# Patient Record
Sex: MALE | Race: OTHER | NOT HISPANIC OR LATINO | URBAN - METROPOLITAN AREA
[De-identification: names, ages, dates, MRNs, and addresses within clinical notes are randomized per-mention and may not be internally consistent; named-entity substitution may affect disease eponyms.]

---

## 2021-01-01 ENCOUNTER — INPATIENT (INPATIENT)
Facility: HOSPITAL | Age: 81
LOS: 35 days | DRG: 870 | End: 2021-11-04
Attending: STUDENT IN AN ORGANIZED HEALTH CARE EDUCATION/TRAINING PROGRAM | Admitting: STUDENT IN AN ORGANIZED HEALTH CARE EDUCATION/TRAINING PROGRAM
Payer: MEDICARE

## 2021-01-01 ENCOUNTER — TRANSCRIPTION ENCOUNTER (OUTPATIENT)
Age: 81
End: 2021-01-01

## 2021-01-01 ENCOUNTER — INPATIENT (INPATIENT)
Facility: HOSPITAL | Age: 81
LOS: 2 days | Discharge: HOME | DRG: 871 | End: 2021-01-24
Attending: HOSPITALIST | Admitting: STUDENT IN AN ORGANIZED HEALTH CARE EDUCATION/TRAINING PROGRAM
Payer: MEDICARE

## 2021-01-01 ENCOUNTER — APPOINTMENT (OUTPATIENT)
Dept: DISASTER EMERGENCY | Facility: CLINIC | Age: 81
End: 2021-01-01

## 2021-01-01 ENCOUNTER — APPOINTMENT (OUTPATIENT)
Dept: GASTROENTEROLOGY | Facility: HOSPITAL | Age: 81
End: 2021-01-01

## 2021-01-01 ENCOUNTER — APPOINTMENT (OUTPATIENT)
Dept: NEUROSURGERY | Facility: HOSPITAL | Age: 81
End: 2021-01-01

## 2021-01-01 ENCOUNTER — APPOINTMENT (OUTPATIENT)
Dept: GASTROENTEROLOGY | Facility: CLINIC | Age: 81
End: 2021-01-01
Payer: MEDICARE

## 2021-01-01 VITALS
RESPIRATION RATE: 18 BRPM | SYSTOLIC BLOOD PRESSURE: 144 MMHG | DIASTOLIC BLOOD PRESSURE: 82 MMHG | HEART RATE: 71 BPM | OXYGEN SATURATION: 97 % | TEMPERATURE: 98 F

## 2021-01-01 VITALS
SYSTOLIC BLOOD PRESSURE: 117 MMHG | HEIGHT: 67 IN | DIASTOLIC BLOOD PRESSURE: 70 MMHG | OXYGEN SATURATION: 95 % | HEART RATE: 109 BPM | WEIGHT: 123.02 LBS | TEMPERATURE: 99 F | RESPIRATION RATE: 22 BRPM

## 2021-01-01 VITALS
RESPIRATION RATE: 22 BRPM | DIASTOLIC BLOOD PRESSURE: 59 MMHG | OXYGEN SATURATION: 95 % | HEART RATE: 101 BPM | TEMPERATURE: 97 F | SYSTOLIC BLOOD PRESSURE: 102 MMHG

## 2021-01-01 VITALS
HEIGHT: 67 IN | WEIGHT: 139.99 LBS | HEART RATE: 87 BPM | SYSTOLIC BLOOD PRESSURE: 145 MMHG | DIASTOLIC BLOOD PRESSURE: 75 MMHG | TEMPERATURE: 98 F | OXYGEN SATURATION: 97 % | RESPIRATION RATE: 14 BRPM

## 2021-01-01 DIAGNOSIS — E11.9 TYPE 2 DIABETES MELLITUS WITHOUT COMPLICATIONS: ICD-10-CM

## 2021-01-01 DIAGNOSIS — Z29.9 ENCOUNTER FOR PROPHYLACTIC MEASURES, UNSPECIFIED: ICD-10-CM

## 2021-01-01 DIAGNOSIS — E87.1 HYPO-OSMOLALITY AND HYPONATREMIA: ICD-10-CM

## 2021-01-01 DIAGNOSIS — G93.89 OTHER SPECIFIED DISORDERS OF BRAIN: ICD-10-CM

## 2021-01-01 DIAGNOSIS — E78.5 HYPERLIPIDEMIA, UNSPECIFIED: ICD-10-CM

## 2021-01-01 DIAGNOSIS — K94.23 GASTROSTOMY MALFUNCTION: ICD-10-CM

## 2021-01-01 DIAGNOSIS — R74.01 ELEVATION OF LEVELS OF LIVER TRANSAMINASE LEVELS: ICD-10-CM

## 2021-01-01 DIAGNOSIS — D64.9 ANEMIA, UNSPECIFIED: ICD-10-CM

## 2021-01-01 DIAGNOSIS — D50.9 IRON DEFICIENCY ANEMIA, UNSPECIFIED: ICD-10-CM

## 2021-01-01 DIAGNOSIS — Z98.49 CATARACT EXTRACTION STATUS, UNSPECIFIED EYE: Chronic | ICD-10-CM

## 2021-01-01 DIAGNOSIS — R13.10 DYSPHAGIA, UNSPECIFIED: ICD-10-CM

## 2021-01-01 DIAGNOSIS — J69.0 PNEUMONITIS DUE TO INHALATION OF FOOD AND VOMIT: ICD-10-CM

## 2021-01-01 DIAGNOSIS — E83.51 HYPOCALCEMIA: ICD-10-CM

## 2021-01-01 DIAGNOSIS — K80.50 CALCULUS OF BILE DUCT W/OUT CHOLANGITIS OR CHOLECYSTITIS W/OUT OBSTRUCTION: ICD-10-CM

## 2021-01-01 DIAGNOSIS — R78.81 BACTEREMIA: ICD-10-CM

## 2021-01-01 DIAGNOSIS — N40.0 BENIGN PROSTATIC HYPERPLASIA WITHOUT LOWER URINARY TRACT SYMPTOMS: ICD-10-CM

## 2021-01-01 DIAGNOSIS — G20 PARKINSON'S DISEASE: ICD-10-CM

## 2021-01-01 DIAGNOSIS — U07.1 COVID-19: ICD-10-CM

## 2021-01-01 DIAGNOSIS — R41.82 ALTERED MENTAL STATUS, UNSPECIFIED: ICD-10-CM

## 2021-01-01 DIAGNOSIS — Z71.89 OTHER SPECIFIED COUNSELING: ICD-10-CM

## 2021-01-01 DIAGNOSIS — I10 ESSENTIAL (PRIMARY) HYPERTENSION: ICD-10-CM

## 2021-01-01 DIAGNOSIS — K83.09 OTHER CHOLANGITIS: ICD-10-CM

## 2021-01-01 DIAGNOSIS — Z46.89 ENCOUNTER FOR FITTING AND ADJUSTMENT OF OTHER SPECIFIED DEVICES: ICD-10-CM

## 2021-01-01 DIAGNOSIS — Z86.73 PERSONAL HISTORY OF TRANSIENT ISCHEMIC ATTACK (TIA), AND CEREBRAL INFARCTION W/OUT RESIDUAL DEFICITS: ICD-10-CM

## 2021-01-01 DIAGNOSIS — R32 UNSPECIFIED URINARY INCONTINENCE: ICD-10-CM

## 2021-01-01 DIAGNOSIS — E11.9 TYPE 2 DIABETES MELLITUS W/OUT COMPLICATIONS: ICD-10-CM

## 2021-01-01 DIAGNOSIS — I62.9 NONTRAUMATIC INTRACRANIAL HEMORRHAGE, UNSPECIFIED: ICD-10-CM

## 2021-01-01 DIAGNOSIS — Z01.818 ENCOUNTER FOR OTHER PREPROCEDURAL EXAMINATION: ICD-10-CM

## 2021-01-01 DIAGNOSIS — D69.6 THROMBOCYTOPENIA, UNSPECIFIED: ICD-10-CM

## 2021-01-01 DIAGNOSIS — Z51.5 ENCOUNTER FOR PALLIATIVE CARE: ICD-10-CM

## 2021-01-01 DIAGNOSIS — E87.0 HYPEROSMOLALITY AND HYPERNATREMIA: ICD-10-CM

## 2021-01-01 DIAGNOSIS — B37.7 CANDIDAL SEPSIS: ICD-10-CM

## 2021-01-01 DIAGNOSIS — R53.2 FUNCTIONAL QUADRIPLEGIA: ICD-10-CM

## 2021-01-01 LAB
-  AMIKACIN: SIGNIFICANT CHANGE UP
-  AMIKACIN: SIGNIFICANT CHANGE UP
-  AMPHOTERICIN B: SIGNIFICANT CHANGE UP
-  AMPICILLIN/SULBACTAM: SIGNIFICANT CHANGE UP
-  AMPICILLIN/SULBACTAM: SIGNIFICANT CHANGE UP
-  AMPICILLIN: SIGNIFICANT CHANGE UP
-  AMPICILLIN: SIGNIFICANT CHANGE UP
-  ANIDULAFUNGIN: SIGNIFICANT CHANGE UP
-  AZTREONAM: SIGNIFICANT CHANGE UP
-  AZTREONAM: SIGNIFICANT CHANGE UP
-  CASPOFUNGIN: SIGNIFICANT CHANGE UP
-  CEFAZOLIN: SIGNIFICANT CHANGE UP
-  CEFAZOLIN: SIGNIFICANT CHANGE UP
-  CEFEPIME: SIGNIFICANT CHANGE UP
-  CEFEPIME: SIGNIFICANT CHANGE UP
-  CEFOXITIN: SIGNIFICANT CHANGE UP
-  CEFOXITIN: SIGNIFICANT CHANGE UP
-  CEFTAZIDIME/AVIBACTAM: SIGNIFICANT CHANGE UP
-  CEFTAZIDIME/AVIBACTAM: SIGNIFICANT CHANGE UP
-  CEFTOLOZANE/TAZOBACTAM: SIGNIFICANT CHANGE UP
-  CEFTOLOZANE/TAZOBACTAM: SIGNIFICANT CHANGE UP
-  CEFTRIAXONE: SIGNIFICANT CHANGE UP
-  CEFTRIAXONE: SIGNIFICANT CHANGE UP
-  CIPROFLOXACIN: SIGNIFICANT CHANGE UP
-  CIPROFLOXACIN: SIGNIFICANT CHANGE UP
-  ERTAPENEM: SIGNIFICANT CHANGE UP
-  ERTAPENEM: SIGNIFICANT CHANGE UP
-  FLUCONAZOLE: SIGNIFICANT CHANGE UP
-  GENTAMICIN: SIGNIFICANT CHANGE UP
-  GENTAMICIN: SIGNIFICANT CHANGE UP
-  IMIPENEM: SIGNIFICANT CHANGE UP
-  IMIPENEM: SIGNIFICANT CHANGE UP
-  INTERPRETATION: SIGNIFICANT CHANGE UP
-  K. PNEUMONIAE GROUP: SIGNIFICANT CHANGE UP
-  LEVOFLOXACIN: SIGNIFICANT CHANGE UP
-  LEVOFLOXACIN: SIGNIFICANT CHANGE UP
-  MEROPENEM: SIGNIFICANT CHANGE UP
-  MEROPENEM: SIGNIFICANT CHANGE UP
-  MICAFUNGIN: SIGNIFICANT CHANGE UP
-  PIPERACILLIN/TAZOBACTAM: SIGNIFICANT CHANGE UP
-  PIPERACILLIN/TAZOBACTAM: SIGNIFICANT CHANGE UP
-  POSACONAZOLE: SIGNIFICANT CHANGE UP
-  TIGECYCLINE: SIGNIFICANT CHANGE UP
-  TIGECYCLINE: SIGNIFICANT CHANGE UP
-  TOBRAMYCIN: SIGNIFICANT CHANGE UP
-  TOBRAMYCIN: SIGNIFICANT CHANGE UP
-  TRIMETHOPRIM/SULFAMETHOXAZOLE: SIGNIFICANT CHANGE UP
-  TRIMETHOPRIM/SULFAMETHOXAZOLE: SIGNIFICANT CHANGE UP
-  VORICONAZOLE: SIGNIFICANT CHANGE UP
A1C WITH ESTIMATED AVERAGE GLUCOSE RESULT: 8 % — HIGH (ref 4–5.6)
ALBUMIN SERPL ELPH-MCNC: 1.4 G/DL — LOW (ref 3.3–5)
ALBUMIN SERPL ELPH-MCNC: 2 G/DL — LOW (ref 3.3–5)
ALBUMIN SERPL ELPH-MCNC: 2.1 G/DL — LOW (ref 3.3–5)
ALBUMIN SERPL ELPH-MCNC: 2.2 G/DL — LOW (ref 3.3–5)
ALBUMIN SERPL ELPH-MCNC: 2.3 G/DL — LOW (ref 3.3–5)
ALBUMIN SERPL ELPH-MCNC: 2.4 G/DL — LOW (ref 3.3–5)
ALBUMIN SERPL ELPH-MCNC: 2.5 G/DL — LOW (ref 3.3–5)
ALBUMIN SERPL ELPH-MCNC: 2.6 G/DL — LOW (ref 3.3–5)
ALBUMIN SERPL ELPH-MCNC: 2.6 G/DL — LOW (ref 3.3–5)
ALBUMIN SERPL ELPH-MCNC: 2.7 G/DL — LOW (ref 3.3–5)
ALBUMIN SERPL ELPH-MCNC: 2.8 G/DL — LOW (ref 3.3–5)
ALBUMIN SERPL ELPH-MCNC: 2.9 G/DL — LOW (ref 3.3–5)
ALBUMIN SERPL ELPH-MCNC: 3 G/DL — LOW (ref 3.3–5)
ALBUMIN SERPL ELPH-MCNC: 3.1 G/DL — LOW (ref 3.3–5)
ALBUMIN SERPL ELPH-MCNC: 3.2 G/DL — LOW (ref 3.3–5)
ALBUMIN SERPL ELPH-MCNC: 3.4 G/DL — SIGNIFICANT CHANGE UP (ref 3.3–5)
ALBUMIN SERPL ELPH-MCNC: 3.4 G/DL — SIGNIFICANT CHANGE UP (ref 3.3–5)
ALBUMIN SERPL ELPH-MCNC: 3.5 G/DL — SIGNIFICANT CHANGE UP (ref 3.3–5)
ALBUMIN SERPL ELPH-MCNC: 3.5 G/DL — SIGNIFICANT CHANGE UP (ref 3.3–5)
ALP SERPL-CCNC: 100 U/L — SIGNIFICANT CHANGE UP (ref 40–120)
ALP SERPL-CCNC: 106 U/L — SIGNIFICANT CHANGE UP (ref 40–120)
ALP SERPL-CCNC: 110 U/L — SIGNIFICANT CHANGE UP (ref 40–120)
ALP SERPL-CCNC: 140 U/L — HIGH (ref 40–120)
ALP SERPL-CCNC: 145 U/L — HIGH (ref 40–120)
ALP SERPL-CCNC: 148 U/L — HIGH (ref 40–120)
ALP SERPL-CCNC: 149 U/L — HIGH (ref 40–120)
ALP SERPL-CCNC: 158 U/L — HIGH (ref 40–120)
ALP SERPL-CCNC: 167 U/L — HIGH (ref 40–120)
ALP SERPL-CCNC: 182 U/L — HIGH (ref 40–120)
ALP SERPL-CCNC: 197 U/L — HIGH (ref 40–120)
ALP SERPL-CCNC: 202 U/L — HIGH (ref 40–120)
ALP SERPL-CCNC: 206 U/L — HIGH (ref 40–120)
ALP SERPL-CCNC: 215 U/L — HIGH (ref 40–120)
ALP SERPL-CCNC: 229 U/L — HIGH (ref 40–120)
ALP SERPL-CCNC: 253 U/L — HIGH (ref 40–120)
ALP SERPL-CCNC: 260 U/L — HIGH (ref 40–120)
ALP SERPL-CCNC: 282 U/L — HIGH (ref 40–120)
ALP SERPL-CCNC: 302 U/L — HIGH (ref 40–120)
ALP SERPL-CCNC: 321 U/L — HIGH (ref 40–120)
ALP SERPL-CCNC: 77 U/L — SIGNIFICANT CHANGE UP (ref 40–120)
ALP SERPL-CCNC: 82 U/L — SIGNIFICANT CHANGE UP (ref 40–120)
ALP SERPL-CCNC: 88 U/L — SIGNIFICANT CHANGE UP (ref 40–120)
ALP SERPL-CCNC: 97 U/L — SIGNIFICANT CHANGE UP (ref 40–120)
ALP SERPL-CCNC: 97 U/L — SIGNIFICANT CHANGE UP (ref 40–120)
ALT FLD-CCNC: 10 U/L — SIGNIFICANT CHANGE UP (ref 10–45)
ALT FLD-CCNC: 10 U/L — SIGNIFICANT CHANGE UP (ref 10–45)
ALT FLD-CCNC: 11 U/L — SIGNIFICANT CHANGE UP (ref 10–45)
ALT FLD-CCNC: 118 U/L — HIGH (ref 10–45)
ALT FLD-CCNC: 12 U/L — SIGNIFICANT CHANGE UP (ref 10–45)
ALT FLD-CCNC: 128 U/L — HIGH (ref 10–45)
ALT FLD-CCNC: 13 U/L — SIGNIFICANT CHANGE UP (ref 10–45)
ALT FLD-CCNC: 133 U/L — HIGH (ref 10–45)
ALT FLD-CCNC: 14 U/L — SIGNIFICANT CHANGE UP (ref 10–45)
ALT FLD-CCNC: 149 U/L — HIGH (ref 10–45)
ALT FLD-CCNC: 166 U/L — HIGH (ref 10–45)
ALT FLD-CCNC: 214 U/L — HIGH (ref 10–45)
ALT FLD-CCNC: 222 U/L — HIGH (ref 10–45)
ALT FLD-CCNC: 25 U/L — SIGNIFICANT CHANGE UP (ref 10–45)
ALT FLD-CCNC: 49 U/L — HIGH (ref 10–45)
ALT FLD-CCNC: 56 U/L — HIGH (ref 10–45)
ALT FLD-CCNC: 68 U/L — HIGH (ref 10–45)
ALT FLD-CCNC: 70 U/L — HIGH (ref 10–45)
ALT FLD-CCNC: 77 U/L — HIGH (ref 10–45)
ALT FLD-CCNC: 80 U/L — HIGH (ref 10–45)
ALT FLD-CCNC: 86 U/L — HIGH (ref 10–45)
ALT FLD-CCNC: 95 U/L — HIGH (ref 10–45)
ALT FLD-CCNC: 98 U/L — HIGH (ref 10–45)
ANION GAP SERPL CALC-SCNC: 10 MMOL/L — SIGNIFICANT CHANGE UP (ref 5–17)
ANION GAP SERPL CALC-SCNC: 11 MMOL/L — SIGNIFICANT CHANGE UP (ref 5–17)
ANION GAP SERPL CALC-SCNC: 12 MMOL/L — SIGNIFICANT CHANGE UP (ref 5–17)
ANION GAP SERPL CALC-SCNC: 13 MMOL/L — SIGNIFICANT CHANGE UP (ref 5–17)
ANION GAP SERPL CALC-SCNC: 14 MMOL/L — SIGNIFICANT CHANGE UP (ref 5–17)
ANION GAP SERPL CALC-SCNC: 15 MMOL/L — SIGNIFICANT CHANGE UP (ref 5–17)
ANION GAP SERPL CALC-SCNC: 16 MMOL/L — SIGNIFICANT CHANGE UP (ref 5–17)
ANION GAP SERPL CALC-SCNC: 16 MMOL/L — SIGNIFICANT CHANGE UP (ref 5–17)
ANION GAP SERPL CALC-SCNC: 17 MMOL/L — SIGNIFICANT CHANGE UP (ref 5–17)
ANION GAP SERPL CALC-SCNC: 18 MMOL/L — HIGH (ref 5–17)
ANION GAP SERPL CALC-SCNC: 5 MMOL/L — SIGNIFICANT CHANGE UP (ref 5–17)
ANISOCYTOSIS BLD QL: SLIGHT — SIGNIFICANT CHANGE UP
ANISOCYTOSIS BLD QL: SLIGHT — SIGNIFICANT CHANGE UP
APPEARANCE UR: ABNORMAL
APPEARANCE UR: ABNORMAL
APPEARANCE UR: CLEAR — SIGNIFICANT CHANGE UP
APTT BLD: 18 SEC — LOW (ref 27.5–35.5)
APTT BLD: 24.2 SEC — LOW (ref 27.5–35.5)
APTT BLD: 24.8 SEC — LOW (ref 27.5–35.5)
APTT BLD: 26 SEC — LOW (ref 27.5–35.5)
APTT BLD: 27.7 SEC — SIGNIFICANT CHANGE UP (ref 27.5–35.5)
APTT BLD: 28 SEC — SIGNIFICANT CHANGE UP (ref 27.5–35.5)
APTT BLD: 29.1 SEC — SIGNIFICANT CHANGE UP (ref 27.5–35.5)
AST SERPL-CCNC: 12 U/L — SIGNIFICANT CHANGE UP (ref 10–40)
AST SERPL-CCNC: 15 U/L — SIGNIFICANT CHANGE UP (ref 10–40)
AST SERPL-CCNC: 15 U/L — SIGNIFICANT CHANGE UP (ref 10–40)
AST SERPL-CCNC: 17 U/L — SIGNIFICANT CHANGE UP (ref 10–40)
AST SERPL-CCNC: 19 U/L — SIGNIFICANT CHANGE UP (ref 10–40)
AST SERPL-CCNC: 20 U/L — SIGNIFICANT CHANGE UP (ref 10–40)
AST SERPL-CCNC: 23 U/L — SIGNIFICANT CHANGE UP (ref 10–40)
AST SERPL-CCNC: 231 U/L — HIGH (ref 10–40)
AST SERPL-CCNC: 233 U/L — HIGH (ref 10–40)
AST SERPL-CCNC: 25 U/L — SIGNIFICANT CHANGE UP (ref 10–40)
AST SERPL-CCNC: 270 U/L — HIGH (ref 10–40)
AST SERPL-CCNC: 29 U/L — SIGNIFICANT CHANGE UP (ref 10–40)
AST SERPL-CCNC: 36 U/L — SIGNIFICANT CHANGE UP (ref 10–40)
AST SERPL-CCNC: 40 U/L — SIGNIFICANT CHANGE UP (ref 10–40)
AST SERPL-CCNC: 43 U/L — HIGH (ref 10–40)
AST SERPL-CCNC: 44 U/L — HIGH (ref 10–40)
AST SERPL-CCNC: 48 U/L — HIGH (ref 10–40)
AST SERPL-CCNC: 53 U/L — HIGH (ref 10–40)
AST SERPL-CCNC: 57 U/L — HIGH (ref 10–40)
AST SERPL-CCNC: 57 U/L — HIGH (ref 10–40)
AST SERPL-CCNC: 64 U/L — HIGH (ref 10–40)
AST SERPL-CCNC: 67 U/L — HIGH (ref 10–40)
AST SERPL-CCNC: 69 U/L — HIGH (ref 10–40)
AST SERPL-CCNC: 86 U/L — HIGH (ref 10–40)
AST SERPL-CCNC: 88 U/L — HIGH (ref 10–40)
BACTERIA # UR AUTO: ABNORMAL
BACTERIA # UR AUTO: NEGATIVE — SIGNIFICANT CHANGE UP
BASE EXCESS BLDA CALC-SCNC: 5.2 MMOL/L — HIGH (ref -2–3)
BASE EXCESS BLDV CALC-SCNC: -2.8 MMOL/L — LOW (ref -2–2)
BASE EXCESS BLDV CALC-SCNC: -4.4 MMOL/L — LOW (ref -2–2)
BASE EXCESS BLDV CALC-SCNC: -6.9 MMOL/L — LOW (ref -2–2)
BASE EXCESS BLDV CALC-SCNC: -7.2 MMOL/L — LOW (ref -2–2)
BASE EXCESS BLDV CALC-SCNC: -8.2 MMOL/L — LOW (ref -2–2)
BASOPHILS # BLD AUTO: 0 K/UL — SIGNIFICANT CHANGE UP (ref 0–0.2)
BASOPHILS # BLD AUTO: 0.01 K/UL — SIGNIFICANT CHANGE UP (ref 0–0.2)
BASOPHILS # BLD AUTO: 0.05 K/UL — SIGNIFICANT CHANGE UP (ref 0–0.2)
BASOPHILS NFR BLD AUTO: 0 % — SIGNIFICANT CHANGE UP (ref 0–2)
BASOPHILS NFR BLD AUTO: 0.1 % — SIGNIFICANT CHANGE UP (ref 0–2)
BASOPHILS NFR BLD AUTO: 0.3 % — SIGNIFICANT CHANGE UP (ref 0–2)
BILIRUB SERPL-MCNC: 0.2 MG/DL — SIGNIFICANT CHANGE UP (ref 0.2–1.2)
BILIRUB SERPL-MCNC: 0.3 MG/DL — SIGNIFICANT CHANGE UP (ref 0.2–1.2)
BILIRUB SERPL-MCNC: 0.4 MG/DL — SIGNIFICANT CHANGE UP (ref 0.2–1.2)
BILIRUB SERPL-MCNC: 0.5 MG/DL — SIGNIFICANT CHANGE UP (ref 0.2–1.2)
BILIRUB SERPL-MCNC: 0.5 MG/DL — SIGNIFICANT CHANGE UP (ref 0.2–1.2)
BILIRUB SERPL-MCNC: 0.6 MG/DL — SIGNIFICANT CHANGE UP (ref 0.2–1.2)
BILIRUB SERPL-MCNC: 0.6 MG/DL — SIGNIFICANT CHANGE UP (ref 0.2–1.2)
BILIRUB SERPL-MCNC: 0.7 MG/DL — SIGNIFICANT CHANGE UP (ref 0.2–1.2)
BILIRUB SERPL-MCNC: 0.8 MG/DL — SIGNIFICANT CHANGE UP (ref 0.2–1.2)
BILIRUB SERPL-MCNC: 0.9 MG/DL — SIGNIFICANT CHANGE UP (ref 0.2–1.2)
BILIRUB SERPL-MCNC: 1.2 MG/DL — SIGNIFICANT CHANGE UP (ref 0.2–1.2)
BILIRUB SERPL-MCNC: 1.2 MG/DL — SIGNIFICANT CHANGE UP (ref 0.2–1.2)
BILIRUB SERPL-MCNC: 1.6 MG/DL — HIGH (ref 0.2–1.2)
BILIRUB SERPL-MCNC: 2.7 MG/DL — HIGH (ref 0.2–1.2)
BILIRUB SERPL-MCNC: 2.7 MG/DL — HIGH (ref 0.2–1.2)
BILIRUB SERPL-MCNC: 3.5 MG/DL — HIGH (ref 0.2–1.2)
BILIRUB UR-MCNC: ABNORMAL
BILIRUB UR-MCNC: NEGATIVE — SIGNIFICANT CHANGE UP
BLD GP AB SCN SERPL QL: NEGATIVE — SIGNIFICANT CHANGE UP
BUN SERPL-MCNC: 15 MG/DL — SIGNIFICANT CHANGE UP (ref 7–23)
BUN SERPL-MCNC: 16 MG/DL — SIGNIFICANT CHANGE UP (ref 7–23)
BUN SERPL-MCNC: 17 MG/DL — SIGNIFICANT CHANGE UP (ref 7–23)
BUN SERPL-MCNC: 18 MG/DL — SIGNIFICANT CHANGE UP (ref 7–23)
BUN SERPL-MCNC: 18 MG/DL — SIGNIFICANT CHANGE UP (ref 7–23)
BUN SERPL-MCNC: 19 MG/DL — SIGNIFICANT CHANGE UP (ref 7–23)
BUN SERPL-MCNC: 19 MG/DL — SIGNIFICANT CHANGE UP (ref 7–23)
BUN SERPL-MCNC: 20 MG/DL — SIGNIFICANT CHANGE UP (ref 7–23)
BUN SERPL-MCNC: 21 MG/DL — SIGNIFICANT CHANGE UP (ref 7–23)
BUN SERPL-MCNC: 22 MG/DL — SIGNIFICANT CHANGE UP (ref 7–23)
BUN SERPL-MCNC: 23 MG/DL — SIGNIFICANT CHANGE UP (ref 7–23)
BUN SERPL-MCNC: 24 MG/DL — HIGH (ref 7–23)
BUN SERPL-MCNC: 25 MG/DL — HIGH (ref 7–23)
BUN SERPL-MCNC: 26 MG/DL — HIGH (ref 7–23)
BUN SERPL-MCNC: 28 MG/DL — HIGH (ref 7–23)
BUN SERPL-MCNC: 29 MG/DL — HIGH (ref 7–23)
BUN SERPL-MCNC: 30 MG/DL — HIGH (ref 7–23)
BUN SERPL-MCNC: 31 MG/DL — HIGH (ref 7–23)
BUN SERPL-MCNC: 33 MG/DL — HIGH (ref 7–23)
BUN SERPL-MCNC: 34 MG/DL — HIGH (ref 7–23)
BUN SERPL-MCNC: 34 MG/DL — HIGH (ref 7–23)
BUN SERPL-MCNC: 37 MG/DL — HIGH (ref 7–23)
BUN SERPL-MCNC: 38 MG/DL — HIGH (ref 7–23)
BUN SERPL-MCNC: 39 MG/DL — HIGH (ref 7–23)
BUN SERPL-MCNC: 39 MG/DL — HIGH (ref 7–23)
BUN SERPL-MCNC: 41 MG/DL — HIGH (ref 7–23)
BUN SERPL-MCNC: 43 MG/DL — HIGH (ref 7–23)
BUN SERPL-MCNC: 44 MG/DL — HIGH (ref 7–23)
BUN SERPL-MCNC: 45 MG/DL — HIGH (ref 7–23)
BUN SERPL-MCNC: 46 MG/DL — HIGH (ref 7–23)
BUN SERPL-MCNC: 46 MG/DL — HIGH (ref 7–23)
BUN SERPL-MCNC: 50 MG/DL — HIGH (ref 7–23)
BURR CELLS BLD QL SMEAR: PRESENT — SIGNIFICANT CHANGE UP
C AURIS DNA BLD POS QL NAA+PROBE: SIGNIFICANT CHANGE UP
CA-I BLD-SCNC: 1.07 MMOL/L — LOW (ref 1.15–1.33)
CA-I SERPL-SCNC: 1.17 MMOL/L — SIGNIFICANT CHANGE UP (ref 1.15–1.33)
CA-I SERPL-SCNC: 1.18 MMOL/L — SIGNIFICANT CHANGE UP (ref 1.15–1.33)
CA-I SERPL-SCNC: 1.19 MMOL/L — SIGNIFICANT CHANGE UP (ref 1.15–1.33)
CA-I SERPL-SCNC: 1.21 MMOL/L — SIGNIFICANT CHANGE UP (ref 1.15–1.33)
CA-I SERPL-SCNC: 1.23 MMOL/L — SIGNIFICANT CHANGE UP (ref 1.15–1.33)
CALCIUM SERPL-MCNC: 5.1 MG/DL — CRITICAL LOW (ref 8.4–10.5)
CALCIUM SERPL-MCNC: 7 MG/DL — LOW (ref 8.4–10.5)
CALCIUM SERPL-MCNC: 7.3 MG/DL — LOW (ref 8.4–10.5)
CALCIUM SERPL-MCNC: 7.6 MG/DL — LOW (ref 8.4–10.5)
CALCIUM SERPL-MCNC: 7.6 MG/DL — LOW (ref 8.4–10.5)
CALCIUM SERPL-MCNC: 7.7 MG/DL — LOW (ref 8.4–10.5)
CALCIUM SERPL-MCNC: 7.8 MG/DL — LOW (ref 8.4–10.5)
CALCIUM SERPL-MCNC: 7.9 MG/DL — LOW (ref 8.4–10.5)
CALCIUM SERPL-MCNC: 8 MG/DL — LOW (ref 8.4–10.5)
CALCIUM SERPL-MCNC: 8.1 MG/DL — LOW (ref 8.4–10.5)
CALCIUM SERPL-MCNC: 8.2 MG/DL — LOW (ref 8.4–10.5)
CALCIUM SERPL-MCNC: 8.3 MG/DL — LOW (ref 8.4–10.5)
CALCIUM SERPL-MCNC: 8.4 MG/DL — SIGNIFICANT CHANGE UP (ref 8.4–10.5)
CALCIUM SERPL-MCNC: 8.5 MG/DL — SIGNIFICANT CHANGE UP (ref 8.4–10.5)
CALCIUM SERPL-MCNC: 8.7 MG/DL — SIGNIFICANT CHANGE UP (ref 8.4–10.5)
CALCIUM SERPL-MCNC: 8.8 MG/DL — SIGNIFICANT CHANGE UP (ref 8.4–10.5)
CALCIUM SERPL-MCNC: 8.9 MG/DL — SIGNIFICANT CHANGE UP (ref 8.4–10.5)
CALCIUM SERPL-MCNC: 8.9 MG/DL — SIGNIFICANT CHANGE UP (ref 8.4–10.5)
CHLORIDE BLDV-SCNC: 106 MMOL/L — SIGNIFICANT CHANGE UP (ref 96–108)
CHLORIDE BLDV-SCNC: 107 MMOL/L — SIGNIFICANT CHANGE UP (ref 96–108)
CHLORIDE BLDV-SCNC: 115 MMOL/L — HIGH (ref 96–108)
CHLORIDE BLDV-SCNC: 80 MMOL/L — LOW (ref 96–108)
CHLORIDE BLDV-SCNC: 90 MMOL/L — LOW (ref 96–108)
CHLORIDE SERPL-SCNC: 101 MMOL/L — SIGNIFICANT CHANGE UP (ref 96–108)
CHLORIDE SERPL-SCNC: 102 MMOL/L — SIGNIFICANT CHANGE UP (ref 96–108)
CHLORIDE SERPL-SCNC: 103 MMOL/L — SIGNIFICANT CHANGE UP (ref 96–108)
CHLORIDE SERPL-SCNC: 104 MMOL/L — SIGNIFICANT CHANGE UP (ref 96–108)
CHLORIDE SERPL-SCNC: 104 MMOL/L — SIGNIFICANT CHANGE UP (ref 96–108)
CHLORIDE SERPL-SCNC: 105 MMOL/L — SIGNIFICANT CHANGE UP (ref 96–108)
CHLORIDE SERPL-SCNC: 106 MMOL/L — SIGNIFICANT CHANGE UP (ref 96–108)
CHLORIDE SERPL-SCNC: 106 MMOL/L — SIGNIFICANT CHANGE UP (ref 96–108)
CHLORIDE SERPL-SCNC: 108 MMOL/L — SIGNIFICANT CHANGE UP (ref 96–108)
CHLORIDE SERPL-SCNC: 109 MMOL/L — HIGH (ref 96–108)
CHLORIDE SERPL-SCNC: 110 MMOL/L — HIGH (ref 96–108)
CHLORIDE SERPL-SCNC: 111 MMOL/L — HIGH (ref 96–108)
CHLORIDE SERPL-SCNC: 112 MMOL/L — HIGH (ref 96–108)
CHLORIDE SERPL-SCNC: 113 MMOL/L — HIGH (ref 96–108)
CHLORIDE SERPL-SCNC: 113 MMOL/L — HIGH (ref 96–108)
CHLORIDE SERPL-SCNC: 114 MMOL/L — HIGH (ref 96–108)
CHLORIDE SERPL-SCNC: 114 MMOL/L — HIGH (ref 96–108)
CHLORIDE SERPL-SCNC: 115 MMOL/L — HIGH (ref 96–108)
CHLORIDE SERPL-SCNC: 116 MMOL/L — HIGH (ref 96–108)
CHLORIDE SERPL-SCNC: 117 MMOL/L — HIGH (ref 96–108)
CHLORIDE SERPL-SCNC: 119 MMOL/L — HIGH (ref 96–108)
CHLORIDE SERPL-SCNC: 79 MMOL/L — LOW (ref 96–108)
CHLORIDE SERPL-SCNC: 84 MMOL/L — LOW (ref 96–108)
CHLORIDE SERPL-SCNC: 86 MMOL/L — LOW (ref 96–108)
CHLORIDE SERPL-SCNC: 87 MMOL/L — LOW (ref 96–108)
CHLORIDE SERPL-SCNC: 90 MMOL/L — LOW (ref 96–108)
CHLORIDE SERPL-SCNC: 90 MMOL/L — LOW (ref 96–108)
CHLORIDE SERPL-SCNC: 91 MMOL/L — LOW (ref 96–108)
CHLORIDE SERPL-SCNC: 91 MMOL/L — LOW (ref 96–108)
CHLORIDE SERPL-SCNC: 94 MMOL/L — LOW (ref 96–108)
CHLORIDE SERPL-SCNC: 97 MMOL/L — SIGNIFICANT CHANGE UP (ref 96–108)
CHLORIDE SERPL-SCNC: 98 MMOL/L — SIGNIFICANT CHANGE UP (ref 96–108)
CHLORIDE SERPL-SCNC: 99 MMOL/L — SIGNIFICANT CHANGE UP (ref 96–108)
CHLORIDE UR-SCNC: 27 MMOL/L — SIGNIFICANT CHANGE UP
CO2 BLDA-SCNC: 28 MMOL/L — HIGH (ref 19–24)
CO2 BLDV-SCNC: 18 MMOL/L — LOW (ref 22–26)
CO2 BLDV-SCNC: 21 MMOL/L — LOW (ref 22–26)
CO2 BLDV-SCNC: 23 MMOL/L — SIGNIFICANT CHANGE UP (ref 22–26)
CO2 SERPL-SCNC: 12 MMOL/L — LOW (ref 22–31)
CO2 SERPL-SCNC: 13 MMOL/L — LOW (ref 22–31)
CO2 SERPL-SCNC: 14 MMOL/L — LOW (ref 22–31)
CO2 SERPL-SCNC: 15 MMOL/L — LOW (ref 22–31)
CO2 SERPL-SCNC: 16 MMOL/L — LOW (ref 22–31)
CO2 SERPL-SCNC: 17 MMOL/L — LOW (ref 22–31)
CO2 SERPL-SCNC: 18 MMOL/L — LOW (ref 22–31)
CO2 SERPL-SCNC: 20 MMOL/L — LOW (ref 22–31)
CO2 SERPL-SCNC: 21 MMOL/L — LOW (ref 22–31)
CO2 SERPL-SCNC: 22 MMOL/L — SIGNIFICANT CHANGE UP (ref 22–31)
CO2 SERPL-SCNC: 23 MMOL/L — SIGNIFICANT CHANGE UP (ref 22–31)
CO2 SERPL-SCNC: 24 MMOL/L — SIGNIFICANT CHANGE UP (ref 22–31)
CO2 SERPL-SCNC: 25 MMOL/L — SIGNIFICANT CHANGE UP (ref 22–31)
CO2 SERPL-SCNC: 26 MMOL/L — SIGNIFICANT CHANGE UP (ref 22–31)
COLOR SPEC: YELLOW — SIGNIFICANT CHANGE UP
COMMENT - URINE: SIGNIFICANT CHANGE UP
CORTICOSTEROID BINDING GLOBULIN RESULT: 1.6 MG/DL — LOW
CORTIS F/TOTAL MFR SERPL: 24 % — SIGNIFICANT CHANGE UP
CORTIS SERPL-MCNC: 13 UG/DL — SIGNIFICANT CHANGE UP
CORTISOL, FREE RESULT: 3.1 UG/DL — HIGH
COVID-19 SPIKE DOMAIN AB INTERP: POSITIVE
COVID-19 SPIKE DOMAIN ANTIBODY RESULT: 73.5 U/ML — HIGH
CREAT ?TM UR-MCNC: 21 MG/DL — SIGNIFICANT CHANGE UP
CREAT ?TM UR-MCNC: 32 MG/DL — SIGNIFICANT CHANGE UP
CREAT SERPL-MCNC: 0.3 MG/DL — LOW (ref 0.5–1.3)
CREAT SERPL-MCNC: 0.31 MG/DL — LOW (ref 0.5–1.3)
CREAT SERPL-MCNC: 0.32 MG/DL — LOW (ref 0.5–1.3)
CREAT SERPL-MCNC: 0.33 MG/DL — LOW (ref 0.5–1.3)
CREAT SERPL-MCNC: 0.34 MG/DL — LOW (ref 0.5–1.3)
CREAT SERPL-MCNC: 0.35 MG/DL — LOW (ref 0.5–1.3)
CREAT SERPL-MCNC: 0.36 MG/DL — LOW (ref 0.5–1.3)
CREAT SERPL-MCNC: 0.36 MG/DL — LOW (ref 0.5–1.3)
CREAT SERPL-MCNC: 0.37 MG/DL — LOW (ref 0.5–1.3)
CREAT SERPL-MCNC: 0.38 MG/DL — LOW (ref 0.5–1.3)
CREAT SERPL-MCNC: 0.39 MG/DL — LOW (ref 0.5–1.3)
CREAT SERPL-MCNC: 0.4 MG/DL — LOW (ref 0.5–1.3)
CREAT SERPL-MCNC: 0.41 MG/DL — LOW (ref 0.5–1.3)
CREAT SERPL-MCNC: 0.42 MG/DL — LOW (ref 0.5–1.3)
CREAT SERPL-MCNC: 0.43 MG/DL — LOW (ref 0.5–1.3)
CREAT SERPL-MCNC: 0.44 MG/DL — LOW (ref 0.5–1.3)
CREAT SERPL-MCNC: 0.45 MG/DL — LOW (ref 0.5–1.3)
CREAT SERPL-MCNC: 0.46 MG/DL — LOW (ref 0.5–1.3)
CREAT SERPL-MCNC: 0.46 MG/DL — LOW (ref 0.5–1.3)
CREAT SERPL-MCNC: 0.47 MG/DL — LOW (ref 0.5–1.3)
CREAT SERPL-MCNC: 0.47 MG/DL — LOW (ref 0.5–1.3)
CREAT SERPL-MCNC: 0.48 MG/DL — LOW (ref 0.5–1.3)
CREAT SERPL-MCNC: 0.49 MG/DL — LOW (ref 0.5–1.3)
CREAT SERPL-MCNC: 0.89 MG/DL — SIGNIFICANT CHANGE UP (ref 0.5–1.3)
CREAT SERPL-MCNC: 1.01 MG/DL — SIGNIFICANT CHANGE UP (ref 0.5–1.3)
CREAT SERPL-MCNC: 1.02 MG/DL — SIGNIFICANT CHANGE UP (ref 0.5–1.3)
CREAT SERPL-MCNC: 1.05 MG/DL — SIGNIFICANT CHANGE UP (ref 0.5–1.3)
CREAT SERPL-MCNC: 1.08 MG/DL — SIGNIFICANT CHANGE UP (ref 0.5–1.3)
CREAT SERPL-MCNC: <0.3 MG/DL — LOW (ref 0.5–1.3)
CULTURE RESULTS: SIGNIFICANT CHANGE UP
D DIMER BLD IA.RAPID-MCNC: 981 NG/ML DDU — HIGH
DACRYOCYTES BLD QL SMEAR: SLIGHT — SIGNIFICANT CHANGE UP
DIFF PNL FLD: ABNORMAL
DIFF PNL FLD: NEGATIVE — SIGNIFICANT CHANGE UP
DIFF PNL FLD: NEGATIVE — SIGNIFICANT CHANGE UP
ELLIPTOCYTES BLD QL SMEAR: SLIGHT — SIGNIFICANT CHANGE UP
ELLIPTOCYTES BLD QL SMEAR: SLIGHT — SIGNIFICANT CHANGE UP
EOSINOPHIL # BLD AUTO: 0 K/UL — SIGNIFICANT CHANGE UP (ref 0–0.5)
EOSINOPHIL # BLD AUTO: 0.11 K/UL — SIGNIFICANT CHANGE UP (ref 0–0.5)
EOSINOPHIL NFR BLD AUTO: 0 % — SIGNIFICANT CHANGE UP (ref 0–6)
EOSINOPHIL NFR BLD AUTO: 0.8 % — SIGNIFICANT CHANGE UP (ref 0–6)
EPI CELLS # UR: 1 — SIGNIFICANT CHANGE UP
EPI CELLS # UR: 3 /HPF — SIGNIFICANT CHANGE UP
EPI CELLS # UR: 6 — HIGH
EPI CELLS # UR: 7 /HPF — HIGH
ESTIMATED AVERAGE GLUCOSE: 183 MG/DL — HIGH (ref 68–114)
FACT X ACT/NOR PPP: 64 % — LOW (ref 70–170)
FERRITIN SERPL-MCNC: 292 NG/ML — SIGNIFICANT CHANGE UP (ref 30–400)
FERRITIN SERPL-MCNC: 666 NG/ML — HIGH (ref 30–400)
FIBRINOGEN PPP-MCNC: 366 MG/DL — SIGNIFICANT CHANGE UP (ref 290–520)
FIBRINOGEN PPP-MCNC: 572 MG/DL — HIGH (ref 290–520)
FOLATE SERPL-MCNC: 4.3 NG/ML — LOW
FOLATE SERPL-MCNC: 8.5 NG/ML — SIGNIFICANT CHANGE UP
GAS PNL BLDA: SIGNIFICANT CHANGE UP
GAS PNL BLDV: 109 MMOL/L — CRITICAL LOW (ref 136–145)
GAS PNL BLDV: 116 MMOL/L — CRITICAL LOW (ref 136–145)
GAS PNL BLDV: 132 MMOL/L — LOW (ref 136–145)
GAS PNL BLDV: 137 MMOL/L — SIGNIFICANT CHANGE UP (ref 136–145)
GAS PNL BLDV: 144 MMOL/L — SIGNIFICANT CHANGE UP (ref 136–145)
GAS PNL BLDV: SIGNIFICANT CHANGE UP
GIANT PLATELETS BLD QL SMEAR: PRESENT — SIGNIFICANT CHANGE UP
GLUCOSE BLDC GLUCOMTR-MCNC: 100 MG/DL — HIGH (ref 70–99)
GLUCOSE BLDC GLUCOMTR-MCNC: 101 MG/DL — HIGH (ref 70–99)
GLUCOSE BLDC GLUCOMTR-MCNC: 102 MG/DL — HIGH (ref 70–99)
GLUCOSE BLDC GLUCOMTR-MCNC: 103 MG/DL — HIGH (ref 70–99)
GLUCOSE BLDC GLUCOMTR-MCNC: 103 MG/DL — HIGH (ref 70–99)
GLUCOSE BLDC GLUCOMTR-MCNC: 104 MG/DL — HIGH (ref 70–99)
GLUCOSE BLDC GLUCOMTR-MCNC: 104 MG/DL — HIGH (ref 70–99)
GLUCOSE BLDC GLUCOMTR-MCNC: 106 MG/DL — HIGH (ref 70–99)
GLUCOSE BLDC GLUCOMTR-MCNC: 109 MG/DL — HIGH (ref 70–99)
GLUCOSE BLDC GLUCOMTR-MCNC: 109 MG/DL — HIGH (ref 70–99)
GLUCOSE BLDC GLUCOMTR-MCNC: 110 MG/DL — HIGH (ref 70–99)
GLUCOSE BLDC GLUCOMTR-MCNC: 111 MG/DL — HIGH (ref 70–99)
GLUCOSE BLDC GLUCOMTR-MCNC: 112 MG/DL — HIGH (ref 70–99)
GLUCOSE BLDC GLUCOMTR-MCNC: 114 MG/DL — HIGH (ref 70–99)
GLUCOSE BLDC GLUCOMTR-MCNC: 114 MG/DL — HIGH (ref 70–99)
GLUCOSE BLDC GLUCOMTR-MCNC: 115 MG/DL — HIGH (ref 70–99)
GLUCOSE BLDC GLUCOMTR-MCNC: 117 MG/DL — HIGH (ref 70–99)
GLUCOSE BLDC GLUCOMTR-MCNC: 117 MG/DL — HIGH (ref 70–99)
GLUCOSE BLDC GLUCOMTR-MCNC: 118 MG/DL — HIGH (ref 70–99)
GLUCOSE BLDC GLUCOMTR-MCNC: 119 MG/DL — HIGH (ref 70–99)
GLUCOSE BLDC GLUCOMTR-MCNC: 120 MG/DL — HIGH (ref 70–99)
GLUCOSE BLDC GLUCOMTR-MCNC: 120 MG/DL — HIGH (ref 70–99)
GLUCOSE BLDC GLUCOMTR-MCNC: 122 MG/DL — HIGH (ref 70–99)
GLUCOSE BLDC GLUCOMTR-MCNC: 122 MG/DL — HIGH (ref 70–99)
GLUCOSE BLDC GLUCOMTR-MCNC: 124 MG/DL — HIGH (ref 70–99)
GLUCOSE BLDC GLUCOMTR-MCNC: 125 MG/DL — HIGH (ref 70–99)
GLUCOSE BLDC GLUCOMTR-MCNC: 126 MG/DL — HIGH (ref 70–99)
GLUCOSE BLDC GLUCOMTR-MCNC: 128 MG/DL — HIGH (ref 70–99)
GLUCOSE BLDC GLUCOMTR-MCNC: 130 MG/DL — HIGH (ref 70–99)
GLUCOSE BLDC GLUCOMTR-MCNC: 132 MG/DL — HIGH (ref 70–99)
GLUCOSE BLDC GLUCOMTR-MCNC: 133 MG/DL — HIGH (ref 70–99)
GLUCOSE BLDC GLUCOMTR-MCNC: 133 MG/DL — HIGH (ref 70–99)
GLUCOSE BLDC GLUCOMTR-MCNC: 135 MG/DL — HIGH (ref 70–99)
GLUCOSE BLDC GLUCOMTR-MCNC: 136 MG/DL — HIGH (ref 70–99)
GLUCOSE BLDC GLUCOMTR-MCNC: 137 MG/DL — HIGH (ref 70–99)
GLUCOSE BLDC GLUCOMTR-MCNC: 138 MG/DL — HIGH (ref 70–99)
GLUCOSE BLDC GLUCOMTR-MCNC: 138 MG/DL — HIGH (ref 70–99)
GLUCOSE BLDC GLUCOMTR-MCNC: 139 MG/DL — HIGH (ref 70–99)
GLUCOSE BLDC GLUCOMTR-MCNC: 140 MG/DL — HIGH (ref 70–99)
GLUCOSE BLDC GLUCOMTR-MCNC: 143 MG/DL — HIGH (ref 70–99)
GLUCOSE BLDC GLUCOMTR-MCNC: 144 MG/DL — HIGH (ref 70–99)
GLUCOSE BLDC GLUCOMTR-MCNC: 146 MG/DL — HIGH (ref 70–99)
GLUCOSE BLDC GLUCOMTR-MCNC: 147 MG/DL — HIGH (ref 70–99)
GLUCOSE BLDC GLUCOMTR-MCNC: 149 MG/DL — HIGH (ref 70–99)
GLUCOSE BLDC GLUCOMTR-MCNC: 152 MG/DL — HIGH (ref 70–99)
GLUCOSE BLDC GLUCOMTR-MCNC: 153 MG/DL — HIGH (ref 70–99)
GLUCOSE BLDC GLUCOMTR-MCNC: 155 MG/DL — HIGH (ref 70–99)
GLUCOSE BLDC GLUCOMTR-MCNC: 157 MG/DL — HIGH (ref 70–99)
GLUCOSE BLDC GLUCOMTR-MCNC: 158 MG/DL — HIGH (ref 70–99)
GLUCOSE BLDC GLUCOMTR-MCNC: 159 MG/DL — HIGH (ref 70–99)
GLUCOSE BLDC GLUCOMTR-MCNC: 161 MG/DL — HIGH (ref 70–99)
GLUCOSE BLDC GLUCOMTR-MCNC: 168 MG/DL — HIGH (ref 70–99)
GLUCOSE BLDC GLUCOMTR-MCNC: 168 MG/DL — HIGH (ref 70–99)
GLUCOSE BLDC GLUCOMTR-MCNC: 170 MG/DL — HIGH (ref 70–99)
GLUCOSE BLDC GLUCOMTR-MCNC: 171 MG/DL — HIGH (ref 70–99)
GLUCOSE BLDC GLUCOMTR-MCNC: 171 MG/DL — HIGH (ref 70–99)
GLUCOSE BLDC GLUCOMTR-MCNC: 174 MG/DL — HIGH (ref 70–99)
GLUCOSE BLDC GLUCOMTR-MCNC: 174 MG/DL — HIGH (ref 70–99)
GLUCOSE BLDC GLUCOMTR-MCNC: 175 MG/DL — HIGH (ref 70–99)
GLUCOSE BLDC GLUCOMTR-MCNC: 179 MG/DL — HIGH (ref 70–99)
GLUCOSE BLDC GLUCOMTR-MCNC: 179 MG/DL — HIGH (ref 70–99)
GLUCOSE BLDC GLUCOMTR-MCNC: 181 MG/DL — HIGH (ref 70–99)
GLUCOSE BLDC GLUCOMTR-MCNC: 181 MG/DL — HIGH (ref 70–99)
GLUCOSE BLDC GLUCOMTR-MCNC: 184 MG/DL — HIGH (ref 70–99)
GLUCOSE BLDC GLUCOMTR-MCNC: 186 MG/DL — HIGH (ref 70–99)
GLUCOSE BLDC GLUCOMTR-MCNC: 189 MG/DL — HIGH (ref 70–99)
GLUCOSE BLDC GLUCOMTR-MCNC: 192 MG/DL — HIGH (ref 70–99)
GLUCOSE BLDC GLUCOMTR-MCNC: 193 MG/DL — HIGH (ref 70–99)
GLUCOSE BLDC GLUCOMTR-MCNC: 194 MG/DL — HIGH (ref 70–99)
GLUCOSE BLDC GLUCOMTR-MCNC: 194 MG/DL — HIGH (ref 70–99)
GLUCOSE BLDC GLUCOMTR-MCNC: 195 MG/DL — HIGH (ref 70–99)
GLUCOSE BLDC GLUCOMTR-MCNC: 198 MG/DL — HIGH (ref 70–99)
GLUCOSE BLDC GLUCOMTR-MCNC: 201 MG/DL — HIGH (ref 70–99)
GLUCOSE BLDC GLUCOMTR-MCNC: 203 MG/DL — HIGH (ref 70–99)
GLUCOSE BLDC GLUCOMTR-MCNC: 203 MG/DL — HIGH (ref 70–99)
GLUCOSE BLDC GLUCOMTR-MCNC: 205 MG/DL — HIGH (ref 70–99)
GLUCOSE BLDC GLUCOMTR-MCNC: 206 MG/DL — HIGH (ref 70–99)
GLUCOSE BLDC GLUCOMTR-MCNC: 208 MG/DL — HIGH (ref 70–99)
GLUCOSE BLDC GLUCOMTR-MCNC: 209 MG/DL — HIGH (ref 70–99)
GLUCOSE BLDC GLUCOMTR-MCNC: 209 MG/DL — HIGH (ref 70–99)
GLUCOSE BLDC GLUCOMTR-MCNC: 212 MG/DL — HIGH (ref 70–99)
GLUCOSE BLDC GLUCOMTR-MCNC: 214 MG/DL — HIGH (ref 70–99)
GLUCOSE BLDC GLUCOMTR-MCNC: 215 MG/DL — HIGH (ref 70–99)
GLUCOSE BLDC GLUCOMTR-MCNC: 217 MG/DL — HIGH (ref 70–99)
GLUCOSE BLDC GLUCOMTR-MCNC: 218 MG/DL — HIGH (ref 70–99)
GLUCOSE BLDC GLUCOMTR-MCNC: 218 MG/DL — HIGH (ref 70–99)
GLUCOSE BLDC GLUCOMTR-MCNC: 220 MG/DL — HIGH (ref 70–99)
GLUCOSE BLDC GLUCOMTR-MCNC: 221 MG/DL — HIGH (ref 70–99)
GLUCOSE BLDC GLUCOMTR-MCNC: 222 MG/DL — HIGH (ref 70–99)
GLUCOSE BLDC GLUCOMTR-MCNC: 223 MG/DL — HIGH (ref 70–99)
GLUCOSE BLDC GLUCOMTR-MCNC: 224 MG/DL — HIGH (ref 70–99)
GLUCOSE BLDC GLUCOMTR-MCNC: 226 MG/DL — HIGH (ref 70–99)
GLUCOSE BLDC GLUCOMTR-MCNC: 227 MG/DL — HIGH (ref 70–99)
GLUCOSE BLDC GLUCOMTR-MCNC: 228 MG/DL — HIGH (ref 70–99)
GLUCOSE BLDC GLUCOMTR-MCNC: 230 MG/DL — HIGH (ref 70–99)
GLUCOSE BLDC GLUCOMTR-MCNC: 231 MG/DL — HIGH (ref 70–99)
GLUCOSE BLDC GLUCOMTR-MCNC: 231 MG/DL — HIGH (ref 70–99)
GLUCOSE BLDC GLUCOMTR-MCNC: 232 MG/DL — HIGH (ref 70–99)
GLUCOSE BLDC GLUCOMTR-MCNC: 236 MG/DL — HIGH (ref 70–99)
GLUCOSE BLDC GLUCOMTR-MCNC: 237 MG/DL — HIGH (ref 70–99)
GLUCOSE BLDC GLUCOMTR-MCNC: 237 MG/DL — HIGH (ref 70–99)
GLUCOSE BLDC GLUCOMTR-MCNC: 241 MG/DL — HIGH (ref 70–99)
GLUCOSE BLDC GLUCOMTR-MCNC: 241 MG/DL — HIGH (ref 70–99)
GLUCOSE BLDC GLUCOMTR-MCNC: 242 MG/DL — HIGH (ref 70–99)
GLUCOSE BLDC GLUCOMTR-MCNC: 243 MG/DL — HIGH (ref 70–99)
GLUCOSE BLDC GLUCOMTR-MCNC: 245 MG/DL — HIGH (ref 70–99)
GLUCOSE BLDC GLUCOMTR-MCNC: 247 MG/DL — HIGH (ref 70–99)
GLUCOSE BLDC GLUCOMTR-MCNC: 248 MG/DL — HIGH (ref 70–99)
GLUCOSE BLDC GLUCOMTR-MCNC: 248 MG/DL — HIGH (ref 70–99)
GLUCOSE BLDC GLUCOMTR-MCNC: 250 MG/DL — HIGH (ref 70–99)
GLUCOSE BLDC GLUCOMTR-MCNC: 251 MG/DL — HIGH (ref 70–99)
GLUCOSE BLDC GLUCOMTR-MCNC: 259 MG/DL — HIGH (ref 70–99)
GLUCOSE BLDC GLUCOMTR-MCNC: 263 MG/DL — HIGH (ref 70–99)
GLUCOSE BLDC GLUCOMTR-MCNC: 267 MG/DL — HIGH (ref 70–99)
GLUCOSE BLDC GLUCOMTR-MCNC: 268 MG/DL — HIGH (ref 70–99)
GLUCOSE BLDC GLUCOMTR-MCNC: 273 MG/DL — HIGH (ref 70–99)
GLUCOSE BLDC GLUCOMTR-MCNC: 284 MG/DL — HIGH (ref 70–99)
GLUCOSE BLDC GLUCOMTR-MCNC: 351 MG/DL — HIGH (ref 70–99)
GLUCOSE BLDC GLUCOMTR-MCNC: 48 MG/DL — CRITICAL LOW (ref 70–99)
GLUCOSE BLDC GLUCOMTR-MCNC: 50 MG/DL — CRITICAL LOW (ref 70–99)
GLUCOSE BLDC GLUCOMTR-MCNC: 51 MG/DL — CRITICAL LOW (ref 70–99)
GLUCOSE BLDC GLUCOMTR-MCNC: 54 MG/DL — CRITICAL LOW (ref 70–99)
GLUCOSE BLDC GLUCOMTR-MCNC: 54 MG/DL — CRITICAL LOW (ref 70–99)
GLUCOSE BLDC GLUCOMTR-MCNC: 60 MG/DL — LOW (ref 70–99)
GLUCOSE BLDC GLUCOMTR-MCNC: 61 MG/DL — LOW (ref 70–99)
GLUCOSE BLDC GLUCOMTR-MCNC: 63 MG/DL — LOW (ref 70–99)
GLUCOSE BLDC GLUCOMTR-MCNC: 68 MG/DL — LOW (ref 70–99)
GLUCOSE BLDC GLUCOMTR-MCNC: 72 MG/DL — SIGNIFICANT CHANGE UP (ref 70–99)
GLUCOSE BLDC GLUCOMTR-MCNC: 73 MG/DL — SIGNIFICANT CHANGE UP (ref 70–99)
GLUCOSE BLDC GLUCOMTR-MCNC: 76 MG/DL — SIGNIFICANT CHANGE UP (ref 70–99)
GLUCOSE BLDC GLUCOMTR-MCNC: 77 MG/DL — SIGNIFICANT CHANGE UP (ref 70–99)
GLUCOSE BLDC GLUCOMTR-MCNC: 78 MG/DL — SIGNIFICANT CHANGE UP (ref 70–99)
GLUCOSE BLDC GLUCOMTR-MCNC: 78 MG/DL — SIGNIFICANT CHANGE UP (ref 70–99)
GLUCOSE BLDC GLUCOMTR-MCNC: 79 MG/DL — SIGNIFICANT CHANGE UP (ref 70–99)
GLUCOSE BLDC GLUCOMTR-MCNC: 83 MG/DL — SIGNIFICANT CHANGE UP (ref 70–99)
GLUCOSE BLDC GLUCOMTR-MCNC: 86 MG/DL — SIGNIFICANT CHANGE UP (ref 70–99)
GLUCOSE BLDC GLUCOMTR-MCNC: 88 MG/DL — SIGNIFICANT CHANGE UP (ref 70–99)
GLUCOSE BLDC GLUCOMTR-MCNC: 89 MG/DL — SIGNIFICANT CHANGE UP (ref 70–99)
GLUCOSE BLDC GLUCOMTR-MCNC: 90 MG/DL — SIGNIFICANT CHANGE UP (ref 70–99)
GLUCOSE BLDC GLUCOMTR-MCNC: 91 MG/DL — SIGNIFICANT CHANGE UP (ref 70–99)
GLUCOSE BLDC GLUCOMTR-MCNC: 91 MG/DL — SIGNIFICANT CHANGE UP (ref 70–99)
GLUCOSE BLDC GLUCOMTR-MCNC: 92 MG/DL — SIGNIFICANT CHANGE UP (ref 70–99)
GLUCOSE BLDC GLUCOMTR-MCNC: 93 MG/DL — SIGNIFICANT CHANGE UP (ref 70–99)
GLUCOSE BLDC GLUCOMTR-MCNC: 96 MG/DL — SIGNIFICANT CHANGE UP (ref 70–99)
GLUCOSE BLDC GLUCOMTR-MCNC: 97 MG/DL — SIGNIFICANT CHANGE UP (ref 70–99)
GLUCOSE BLDC GLUCOMTR-MCNC: 99 MG/DL — SIGNIFICANT CHANGE UP (ref 70–99)
GLUCOSE BLDV-MCNC: 156 MG/DL — HIGH (ref 70–99)
GLUCOSE BLDV-MCNC: 159 MG/DL — HIGH (ref 70–99)
GLUCOSE BLDV-MCNC: 176 MG/DL — HIGH (ref 70–99)
GLUCOSE BLDV-MCNC: 242 MG/DL — HIGH (ref 70–99)
GLUCOSE BLDV-MCNC: 250 MG/DL — HIGH (ref 70–99)
GLUCOSE SERPL-MCNC: 104 MG/DL — HIGH (ref 70–99)
GLUCOSE SERPL-MCNC: 113 MG/DL — HIGH (ref 70–99)
GLUCOSE SERPL-MCNC: 115 MG/DL — HIGH (ref 70–99)
GLUCOSE SERPL-MCNC: 117 MG/DL — HIGH (ref 70–99)
GLUCOSE SERPL-MCNC: 121 MG/DL — HIGH (ref 70–99)
GLUCOSE SERPL-MCNC: 126 MG/DL — HIGH (ref 70–99)
GLUCOSE SERPL-MCNC: 127 MG/DL — HIGH (ref 70–99)
GLUCOSE SERPL-MCNC: 127 MG/DL — HIGH (ref 70–99)
GLUCOSE SERPL-MCNC: 131 MG/DL — HIGH (ref 70–99)
GLUCOSE SERPL-MCNC: 136 MG/DL — HIGH (ref 70–99)
GLUCOSE SERPL-MCNC: 140 MG/DL — HIGH (ref 70–99)
GLUCOSE SERPL-MCNC: 142 MG/DL — HIGH (ref 70–99)
GLUCOSE SERPL-MCNC: 144 MG/DL — HIGH (ref 70–99)
GLUCOSE SERPL-MCNC: 145 MG/DL — HIGH (ref 70–99)
GLUCOSE SERPL-MCNC: 146 MG/DL — HIGH (ref 70–99)
GLUCOSE SERPL-MCNC: 147 MG/DL — HIGH (ref 70–99)
GLUCOSE SERPL-MCNC: 152 MG/DL — HIGH (ref 70–99)
GLUCOSE SERPL-MCNC: 153 MG/DL — HIGH (ref 70–99)
GLUCOSE SERPL-MCNC: 153 MG/DL — HIGH (ref 70–99)
GLUCOSE SERPL-MCNC: 155 MG/DL — HIGH (ref 70–99)
GLUCOSE SERPL-MCNC: 156 MG/DL — HIGH (ref 70–99)
GLUCOSE SERPL-MCNC: 157 MG/DL — HIGH (ref 70–99)
GLUCOSE SERPL-MCNC: 158 MG/DL — HIGH (ref 70–99)
GLUCOSE SERPL-MCNC: 159 MG/DL — HIGH (ref 70–99)
GLUCOSE SERPL-MCNC: 160 MG/DL — HIGH (ref 70–99)
GLUCOSE SERPL-MCNC: 160 MG/DL — HIGH (ref 70–99)
GLUCOSE SERPL-MCNC: 171 MG/DL — HIGH (ref 70–99)
GLUCOSE SERPL-MCNC: 173 MG/DL — HIGH (ref 70–99)
GLUCOSE SERPL-MCNC: 174 MG/DL — HIGH (ref 70–99)
GLUCOSE SERPL-MCNC: 179 MG/DL — HIGH (ref 70–99)
GLUCOSE SERPL-MCNC: 198 MG/DL — HIGH (ref 70–99)
GLUCOSE SERPL-MCNC: 198 MG/DL — HIGH (ref 70–99)
GLUCOSE SERPL-MCNC: 204 MG/DL — HIGH (ref 70–99)
GLUCOSE SERPL-MCNC: 207 MG/DL — HIGH (ref 70–99)
GLUCOSE SERPL-MCNC: 210 MG/DL — HIGH (ref 70–99)
GLUCOSE SERPL-MCNC: 210 MG/DL — HIGH (ref 70–99)
GLUCOSE SERPL-MCNC: 213 MG/DL — HIGH (ref 70–99)
GLUCOSE SERPL-MCNC: 220 MG/DL — HIGH (ref 70–99)
GLUCOSE SERPL-MCNC: 222 MG/DL — HIGH (ref 70–99)
GLUCOSE SERPL-MCNC: 223 MG/DL — HIGH (ref 70–99)
GLUCOSE SERPL-MCNC: 228 MG/DL — HIGH (ref 70–99)
GLUCOSE SERPL-MCNC: 228 MG/DL — HIGH (ref 70–99)
GLUCOSE SERPL-MCNC: 236 MG/DL — HIGH (ref 70–99)
GLUCOSE SERPL-MCNC: 238 MG/DL — HIGH (ref 70–99)
GLUCOSE SERPL-MCNC: 242 MG/DL — HIGH (ref 70–99)
GLUCOSE SERPL-MCNC: 249 MG/DL — HIGH (ref 70–99)
GLUCOSE SERPL-MCNC: 252 MG/DL — HIGH (ref 70–99)
GLUCOSE SERPL-MCNC: 280 MG/DL — HIGH (ref 70–99)
GLUCOSE SERPL-MCNC: 324 MG/DL — HIGH (ref 70–99)
GLUCOSE SERPL-MCNC: 326 MG/DL — HIGH (ref 70–99)
GLUCOSE SERPL-MCNC: 337 MG/DL — HIGH (ref 70–99)
GLUCOSE SERPL-MCNC: 45 MG/DL — CRITICAL LOW (ref 70–99)
GLUCOSE SERPL-MCNC: 53 MG/DL — CRITICAL LOW (ref 70–99)
GLUCOSE SERPL-MCNC: 60 MG/DL — LOW (ref 70–99)
GLUCOSE SERPL-MCNC: 77 MG/DL — SIGNIFICANT CHANGE UP (ref 70–99)
GLUCOSE SERPL-MCNC: 79 MG/DL — SIGNIFICANT CHANGE UP (ref 70–99)
GLUCOSE SERPL-MCNC: 80 MG/DL — SIGNIFICANT CHANGE UP (ref 70–99)
GLUCOSE SERPL-MCNC: 83 MG/DL — SIGNIFICANT CHANGE UP (ref 70–99)
GLUCOSE SERPL-MCNC: 93 MG/DL — SIGNIFICANT CHANGE UP (ref 70–99)
GLUCOSE SERPL-MCNC: 94 MG/DL — SIGNIFICANT CHANGE UP (ref 70–99)
GLUCOSE SERPL-MCNC: 96 MG/DL — SIGNIFICANT CHANGE UP (ref 70–99)
GLUCOSE UR QL: ABNORMAL
GLUCOSE UR QL: ABNORMAL
GLUCOSE UR QL: NEGATIVE — SIGNIFICANT CHANGE UP
GRAM STN FLD: SIGNIFICANT CHANGE UP
GRAN CASTS # UR COMP ASSIST: 1 /LPF — SIGNIFICANT CHANGE UP
GRAN CASTS # UR COMP ASSIST: 1 /LPF — SIGNIFICANT CHANGE UP
GRAN CASTS # UR COMP ASSIST: 2 /LPF — SIGNIFICANT CHANGE UP
GRAN CASTS # UR COMP ASSIST: SIGNIFICANT CHANGE UP /LPF
HAPTOGLOB SERPL-MCNC: 242 MG/DL — HIGH (ref 34–200)
HAPTOGLOB SERPL-MCNC: 253 MG/DL — HIGH (ref 34–200)
HAPTOGLOB SERPL-MCNC: 278 MG/DL — HIGH (ref 34–200)
HBV SURFACE AB SER-ACNC: REACTIVE
HBV SURFACE AG SER-ACNC: SIGNIFICANT CHANGE UP
HCO3 BLDA-SCNC: 27 MMOL/L — SIGNIFICANT CHANGE UP (ref 21–28)
HCO3 BLDV-SCNC: 17 MMOL/L — LOW (ref 22–29)
HCO3 BLDV-SCNC: 20 MMOL/L — LOW (ref 22–29)
HCO3 BLDV-SCNC: 22 MMOL/L — SIGNIFICANT CHANGE UP (ref 22–29)
HCT VFR BLD CALC: 20.9 % — CRITICAL LOW (ref 39–50)
HCT VFR BLD CALC: 21 % — CRITICAL LOW (ref 39–50)
HCT VFR BLD CALC: 21 % — CRITICAL LOW (ref 39–50)
HCT VFR BLD CALC: 21.7 % — LOW (ref 39–50)
HCT VFR BLD CALC: 22.5 % — LOW (ref 39–50)
HCT VFR BLD CALC: 22.6 % — LOW (ref 39–50)
HCT VFR BLD CALC: 22.9 % — LOW (ref 39–50)
HCT VFR BLD CALC: 23.1 % — LOW (ref 39–50)
HCT VFR BLD CALC: 23.3 % — LOW (ref 39–50)
HCT VFR BLD CALC: 23.5 % — LOW (ref 39–50)
HCT VFR BLD CALC: 23.7 % — LOW (ref 39–50)
HCT VFR BLD CALC: 23.9 % — LOW (ref 39–50)
HCT VFR BLD CALC: 23.9 % — LOW (ref 39–50)
HCT VFR BLD CALC: 24.3 % — LOW (ref 39–50)
HCT VFR BLD CALC: 24.8 % — LOW (ref 39–50)
HCT VFR BLD CALC: 24.8 % — LOW (ref 39–50)
HCT VFR BLD CALC: 25.1 % — LOW (ref 39–50)
HCT VFR BLD CALC: 25.2 % — LOW (ref 39–50)
HCT VFR BLD CALC: 25.3 % — LOW (ref 39–50)
HCT VFR BLD CALC: 25.4 % — LOW (ref 39–50)
HCT VFR BLD CALC: 25.7 % — LOW (ref 39–50)
HCT VFR BLD CALC: 25.9 % — LOW (ref 39–50)
HCT VFR BLD CALC: 26.2 % — LOW (ref 39–50)
HCT VFR BLD CALC: 27.8 % — LOW (ref 39–50)
HCT VFR BLD CALC: 28 % — LOW (ref 39–50)
HCT VFR BLD CALC: 28.2 % — LOW (ref 39–50)
HCT VFR BLD CALC: 28.7 % — LOW (ref 39–50)
HCT VFR BLD CALC: 29.2 % — LOW (ref 39–50)
HCT VFR BLD CALC: 29.7 % — LOW (ref 39–50)
HCT VFR BLD CALC: 30 % — LOW (ref 39–50)
HCT VFR BLD CALC: 30.5 % — LOW (ref 39–50)
HCT VFR BLD CALC: 31.1 % — LOW (ref 39–50)
HCT VFR BLD CALC: 31.1 % — LOW (ref 39–50)
HCT VFR BLD CALC: 31.5 % — LOW (ref 39–50)
HCT VFR BLD CALC: 31.8 % — LOW (ref 39–50)
HCT VFR BLD CALC: 31.9 % — LOW (ref 39–50)
HCT VFR BLDA CALC: 22 % — LOW (ref 39–51)
HCT VFR BLDA CALC: 23 % — LOW (ref 39–51)
HCT VFR BLDA CALC: 24 % — LOW (ref 39–51)
HCT VFR BLDA CALC: 26 % — LOW (ref 39–51)
HCT VFR BLDA CALC: 32 % — LOW (ref 39–51)
HEPARINASE TEG R TIME: 5.7 MIN — SIGNIFICANT CHANGE UP (ref 4.3–8.3)
HGB BLD CALC-MCNC: 10.6 G/DL — LOW (ref 12.6–17.4)
HGB BLD CALC-MCNC: 7.4 G/DL — LOW (ref 12.6–17.4)
HGB BLD CALC-MCNC: 7.7 G/DL — LOW (ref 12.6–17.4)
HGB BLD CALC-MCNC: 8 G/DL — LOW (ref 12.6–17.4)
HGB BLD CALC-MCNC: 8.7 G/DL — LOW (ref 12.6–17.4)
HGB BLD-MCNC: 10 G/DL — LOW (ref 13–17)
HGB BLD-MCNC: 10.1 G/DL — LOW (ref 13–17)
HGB BLD-MCNC: 6.5 G/DL — CRITICAL LOW (ref 13–17)
HGB BLD-MCNC: 6.8 G/DL — CRITICAL LOW (ref 13–17)
HGB BLD-MCNC: 7 G/DL — CRITICAL LOW (ref 13–17)
HGB BLD-MCNC: 7.1 G/DL — LOW (ref 13–17)
HGB BLD-MCNC: 7.1 G/DL — LOW (ref 13–17)
HGB BLD-MCNC: 7.2 G/DL — LOW (ref 13–17)
HGB BLD-MCNC: 7.2 G/DL — LOW (ref 13–17)
HGB BLD-MCNC: 7.3 G/DL — LOW (ref 13–17)
HGB BLD-MCNC: 7.3 G/DL — LOW (ref 13–17)
HGB BLD-MCNC: 7.5 G/DL — LOW (ref 13–17)
HGB BLD-MCNC: 7.5 G/DL — LOW (ref 13–17)
HGB BLD-MCNC: 7.6 G/DL — LOW (ref 13–17)
HGB BLD-MCNC: 7.6 G/DL — LOW (ref 13–17)
HGB BLD-MCNC: 7.7 G/DL — LOW (ref 13–17)
HGB BLD-MCNC: 7.8 G/DL — LOW (ref 13–17)
HGB BLD-MCNC: 7.8 G/DL — LOW (ref 13–17)
HGB BLD-MCNC: 7.9 G/DL — LOW (ref 13–17)
HGB BLD-MCNC: 8 G/DL — LOW (ref 13–17)
HGB BLD-MCNC: 8.1 G/DL — LOW (ref 13–17)
HGB BLD-MCNC: 8.1 G/DL — LOW (ref 13–17)
HGB BLD-MCNC: 8.3 G/DL — LOW (ref 13–17)
HGB BLD-MCNC: 8.5 G/DL — LOW (ref 13–17)
HGB BLD-MCNC: 8.5 G/DL — LOW (ref 13–17)
HGB BLD-MCNC: 8.6 G/DL — LOW (ref 13–17)
HGB BLD-MCNC: 9 G/DL — LOW (ref 13–17)
HGB BLD-MCNC: 9.1 G/DL — LOW (ref 13–17)
HGB BLD-MCNC: 9.3 G/DL — LOW (ref 13–17)
HGB BLD-MCNC: 9.5 G/DL — LOW (ref 13–17)
HGB BLD-MCNC: 9.8 G/DL — LOW (ref 13–17)
HGB BLD-MCNC: 9.9 G/DL — LOW (ref 13–17)
HOROWITZ INDEX BLDA+IHG-RTO: 21 — SIGNIFICANT CHANGE UP
HOROWITZ INDEX BLDV+IHG-RTO: 50 — SIGNIFICANT CHANGE UP
HYALINE CASTS # UR AUTO: 1 /LPF — SIGNIFICANT CHANGE UP (ref 0–7)
HYALINE CASTS # UR AUTO: 35 /LPF — HIGH (ref 0–7)
HYALINE CASTS # UR AUTO: 8 /LPF — HIGH (ref 0–2)
HYALINE CASTS # UR AUTO: 8 /LPF — HIGH (ref 0–2)
IMM GRANULOCYTES NFR BLD AUTO: 0.5 % — SIGNIFICANT CHANGE UP (ref 0–1.5)
IMM GRANULOCYTES NFR BLD AUTO: 0.8 % — SIGNIFICANT CHANGE UP (ref 0–1.5)
IMM GRANULOCYTES NFR BLD AUTO: 0.8 % — SIGNIFICANT CHANGE UP (ref 0–1.5)
INR BLD: 0.87 RATIO — LOW (ref 0.88–1.16)
INR BLD: 0.94 RATIO — SIGNIFICANT CHANGE UP (ref 0.88–1.16)
INR BLD: 0.95 RATIO — SIGNIFICANT CHANGE UP (ref 0.88–1.16)
INR BLD: 0.97 RATIO — SIGNIFICANT CHANGE UP (ref 0.88–1.16)
INR BLD: 1 RATIO — SIGNIFICANT CHANGE UP (ref 0.88–1.16)
INR BLD: 1 RATIO — SIGNIFICANT CHANGE UP (ref 0.88–1.16)
INR BLD: 1.02 RATIO — SIGNIFICANT CHANGE UP (ref 0.88–1.16)
INR BLD: 1.11 RATIO — SIGNIFICANT CHANGE UP (ref 0.88–1.16)
INR BLD: 1.13 RATIO — SIGNIFICANT CHANGE UP (ref 0.88–1.16)
INR BLD: 1.23 RATIO — HIGH (ref 0.88–1.16)
IRON SATN MFR SERPL: 122 UG/DL — SIGNIFICANT CHANGE UP (ref 45–165)
IRON SATN MFR SERPL: 15 UG/DL — LOW (ref 45–165)
IRON SATN MFR SERPL: 50 % — SIGNIFICANT CHANGE UP (ref 16–55)
IRON SATN MFR SERPL: 8 % — LOW (ref 16–55)
KETONES UR-MCNC: NEGATIVE — SIGNIFICANT CHANGE UP
KETONES UR-MCNC: SIGNIFICANT CHANGE UP
LACTATE BLDV-MCNC: 1.4 MMOL/L — SIGNIFICANT CHANGE UP (ref 0.7–2)
LACTATE BLDV-MCNC: 2.1 MMOL/L — HIGH (ref 0.7–2)
LACTATE BLDV-MCNC: 2.5 MMOL/L — HIGH (ref 0.7–2)
LACTATE BLDV-MCNC: 2.8 MMOL/L — HIGH (ref 0.7–2)
LACTATE BLDV-MCNC: 3.2 MMOL/L — HIGH (ref 0.7–2)
LACTATE SERPL-SCNC: 2.1 MMOL/L — HIGH (ref 0.7–2)
LACTATE SERPL-SCNC: 2.8 MMOL/L — HIGH (ref 0.7–2)
LDH SERPL L TO P-CCNC: 295 U/L — HIGH (ref 50–242)
LDH SERPL L TO P-CCNC: 380 U/L — HIGH (ref 50–242)
LDH SERPL L TO P-CCNC: 390 U/L — HIGH (ref 50–242)
LEGIONELLA AG UR QL: NEGATIVE — SIGNIFICANT CHANGE UP
LEUKOCYTE ESTERASE UR-ACNC: NEGATIVE — SIGNIFICANT CHANGE UP
LYMPHOCYTES # BLD AUTO: 0 % — LOW (ref 13–44)
LYMPHOCYTES # BLD AUTO: 0 K/UL — LOW (ref 1–3.3)
LYMPHOCYTES # BLD AUTO: 0.17 K/UL — LOW (ref 1–3.3)
LYMPHOCYTES # BLD AUTO: 0.3 K/UL — LOW (ref 1–3.3)
LYMPHOCYTES # BLD AUTO: 0.4 K/UL — LOW (ref 1–3.3)
LYMPHOCYTES # BLD AUTO: 0.42 K/UL — LOW (ref 1–3.3)
LYMPHOCYTES # BLD AUTO: 0.92 K/UL — LOW (ref 1–3.3)
LYMPHOCYTES # BLD AUTO: 1.6 % — LOW (ref 13–44)
LYMPHOCYTES # BLD AUTO: 1.8 % — LOW (ref 13–44)
LYMPHOCYTES # BLD AUTO: 2.3 % — LOW (ref 13–44)
LYMPHOCYTES # BLD AUTO: 4.3 % — LOW (ref 13–44)
LYMPHOCYTES # BLD AUTO: 6.3 % — LOW (ref 13–44)
MACROCYTES BLD QL: SLIGHT — SIGNIFICANT CHANGE UP
MAGNESIUM SERPL-MCNC: 1.7 MG/DL — SIGNIFICANT CHANGE UP (ref 1.6–2.6)
MAGNESIUM SERPL-MCNC: 1.7 MG/DL — SIGNIFICANT CHANGE UP (ref 1.6–2.6)
MAGNESIUM SERPL-MCNC: 1.8 MG/DL — SIGNIFICANT CHANGE UP (ref 1.6–2.6)
MAGNESIUM SERPL-MCNC: 1.8 MG/DL — SIGNIFICANT CHANGE UP (ref 1.6–2.6)
MAGNESIUM SERPL-MCNC: 1.9 MG/DL — SIGNIFICANT CHANGE UP (ref 1.6–2.6)
MAGNESIUM SERPL-MCNC: 2 MG/DL — SIGNIFICANT CHANGE UP (ref 1.6–2.6)
MAGNESIUM SERPL-MCNC: 2.1 MG/DL — SIGNIFICANT CHANGE UP (ref 1.6–2.6)
MAGNESIUM SERPL-MCNC: 2.2 MG/DL — SIGNIFICANT CHANGE UP (ref 1.6–2.6)
MAGNESIUM SERPL-MCNC: 2.3 MG/DL — SIGNIFICANT CHANGE UP (ref 1.6–2.6)
MANUAL SMEAR VERIFICATION: SIGNIFICANT CHANGE UP
MANUAL SMEAR VERIFICATION: SIGNIFICANT CHANGE UP
MCHC RBC-ENTMCNC: 21.1 PG — LOW (ref 27–34)
MCHC RBC-ENTMCNC: 21.1 PG — LOW (ref 27–34)
MCHC RBC-ENTMCNC: 21.2 PG — LOW (ref 27–34)
MCHC RBC-ENTMCNC: 21.7 PG — LOW (ref 27–34)
MCHC RBC-ENTMCNC: 21.7 PG — LOW (ref 27–34)
MCHC RBC-ENTMCNC: 21.8 PG — LOW (ref 27–34)
MCHC RBC-ENTMCNC: 21.9 PG — LOW (ref 27–34)
MCHC RBC-ENTMCNC: 22 PG — LOW (ref 27–34)
MCHC RBC-ENTMCNC: 22.1 PG — LOW (ref 27–34)
MCHC RBC-ENTMCNC: 22.2 PG — LOW (ref 27–34)
MCHC RBC-ENTMCNC: 22.2 PG — LOW (ref 27–34)
MCHC RBC-ENTMCNC: 22.3 PG — LOW (ref 27–34)
MCHC RBC-ENTMCNC: 22.3 PG — LOW (ref 27–34)
MCHC RBC-ENTMCNC: 22.4 PG — LOW (ref 27–34)
MCHC RBC-ENTMCNC: 22.5 PG — LOW (ref 27–34)
MCHC RBC-ENTMCNC: 22.6 PG — LOW (ref 27–34)
MCHC RBC-ENTMCNC: 22.6 PG — LOW (ref 27–34)
MCHC RBC-ENTMCNC: 22.8 PG — LOW (ref 27–34)
MCHC RBC-ENTMCNC: 22.8 PG — LOW (ref 27–34)
MCHC RBC-ENTMCNC: 29.6 GM/DL — LOW (ref 32–36)
MCHC RBC-ENTMCNC: 29.8 GM/DL — LOW (ref 32–36)
MCHC RBC-ENTMCNC: 29.9 GM/DL — LOW (ref 32–36)
MCHC RBC-ENTMCNC: 30 GM/DL — LOW (ref 32–36)
MCHC RBC-ENTMCNC: 30 GM/DL — LOW (ref 32–36)
MCHC RBC-ENTMCNC: 30.1 GM/DL — LOW (ref 32–36)
MCHC RBC-ENTMCNC: 30.1 GM/DL — LOW (ref 32–36)
MCHC RBC-ENTMCNC: 30.2 GM/DL — LOW (ref 32–36)
MCHC RBC-ENTMCNC: 30.3 GM/DL — LOW (ref 32–36)
MCHC RBC-ENTMCNC: 30.4 GM/DL — LOW (ref 32–36)
MCHC RBC-ENTMCNC: 30.5 GM/DL — LOW (ref 32–36)
MCHC RBC-ENTMCNC: 30.6 GM/DL — LOW (ref 32–36)
MCHC RBC-ENTMCNC: 30.6 GM/DL — LOW (ref 32–36)
MCHC RBC-ENTMCNC: 30.7 GM/DL — LOW (ref 32–36)
MCHC RBC-ENTMCNC: 30.7 GM/DL — LOW (ref 32–36)
MCHC RBC-ENTMCNC: 30.8 GM/DL — LOW (ref 32–36)
MCHC RBC-ENTMCNC: 30.9 GM/DL — LOW (ref 32–36)
MCHC RBC-ENTMCNC: 30.9 GM/DL — LOW (ref 32–36)
MCHC RBC-ENTMCNC: 31.1 GM/DL — LOW (ref 32–36)
MCHC RBC-ENTMCNC: 31.1 GM/DL — LOW (ref 32–36)
MCHC RBC-ENTMCNC: 31.2 GM/DL — LOW (ref 32–36)
MCHC RBC-ENTMCNC: 31.3 GM/DL — LOW (ref 32–36)
MCHC RBC-ENTMCNC: 31.4 GM/DL — LOW (ref 32–36)
MCHC RBC-ENTMCNC: 31.5 GM/DL — LOW (ref 32–36)
MCHC RBC-ENTMCNC: 31.5 GM/DL — LOW (ref 32–36)
MCHC RBC-ENTMCNC: 31.7 GM/DL — LOW (ref 32–36)
MCHC RBC-ENTMCNC: 31.9 GM/DL — LOW (ref 32–36)
MCHC RBC-ENTMCNC: 32.5 GM/DL — SIGNIFICANT CHANGE UP (ref 32–36)
MCHC RBC-ENTMCNC: 32.8 GM/DL — SIGNIFICANT CHANGE UP (ref 32–36)
MCHC RBC-ENTMCNC: 32.9 GM/DL — SIGNIFICANT CHANGE UP (ref 32–36)
MCHC RBC-ENTMCNC: 33.2 GM/DL — SIGNIFICANT CHANGE UP (ref 32–36)
MCHC RBC-ENTMCNC: 33.5 GM/DL — SIGNIFICANT CHANGE UP (ref 32–36)
MCHC RBC-ENTMCNC: 33.8 GM/DL — SIGNIFICANT CHANGE UP (ref 32–36)
MCHC RBC-ENTMCNC: 33.8 GM/DL — SIGNIFICANT CHANGE UP (ref 32–36)
MCV RBC AUTO: 66.9 FL — LOW (ref 80–100)
MCV RBC AUTO: 67.2 FL — LOW (ref 80–100)
MCV RBC AUTO: 67.3 FL — LOW (ref 80–100)
MCV RBC AUTO: 67.3 FL — LOW (ref 80–100)
MCV RBC AUTO: 67.4 FL — LOW (ref 80–100)
MCV RBC AUTO: 67.9 FL — LOW (ref 80–100)
MCV RBC AUTO: 68.4 FL — LOW (ref 80–100)
MCV RBC AUTO: 69.1 FL — LOW (ref 80–100)
MCV RBC AUTO: 69.3 FL — LOW (ref 80–100)
MCV RBC AUTO: 69.5 FL — LOW (ref 80–100)
MCV RBC AUTO: 69.7 FL — LOW (ref 80–100)
MCV RBC AUTO: 70.2 FL — LOW (ref 80–100)
MCV RBC AUTO: 70.2 FL — LOW (ref 80–100)
MCV RBC AUTO: 70.3 FL — LOW (ref 80–100)
MCV RBC AUTO: 70.7 FL — LOW (ref 80–100)
MCV RBC AUTO: 70.8 FL — LOW (ref 80–100)
MCV RBC AUTO: 71.1 FL — LOW (ref 80–100)
MCV RBC AUTO: 71.2 FL — LOW (ref 80–100)
MCV RBC AUTO: 71.2 FL — LOW (ref 80–100)
MCV RBC AUTO: 71.5 FL — LOW (ref 80–100)
MCV RBC AUTO: 71.6 FL — LOW (ref 80–100)
MCV RBC AUTO: 71.7 FL — LOW (ref 80–100)
MCV RBC AUTO: 71.8 FL — LOW (ref 80–100)
MCV RBC AUTO: 71.9 FL — LOW (ref 80–100)
MCV RBC AUTO: 72.1 FL — LOW (ref 80–100)
MCV RBC AUTO: 72.1 FL — LOW (ref 80–100)
MCV RBC AUTO: 72.3 FL — LOW (ref 80–100)
MCV RBC AUTO: 72.4 FL — LOW (ref 80–100)
MCV RBC AUTO: 72.5 FL — LOW (ref 80–100)
MCV RBC AUTO: 72.6 FL — LOW (ref 80–100)
MCV RBC AUTO: 72.6 FL — LOW (ref 80–100)
MCV RBC AUTO: 72.7 FL — LOW (ref 80–100)
MCV RBC AUTO: 73.2 FL — LOW (ref 80–100)
MCV RBC AUTO: 73.3 FL — LOW (ref 80–100)
MCV RBC AUTO: 73.3 FL — LOW (ref 80–100)
MCV RBC AUTO: 73.6 FL — LOW (ref 80–100)
METAMYELOCYTES # FLD: 0.9 % — HIGH (ref 0–0)
METAMYELOCYTES # FLD: 9.5 % — HIGH (ref 0–0)
METHOD TYPE: SIGNIFICANT CHANGE UP
MICROCYTES BLD QL: SLIGHT — SIGNIFICANT CHANGE UP
MONOCYTES # BLD AUTO: 0.2 K/UL — SIGNIFICANT CHANGE UP (ref 0–0.9)
MONOCYTES # BLD AUTO: 0.29 K/UL — SIGNIFICANT CHANGE UP (ref 0–0.9)
MONOCYTES # BLD AUTO: 0.34 K/UL — SIGNIFICANT CHANGE UP (ref 0–0.9)
MONOCYTES # BLD AUTO: 0.41 K/UL — SIGNIFICANT CHANGE UP (ref 0–0.9)
MONOCYTES # BLD AUTO: 0.81 K/UL — SIGNIFICANT CHANGE UP (ref 0–0.9)
MONOCYTES # BLD AUTO: 1.35 K/UL — HIGH (ref 0–0.9)
MONOCYTES NFR BLD AUTO: 0.9 % — LOW (ref 2–14)
MONOCYTES NFR BLD AUTO: 2.7 % — SIGNIFICANT CHANGE UP (ref 2–14)
MONOCYTES NFR BLD AUTO: 3.1 % — SIGNIFICANT CHANGE UP (ref 2–14)
MONOCYTES NFR BLD AUTO: 3.5 % — SIGNIFICANT CHANGE UP (ref 2–14)
MONOCYTES NFR BLD AUTO: 4.4 % — SIGNIFICANT CHANGE UP (ref 2–14)
MONOCYTES NFR BLD AUTO: 5.6 % — SIGNIFICANT CHANGE UP (ref 2–14)
MRSA PCR RESULT.: SIGNIFICANT CHANGE UP
NEUTROPHILS # BLD AUTO: 10.32 K/UL — HIGH (ref 1.8–7.4)
NEUTROPHILS # BLD AUTO: 12.23 K/UL — HIGH (ref 1.8–7.4)
NEUTROPHILS # BLD AUTO: 12.61 K/UL — HIGH (ref 1.8–7.4)
NEUTROPHILS # BLD AUTO: 21.39 K/UL — HIGH (ref 1.8–7.4)
NEUTROPHILS # BLD AUTO: 29.24 K/UL — HIGH (ref 1.8–7.4)
NEUTROPHILS # BLD AUTO: 8.09 K/UL — HIGH (ref 1.8–7.4)
NEUTROPHILS NFR BLD AUTO: 76.7 % — SIGNIFICANT CHANGE UP (ref 43–77)
NEUTROPHILS NFR BLD AUTO: 83.9 % — HIGH (ref 43–77)
NEUTROPHILS NFR BLD AUTO: 86.5 % — HIGH (ref 43–77)
NEUTROPHILS NFR BLD AUTO: 91.2 % — HIGH (ref 43–77)
NEUTROPHILS NFR BLD AUTO: 93.7 % — HIGH (ref 43–77)
NEUTROPHILS NFR BLD AUTO: 94.9 % — HIGH (ref 43–77)
NEUTS BAND # BLD: 12.5 % — HIGH (ref 0–8)
NEUTS BAND # BLD: 6 % — SIGNIFICANT CHANGE UP (ref 0–8)
NITRITE UR-MCNC: NEGATIVE — SIGNIFICANT CHANGE UP
NRBC # BLD: 0 /100 WBCS — SIGNIFICANT CHANGE UP (ref 0–0)
OB PNL GAST: POSITIVE
OCCULT BLOOD, GASTRIC FLUID PH: 1 — SIGNIFICANT CHANGE UP
ORGANISM # SPEC MICROSCOPIC CNT: SIGNIFICANT CHANGE UP
OSMOLALITY SERPL: 238 MOSMOL/KG — LOW (ref 280–301)
OSMOLALITY UR: 486 MOS/KG — SIGNIFICANT CHANGE UP (ref 300–900)
OSMOLALITY UR: 515 MOS/KG — SIGNIFICANT CHANGE UP (ref 300–900)
OSMOLALITY UR: 523 MOS/KG — SIGNIFICANT CHANGE UP (ref 300–900)
OVALOCYTES BLD QL SMEAR: SLIGHT — SIGNIFICANT CHANGE UP
PA ADP PRP-ACNC: 265 PRU — SIGNIFICANT CHANGE UP (ref 194–417)
PCO2 BLDA: 32 MMHG — LOW (ref 35–48)
PCO2 BLDV: 29 MMHG — LOW (ref 42–55)
PCO2 BLDV: 30 MMHG — LOW (ref 42–55)
PCO2 BLDV: 31 MMHG — LOW (ref 42–55)
PCO2 BLDV: 33 MMHG — LOW (ref 42–55)
PCO2 BLDV: 36 MMHG — LOW (ref 42–55)
PH BLDA: 7.54 — HIGH (ref 7.35–7.45)
PH BLDV: 7.34 — SIGNIFICANT CHANGE UP (ref 7.32–7.43)
PH BLDV: 7.37 — SIGNIFICANT CHANGE UP (ref 7.32–7.43)
PH BLDV: 7.38 — SIGNIFICANT CHANGE UP (ref 7.32–7.43)
PH BLDV: 7.39 — SIGNIFICANT CHANGE UP (ref 7.32–7.43)
PH BLDV: 7.39 — SIGNIFICANT CHANGE UP (ref 7.32–7.43)
PH UR: 5.5 — SIGNIFICANT CHANGE UP (ref 5–8)
PH UR: 6 — SIGNIFICANT CHANGE UP (ref 5–8)
PH UR: 6.5 — SIGNIFICANT CHANGE UP (ref 5–8)
PHOSPHATE SERPL-MCNC: 1.8 MG/DL — LOW (ref 2.5–4.5)
PHOSPHATE SERPL-MCNC: 2.3 MG/DL — LOW (ref 2.5–4.5)
PHOSPHATE SERPL-MCNC: 2.3 MG/DL — LOW (ref 2.5–4.5)
PHOSPHATE SERPL-MCNC: 2.4 MG/DL — LOW (ref 2.5–4.5)
PHOSPHATE SERPL-MCNC: 2.4 MG/DL — LOW (ref 2.5–4.5)
PHOSPHATE SERPL-MCNC: 2.5 MG/DL — SIGNIFICANT CHANGE UP (ref 2.5–4.5)
PHOSPHATE SERPL-MCNC: 2.6 MG/DL — SIGNIFICANT CHANGE UP (ref 2.5–4.5)
PHOSPHATE SERPL-MCNC: 2.6 MG/DL — SIGNIFICANT CHANGE UP (ref 2.5–4.5)
PHOSPHATE SERPL-MCNC: 2.9 MG/DL — SIGNIFICANT CHANGE UP (ref 2.5–4.5)
PHOSPHATE SERPL-MCNC: 2.9 MG/DL — SIGNIFICANT CHANGE UP (ref 2.5–4.5)
PHOSPHATE SERPL-MCNC: 3 MG/DL — SIGNIFICANT CHANGE UP (ref 2.5–4.5)
PHOSPHATE SERPL-MCNC: 3.1 MG/DL — SIGNIFICANT CHANGE UP (ref 2.5–4.5)
PHOSPHATE SERPL-MCNC: 3.2 MG/DL — SIGNIFICANT CHANGE UP (ref 2.5–4.5)
PHOSPHATE SERPL-MCNC: 3.3 MG/DL — SIGNIFICANT CHANGE UP (ref 2.5–4.5)
PHOSPHATE SERPL-MCNC: 3.4 MG/DL — SIGNIFICANT CHANGE UP (ref 2.5–4.5)
PHOSPHATE SERPL-MCNC: 3.4 MG/DL — SIGNIFICANT CHANGE UP (ref 2.5–4.5)
PHOSPHATE SERPL-MCNC: 3.5 MG/DL — SIGNIFICANT CHANGE UP (ref 2.5–4.5)
PHOSPHATE SERPL-MCNC: 3.5 MG/DL — SIGNIFICANT CHANGE UP (ref 2.5–4.5)
PHOSPHATE SERPL-MCNC: 3.8 MG/DL — SIGNIFICANT CHANGE UP (ref 2.5–4.5)
PLAT MORPH BLD: NORMAL — SIGNIFICANT CHANGE UP
PLAT MORPH BLD: NORMAL — SIGNIFICANT CHANGE UP
PLATELET # BLD AUTO: 101 K/UL — LOW (ref 150–400)
PLATELET # BLD AUTO: 113 K/UL — LOW (ref 150–400)
PLATELET # BLD AUTO: 119 K/UL — LOW (ref 150–400)
PLATELET # BLD AUTO: 123 K/UL — LOW (ref 150–400)
PLATELET # BLD AUTO: 126 K/UL — LOW (ref 150–400)
PLATELET # BLD AUTO: 147 K/UL — LOW (ref 150–400)
PLATELET # BLD AUTO: 156 K/UL — SIGNIFICANT CHANGE UP (ref 150–400)
PLATELET # BLD AUTO: 159 K/UL — SIGNIFICANT CHANGE UP (ref 150–400)
PLATELET # BLD AUTO: 175 K/UL — SIGNIFICANT CHANGE UP (ref 150–400)
PLATELET # BLD AUTO: 176 K/UL — SIGNIFICANT CHANGE UP (ref 150–400)
PLATELET # BLD AUTO: 180 K/UL — SIGNIFICANT CHANGE UP (ref 150–400)
PLATELET # BLD AUTO: 182 K/UL — SIGNIFICANT CHANGE UP (ref 150–400)
PLATELET # BLD AUTO: 187 K/UL — SIGNIFICANT CHANGE UP (ref 150–400)
PLATELET # BLD AUTO: 188 K/UL — SIGNIFICANT CHANGE UP (ref 150–400)
PLATELET # BLD AUTO: 19 K/UL — CRITICAL LOW (ref 150–400)
PLATELET # BLD AUTO: 197 K/UL — SIGNIFICANT CHANGE UP (ref 150–400)
PLATELET # BLD AUTO: 226 K/UL — SIGNIFICANT CHANGE UP (ref 150–400)
PLATELET # BLD AUTO: 238 K/UL — SIGNIFICANT CHANGE UP (ref 150–400)
PLATELET # BLD AUTO: 24 K/UL — LOW (ref 150–400)
PLATELET # BLD AUTO: 27 K/UL — LOW (ref 150–400)
PLATELET # BLD AUTO: 28 K/UL — LOW (ref 150–400)
PLATELET # BLD AUTO: 32 K/UL — LOW (ref 150–400)
PLATELET # BLD AUTO: 34 K/UL — LOW (ref 150–400)
PLATELET # BLD AUTO: 34 K/UL — LOW (ref 150–400)
PLATELET # BLD AUTO: 42 K/UL — LOW (ref 150–400)
PLATELET # BLD AUTO: 42 K/UL — LOW (ref 150–400)
PLATELET # BLD AUTO: 50 K/UL — LOW (ref 150–400)
PLATELET # BLD AUTO: 69 K/UL — LOW (ref 150–400)
PLATELET # BLD AUTO: 72 K/UL — LOW (ref 150–400)
PLATELET # BLD AUTO: 74 K/UL — LOW (ref 150–400)
PLATELET # BLD AUTO: 75 K/UL — LOW (ref 150–400)
PLATELET # BLD AUTO: 78 K/UL — LOW (ref 150–400)
PLATELET # BLD AUTO: 85 K/UL — LOW (ref 150–400)
PLATELET # BLD AUTO: 92 K/UL — LOW (ref 150–400)
PLATELET # BLD AUTO: 94 K/UL — LOW (ref 150–400)
PLATELET # BLD AUTO: 97 K/UL — LOW (ref 150–400)
PLATELET COUNT - ESTIMATE: NORMAL — SIGNIFICANT CHANGE UP
PLATELET RESPONSE ASPIRIN RESULT: 393 ARU — SIGNIFICANT CHANGE UP (ref 350–700)
PO2 BLDA: 146 MMHG — HIGH (ref 83–108)
PO2 BLDV: 40 MMHG — SIGNIFICANT CHANGE UP (ref 25–45)
PO2 BLDV: 44 MMHG — SIGNIFICANT CHANGE UP (ref 25–45)
PO2 BLDV: 45 MMHG — SIGNIFICANT CHANGE UP (ref 25–45)
PO2 BLDV: 45 MMHG — SIGNIFICANT CHANGE UP (ref 25–45)
PO2 BLDV: 93 MMHG — HIGH (ref 25–45)
POIKILOCYTOSIS BLD QL AUTO: SIGNIFICANT CHANGE UP
POIKILOCYTOSIS BLD QL AUTO: SIGNIFICANT CHANGE UP
POTASSIUM BLDV-SCNC: 3.3 MMOL/L — LOW (ref 3.5–5.1)
POTASSIUM BLDV-SCNC: 3.9 MMOL/L — SIGNIFICANT CHANGE UP (ref 3.5–5.1)
POTASSIUM BLDV-SCNC: 3.9 MMOL/L — SIGNIFICANT CHANGE UP (ref 3.5–5.1)
POTASSIUM BLDV-SCNC: 4.3 MMOL/L — SIGNIFICANT CHANGE UP (ref 3.5–5.1)
POTASSIUM BLDV-SCNC: 4.7 MMOL/L — SIGNIFICANT CHANGE UP (ref 3.5–5.1)
POTASSIUM SERPL-MCNC: 3.1 MMOL/L — LOW (ref 3.5–5.3)
POTASSIUM SERPL-MCNC: 3.2 MMOL/L — LOW (ref 3.5–5.3)
POTASSIUM SERPL-MCNC: 3.2 MMOL/L — LOW (ref 3.5–5.3)
POTASSIUM SERPL-MCNC: 3.3 MMOL/L — LOW (ref 3.5–5.3)
POTASSIUM SERPL-MCNC: 3.3 MMOL/L — LOW (ref 3.5–5.3)
POTASSIUM SERPL-MCNC: 3.4 MMOL/L — LOW (ref 3.5–5.3)
POTASSIUM SERPL-MCNC: 3.4 MMOL/L — LOW (ref 3.5–5.3)
POTASSIUM SERPL-MCNC: 3.5 MMOL/L — SIGNIFICANT CHANGE UP (ref 3.5–5.3)
POTASSIUM SERPL-MCNC: 3.6 MMOL/L — SIGNIFICANT CHANGE UP (ref 3.5–5.3)
POTASSIUM SERPL-MCNC: 3.7 MMOL/L — SIGNIFICANT CHANGE UP (ref 3.5–5.3)
POTASSIUM SERPL-MCNC: 3.8 MMOL/L — SIGNIFICANT CHANGE UP (ref 3.5–5.3)
POTASSIUM SERPL-MCNC: 3.9 MMOL/L — SIGNIFICANT CHANGE UP (ref 3.5–5.3)
POTASSIUM SERPL-MCNC: 4 MMOL/L — SIGNIFICANT CHANGE UP (ref 3.5–5.3)
POTASSIUM SERPL-MCNC: 4.1 MMOL/L — SIGNIFICANT CHANGE UP (ref 3.5–5.3)
POTASSIUM SERPL-MCNC: 4.1 MMOL/L — SIGNIFICANT CHANGE UP (ref 3.5–5.3)
POTASSIUM SERPL-MCNC: 4.2 MMOL/L — SIGNIFICANT CHANGE UP (ref 3.5–5.3)
POTASSIUM SERPL-MCNC: 4.3 MMOL/L — SIGNIFICANT CHANGE UP (ref 3.5–5.3)
POTASSIUM SERPL-MCNC: 4.4 MMOL/L — SIGNIFICANT CHANGE UP (ref 3.5–5.3)
POTASSIUM SERPL-MCNC: 4.5 MMOL/L — SIGNIFICANT CHANGE UP (ref 3.5–5.3)
POTASSIUM SERPL-MCNC: 4.5 MMOL/L — SIGNIFICANT CHANGE UP (ref 3.5–5.3)
POTASSIUM SERPL-MCNC: 4.6 MMOL/L — SIGNIFICANT CHANGE UP (ref 3.5–5.3)
POTASSIUM SERPL-MCNC: 4.7 MMOL/L — SIGNIFICANT CHANGE UP (ref 3.5–5.3)
POTASSIUM SERPL-MCNC: 4.9 MMOL/L — SIGNIFICANT CHANGE UP (ref 3.5–5.3)
POTASSIUM SERPL-SCNC: 3.1 MMOL/L — LOW (ref 3.5–5.3)
POTASSIUM SERPL-SCNC: 3.2 MMOL/L — LOW (ref 3.5–5.3)
POTASSIUM SERPL-SCNC: 3.2 MMOL/L — LOW (ref 3.5–5.3)
POTASSIUM SERPL-SCNC: 3.3 MMOL/L — LOW (ref 3.5–5.3)
POTASSIUM SERPL-SCNC: 3.3 MMOL/L — LOW (ref 3.5–5.3)
POTASSIUM SERPL-SCNC: 3.4 MMOL/L — LOW (ref 3.5–5.3)
POTASSIUM SERPL-SCNC: 3.4 MMOL/L — LOW (ref 3.5–5.3)
POTASSIUM SERPL-SCNC: 3.5 MMOL/L — SIGNIFICANT CHANGE UP (ref 3.5–5.3)
POTASSIUM SERPL-SCNC: 3.6 MMOL/L — SIGNIFICANT CHANGE UP (ref 3.5–5.3)
POTASSIUM SERPL-SCNC: 3.7 MMOL/L — SIGNIFICANT CHANGE UP (ref 3.5–5.3)
POTASSIUM SERPL-SCNC: 3.8 MMOL/L — SIGNIFICANT CHANGE UP (ref 3.5–5.3)
POTASSIUM SERPL-SCNC: 3.9 MMOL/L — SIGNIFICANT CHANGE UP (ref 3.5–5.3)
POTASSIUM SERPL-SCNC: 4 MMOL/L — SIGNIFICANT CHANGE UP (ref 3.5–5.3)
POTASSIUM SERPL-SCNC: 4.1 MMOL/L — SIGNIFICANT CHANGE UP (ref 3.5–5.3)
POTASSIUM SERPL-SCNC: 4.1 MMOL/L — SIGNIFICANT CHANGE UP (ref 3.5–5.3)
POTASSIUM SERPL-SCNC: 4.2 MMOL/L — SIGNIFICANT CHANGE UP (ref 3.5–5.3)
POTASSIUM SERPL-SCNC: 4.3 MMOL/L — SIGNIFICANT CHANGE UP (ref 3.5–5.3)
POTASSIUM SERPL-SCNC: 4.4 MMOL/L — SIGNIFICANT CHANGE UP (ref 3.5–5.3)
POTASSIUM SERPL-SCNC: 4.5 MMOL/L — SIGNIFICANT CHANGE UP (ref 3.5–5.3)
POTASSIUM SERPL-SCNC: 4.5 MMOL/L — SIGNIFICANT CHANGE UP (ref 3.5–5.3)
POTASSIUM SERPL-SCNC: 4.6 MMOL/L — SIGNIFICANT CHANGE UP (ref 3.5–5.3)
POTASSIUM SERPL-SCNC: 4.7 MMOL/L — SIGNIFICANT CHANGE UP (ref 3.5–5.3)
POTASSIUM SERPL-SCNC: 4.9 MMOL/L — SIGNIFICANT CHANGE UP (ref 3.5–5.3)
PROT SERPL-MCNC: 3.3 G/DL — LOW (ref 6–8.3)
PROT SERPL-MCNC: 3.9 G/DL — LOW (ref 6–8.3)
PROT SERPL-MCNC: 4.1 G/DL — LOW (ref 6–8.3)
PROT SERPL-MCNC: 4.4 G/DL — LOW (ref 6–8.3)
PROT SERPL-MCNC: 4.5 G/DL — LOW (ref 6–8.3)
PROT SERPL-MCNC: 4.6 G/DL — LOW (ref 6–8.3)
PROT SERPL-MCNC: 5 G/DL — LOW (ref 6–8.3)
PROT SERPL-MCNC: 5.2 G/DL — LOW (ref 6–8.3)
PROT SERPL-MCNC: 5.3 G/DL — LOW (ref 6–8.3)
PROT SERPL-MCNC: 5.6 G/DL — LOW (ref 6–8.3)
PROT SERPL-MCNC: 6 G/DL — SIGNIFICANT CHANGE UP (ref 6–8.3)
PROT SERPL-MCNC: 6.4 G/DL — SIGNIFICANT CHANGE UP (ref 6–8.3)
PROT SERPL-MCNC: 6.4 G/DL — SIGNIFICANT CHANGE UP (ref 6–8.3)
PROT SERPL-MCNC: 6.6 G/DL — SIGNIFICANT CHANGE UP (ref 6–8.3)
PROT SERPL-MCNC: 6.8 G/DL — SIGNIFICANT CHANGE UP (ref 6–8.3)
PROT SERPL-MCNC: 6.9 G/DL — SIGNIFICANT CHANGE UP (ref 6–8.3)
PROT UR-MCNC: 100 — SIGNIFICANT CHANGE UP
PROT UR-MCNC: ABNORMAL
PROTHROM AB SERPL-ACNC: 10.5 SEC — LOW (ref 10.6–13.6)
PROTHROM AB SERPL-ACNC: 11.3 SEC — SIGNIFICANT CHANGE UP (ref 10.6–13.6)
PROTHROM AB SERPL-ACNC: 11.4 SEC — SIGNIFICANT CHANGE UP (ref 10.6–13.6)
PROTHROM AB SERPL-ACNC: 11.6 SEC — SIGNIFICANT CHANGE UP (ref 10.6–13.6)
PROTHROM AB SERPL-ACNC: 12 SEC — SIGNIFICANT CHANGE UP (ref 10.6–13.6)
PROTHROM AB SERPL-ACNC: 12 SEC — SIGNIFICANT CHANGE UP (ref 10.6–13.6)
PROTHROM AB SERPL-ACNC: 12.2 SEC — SIGNIFICANT CHANGE UP (ref 10.6–13.6)
PROTHROM AB SERPL-ACNC: 13.3 SEC — SIGNIFICANT CHANGE UP (ref 10.6–13.6)
PROTHROM AB SERPL-ACNC: 13.5 SEC — SIGNIFICANT CHANGE UP (ref 10.6–13.6)
PROTHROM AB SERPL-ACNC: 14.6 SEC — HIGH (ref 10.6–13.6)
RAPID RVP RESULT: SIGNIFICANT CHANGE UP
RAPIDTEG MAXIMUM AMPLITUDE: 68.1 MM — SIGNIFICANT CHANGE UP (ref 52–70)
RBC # BLD: 2.96 M/UL — LOW (ref 4.2–5.8)
RBC # BLD: 3.11 M/UL — LOW (ref 4.2–5.8)
RBC # BLD: 3.11 M/UL — LOW (ref 4.2–5.8)
RBC # BLD: 3.12 M/UL — LOW (ref 4.2–5.8)
RBC # BLD: 3.14 M/UL — LOW (ref 4.2–5.8)
RBC # BLD: 3.18 M/UL — LOW (ref 4.2–5.8)
RBC # BLD: 3.22 M/UL — LOW (ref 4.2–5.8)
RBC # BLD: 3.23 M/UL — LOW (ref 4.2–5.8)
RBC # BLD: 3.25 M/UL — LOW (ref 4.2–5.8)
RBC # BLD: 3.3 M/UL — LOW (ref 4.2–5.8)
RBC # BLD: 3.33 M/UL — LOW (ref 4.2–5.8)
RBC # BLD: 3.35 M/UL — LOW (ref 4.2–5.8)
RBC # BLD: 3.4 M/UL — LOW (ref 4.2–5.8)
RBC # BLD: 3.44 M/UL — LOW (ref 4.2–5.8)
RBC # BLD: 3.45 M/UL — LOW (ref 4.2–5.8)
RBC # BLD: 3.45 M/UL — LOW (ref 4.2–5.8)
RBC # BLD: 3.46 M/UL — LOW (ref 4.2–5.8)
RBC # BLD: 3.46 M/UL — LOW (ref 4.2–5.8)
RBC # BLD: 3.47 M/UL — LOW (ref 4.2–5.8)
RBC # BLD: 3.59 M/UL — LOW (ref 4.2–5.8)
RBC # BLD: 3.66 M/UL — LOW (ref 4.2–5.8)
RBC # BLD: 3.73 M/UL — LOW (ref 4.2–5.8)
RBC # BLD: 3.74 M/UL — LOW (ref 4.2–5.8)
RBC # BLD: 3.83 M/UL — LOW (ref 4.2–5.8)
RBC # BLD: 3.91 M/UL — LOW (ref 4.2–5.8)
RBC # BLD: 3.91 M/UL — LOW (ref 4.2–5.8)
RBC # BLD: 4.03 M/UL — LOW (ref 4.2–5.8)
RBC # BLD: 4.04 M/UL — LOW (ref 4.2–5.8)
RBC # BLD: 4.08 M/UL — LOW (ref 4.2–5.8)
RBC # BLD: 4.17 M/UL — LOW (ref 4.2–5.8)
RBC # BLD: 4.29 M/UL — SIGNIFICANT CHANGE UP (ref 4.2–5.8)
RBC # BLD: 4.33 M/UL — SIGNIFICANT CHANGE UP (ref 4.2–5.8)
RBC # BLD: 4.33 M/UL — SIGNIFICANT CHANGE UP (ref 4.2–5.8)
RBC # BLD: 4.4 M/UL — SIGNIFICANT CHANGE UP (ref 4.2–5.8)
RBC # BLD: 4.48 M/UL — SIGNIFICANT CHANGE UP (ref 4.2–5.8)
RBC # BLD: 4.49 M/UL — SIGNIFICANT CHANGE UP (ref 4.2–5.8)
RBC # BLD: 4.59 M/UL — SIGNIFICANT CHANGE UP (ref 4.2–5.8)
RBC # BLD: 4.62 M/UL — SIGNIFICANT CHANGE UP (ref 4.2–5.8)
RBC # FLD: 15.9 % — HIGH (ref 10.3–14.5)
RBC # FLD: 16.1 % — HIGH (ref 10.3–14.5)
RBC # FLD: 16.3 % — HIGH (ref 10.3–14.5)
RBC # FLD: 16.4 % — HIGH (ref 10.3–14.5)
RBC # FLD: 18 % — HIGH (ref 10.3–14.5)
RBC # FLD: 18.3 % — HIGH (ref 10.3–14.5)
RBC # FLD: 18.6 % — HIGH (ref 10.3–14.5)
RBC # FLD: 18.8 % — HIGH (ref 10.3–14.5)
RBC # FLD: 18.8 % — HIGH (ref 10.3–14.5)
RBC # FLD: 19.1 % — HIGH (ref 10.3–14.5)
RBC # FLD: 19.2 % — HIGH (ref 10.3–14.5)
RBC # FLD: 19.3 % — HIGH (ref 10.3–14.5)
RBC # FLD: 19.4 % — HIGH (ref 10.3–14.5)
RBC # FLD: 19.4 % — HIGH (ref 10.3–14.5)
RBC # FLD: 19.5 % — HIGH (ref 10.3–14.5)
RBC # FLD: 19.5 % — HIGH (ref 10.3–14.5)
RBC # FLD: 19.6 % — HIGH (ref 10.3–14.5)
RBC # FLD: 19.8 % — HIGH (ref 10.3–14.5)
RBC # FLD: 19.9 % — HIGH (ref 10.3–14.5)
RBC # FLD: 20.1 % — HIGH (ref 10.3–14.5)
RBC # FLD: 20.2 % — HIGH (ref 10.3–14.5)
RBC # FLD: 20.4 % — HIGH (ref 10.3–14.5)
RBC # FLD: 20.4 % — HIGH (ref 10.3–14.5)
RBC # FLD: 20.5 % — HIGH (ref 10.3–14.5)
RBC # FLD: 20.7 % — HIGH (ref 10.3–14.5)
RBC # FLD: 20.7 % — HIGH (ref 10.3–14.5)
RBC # FLD: 20.9 % — HIGH (ref 10.3–14.5)
RBC # FLD: 21.2 % — HIGH (ref 10.3–14.5)
RBC BLD AUTO: ABNORMAL
RBC BLD AUTO: ABNORMAL
RBC CASTS # UR COMP ASSIST: 1 /HPF — SIGNIFICANT CHANGE UP (ref 0–4)
RBC CASTS # UR COMP ASSIST: 132 /HPF — HIGH (ref 0–4)
RBC CASTS # UR COMP ASSIST: 3 /HPF — SIGNIFICANT CHANGE UP (ref 0–4)
RBC CASTS # UR COMP ASSIST: 3 /HPF — SIGNIFICANT CHANGE UP (ref 0–4)
RETICS #: 39.1 K/UL — SIGNIFICANT CHANGE UP (ref 25–125)
RETICS #: 75.4 K/UL — SIGNIFICANT CHANGE UP (ref 25–125)
RETICS/RBC NFR: 1 % — SIGNIFICANT CHANGE UP (ref 0.5–2.5)
RETICS/RBC NFR: 1.8 % — SIGNIFICANT CHANGE UP (ref 0.5–2.5)
RH IG SCN BLD-IMP: POSITIVE — SIGNIFICANT CHANGE UP
S AUREUS DNA NOSE QL NAA+PROBE: SIGNIFICANT CHANGE UP
SAO2 % BLDA: 99.9 % — HIGH (ref 94–98)
SAO2 % BLDV: 69 % — SIGNIFICANT CHANGE UP (ref 67–88)
SAO2 % BLDV: 76 % — SIGNIFICANT CHANGE UP (ref 67–88)
SAO2 % BLDV: 77 % — SIGNIFICANT CHANGE UP (ref 67–88)
SAO2 % BLDV: 79.7 % — SIGNIFICANT CHANGE UP (ref 67–88)
SAO2 % BLDV: 99.1 % — HIGH (ref 67–88)
SARS-COV-2 IGG+IGM SERPL QL IA: 73.5 U/ML — HIGH
SARS-COV-2 IGG+IGM SERPL QL IA: POSITIVE
SARS-COV-2 RNA SPEC QL NAA+PROBE: SIGNIFICANT CHANGE UP
SCHISTOCYTES BLD QL AUTO: SIGNIFICANT CHANGE UP
SCHISTOCYTES BLD QL AUTO: SLIGHT — SIGNIFICANT CHANGE UP
SODIUM SERPL-SCNC: 110 MMOL/L — CRITICAL LOW (ref 135–145)
SODIUM SERPL-SCNC: 114 MMOL/L — CRITICAL LOW (ref 135–145)
SODIUM SERPL-SCNC: 116 MMOL/L — CRITICAL LOW (ref 135–145)
SODIUM SERPL-SCNC: 116 MMOL/L — CRITICAL LOW (ref 135–145)
SODIUM SERPL-SCNC: 118 MMOL/L — CRITICAL LOW (ref 135–145)
SODIUM SERPL-SCNC: 119 MMOL/L — CRITICAL LOW (ref 135–145)
SODIUM SERPL-SCNC: 119 MMOL/L — CRITICAL LOW (ref 135–145)
SODIUM SERPL-SCNC: 120 MMOL/L — CRITICAL LOW (ref 135–145)
SODIUM SERPL-SCNC: 121 MMOL/L — LOW (ref 135–145)
SODIUM SERPL-SCNC: 126 MMOL/L — LOW (ref 135–145)
SODIUM SERPL-SCNC: 126 MMOL/L — LOW (ref 135–145)
SODIUM SERPL-SCNC: 127 MMOL/L — LOW (ref 135–145)
SODIUM SERPL-SCNC: 129 MMOL/L — LOW (ref 135–145)
SODIUM SERPL-SCNC: 130 MMOL/L — LOW (ref 135–145)
SODIUM SERPL-SCNC: 130 MMOL/L — LOW (ref 135–145)
SODIUM SERPL-SCNC: 131 MMOL/L — LOW (ref 135–145)
SODIUM SERPL-SCNC: 132 MMOL/L — LOW (ref 135–145)
SODIUM SERPL-SCNC: 132 MMOL/L — LOW (ref 135–145)
SODIUM SERPL-SCNC: 133 MMOL/L — LOW (ref 135–145)
SODIUM SERPL-SCNC: 133 MMOL/L — LOW (ref 135–145)
SODIUM SERPL-SCNC: 134 MMOL/L — LOW (ref 135–145)
SODIUM SERPL-SCNC: 134 MMOL/L — LOW (ref 135–145)
SODIUM SERPL-SCNC: 135 MMOL/L — SIGNIFICANT CHANGE UP (ref 135–145)
SODIUM SERPL-SCNC: 135 MMOL/L — SIGNIFICANT CHANGE UP (ref 135–145)
SODIUM SERPL-SCNC: 137 MMOL/L — SIGNIFICANT CHANGE UP (ref 135–145)
SODIUM SERPL-SCNC: 137 MMOL/L — SIGNIFICANT CHANGE UP (ref 135–145)
SODIUM SERPL-SCNC: 139 MMOL/L — SIGNIFICANT CHANGE UP (ref 135–145)
SODIUM SERPL-SCNC: 140 MMOL/L — SIGNIFICANT CHANGE UP (ref 135–145)
SODIUM SERPL-SCNC: 141 MMOL/L — SIGNIFICANT CHANGE UP (ref 135–145)
SODIUM SERPL-SCNC: 143 MMOL/L — SIGNIFICANT CHANGE UP (ref 135–145)
SODIUM SERPL-SCNC: 144 MMOL/L — SIGNIFICANT CHANGE UP (ref 135–145)
SODIUM SERPL-SCNC: 145 MMOL/L — SIGNIFICANT CHANGE UP (ref 135–145)
SODIUM SERPL-SCNC: 146 MMOL/L — HIGH (ref 135–145)
SODIUM SERPL-SCNC: 146 MMOL/L — HIGH (ref 135–145)
SODIUM SERPL-SCNC: 147 MMOL/L — HIGH (ref 135–145)
SODIUM SERPL-SCNC: 148 MMOL/L — HIGH (ref 135–145)
SODIUM SERPL-SCNC: 149 MMOL/L — HIGH (ref 135–145)
SODIUM SERPL-SCNC: 150 MMOL/L — HIGH (ref 135–145)
SODIUM SERPL-SCNC: 150 MMOL/L — HIGH (ref 135–145)
SODIUM SERPL-SCNC: 151 MMOL/L — HIGH (ref 135–145)
SODIUM SERPL-SCNC: 151 MMOL/L — HIGH (ref 135–145)
SODIUM SERPL-SCNC: 152 MMOL/L — HIGH (ref 135–145)
SODIUM SERPL-SCNC: 152 MMOL/L — HIGH (ref 135–145)
SODIUM SERPL-SCNC: 154 MMOL/L — HIGH (ref 135–145)
SODIUM UR-SCNC: 15 MMOL/L — SIGNIFICANT CHANGE UP
SODIUM UR-SCNC: 77 MMOL/L — SIGNIFICANT CHANGE UP
SP GR SPEC: 1.02 — SIGNIFICANT CHANGE UP (ref 1.01–1.02)
SP GR SPEC: 1.03 — HIGH (ref 1.01–1.02)
SP GR SPEC: 1.03 — HIGH (ref 1.01–1.02)
SP GR SPEC: >1.05 (ref 1.01–1.02)
SP GR SPEC: >1.05 (ref 1.01–1.02)
SPECIMEN SOURCE: SIGNIFICANT CHANGE UP
TEG FUNCTIONAL FIBRINOGEN: 32.7 MM (ref 15–32)
TEG MAXIMUM AMPLITUDE: 67.3 MM — SIGNIFICANT CHANGE UP (ref 52–69)
TEG REACTION TIME: 5.3 MIN — SIGNIFICANT CHANGE UP (ref 4.6–9.1)
TIBC SERPL-MCNC: 184 UG/DL — LOW (ref 220–430)
TIBC SERPL-MCNC: 246 UG/DL — SIGNIFICANT CHANGE UP (ref 220–430)
TSH SERPL-MCNC: 1.18 UIU/ML — SIGNIFICANT CHANGE UP (ref 0.27–4.2)
UIBC SERPL-MCNC: 124 UG/DL — SIGNIFICANT CHANGE UP (ref 110–370)
UIBC SERPL-MCNC: 169 UG/DL — SIGNIFICANT CHANGE UP (ref 110–370)
URATE SERPL-MCNC: 1.8 MG/DL — LOW (ref 3.4–8.8)
URATE SERPL-MCNC: 2.4 MG/DL — LOW (ref 3.4–8.8)
UROBILINOGEN FLD QL: NEGATIVE — SIGNIFICANT CHANGE UP
VIT B12 SERPL-MCNC: 1525 PG/ML — HIGH (ref 232–1245)
VIT B12 SERPL-MCNC: 888 PG/ML — SIGNIFICANT CHANGE UP (ref 232–1245)
WBC # BLD: 10.87 K/UL — HIGH (ref 3.8–10.5)
WBC # BLD: 11.19 K/UL — HIGH (ref 3.8–10.5)
WBC # BLD: 13.06 K/UL — HIGH (ref 3.8–10.5)
WBC # BLD: 13.41 K/UL — HIGH (ref 3.8–10.5)
WBC # BLD: 14.58 K/UL — HIGH (ref 3.8–10.5)
WBC # BLD: 15.32 K/UL — HIGH (ref 3.8–10.5)
WBC # BLD: 15.47 K/UL — HIGH (ref 3.8–10.5)
WBC # BLD: 16.86 K/UL — HIGH (ref 3.8–10.5)
WBC # BLD: 17.43 K/UL — HIGH (ref 3.8–10.5)
WBC # BLD: 18.41 K/UL — HIGH (ref 3.8–10.5)
WBC # BLD: 18.72 K/UL — HIGH (ref 3.8–10.5)
WBC # BLD: 19.62 K/UL — HIGH (ref 3.8–10.5)
WBC # BLD: 20.81 K/UL — HIGH (ref 3.8–10.5)
WBC # BLD: 21.23 K/UL — HIGH (ref 3.8–10.5)
WBC # BLD: 22.19 K/UL — HIGH (ref 3.8–10.5)
WBC # BLD: 22.91 K/UL — HIGH (ref 3.8–10.5)
WBC # BLD: 3.95 K/UL — SIGNIFICANT CHANGE UP (ref 3.8–10.5)
WBC # BLD: 30.59 K/UL — HIGH (ref 3.8–10.5)
WBC # BLD: 32.62 K/UL — HIGH (ref 3.8–10.5)
WBC # BLD: 4.42 K/UL — SIGNIFICANT CHANGE UP (ref 3.8–10.5)
WBC # BLD: 5.13 K/UL — SIGNIFICANT CHANGE UP (ref 3.8–10.5)
WBC # BLD: 5.24 K/UL — SIGNIFICANT CHANGE UP (ref 3.8–10.5)
WBC # BLD: 6.88 K/UL — SIGNIFICANT CHANGE UP (ref 3.8–10.5)
WBC # BLD: 67.18 K/UL — CRITICAL HIGH (ref 3.8–10.5)
WBC # BLD: 7.75 K/UL — SIGNIFICANT CHANGE UP (ref 3.8–10.5)
WBC # BLD: 8.01 K/UL — SIGNIFICANT CHANGE UP (ref 3.8–10.5)
WBC # BLD: 8.12 K/UL — SIGNIFICANT CHANGE UP (ref 3.8–10.5)
WBC # BLD: 8.31 K/UL — SIGNIFICANT CHANGE UP (ref 3.8–10.5)
WBC # BLD: 8.51 K/UL — SIGNIFICANT CHANGE UP (ref 3.8–10.5)
WBC # BLD: 8.65 K/UL — SIGNIFICANT CHANGE UP (ref 3.8–10.5)
WBC # BLD: 8.83 K/UL — SIGNIFICANT CHANGE UP (ref 3.8–10.5)
WBC # BLD: 9.12 K/UL — SIGNIFICANT CHANGE UP (ref 3.8–10.5)
WBC # BLD: 9.17 K/UL — SIGNIFICANT CHANGE UP (ref 3.8–10.5)
WBC # BLD: 9.3 K/UL — SIGNIFICANT CHANGE UP (ref 3.8–10.5)
WBC # BLD: 9.56 K/UL — SIGNIFICANT CHANGE UP (ref 3.8–10.5)
WBC # BLD: 9.78 K/UL — SIGNIFICANT CHANGE UP (ref 3.8–10.5)
WBC # FLD AUTO: 10.87 K/UL — HIGH (ref 3.8–10.5)
WBC # FLD AUTO: 11.19 K/UL — HIGH (ref 3.8–10.5)
WBC # FLD AUTO: 13.06 K/UL — HIGH (ref 3.8–10.5)
WBC # FLD AUTO: 13.41 K/UL — HIGH (ref 3.8–10.5)
WBC # FLD AUTO: 14.58 K/UL — HIGH (ref 3.8–10.5)
WBC # FLD AUTO: 15.32 K/UL — HIGH (ref 3.8–10.5)
WBC # FLD AUTO: 15.47 K/UL — HIGH (ref 3.8–10.5)
WBC # FLD AUTO: 16.86 K/UL — HIGH (ref 3.8–10.5)
WBC # FLD AUTO: 17.43 K/UL — HIGH (ref 3.8–10.5)
WBC # FLD AUTO: 18.41 K/UL — HIGH (ref 3.8–10.5)
WBC # FLD AUTO: 18.72 K/UL — HIGH (ref 3.8–10.5)
WBC # FLD AUTO: 19.62 K/UL — HIGH (ref 3.8–10.5)
WBC # FLD AUTO: 20.81 K/UL — HIGH (ref 3.8–10.5)
WBC # FLD AUTO: 21.23 K/UL — HIGH (ref 3.8–10.5)
WBC # FLD AUTO: 22.19 K/UL — HIGH (ref 3.8–10.5)
WBC # FLD AUTO: 22.91 K/UL — HIGH (ref 3.8–10.5)
WBC # FLD AUTO: 3.95 K/UL — SIGNIFICANT CHANGE UP (ref 3.8–10.5)
WBC # FLD AUTO: 30.59 K/UL — HIGH (ref 3.8–10.5)
WBC # FLD AUTO: 32.62 K/UL — HIGH (ref 3.8–10.5)
WBC # FLD AUTO: 4.42 K/UL — SIGNIFICANT CHANGE UP (ref 3.8–10.5)
WBC # FLD AUTO: 5.13 K/UL — SIGNIFICANT CHANGE UP (ref 3.8–10.5)
WBC # FLD AUTO: 5.24 K/UL — SIGNIFICANT CHANGE UP (ref 3.8–10.5)
WBC # FLD AUTO: 6.88 K/UL — SIGNIFICANT CHANGE UP (ref 3.8–10.5)
WBC # FLD AUTO: 67.18 K/UL — CRITICAL HIGH (ref 3.8–10.5)
WBC # FLD AUTO: 7.75 K/UL — SIGNIFICANT CHANGE UP (ref 3.8–10.5)
WBC # FLD AUTO: 8.01 K/UL — SIGNIFICANT CHANGE UP (ref 3.8–10.5)
WBC # FLD AUTO: 8.12 K/UL — SIGNIFICANT CHANGE UP (ref 3.8–10.5)
WBC # FLD AUTO: 8.31 K/UL — SIGNIFICANT CHANGE UP (ref 3.8–10.5)
WBC # FLD AUTO: 8.51 K/UL — SIGNIFICANT CHANGE UP (ref 3.8–10.5)
WBC # FLD AUTO: 8.65 K/UL — SIGNIFICANT CHANGE UP (ref 3.8–10.5)
WBC # FLD AUTO: 8.83 K/UL — SIGNIFICANT CHANGE UP (ref 3.8–10.5)
WBC # FLD AUTO: 9.12 K/UL — SIGNIFICANT CHANGE UP (ref 3.8–10.5)
WBC # FLD AUTO: 9.17 K/UL — SIGNIFICANT CHANGE UP (ref 3.8–10.5)
WBC # FLD AUTO: 9.3 K/UL — SIGNIFICANT CHANGE UP (ref 3.8–10.5)
WBC # FLD AUTO: 9.56 K/UL — SIGNIFICANT CHANGE UP (ref 3.8–10.5)
WBC # FLD AUTO: 9.78 K/UL — SIGNIFICANT CHANGE UP (ref 3.8–10.5)
WBC UR QL: 1 /HPF — SIGNIFICANT CHANGE UP (ref 0–5)
WBC UR QL: 19 /HPF — HIGH (ref 0–5)
WBC UR QL: 4 /HPF — SIGNIFICANT CHANGE UP (ref 0–5)
WBC UR QL: 50 /HPF — HIGH (ref 0–5)

## 2021-01-01 PROCEDURE — 99233 SBSQ HOSP IP/OBS HIGH 50: CPT

## 2021-01-01 PROCEDURE — 87040 BLOOD CULTURE FOR BACTERIA: CPT

## 2021-01-01 PROCEDURE — 99232 SBSQ HOSP IP/OBS MODERATE 35: CPT | Mod: GC

## 2021-01-01 PROCEDURE — 11045 DBRDMT SUBQ TISS EACH ADDL: CPT

## 2021-01-01 PROCEDURE — 70460 CT HEAD/BRAIN W/DYE: CPT | Mod: MA

## 2021-01-01 PROCEDURE — 99291 CRITICAL CARE FIRST HOUR: CPT

## 2021-01-01 PROCEDURE — 99233 SBSQ HOSP IP/OBS HIGH 50: CPT | Mod: GC

## 2021-01-01 PROCEDURE — 93971 EXTREMITY STUDY: CPT | Mod: 26,59,LT

## 2021-01-01 PROCEDURE — 85379 FIBRIN DEGRADATION QUANT: CPT

## 2021-01-01 PROCEDURE — 84295 ASSAY OF SERUM SODIUM: CPT

## 2021-01-01 PROCEDURE — 11042 DBRDMT SUBQ TIS 1ST 20SQCM/<: CPT

## 2021-01-01 PROCEDURE — A9585: CPT

## 2021-01-01 PROCEDURE — 99223 1ST HOSP IP/OBS HIGH 75: CPT

## 2021-01-01 PROCEDURE — 71045 X-RAY EXAM CHEST 1 VIEW: CPT

## 2021-01-01 PROCEDURE — 86900 BLOOD TYPING SEROLOGIC ABO: CPT

## 2021-01-01 PROCEDURE — 76705 ECHO EXAM OF ABDOMEN: CPT

## 2021-01-01 PROCEDURE — 99232 SBSQ HOSP IP/OBS MODERATE 35: CPT

## 2021-01-01 PROCEDURE — 82746 ASSAY OF FOLIC ACID SERUM: CPT

## 2021-01-01 PROCEDURE — 86901 BLOOD TYPING SEROLOGIC RH(D): CPT

## 2021-01-01 PROCEDURE — 99222 1ST HOSP IP/OBS MODERATE 55: CPT

## 2021-01-01 PROCEDURE — 83615 LACTATE (LD) (LDH) ENZYME: CPT

## 2021-01-01 PROCEDURE — 72146 MRI CHEST SPINE W/O DYE: CPT

## 2021-01-01 PROCEDURE — 36600 WITHDRAWAL OF ARTERIAL BLOOD: CPT

## 2021-01-01 PROCEDURE — 83605 ASSAY OF LACTIC ACID: CPT

## 2021-01-01 PROCEDURE — 93005 ELECTROCARDIOGRAM TRACING: CPT | Mod: XU

## 2021-01-01 PROCEDURE — 76705 ECHO EXAM OF ABDOMEN: CPT | Mod: 26

## 2021-01-01 PROCEDURE — C2625: CPT

## 2021-01-01 PROCEDURE — 97606 NEG PRS WND THER DME>50 SQCM: CPT

## 2021-01-01 PROCEDURE — 74177 CT ABD & PELVIS W/CONTRAST: CPT | Mod: 26,MA

## 2021-01-01 PROCEDURE — 82607 VITAMIN B-12: CPT

## 2021-01-01 PROCEDURE — 74177 CT ABD & PELVIS W/CONTRAST: CPT | Mod: 26

## 2021-01-01 PROCEDURE — 93308 TTE F-UP OR LMTD: CPT | Mod: 26,GC

## 2021-01-01 PROCEDURE — 85025 COMPLETE CBC W/AUTO DIFF WBC: CPT

## 2021-01-01 PROCEDURE — 84550 ASSAY OF BLOOD/URIC ACID: CPT

## 2021-01-01 PROCEDURE — 99232 SBSQ HOSP IP/OBS MODERATE 35: CPT | Mod: 25

## 2021-01-01 PROCEDURE — 43274 ERCP DUCT STENT PLACEMENT: CPT | Mod: GC

## 2021-01-01 PROCEDURE — 82728 ASSAY OF FERRITIN: CPT

## 2021-01-01 PROCEDURE — 76604 US EXAM CHEST: CPT | Mod: 26,GC

## 2021-01-01 PROCEDURE — 99291 CRITICAL CARE FIRST HOUR: CPT | Mod: 25

## 2021-01-01 PROCEDURE — 80048 BASIC METABOLIC PNL TOTAL CA: CPT

## 2021-01-01 PROCEDURE — 80053 COMPREHEN METABOLIC PANEL: CPT

## 2021-01-01 PROCEDURE — 82565 ASSAY OF CREATININE: CPT

## 2021-01-01 PROCEDURE — 83735 ASSAY OF MAGNESIUM: CPT

## 2021-01-01 PROCEDURE — 85045 AUTOMATED RETICULOCYTE COUNT: CPT

## 2021-01-01 PROCEDURE — 83930 ASSAY OF BLOOD OSMOLALITY: CPT

## 2021-01-01 PROCEDURE — 71045 X-RAY EXAM CHEST 1 VIEW: CPT | Mod: 26

## 2021-01-01 PROCEDURE — 85610 PROTHROMBIN TIME: CPT

## 2021-01-01 PROCEDURE — 70553 MRI BRAIN STEM W/O & W/DYE: CPT

## 2021-01-01 PROCEDURE — 85014 HEMATOCRIT: CPT

## 2021-01-01 PROCEDURE — 85730 THROMBOPLASTIN TIME PARTIAL: CPT

## 2021-01-01 PROCEDURE — 49465 FLUORO EXAM OF G/COLON TUBE: CPT

## 2021-01-01 PROCEDURE — 70496 CT ANGIOGRAPHY HEAD: CPT | Mod: 26

## 2021-01-01 PROCEDURE — 74177 CT ABD & PELVIS W/CONTRAST: CPT

## 2021-01-01 PROCEDURE — 85260 CLOT FACTOR X STUART-POWER: CPT

## 2021-01-01 PROCEDURE — 83935 ASSAY OF URINE OSMOLALITY: CPT

## 2021-01-01 PROCEDURE — 76705 ECHO EXAM OF ABDOMEN: CPT | Mod: 26,RT

## 2021-01-01 PROCEDURE — 74330 X-RAY BILE/PANC ENDOSCOPY: CPT

## 2021-01-01 PROCEDURE — 86850 RBC ANTIBODY SCREEN: CPT

## 2021-01-01 PROCEDURE — 99285 EMERGENCY DEPT VISIT HI MDM: CPT | Mod: 25

## 2021-01-01 PROCEDURE — 99497 ADVNCD CARE PLAN 30 MIN: CPT | Mod: 25

## 2021-01-01 PROCEDURE — P9016: CPT

## 2021-01-01 PROCEDURE — 99442: CPT

## 2021-01-01 PROCEDURE — 82570 ASSAY OF URINE CREATININE: CPT

## 2021-01-01 PROCEDURE — 99222 1ST HOSP IP/OBS MODERATE 55: CPT | Mod: GC

## 2021-01-01 PROCEDURE — 93308 TTE F-UP OR LMTD: CPT | Mod: 26

## 2021-01-01 PROCEDURE — 83880 ASSAY OF NATRIURETIC PEPTIDE: CPT

## 2021-01-01 PROCEDURE — 70496 CT ANGIOGRAPHY HEAD: CPT | Mod: MA

## 2021-01-01 PROCEDURE — 83540 ASSAY OF IRON: CPT

## 2021-01-01 PROCEDURE — 97161 PT EVAL LOW COMPLEX 20 MIN: CPT

## 2021-01-01 PROCEDURE — 87640 STAPH A DNA AMP PROBE: CPT

## 2021-01-01 PROCEDURE — U0003: CPT

## 2021-01-01 PROCEDURE — 93306 TTE W/DOPPLER COMPLETE: CPT

## 2021-01-01 PROCEDURE — 85018 HEMOGLOBIN: CPT

## 2021-01-01 PROCEDURE — 99223 1ST HOSP IP/OBS HIGH 75: CPT | Mod: GC

## 2021-01-01 PROCEDURE — 71250 CT THORAX DX C-: CPT | Mod: 26

## 2021-01-01 PROCEDURE — G1004: CPT

## 2021-01-01 PROCEDURE — 97164 PT RE-EVAL EST PLAN CARE: CPT

## 2021-01-01 PROCEDURE — 70498 CT ANGIOGRAPHY NECK: CPT | Mod: MA

## 2021-01-01 PROCEDURE — 72148 MRI LUMBAR SPINE W/O DYE: CPT

## 2021-01-01 PROCEDURE — 70496 CT ANGIOGRAPHY HEAD: CPT | Mod: 26,MG

## 2021-01-01 PROCEDURE — 31500 INSERT EMERGENCY AIRWAY: CPT

## 2021-01-01 PROCEDURE — 93308 TTE F-UP OR LMTD: CPT

## 2021-01-01 PROCEDURE — 70450 CT HEAD/BRAIN W/O DYE: CPT

## 2021-01-01 PROCEDURE — 72125 CT NECK SPINE W/O DYE: CPT | Mod: MA

## 2021-01-01 PROCEDURE — 36415 COLL VENOUS BLD VENIPUNCTURE: CPT

## 2021-01-01 PROCEDURE — 85384 FIBRINOGEN ACTIVITY: CPT

## 2021-01-01 PROCEDURE — 96375 TX/PRO/DX INJ NEW DRUG ADDON: CPT

## 2021-01-01 PROCEDURE — U0005: CPT

## 2021-01-01 PROCEDURE — 93306 TTE W/DOPPLER COMPLETE: CPT | Mod: 26

## 2021-01-01 PROCEDURE — 70450 CT HEAD/BRAIN W/O DYE: CPT | Mod: 26,59

## 2021-01-01 PROCEDURE — 70450 CT HEAD/BRAIN W/O DYE: CPT | Mod: 26

## 2021-01-01 PROCEDURE — 96375 TX/PRO/DX INJ NEW DRUG ADDON: CPT | Mod: XU

## 2021-01-01 PROCEDURE — 81001 URINALYSIS AUTO W/SCOPE: CPT

## 2021-01-01 PROCEDURE — 84132 ASSAY OF SERUM POTASSIUM: CPT

## 2021-01-01 PROCEDURE — 82330 ASSAY OF CALCIUM: CPT

## 2021-01-01 PROCEDURE — 96374 THER/PROPH/DIAG INJ IV PUSH: CPT | Mod: XU

## 2021-01-01 PROCEDURE — 81241 F5 GENE: CPT

## 2021-01-01 PROCEDURE — 94002 VENT MGMT INPAT INIT DAY: CPT

## 2021-01-01 PROCEDURE — 87340 HEPATITIS B SURFACE AG IA: CPT

## 2021-01-01 PROCEDURE — 70460 CT HEAD/BRAIN W/DYE: CPT | Mod: 26,59

## 2021-01-01 PROCEDURE — 83010 ASSAY OF HAPTOGLOBIN QUANT: CPT

## 2021-01-01 PROCEDURE — 99239 HOSP IP/OBS DSCHRG MGMT >30: CPT | Mod: GC

## 2021-01-01 PROCEDURE — 87449 NOS EACH ORGANISM AG IA: CPT

## 2021-01-01 PROCEDURE — 93970 EXTREMITY STUDY: CPT

## 2021-01-01 PROCEDURE — 93010 ELECTROCARDIOGRAM REPORT: CPT

## 2021-01-01 PROCEDURE — 51701 INSERT BLADDER CATHETER: CPT

## 2021-01-01 PROCEDURE — 87641 MR-STAPH DNA AMP PROBE: CPT

## 2021-01-01 PROCEDURE — 84100 ASSAY OF PHOSPHORUS: CPT

## 2021-01-01 PROCEDURE — 70450 CT HEAD/BRAIN W/O DYE: CPT | Mod: 26,MA

## 2021-01-01 PROCEDURE — 83550 IRON BINDING TEST: CPT

## 2021-01-01 PROCEDURE — 86706 HEP B SURFACE ANTIBODY: CPT

## 2021-01-01 PROCEDURE — 85027 COMPLETE CBC AUTOMATED: CPT

## 2021-01-01 PROCEDURE — 82962 GLUCOSE BLOOD TEST: CPT

## 2021-01-01 PROCEDURE — 82803 BLOOD GASES ANY COMBINATION: CPT

## 2021-01-01 PROCEDURE — 87077 CULTURE AEROBIC IDENTIFY: CPT

## 2021-01-01 PROCEDURE — 71250 CT THORAX DX C-: CPT

## 2021-01-01 PROCEDURE — 72125 CT NECK SPINE W/O DYE: CPT | Mod: 26,MA

## 2021-01-01 PROCEDURE — 72148 MRI LUMBAR SPINE W/O DYE: CPT | Mod: 26

## 2021-01-01 PROCEDURE — 97605 NEG PRS WND THER DME<=50SQCM: CPT

## 2021-01-01 PROCEDURE — 82533 TOTAL CORTISOL: CPT

## 2021-01-01 PROCEDURE — 70496 CT ANGIOGRAPHY HEAD: CPT

## 2021-01-01 PROCEDURE — 82271 OCCULT BLOOD OTHER SOURCES: CPT

## 2021-01-01 PROCEDURE — 99233 SBSQ HOSP IP/OBS HIGH 50: CPT | Mod: 25

## 2021-01-01 PROCEDURE — 82436 ASSAY OF URINE CHLORIDE: CPT

## 2021-01-01 PROCEDURE — 0225U NFCT DS DNA&RNA 21 SARSCOV2: CPT

## 2021-01-01 PROCEDURE — 86923 COMPATIBILITY TEST ELECTRIC: CPT

## 2021-01-01 PROCEDURE — 87186 SC STD MICRODIL/AGAR DIL: CPT

## 2021-01-01 PROCEDURE — 87150 DNA/RNA AMPLIFIED PROBE: CPT

## 2021-01-01 PROCEDURE — 93005 ELECTROCARDIOGRAM TRACING: CPT

## 2021-01-01 PROCEDURE — 72146 MRI CHEST SPINE W/O DYE: CPT | Mod: 26

## 2021-01-01 PROCEDURE — G0452: CPT | Mod: 26

## 2021-01-01 PROCEDURE — C1769: CPT

## 2021-01-01 PROCEDURE — 94003 VENT MGMT INPAT SUBQ DAY: CPT

## 2021-01-01 PROCEDURE — 93970 EXTREMITY STUDY: CPT | Mod: 26

## 2021-01-01 PROCEDURE — 84443 ASSAY THYROID STIM HORMONE: CPT

## 2021-01-01 PROCEDURE — 85576 BLOOD PLATELET AGGREGATION: CPT

## 2021-01-01 PROCEDURE — 70553 MRI BRAIN STEM W/O & W/DYE: CPT | Mod: 26

## 2021-01-01 PROCEDURE — 36430 TRANSFUSION BLD/BLD COMPNT: CPT

## 2021-01-01 PROCEDURE — 85396 CLOTTING ASSAY WHOLE BLOOD: CPT

## 2021-01-01 PROCEDURE — 82947 ASSAY GLUCOSE BLOOD QUANT: CPT

## 2021-01-01 PROCEDURE — 82435 ASSAY OF BLOOD CHLORIDE: CPT

## 2021-01-01 PROCEDURE — 83986 ASSAY PH BODY FLUID NOS: CPT

## 2021-01-01 PROCEDURE — 70498 CT ANGIOGRAPHY NECK: CPT | Mod: 26,MG

## 2021-01-01 PROCEDURE — 70498 CT ANGIOGRAPHY NECK: CPT | Mod: 26

## 2021-01-01 PROCEDURE — 97162 PT EVAL MOD COMPLEX 30 MIN: CPT

## 2021-01-01 PROCEDURE — 70498 CT ANGIOGRAPHY NECK: CPT

## 2021-01-01 PROCEDURE — 43264 ERCP REMOVE DUCT CALCULI: CPT | Mod: GC,59

## 2021-01-01 PROCEDURE — 83036 HEMOGLOBIN GLYCOSYLATED A1C: CPT

## 2021-01-01 PROCEDURE — 84300 ASSAY OF URINE SODIUM: CPT

## 2021-01-01 PROCEDURE — P9037: CPT

## 2021-01-01 PROCEDURE — 99497 ADVNCD CARE PLAN 30 MIN: CPT

## 2021-01-01 PROCEDURE — 86769 SARS-COV-2 COVID-19 ANTIBODY: CPT

## 2021-01-01 RX ORDER — CHLORHEXIDINE GLUCONATE 213 G/1000ML
15 SOLUTION TOPICAL EVERY 12 HOURS
Refills: 0 | Status: DISCONTINUED | OUTPATIENT
Start: 2021-01-01 | End: 2021-01-01

## 2021-01-01 RX ORDER — VANCOMYCIN HCL 1 G
1000 VIAL (EA) INTRAVENOUS ONCE
Refills: 0 | Status: COMPLETED | OUTPATIENT
Start: 2021-01-01 | End: 2021-01-01

## 2021-01-01 RX ORDER — CHLORHEXIDINE GLUCONATE 213 G/1000ML
1 SOLUTION TOPICAL
Refills: 0 | Status: DISCONTINUED | OUTPATIENT
Start: 2021-01-01 | End: 2021-01-01

## 2021-01-01 RX ORDER — SODIUM CHLORIDE 9 MG/ML
1000 INJECTION, SOLUTION INTRAVENOUS
Refills: 0 | Status: DISCONTINUED | OUTPATIENT
Start: 2021-01-01 | End: 2021-01-01

## 2021-01-01 RX ORDER — CASPOFUNGIN ACETATE 7 MG/ML
70 INJECTION, POWDER, LYOPHILIZED, FOR SOLUTION INTRAVENOUS ONCE
Refills: 0 | Status: DISCONTINUED | OUTPATIENT
Start: 2021-01-01 | End: 2021-01-01

## 2021-01-01 RX ORDER — INSULIN LISPRO 100/ML
VIAL (ML) SUBCUTANEOUS EVERY 6 HOURS
Refills: 0 | Status: DISCONTINUED | OUTPATIENT
Start: 2021-01-01 | End: 2021-01-01

## 2021-01-01 RX ORDER — FINASTERIDE 5 MG/1
5 TABLET, FILM COATED ORAL
Refills: 0 | Status: ACTIVE | COMMUNITY
Start: 2021-01-01

## 2021-01-01 RX ORDER — AZITHROMYCIN 500 MG/1
500 TABLET, FILM COATED ORAL EVERY 24 HOURS
Refills: 0 | Status: DISCONTINUED | OUTPATIENT
Start: 2021-01-01 | End: 2021-01-01

## 2021-01-01 RX ORDER — TAMSULOSIN HYDROCHLORIDE 0.4 MG/1
1 CAPSULE ORAL
Qty: 0 | Refills: 0 | DISCHARGE

## 2021-01-01 RX ORDER — HUMAN INSULIN 100 [IU]/ML
7 INJECTION, SUSPENSION SUBCUTANEOUS EVERY 6 HOURS
Refills: 0 | Status: DISCONTINUED | OUTPATIENT
Start: 2021-01-01 | End: 2021-01-01

## 2021-01-01 RX ORDER — URSODIOL 300 MG/1
300 CAPSULE ORAL
Qty: 120 | Refills: 1 | Status: ACTIVE | COMMUNITY
Start: 2021-01-01

## 2021-01-01 RX ORDER — METFORMIN HYDROCHLORIDE 850 MG/1
1 TABLET ORAL
Qty: 0 | Refills: 0 | DISCHARGE

## 2021-01-01 RX ORDER — DEXTROSE 50 % IN WATER 50 %
25 SYRINGE (ML) INTRAVENOUS ONCE
Refills: 0 | Status: COMPLETED | OUTPATIENT
Start: 2021-01-01 | End: 2021-01-01

## 2021-01-01 RX ORDER — OMEPRAZOLE 10 MG/1
1 CAPSULE, DELAYED RELEASE ORAL
Qty: 0 | Refills: 0 | DISCHARGE

## 2021-01-01 RX ORDER — DESMOPRESSIN ACETATE 0.1 MG/1
2 TABLET ORAL ONCE
Refills: 0 | Status: COMPLETED | OUTPATIENT
Start: 2021-01-01 | End: 2021-01-01

## 2021-01-01 RX ORDER — DEXTROSE 50 % IN WATER 50 %
15 SYRINGE (ML) INTRAVENOUS ONCE
Refills: 0 | Status: DISCONTINUED | OUTPATIENT
Start: 2021-01-01 | End: 2021-01-01

## 2021-01-01 RX ORDER — DESMOPRESSIN ACETATE 0.1 MG/1
0.2 TABLET ORAL ONCE
Refills: 0 | Status: DISCONTINUED | OUTPATIENT
Start: 2021-01-01 | End: 2021-01-01

## 2021-01-01 RX ORDER — NOREPINEPHRINE BITARTRATE/D5W 8 MG/250ML
0.05 PLASTIC BAG, INJECTION (ML) INTRAVENOUS
Qty: 8 | Refills: 0 | Status: DISCONTINUED | OUTPATIENT
Start: 2021-01-01 | End: 2021-01-01

## 2021-01-01 RX ORDER — POTASSIUM CHLORIDE 20 MEQ
10 PACKET (EA) ORAL
Refills: 0 | Status: COMPLETED | OUTPATIENT
Start: 2021-01-01 | End: 2021-01-01

## 2021-01-01 RX ORDER — SODIUM CHLORIDE 9 MG/ML
1000 INJECTION INTRAMUSCULAR; INTRAVENOUS; SUBCUTANEOUS ONCE
Refills: 0 | Status: COMPLETED | OUTPATIENT
Start: 2021-01-01 | End: 2021-01-01

## 2021-01-01 RX ORDER — ASPIRIN/CALCIUM CARB/MAGNESIUM 324 MG
1 TABLET ORAL
Qty: 0 | Refills: 0 | DISCHARGE

## 2021-01-01 RX ORDER — AMLODIPINE BESYLATE 2.5 MG/1
5 TABLET ORAL DAILY
Refills: 0 | Status: DISCONTINUED | OUTPATIENT
Start: 2021-01-01 | End: 2021-01-01

## 2021-01-01 RX ORDER — FENTANYL CITRATE 50 UG/ML
0.5 INJECTION INTRAVENOUS
Qty: 2500 | Refills: 0 | Status: DISCONTINUED | OUTPATIENT
Start: 2021-01-01 | End: 2021-01-01

## 2021-01-01 RX ORDER — PIPERACILLIN AND TAZOBACTAM 4; .5 G/20ML; G/20ML
3.38 INJECTION, POWDER, LYOPHILIZED, FOR SOLUTION INTRAVENOUS EVERY 8 HOURS
Refills: 0 | Status: DISCONTINUED | OUTPATIENT
Start: 2021-01-01 | End: 2021-01-01

## 2021-01-01 RX ORDER — AZITHROMYCIN 500 MG/1
500 TABLET, FILM COATED ORAL ONCE
Refills: 0 | Status: DISCONTINUED | OUTPATIENT
Start: 2021-01-01 | End: 2021-01-01

## 2021-01-01 RX ORDER — SENNA PLUS 8.6 MG/1
10 TABLET ORAL AT BEDTIME
Refills: 0 | Status: DISCONTINUED | OUTPATIENT
Start: 2021-01-01 | End: 2021-01-01

## 2021-01-01 RX ORDER — LEVETIRACETAM 250 MG/1
500 TABLET, FILM COATED ORAL EVERY 12 HOURS
Refills: 0 | Status: DISCONTINUED | OUTPATIENT
Start: 2021-01-01 | End: 2021-01-01

## 2021-01-01 RX ORDER — SODIUM CHLORIDE 5 G/100ML
360 INJECTION, SOLUTION INTRAVENOUS
Refills: 0 | Status: DISCONTINUED | OUTPATIENT
Start: 2021-01-01 | End: 2021-01-01

## 2021-01-01 RX ORDER — POTASSIUM CHLORIDE 20 MEQ
40 PACKET (EA) ORAL ONCE
Refills: 0 | Status: DISCONTINUED | OUTPATIENT
Start: 2021-01-01 | End: 2021-01-01

## 2021-01-01 RX ORDER — INSULIN HUMAN 100 [IU]/ML
INJECTION, SOLUTION SUBCUTANEOUS EVERY 6 HOURS
Refills: 0 | Status: DISCONTINUED | OUTPATIENT
Start: 2021-01-01 | End: 2021-01-01

## 2021-01-01 RX ORDER — NYSTATIN CREAM 100000 [USP'U]/G
1 CREAM TOPICAL
Refills: 0 | Status: DISCONTINUED | OUTPATIENT
Start: 2021-01-01 | End: 2021-01-01

## 2021-01-01 RX ORDER — GLUCAGON INJECTION, SOLUTION 0.5 MG/.1ML
1 INJECTION, SOLUTION SUBCUTANEOUS ONCE
Refills: 0 | Status: DISCONTINUED | OUTPATIENT
Start: 2021-01-01 | End: 2021-01-01

## 2021-01-01 RX ORDER — POTASSIUM CHLORIDE 20 MEQ
40 PACKET (EA) ORAL ONCE
Refills: 0 | Status: COMPLETED | OUTPATIENT
Start: 2021-01-01 | End: 2021-01-01

## 2021-01-01 RX ORDER — CHOLECALCIFEROL (VITAMIN D3) 125 MCG
1 CAPSULE ORAL
Qty: 0 | Refills: 0 | DISCHARGE

## 2021-01-01 RX ORDER — MICAFUNGIN SODIUM 100 MG/1
INJECTION, POWDER, LYOPHILIZED, FOR SOLUTION INTRAVENOUS
Refills: 0 | Status: DISCONTINUED | OUTPATIENT
Start: 2021-01-01 | End: 2021-01-01

## 2021-01-01 RX ORDER — ETOMIDATE 2 MG/ML
20 INJECTION INTRAVENOUS ONCE
Refills: 0 | Status: COMPLETED | OUTPATIENT
Start: 2021-01-01 | End: 2021-01-01

## 2021-01-01 RX ORDER — MAGNESIUM OXIDE 400 MG ORAL TABLET 241.3 MG
400 TABLET ORAL DAILY
Refills: 0 | Status: DISCONTINUED | OUTPATIENT
Start: 2021-01-01 | End: 2021-01-01

## 2021-01-01 RX ORDER — INSULIN LISPRO 100/ML
VIAL (ML) SUBCUTANEOUS EVERY 4 HOURS
Refills: 0 | Status: DISCONTINUED | OUTPATIENT
Start: 2021-01-01 | End: 2021-01-01

## 2021-01-01 RX ORDER — DEXTROSE 50 % IN WATER 50 %
12.5 SYRINGE (ML) INTRAVENOUS ONCE
Refills: 0 | Status: DISCONTINUED | OUTPATIENT
Start: 2021-01-01 | End: 2021-01-01

## 2021-01-01 RX ORDER — TAMSULOSIN HYDROCHLORIDE 0.4 MG/1
0.4 CAPSULE ORAL
Refills: 0 | Status: ACTIVE | COMMUNITY
Start: 2021-01-01

## 2021-01-01 RX ORDER — METFORMIN HYDROCHLORIDE 850 MG/1
1 TABLET ORAL
Qty: 60 | Refills: 0
Start: 2021-01-01 | End: 2021-01-01

## 2021-01-01 RX ORDER — LOSARTAN POTASSIUM 100 MG/1
1 TABLET, FILM COATED ORAL
Qty: 0 | Refills: 0 | DISCHARGE

## 2021-01-01 RX ORDER — ENOXAPARIN SODIUM 100 MG/ML
40 INJECTION SUBCUTANEOUS DAILY
Refills: 0 | Status: DISCONTINUED | OUTPATIENT
Start: 2021-01-01 | End: 2021-01-01

## 2021-01-01 RX ORDER — SODIUM CHLORIDE 5 G/100ML
500 INJECTION, SOLUTION INTRAVENOUS
Refills: 0 | Status: COMPLETED | OUTPATIENT
Start: 2021-01-01 | End: 2021-01-01

## 2021-01-01 RX ORDER — BACITRACIN ZINC 500 UNIT/G
1 OINTMENT IN PACKET (EA) TOPICAL
Refills: 0 | Status: DISCONTINUED | OUTPATIENT
Start: 2021-01-01 | End: 2021-01-01

## 2021-01-01 RX ORDER — LEVETIRACETAM 250 MG/1
500 TABLET, FILM COATED ORAL
Refills: 0 | Status: DISCONTINUED | OUTPATIENT
Start: 2021-01-01 | End: 2021-01-01

## 2021-01-01 RX ORDER — DESMOPRESSIN ACETATE 0.1 MG/1
0.2 TABLET ORAL ONCE
Refills: 0 | Status: COMPLETED | OUTPATIENT
Start: 2021-01-01 | End: 2021-01-01

## 2021-01-01 RX ORDER — POTASSIUM PHOSPHATE, MONOBASIC POTASSIUM PHOSPHATE, DIBASIC 236; 224 MG/ML; MG/ML
30 INJECTION, SOLUTION INTRAVENOUS ONCE
Refills: 0 | Status: COMPLETED | OUTPATIENT
Start: 2021-01-01 | End: 2021-01-01

## 2021-01-01 RX ORDER — DEXAMETHASONE 0.5 MG/5ML
8 ELIXIR ORAL EVERY 8 HOURS
Refills: 0 | Status: DISCONTINUED | OUTPATIENT
Start: 2021-01-01 | End: 2021-01-01

## 2021-01-01 RX ORDER — PIPERACILLIN AND TAZOBACTAM 4; .5 G/20ML; G/20ML
4.5 INJECTION, POWDER, LYOPHILIZED, FOR SOLUTION INTRAVENOUS ONCE
Refills: 0 | Status: COMPLETED | OUTPATIENT
Start: 2021-01-01 | End: 2021-01-01

## 2021-01-01 RX ORDER — ASPIRIN/CALCIUM CARB/MAGNESIUM 324 MG
81 TABLET ORAL DAILY
Refills: 0 | Status: DISCONTINUED | OUTPATIENT
Start: 2021-01-01 | End: 2021-01-01

## 2021-01-01 RX ORDER — METOPROLOL TARTRATE 50 MG
25 TABLET ORAL
Refills: 0 | Status: DISCONTINUED | OUTPATIENT
Start: 2021-01-01 | End: 2021-01-01

## 2021-01-01 RX ORDER — MAGNESIUM OXIDE 400 MG ORAL TABLET 241.3 MG
1 TABLET ORAL
Qty: 0 | Refills: 0 | DISCHARGE

## 2021-01-01 RX ORDER — CHOLESTYRAMINE 4 G/9G
1 POWDER, FOR SUSPENSION ORAL
Qty: 0 | Refills: 0 | DISCHARGE

## 2021-01-01 RX ORDER — PROPOFOL 10 MG/ML
10 INJECTION, EMULSION INTRAVENOUS
Qty: 1000 | Refills: 0 | Status: DISCONTINUED | OUTPATIENT
Start: 2021-01-01 | End: 2021-01-01

## 2021-01-01 RX ORDER — SODIUM CHLORIDE 5 G/100ML
500 INJECTION, SOLUTION INTRAVENOUS
Refills: 0 | Status: DISCONTINUED | OUTPATIENT
Start: 2021-01-01 | End: 2021-01-01

## 2021-01-01 RX ORDER — ASPIRIN 81 MG/1
81 TABLET ORAL DAILY
Refills: 0 | Status: ACTIVE | COMMUNITY
Start: 2021-01-01

## 2021-01-01 RX ORDER — HYDRALAZINE HCL 50 MG
50 TABLET ORAL THREE TIMES A DAY
Refills: 0 | Status: DISCONTINUED | OUTPATIENT
Start: 2021-01-01 | End: 2021-01-01

## 2021-01-01 RX ORDER — CASPOFUNGIN ACETATE 7 MG/ML
50 INJECTION, POWDER, LYOPHILIZED, FOR SOLUTION INTRAVENOUS EVERY 24 HOURS
Refills: 0 | Status: DISCONTINUED | OUTPATIENT
Start: 2021-01-01 | End: 2021-01-01

## 2021-01-01 RX ORDER — CEFEPIME 1 G/1
1000 INJECTION, POWDER, FOR SOLUTION INTRAMUSCULAR; INTRAVENOUS ONCE
Refills: 0 | Status: COMPLETED | OUTPATIENT
Start: 2021-01-01 | End: 2021-01-01

## 2021-01-01 RX ORDER — OMEGA-3-ACID ETHYL ESTERS 1 G/1
1 CAPSULE, LIQUID FILLED ORAL
Refills: 0 | Status: ACTIVE | COMMUNITY
Start: 2021-01-01

## 2021-01-01 RX ORDER — METOPROLOL TARTRATE 50 MG
1 TABLET ORAL
Qty: 0 | Refills: 0 | DISCHARGE

## 2021-01-01 RX ORDER — INSULIN LISPRO 100/ML
VIAL (ML) SUBCUTANEOUS
Refills: 0 | Status: DISCONTINUED | OUTPATIENT
Start: 2021-01-01 | End: 2021-01-01

## 2021-01-01 RX ORDER — PIPERACILLIN AND TAZOBACTAM 4; .5 G/20ML; G/20ML
3.38 INJECTION, POWDER, LYOPHILIZED, FOR SOLUTION INTRAVENOUS ONCE
Refills: 0 | Status: COMPLETED | OUTPATIENT
Start: 2021-01-01 | End: 2021-01-01

## 2021-01-01 RX ORDER — SODIUM,POTASSIUM PHOSPHATES 278-250MG
1 POWDER IN PACKET (EA) ORAL ONCE
Refills: 0 | Status: COMPLETED | OUTPATIENT
Start: 2021-01-01 | End: 2021-01-01

## 2021-01-01 RX ORDER — POLYETHYLENE GLYCOL 3350 17 G/17G
17 POWDER, FOR SOLUTION ORAL EVERY 12 HOURS
Refills: 0 | Status: DISCONTINUED | OUTPATIENT
Start: 2021-01-01 | End: 2021-01-01

## 2021-01-01 RX ORDER — HUMAN INSULIN 100 [IU]/ML
6 INJECTION, SUSPENSION SUBCUTANEOUS EVERY 6 HOURS
Refills: 0 | Status: DISCONTINUED | OUTPATIENT
Start: 2021-01-01 | End: 2021-01-01

## 2021-01-01 RX ORDER — HYDRALAZINE HCL 50 MG
5 TABLET ORAL EVERY 6 HOURS
Refills: 0 | Status: DISCONTINUED | OUTPATIENT
Start: 2021-01-01 | End: 2021-01-01

## 2021-01-01 RX ORDER — INDOMETHACIN 50 MG
100 CAPSULE ORAL ONCE
Refills: 0 | Status: COMPLETED | OUTPATIENT
Start: 2021-01-01 | End: 2021-01-01

## 2021-01-01 RX ORDER — DEXTROSE 50 % IN WATER 50 %
25 SYRINGE (ML) INTRAVENOUS ONCE
Refills: 0 | Status: DISCONTINUED | OUTPATIENT
Start: 2021-01-01 | End: 2021-01-01

## 2021-01-01 RX ORDER — ACETAMINOPHEN 500 MG
650 TABLET ORAL ONCE
Refills: 0 | Status: COMPLETED | OUTPATIENT
Start: 2021-01-01 | End: 2021-01-01

## 2021-01-01 RX ORDER — AMLODIPINE BESYLATE 2.5 MG/1
1 TABLET ORAL
Qty: 0 | Refills: 0 | DISCHARGE

## 2021-01-01 RX ORDER — CHOLECALCIFEROL (VITAMIN D3) 125 MCG
0 CAPSULE ORAL
Qty: 0 | Refills: 0 | DISCHARGE

## 2021-01-01 RX ORDER — METOPROLOL TARTRATE 50 MG
25 TABLET ORAL DAILY
Refills: 0 | Status: DISCONTINUED | OUTPATIENT
Start: 2021-01-01 | End: 2021-01-01

## 2021-01-01 RX ORDER — METFORMIN HYDROCHLORIDE 1000 MG/1
1000 TABLET, COATED ORAL
Refills: 0 | Status: ACTIVE | COMMUNITY
Start: 2021-01-01

## 2021-01-01 RX ORDER — MULTIVIT-MIN/FERROUS GLUCONATE 9 MG/15 ML
15 LIQUID (ML) ORAL DAILY
Refills: 0 | Status: DISCONTINUED | OUTPATIENT
Start: 2021-01-01 | End: 2021-01-01

## 2021-01-01 RX ORDER — INDOMETHACIN 50 MG
100 CAPSULE ORAL ONCE
Refills: 0 | Status: DISCONTINUED | OUTPATIENT
Start: 2021-01-01 | End: 2021-01-01

## 2021-01-01 RX ORDER — ASCORBIC ACID 60 MG
500 TABLET,CHEWABLE ORAL DAILY
Refills: 0 | Status: DISCONTINUED | OUTPATIENT
Start: 2021-01-01 | End: 2021-01-01

## 2021-01-01 RX ORDER — LOSARTAN POTASSIUM 100 MG/1
50 TABLET, FILM COATED ORAL DAILY
Refills: 0 | Status: DISCONTINUED | OUTPATIENT
Start: 2021-01-01 | End: 2021-01-01

## 2021-01-01 RX ORDER — SENNA PLUS 8.6 MG/1
2 TABLET ORAL AT BEDTIME
Refills: 0 | Status: DISCONTINUED | OUTPATIENT
Start: 2021-01-01 | End: 2021-01-01

## 2021-01-01 RX ORDER — DESMOPRESSIN ACETATE 0.1 MG/1
25 TABLET ORAL ONCE
Refills: 0 | Status: COMPLETED | OUTPATIENT
Start: 2021-01-01 | End: 2021-01-01

## 2021-01-01 RX ORDER — DEXAMETHASONE 0.5 MG/5ML
4 ELIXIR ORAL EVERY 8 HOURS
Refills: 0 | Status: DISCONTINUED | OUTPATIENT
Start: 2021-01-01 | End: 2021-01-01

## 2021-01-01 RX ORDER — FENTANYL CITRATE 50 UG/ML
100 INJECTION INTRAVENOUS ONCE
Refills: 0 | Status: DISCONTINUED | OUTPATIENT
Start: 2021-01-01 | End: 2021-01-01

## 2021-01-01 RX ORDER — FINASTERIDE 5 MG/1
5 TABLET, FILM COATED ORAL DAILY
Refills: 0 | Status: DISCONTINUED | OUTPATIENT
Start: 2021-01-01 | End: 2021-01-01

## 2021-01-01 RX ORDER — OMEPRAZOLE 20 MG/1
20 CAPSULE, DELAYED RELEASE ORAL
Refills: 0 | Status: ACTIVE | COMMUNITY
Start: 2021-01-01

## 2021-01-01 RX ORDER — URSODIOL 250 MG/1
1 TABLET, FILM COATED ORAL
Qty: 60 | Refills: 1
Start: 2021-01-01 | End: 2021-01-01

## 2021-01-01 RX ORDER — HYDRALAZINE HCL 50 MG
10 TABLET ORAL EVERY 6 HOURS
Refills: 0 | Status: DISCONTINUED | OUTPATIENT
Start: 2021-01-01 | End: 2021-01-01

## 2021-01-01 RX ORDER — AZITHROMYCIN 500 MG/1
500 TABLET, FILM COATED ORAL ONCE
Refills: 0 | Status: COMPLETED | OUTPATIENT
Start: 2021-01-01 | End: 2021-01-01

## 2021-01-01 RX ORDER — HYDRALAZINE HCL 50 MG
2.5 TABLET ORAL EVERY 6 HOURS
Refills: 0 | Status: DISCONTINUED | OUTPATIENT
Start: 2021-01-01 | End: 2021-01-01

## 2021-01-01 RX ORDER — SUCCINYLCHOLINE CHLORIDE 100 MG/5ML
120 SYRINGE (ML) INTRAVENOUS ONCE
Refills: 0 | Status: COMPLETED | OUTPATIENT
Start: 2021-01-01 | End: 2021-01-01

## 2021-01-01 RX ORDER — FENTANYL CITRATE 50 UG/ML
50 INJECTION INTRAVENOUS ONCE
Refills: 0 | Status: DISCONTINUED | OUTPATIENT
Start: 2021-01-01 | End: 2021-01-01

## 2021-01-01 RX ORDER — OMEGA-3 ACID ETHYL ESTERS 1 G
2 CAPSULE ORAL
Qty: 0 | Refills: 0 | DISCHARGE

## 2021-01-01 RX ORDER — TAMSULOSIN HYDROCHLORIDE 0.4 MG/1
0.4 CAPSULE ORAL AT BEDTIME
Refills: 0 | Status: DISCONTINUED | OUTPATIENT
Start: 2021-01-01 | End: 2021-01-01

## 2021-01-01 RX ORDER — PROPOFOL 10 MG/ML
10.78 INJECTION, EMULSION INTRAVENOUS
Qty: 1000 | Refills: 0 | Status: DISCONTINUED | OUTPATIENT
Start: 2021-01-01 | End: 2021-01-01

## 2021-01-01 RX ORDER — SODIUM CHLORIDE 9 MG/ML
1000 INJECTION, SOLUTION INTRAVENOUS
Qty: 0 | Refills: 0 | DISCHARGE
Start: 2021-01-01

## 2021-01-01 RX ORDER — PANTOPRAZOLE SODIUM 20 MG/1
40 TABLET, DELAYED RELEASE ORAL
Refills: 0 | Status: DISCONTINUED | OUTPATIENT
Start: 2021-01-01 | End: 2021-01-01

## 2021-01-01 RX ORDER — METRONIDAZOLE 500 MG
500 TABLET ORAL ONCE
Refills: 0 | Status: COMPLETED | OUTPATIENT
Start: 2021-01-01 | End: 2021-01-01

## 2021-01-01 RX ORDER — INSULIN LISPRO 100/ML
VIAL (ML) SUBCUTANEOUS AT BEDTIME
Refills: 0 | Status: DISCONTINUED | OUTPATIENT
Start: 2021-01-01 | End: 2021-01-01

## 2021-01-01 RX ORDER — FINASTERIDE 5 MG/1
1 TABLET, FILM COATED ORAL
Qty: 0 | Refills: 0 | DISCHARGE

## 2021-01-01 RX ORDER — PIPERACILLIN AND TAZOBACTAM 4; .5 G/20ML; G/20ML
3.38 INJECTION, POWDER, LYOPHILIZED, FOR SOLUTION INTRAVENOUS EVERY 8 HOURS
Refills: 0 | Status: COMPLETED | OUTPATIENT
Start: 2021-01-01 | End: 2021-01-01

## 2021-01-01 RX ORDER — SODIUM CHLORIDE 5 G/100ML
180 INJECTION, SOLUTION INTRAVENOUS
Refills: 0 | Status: DISCONTINUED | OUTPATIENT
Start: 2021-01-01 | End: 2021-01-01

## 2021-01-01 RX ORDER — DESMOPRESSIN ACETATE 0.1 MG/1
25 TABLET ORAL ONCE
Refills: 0 | Status: DISCONTINUED | OUTPATIENT
Start: 2021-01-01 | End: 2021-01-01

## 2021-01-01 RX ORDER — DEXAMETHASONE 0.5 MG/5ML
4 ELIXIR ORAL EVERY 6 HOURS
Refills: 0 | Status: DISCONTINUED | OUTPATIENT
Start: 2021-01-01 | End: 2021-01-01

## 2021-01-01 RX ORDER — LOSARTAN POTASSIUM 50 MG/1
50 TABLET, FILM COATED ORAL
Refills: 0 | Status: ACTIVE | COMMUNITY
Start: 2021-01-01

## 2021-01-01 RX ORDER — POTASSIUM PHOSPHATE, MONOBASIC POTASSIUM PHOSPHATE, DIBASIC 236; 224 MG/ML; MG/ML
15 INJECTION, SOLUTION INTRAVENOUS ONCE
Refills: 0 | Status: DISCONTINUED | OUTPATIENT
Start: 2021-01-01 | End: 2021-01-01

## 2021-01-01 RX ORDER — PHENYLEPHRINE HYDROCHLORIDE 10 MG/ML
0.5 INJECTION INTRAVENOUS
Qty: 40 | Refills: 0 | Status: DISCONTINUED | OUTPATIENT
Start: 2021-01-01 | End: 2021-01-01

## 2021-01-01 RX ORDER — MICAFUNGIN SODIUM 100 MG/1
100 INJECTION, POWDER, LYOPHILIZED, FOR SOLUTION INTRAVENOUS ONCE
Refills: 0 | Status: COMPLETED | OUTPATIENT
Start: 2021-01-01 | End: 2021-01-01

## 2021-01-01 RX ORDER — POLYETHYLENE GLYCOL 3350 17 G/17G
17 POWDER, FOR SOLUTION ORAL DAILY
Refills: 0 | Status: DISCONTINUED | OUTPATIENT
Start: 2021-01-01 | End: 2021-01-01

## 2021-01-01 RX ORDER — PANTOPRAZOLE SODIUM 20 MG/1
40 TABLET, DELAYED RELEASE ORAL DAILY
Refills: 0 | Status: DISCONTINUED | OUTPATIENT
Start: 2021-01-01 | End: 2021-01-01

## 2021-01-01 RX ORDER — DEXTROSE 50 % IN WATER 50 %
12.5 SYRINGE (ML) INTRAVENOUS ONCE
Refills: 0 | Status: COMPLETED | OUTPATIENT
Start: 2021-01-01 | End: 2021-01-01

## 2021-01-01 RX ORDER — FUROSEMIDE 40 MG
20 TABLET ORAL ONCE
Refills: 0 | Status: COMPLETED | OUTPATIENT
Start: 2021-01-01 | End: 2021-01-01

## 2021-01-01 RX ORDER — MICAFUNGIN SODIUM 100 MG/1
100 INJECTION, POWDER, LYOPHILIZED, FOR SOLUTION INTRAVENOUS EVERY 24 HOURS
Refills: 0 | Status: DISCONTINUED | OUTPATIENT
Start: 2021-01-01 | End: 2021-01-01

## 2021-01-01 RX ORDER — PIPERACILLIN AND TAZOBACTAM 4; .5 G/20ML; G/20ML
4.5 INJECTION, POWDER, LYOPHILIZED, FOR SOLUTION INTRAVENOUS EVERY 8 HOURS
Refills: 0 | Status: DISCONTINUED | OUTPATIENT
Start: 2021-01-01 | End: 2021-01-01

## 2021-01-01 RX ORDER — POTASSIUM PHOSPHATE, MONOBASIC POTASSIUM PHOSPHATE, DIBASIC 236; 224 MG/ML; MG/ML
15 INJECTION, SOLUTION INTRAVENOUS ONCE
Refills: 0 | Status: COMPLETED | OUTPATIENT
Start: 2021-01-01 | End: 2021-01-01

## 2021-01-01 RX ORDER — CASPOFUNGIN ACETATE 7 MG/ML
70 INJECTION, POWDER, LYOPHILIZED, FOR SOLUTION INTRAVENOUS ONCE
Refills: 0 | Status: COMPLETED | OUTPATIENT
Start: 2021-01-01 | End: 2021-01-01

## 2021-01-01 RX ORDER — CASPOFUNGIN ACETATE 7 MG/ML
INJECTION, POWDER, LYOPHILIZED, FOR SOLUTION INTRAVENOUS
Refills: 0 | Status: DISCONTINUED | OUTPATIENT
Start: 2021-01-01 | End: 2021-01-01

## 2021-01-01 RX ORDER — SODIUM CHLORIDE 9 MG/ML
500 INJECTION, SOLUTION INTRAVENOUS
Refills: 0 | Status: DISCONTINUED | OUTPATIENT
Start: 2021-01-01 | End: 2021-01-01

## 2021-01-01 RX ORDER — MAGNESIUM OXIDE 241.3 MG/1000MG
400 TABLET ORAL
Refills: 0 | Status: ACTIVE | COMMUNITY
Start: 2021-01-01

## 2021-01-01 RX ORDER — DEXAMETHASONE 0.5 MG/5ML
10 ELIXIR ORAL ONCE
Refills: 0 | Status: COMPLETED | OUTPATIENT
Start: 2021-01-01 | End: 2021-01-01

## 2021-01-01 RX ORDER — OMEGA-3 ACID ETHYL ESTERS 1 G
2 CAPSULE ORAL
Refills: 0 | Status: DISCONTINUED | OUTPATIENT
Start: 2021-01-01 | End: 2021-01-01

## 2021-01-01 RX ORDER — FENTANYL CITRATE 50 UG/ML
0.5 INJECTION INTRAVENOUS
Qty: 5000 | Refills: 0 | Status: DISCONTINUED | OUTPATIENT
Start: 2021-01-01 | End: 2021-01-01

## 2021-01-01 RX ORDER — METOPROLOL SUCCINATE 25 MG/1
25 TABLET, EXTENDED RELEASE ORAL
Refills: 0 | Status: ACTIVE | COMMUNITY
Start: 2021-01-01

## 2021-01-01 RX ORDER — AMLODIPINE BESYLATE 5 MG/1
5 TABLET ORAL
Refills: 0 | Status: ACTIVE | COMMUNITY
Start: 2021-01-01

## 2021-01-01 RX ORDER — FOLIC ACID 0.8 MG
1 TABLET ORAL DAILY
Refills: 0 | Status: DISCONTINUED | OUTPATIENT
Start: 2021-01-01 | End: 2021-01-01

## 2021-01-01 RX ORDER — HUMAN INSULIN 100 [IU]/ML
13 INJECTION, SUSPENSION SUBCUTANEOUS EVERY 6 HOURS
Refills: 0 | Status: DISCONTINUED | OUTPATIENT
Start: 2021-01-01 | End: 2021-01-01

## 2021-01-01 RX ORDER — ERYTHROMYCIN BASE 5 MG/GRAM
1 OINTMENT (GRAM) OPHTHALMIC (EYE)
Refills: 0 | Status: DISCONTINUED | OUTPATIENT
Start: 2021-01-01 | End: 2021-01-01

## 2021-01-01 RX ORDER — SODIUM CHLORIDE 9 MG/ML
1000 INJECTION INTRAMUSCULAR; INTRAVENOUS; SUBCUTANEOUS
Refills: 0 | Status: COMPLETED | OUTPATIENT
Start: 2021-01-01 | End: 2021-01-01

## 2021-01-01 RX ORDER — ACETAMINOPHEN 500 MG
520 TABLET ORAL ONCE
Refills: 0 | Status: COMPLETED | OUTPATIENT
Start: 2021-01-01 | End: 2021-01-01

## 2021-01-01 RX ORDER — URSODIOL 250 MG/1
300 TABLET, FILM COATED ORAL
Refills: 0 | Status: DISCONTINUED | OUTPATIENT
Start: 2021-01-01 | End: 2021-01-01

## 2021-01-01 RX ORDER — SODIUM CHLORIDE 9 MG/ML
500 INJECTION INTRAMUSCULAR; INTRAVENOUS; SUBCUTANEOUS
Refills: 0 | Status: COMPLETED | OUTPATIENT
Start: 2021-01-01 | End: 2021-01-01

## 2021-01-01 RX ADMIN — HUMAN INSULIN 6 UNIT(S): 100 INJECTION, SUSPENSION SUBCUTANEOUS at 06:35

## 2021-01-01 RX ADMIN — Medication 50 MILLIGRAM(S): at 13:32

## 2021-01-01 RX ADMIN — CHLORHEXIDINE GLUCONATE 1 APPLICATION(S): 213 SOLUTION TOPICAL at 06:30

## 2021-01-01 RX ADMIN — Medication 50 MILLIGRAM(S): at 12:32

## 2021-01-01 RX ADMIN — Medication 8: at 05:16

## 2021-01-01 RX ADMIN — Medication 15 MILLILITER(S): at 13:55

## 2021-01-01 RX ADMIN — Medication 1 APPLICATION(S): at 05:57

## 2021-01-01 RX ADMIN — CASPOFUNGIN ACETATE 260 MILLIGRAM(S): 7 INJECTION, POWDER, LYOPHILIZED, FOR SOLUTION INTRAVENOUS at 21:49

## 2021-01-01 RX ADMIN — Medication 101.6 MILLIGRAM(S): at 05:03

## 2021-01-01 RX ADMIN — PIPERACILLIN AND TAZOBACTAM 200 GRAM(S): 4; .5 INJECTION, POWDER, LYOPHILIZED, FOR SOLUTION INTRAVENOUS at 17:39

## 2021-01-01 RX ADMIN — Medication 101.6 MILLIGRAM(S): at 06:38

## 2021-01-01 RX ADMIN — CASPOFUNGIN ACETATE 260 MILLIGRAM(S): 7 INJECTION, POWDER, LYOPHILIZED, FOR SOLUTION INTRAVENOUS at 22:09

## 2021-01-01 RX ADMIN — Medication 50 MILLIGRAM(S): at 06:39

## 2021-01-01 RX ADMIN — PANTOPRAZOLE SODIUM 40 MILLIGRAM(S): 20 TABLET, DELAYED RELEASE ORAL at 17:07

## 2021-01-01 RX ADMIN — SODIUM CHLORIDE 30 MILLILITER(S): 5 INJECTION, SOLUTION INTRAVENOUS at 02:25

## 2021-01-01 RX ADMIN — LEVETIRACETAM 400 MILLIGRAM(S): 250 TABLET, FILM COATED ORAL at 11:20

## 2021-01-01 RX ADMIN — Medication 15 MILLILITER(S): at 13:05

## 2021-01-01 RX ADMIN — Medication 15 MILLILITER(S): at 14:29

## 2021-01-01 RX ADMIN — Medication 50 MILLIGRAM(S): at 14:25

## 2021-01-01 RX ADMIN — Medication 101.6 MILLIGRAM(S): at 21:53

## 2021-01-01 RX ADMIN — CASPOFUNGIN ACETATE 260 MILLIGRAM(S): 7 INJECTION, POWDER, LYOPHILIZED, FOR SOLUTION INTRAVENOUS at 23:39

## 2021-01-01 RX ADMIN — CASPOFUNGIN ACETATE 260 MILLIGRAM(S): 7 INJECTION, POWDER, LYOPHILIZED, FOR SOLUTION INTRAVENOUS at 22:19

## 2021-01-01 RX ADMIN — Medication 4 MILLIGRAM(S): at 13:05

## 2021-01-01 RX ADMIN — Medication 25 GRAM(S): at 14:12

## 2021-01-01 RX ADMIN — PANTOPRAZOLE SODIUM 40 MILLIGRAM(S): 20 TABLET, DELAYED RELEASE ORAL at 05:38

## 2021-01-01 RX ADMIN — Medication 4: at 18:08

## 2021-01-01 RX ADMIN — PANTOPRAZOLE SODIUM 40 MILLIGRAM(S): 20 TABLET, DELAYED RELEASE ORAL at 06:04

## 2021-01-01 RX ADMIN — HUMAN INSULIN 6 UNIT(S): 100 INJECTION, SUSPENSION SUBCUTANEOUS at 11:46

## 2021-01-01 RX ADMIN — HUMAN INSULIN 6 UNIT(S): 100 INJECTION, SUSPENSION SUBCUTANEOUS at 06:56

## 2021-01-01 RX ADMIN — Medication 101.6 MILLIGRAM(S): at 14:50

## 2021-01-01 RX ADMIN — LEVETIRACETAM 400 MILLIGRAM(S): 250 TABLET, FILM COATED ORAL at 05:12

## 2021-01-01 RX ADMIN — Medication 1 APPLICATION(S): at 11:37

## 2021-01-01 RX ADMIN — PIPERACILLIN AND TAZOBACTAM 200 GRAM(S): 4; .5 INJECTION, POWDER, LYOPHILIZED, FOR SOLUTION INTRAVENOUS at 12:15

## 2021-01-01 RX ADMIN — Medication 6: at 11:32

## 2021-01-01 RX ADMIN — PANTOPRAZOLE SODIUM 40 MILLIGRAM(S): 20 TABLET, DELAYED RELEASE ORAL at 17:21

## 2021-01-01 RX ADMIN — CHLORHEXIDINE GLUCONATE 1 APPLICATION(S): 213 SOLUTION TOPICAL at 05:25

## 2021-01-01 RX ADMIN — LEVETIRACETAM 400 MILLIGRAM(S): 250 TABLET, FILM COATED ORAL at 20:14

## 2021-01-01 RX ADMIN — LEVETIRACETAM 400 MILLIGRAM(S): 250 TABLET, FILM COATED ORAL at 22:53

## 2021-01-01 RX ADMIN — Medication 500 MILLIGRAM(S): at 18:22

## 2021-01-01 RX ADMIN — LEVETIRACETAM 400 MILLIGRAM(S): 250 TABLET, FILM COATED ORAL at 17:30

## 2021-01-01 RX ADMIN — FENTANYL CITRATE 1.4 MICROGRAM(S)/KG/HR: 50 INJECTION INTRAVENOUS at 02:13

## 2021-01-01 RX ADMIN — Medication 15 MILLILITER(S): at 14:16

## 2021-01-01 RX ADMIN — PIPERACILLIN AND TAZOBACTAM 25 GRAM(S): 4; .5 INJECTION, POWDER, LYOPHILIZED, FOR SOLUTION INTRAVENOUS at 21:09

## 2021-01-01 RX ADMIN — Medication 2: at 23:26

## 2021-01-01 RX ADMIN — Medication 1 APPLICATION(S): at 18:11

## 2021-01-01 RX ADMIN — HUMAN INSULIN 6 UNIT(S): 100 INJECTION, SUSPENSION SUBCUTANEOUS at 23:26

## 2021-01-01 RX ADMIN — Medication 50 MILLIGRAM(S): at 21:51

## 2021-01-01 RX ADMIN — Medication 4 MILLIGRAM(S): at 22:32

## 2021-01-01 RX ADMIN — Medication 1 APPLICATION(S): at 05:15

## 2021-01-01 RX ADMIN — Medication 1 APPLICATION(S): at 17:14

## 2021-01-01 RX ADMIN — NYSTATIN CREAM 1 APPLICATION(S): 100000 CREAM TOPICAL at 17:15

## 2021-01-01 RX ADMIN — LEVETIRACETAM 400 MILLIGRAM(S): 250 TABLET, FILM COATED ORAL at 08:36

## 2021-01-01 RX ADMIN — CASPOFUNGIN ACETATE 260 MILLIGRAM(S): 7 INJECTION, POWDER, LYOPHILIZED, FOR SOLUTION INTRAVENOUS at 23:09

## 2021-01-01 RX ADMIN — PANTOPRAZOLE SODIUM 40 MILLIGRAM(S): 20 TABLET, DELAYED RELEASE ORAL at 05:02

## 2021-01-01 RX ADMIN — Medication 5 MILLIGRAM(S): at 11:46

## 2021-01-01 RX ADMIN — Medication 1 MILLIGRAM(S): at 12:14

## 2021-01-01 RX ADMIN — Medication 20: at 18:59

## 2021-01-01 RX ADMIN — PIPERACILLIN AND TAZOBACTAM 25 GRAM(S): 4; .5 INJECTION, POWDER, LYOPHILIZED, FOR SOLUTION INTRAVENOUS at 00:19

## 2021-01-01 RX ADMIN — Medication 101.6 MILLIGRAM(S): at 21:49

## 2021-01-01 RX ADMIN — Medication 1 APPLICATION(S): at 22:20

## 2021-01-01 RX ADMIN — LEVETIRACETAM 400 MILLIGRAM(S): 250 TABLET, FILM COATED ORAL at 05:25

## 2021-01-01 RX ADMIN — Medication 1 APPLICATION(S): at 23:16

## 2021-01-01 RX ADMIN — HUMAN INSULIN 6 UNIT(S): 100 INJECTION, SUSPENSION SUBCUTANEOUS at 23:41

## 2021-01-01 RX ADMIN — Medication 4: at 23:23

## 2021-01-01 RX ADMIN — Medication 50 MILLIGRAM(S): at 15:09

## 2021-01-01 RX ADMIN — Medication 12: at 23:57

## 2021-01-01 RX ADMIN — Medication 4 MILLIGRAM(S): at 15:32

## 2021-01-01 RX ADMIN — Medication 101.6 MILLIGRAM(S): at 13:09

## 2021-01-01 RX ADMIN — HUMAN INSULIN 6 UNIT(S): 100 INJECTION, SUSPENSION SUBCUTANEOUS at 11:40

## 2021-01-01 RX ADMIN — Medication 101.6 MILLIGRAM(S): at 21:44

## 2021-01-01 RX ADMIN — Medication 1 APPLICATION(S): at 11:34

## 2021-01-01 RX ADMIN — PANTOPRAZOLE SODIUM 40 MILLIGRAM(S): 20 TABLET, DELAYED RELEASE ORAL at 17:20

## 2021-01-01 RX ADMIN — Medication 1 APPLICATION(S): at 00:42

## 2021-01-01 RX ADMIN — LEVETIRACETAM 400 MILLIGRAM(S): 250 TABLET, FILM COATED ORAL at 10:21

## 2021-01-01 RX ADMIN — Medication 40 MILLIEQUIVALENT(S): at 06:18

## 2021-01-01 RX ADMIN — Medication 25 MILLIGRAM(S): at 05:48

## 2021-01-01 RX ADMIN — NYSTATIN CREAM 1 APPLICATION(S): 100000 CREAM TOPICAL at 06:28

## 2021-01-01 RX ADMIN — CHLORHEXIDINE GLUCONATE 1 APPLICATION(S): 213 SOLUTION TOPICAL at 05:18

## 2021-01-01 RX ADMIN — Medication 50 MILLIGRAM(S): at 22:08

## 2021-01-01 RX ADMIN — POLYETHYLENE GLYCOL 3350 17 GRAM(S): 17 POWDER, FOR SOLUTION ORAL at 17:15

## 2021-01-01 RX ADMIN — Medication 4: at 06:56

## 2021-01-01 RX ADMIN — PANTOPRAZOLE SODIUM 40 MILLIGRAM(S): 20 TABLET, DELAYED RELEASE ORAL at 17:31

## 2021-01-01 RX ADMIN — Medication 50 MILLIGRAM(S): at 21:49

## 2021-01-01 RX ADMIN — Medication 4 MILLIGRAM(S): at 17:17

## 2021-01-01 RX ADMIN — Medication 1 APPLICATION(S): at 08:38

## 2021-01-01 RX ADMIN — Medication 50 MILLIGRAM(S): at 14:40

## 2021-01-01 RX ADMIN — Medication 101.6 MILLIGRAM(S): at 05:56

## 2021-01-01 RX ADMIN — Medication 101.6 MILLIGRAM(S): at 05:55

## 2021-01-01 RX ADMIN — Medication 101.6 MILLIGRAM(S): at 21:45

## 2021-01-01 RX ADMIN — NYSTATIN CREAM 1 APPLICATION(S): 100000 CREAM TOPICAL at 18:31

## 2021-01-01 RX ADMIN — MAGNESIUM OXIDE 400 MG ORAL TABLET 400 MILLIGRAM(S): 241.3 TABLET ORAL at 18:10

## 2021-01-01 RX ADMIN — HUMAN INSULIN 6 UNIT(S): 100 INJECTION, SUSPENSION SUBCUTANEOUS at 05:02

## 2021-01-01 RX ADMIN — SENNA PLUS 10 MILLILITER(S): 8.6 TABLET ORAL at 21:13

## 2021-01-01 RX ADMIN — DESMOPRESSIN ACETATE 225 MICROGRAM(S): 0.1 TABLET ORAL at 02:00

## 2021-01-01 RX ADMIN — POLYETHYLENE GLYCOL 3350 17 GRAM(S): 17 POWDER, FOR SOLUTION ORAL at 05:19

## 2021-01-01 RX ADMIN — MAGNESIUM OXIDE 400 MG ORAL TABLET 400 MILLIGRAM(S): 241.3 TABLET ORAL at 13:01

## 2021-01-01 RX ADMIN — Medication 50 MILLIGRAM(S): at 12:05

## 2021-01-01 RX ADMIN — Medication 40 MILLIEQUIVALENT(S): at 14:51

## 2021-01-01 RX ADMIN — Medication 101.6 MILLIGRAM(S): at 05:50

## 2021-01-01 RX ADMIN — Medication 120 MILLIGRAM(S): at 01:07

## 2021-01-01 RX ADMIN — Medication 650 MILLIGRAM(S): at 23:11

## 2021-01-01 RX ADMIN — LEVETIRACETAM 400 MILLIGRAM(S): 250 TABLET, FILM COATED ORAL at 10:49

## 2021-01-01 RX ADMIN — CHLORHEXIDINE GLUCONATE 1 APPLICATION(S): 213 SOLUTION TOPICAL at 09:22

## 2021-01-01 RX ADMIN — Medication 101.6 MILLIGRAM(S): at 22:03

## 2021-01-01 RX ADMIN — LEVETIRACETAM 400 MILLIGRAM(S): 250 TABLET, FILM COATED ORAL at 10:47

## 2021-01-01 RX ADMIN — PANTOPRAZOLE SODIUM 40 MILLIGRAM(S): 20 TABLET, DELAYED RELEASE ORAL at 05:47

## 2021-01-01 RX ADMIN — Medication 1 APPLICATION(S): at 21:44

## 2021-01-01 RX ADMIN — NYSTATIN CREAM 1 APPLICATION(S): 100000 CREAM TOPICAL at 18:06

## 2021-01-01 RX ADMIN — Medication 50 MILLIGRAM(S): at 21:45

## 2021-01-01 RX ADMIN — Medication 1 APPLICATION(S): at 05:02

## 2021-01-01 RX ADMIN — PANTOPRAZOLE SODIUM 40 MILLIGRAM(S): 20 TABLET, DELAYED RELEASE ORAL at 17:29

## 2021-01-01 RX ADMIN — PANTOPRAZOLE SODIUM 40 MILLIGRAM(S): 20 TABLET, DELAYED RELEASE ORAL at 06:25

## 2021-01-01 RX ADMIN — Medication 2: at 17:36

## 2021-01-01 RX ADMIN — Medication 1 TABLET(S): at 13:01

## 2021-01-01 RX ADMIN — Medication 15 MILLILITER(S): at 14:40

## 2021-01-01 RX ADMIN — Medication 101.6 MILLIGRAM(S): at 21:09

## 2021-01-01 RX ADMIN — Medication 4: at 06:35

## 2021-01-01 RX ADMIN — PANTOPRAZOLE SODIUM 40 MILLIGRAM(S): 20 TABLET, DELAYED RELEASE ORAL at 17:37

## 2021-01-01 RX ADMIN — PANTOPRAZOLE SODIUM 40 MILLIGRAM(S): 20 TABLET, DELAYED RELEASE ORAL at 18:03

## 2021-01-01 RX ADMIN — Medication 1 APPLICATION(S): at 17:55

## 2021-01-01 RX ADMIN — CHLORHEXIDINE GLUCONATE 1 APPLICATION(S): 213 SOLUTION TOPICAL at 06:27

## 2021-01-01 RX ADMIN — CHLORHEXIDINE GLUCONATE 15 MILLILITER(S): 213 SOLUTION TOPICAL at 05:01

## 2021-01-01 RX ADMIN — HUMAN INSULIN 6 UNIT(S): 100 INJECTION, SUSPENSION SUBCUTANEOUS at 06:39

## 2021-01-01 RX ADMIN — POLYETHYLENE GLYCOL 3350 17 GRAM(S): 17 POWDER, FOR SOLUTION ORAL at 17:53

## 2021-01-01 RX ADMIN — Medication 250 MILLIGRAM(S): at 07:29

## 2021-01-01 RX ADMIN — Medication 1 APPLICATION(S): at 21:06

## 2021-01-01 RX ADMIN — NYSTATIN CREAM 1 APPLICATION(S): 100000 CREAM TOPICAL at 06:44

## 2021-01-01 RX ADMIN — Medication 4 MILLIGRAM(S): at 14:23

## 2021-01-01 RX ADMIN — NYSTATIN CREAM 1 APPLICATION(S): 100000 CREAM TOPICAL at 18:14

## 2021-01-01 RX ADMIN — HUMAN INSULIN 6 UNIT(S): 100 INJECTION, SUSPENSION SUBCUTANEOUS at 06:30

## 2021-01-01 RX ADMIN — PANTOPRAZOLE SODIUM 40 MILLIGRAM(S): 20 TABLET, DELAYED RELEASE ORAL at 17:17

## 2021-01-01 RX ADMIN — LEVETIRACETAM 400 MILLIGRAM(S): 250 TABLET, FILM COATED ORAL at 09:03

## 2021-01-01 RX ADMIN — Medication 2: at 06:10

## 2021-01-01 RX ADMIN — CHLORHEXIDINE GLUCONATE 1 APPLICATION(S): 213 SOLUTION TOPICAL at 05:02

## 2021-01-01 RX ADMIN — Medication 500 MILLIGRAM(S): at 12:14

## 2021-01-01 RX ADMIN — HUMAN INSULIN 7 UNIT(S): 100 INJECTION, SUSPENSION SUBCUTANEOUS at 11:25

## 2021-01-01 RX ADMIN — LEVETIRACETAM 400 MILLIGRAM(S): 250 TABLET, FILM COATED ORAL at 21:10

## 2021-01-01 RX ADMIN — Medication 50 MILLIGRAM(S): at 14:34

## 2021-01-01 RX ADMIN — Medication 4 MILLIGRAM(S): at 05:02

## 2021-01-01 RX ADMIN — PANTOPRAZOLE SODIUM 40 MILLIGRAM(S): 20 TABLET, DELAYED RELEASE ORAL at 05:03

## 2021-01-01 RX ADMIN — Medication 2: at 17:29

## 2021-01-01 RX ADMIN — HUMAN INSULIN 6 UNIT(S): 100 INJECTION, SUSPENSION SUBCUTANEOUS at 00:12

## 2021-01-01 RX ADMIN — HUMAN INSULIN 6 UNIT(S): 100 INJECTION, SUSPENSION SUBCUTANEOUS at 23:44

## 2021-01-01 RX ADMIN — Medication 100 MILLIEQUIVALENT(S): at 16:42

## 2021-01-01 RX ADMIN — Medication 4: at 06:19

## 2021-01-01 RX ADMIN — LEVETIRACETAM 400 MILLIGRAM(S): 250 TABLET, FILM COATED ORAL at 17:17

## 2021-01-01 RX ADMIN — Medication 2: at 06:39

## 2021-01-01 RX ADMIN — Medication 50 MILLIGRAM(S): at 13:41

## 2021-01-01 RX ADMIN — HUMAN INSULIN 6 UNIT(S): 100 INJECTION, SUSPENSION SUBCUTANEOUS at 00:29

## 2021-01-01 RX ADMIN — Medication 2: at 06:41

## 2021-01-01 RX ADMIN — HUMAN INSULIN 6 UNIT(S): 100 INJECTION, SUSPENSION SUBCUTANEOUS at 12:53

## 2021-01-01 RX ADMIN — CHLORHEXIDINE GLUCONATE 1 APPLICATION(S): 213 SOLUTION TOPICAL at 05:13

## 2021-01-01 RX ADMIN — LEVETIRACETAM 400 MILLIGRAM(S): 250 TABLET, FILM COATED ORAL at 10:14

## 2021-01-01 RX ADMIN — CHLORHEXIDINE GLUCONATE 1 APPLICATION(S): 213 SOLUTION TOPICAL at 05:37

## 2021-01-01 RX ADMIN — Medication 50 MILLIGRAM(S): at 22:15

## 2021-01-01 RX ADMIN — POLYETHYLENE GLYCOL 3350 17 GRAM(S): 17 POWDER, FOR SOLUTION ORAL at 17:54

## 2021-01-01 RX ADMIN — AMLODIPINE BESYLATE 5 MILLIGRAM(S): 2.5 TABLET ORAL at 05:44

## 2021-01-01 RX ADMIN — CHLORHEXIDINE GLUCONATE 15 MILLILITER(S): 213 SOLUTION TOPICAL at 05:24

## 2021-01-01 RX ADMIN — Medication 1 APPLICATION(S): at 17:31

## 2021-01-01 RX ADMIN — Medication 25 GRAM(S): at 16:08

## 2021-01-01 RX ADMIN — PIPERACILLIN AND TAZOBACTAM 25 GRAM(S): 4; .5 INJECTION, POWDER, LYOPHILIZED, FOR SOLUTION INTRAVENOUS at 11:02

## 2021-01-01 RX ADMIN — LEVETIRACETAM 400 MILLIGRAM(S): 250 TABLET, FILM COATED ORAL at 17:50

## 2021-01-01 RX ADMIN — PANTOPRAZOLE SODIUM 40 MILLIGRAM(S): 20 TABLET, DELAYED RELEASE ORAL at 05:26

## 2021-01-01 RX ADMIN — Medication 81 MILLIGRAM(S): at 13:01

## 2021-01-01 RX ADMIN — NYSTATIN CREAM 1 APPLICATION(S): 100000 CREAM TOPICAL at 05:56

## 2021-01-01 RX ADMIN — Medication 4: at 00:29

## 2021-01-01 RX ADMIN — LEVETIRACETAM 400 MILLIGRAM(S): 250 TABLET, FILM COATED ORAL at 19:36

## 2021-01-01 RX ADMIN — Medication 4: at 17:48

## 2021-01-01 RX ADMIN — TAMSULOSIN HYDROCHLORIDE 0.4 MILLIGRAM(S): 0.4 CAPSULE ORAL at 23:24

## 2021-01-01 RX ADMIN — CHLORHEXIDINE GLUCONATE 1 APPLICATION(S): 213 SOLUTION TOPICAL at 06:55

## 2021-01-01 RX ADMIN — Medication 1 APPLICATION(S): at 23:30

## 2021-01-01 RX ADMIN — LEVETIRACETAM 400 MILLIGRAM(S): 250 TABLET, FILM COATED ORAL at 20:17

## 2021-01-01 RX ADMIN — Medication 25 MILLIGRAM(S): at 17:39

## 2021-01-01 RX ADMIN — HUMAN INSULIN 6 UNIT(S): 100 INJECTION, SUSPENSION SUBCUTANEOUS at 17:30

## 2021-01-01 RX ADMIN — HUMAN INSULIN 6 UNIT(S): 100 INJECTION, SUSPENSION SUBCUTANEOUS at 06:18

## 2021-01-01 RX ADMIN — Medication 1 MILLIGRAM(S): at 11:27

## 2021-01-01 RX ADMIN — HUMAN INSULIN 6 UNIT(S): 100 INJECTION, SUSPENSION SUBCUTANEOUS at 06:10

## 2021-01-01 RX ADMIN — HUMAN INSULIN 6 UNIT(S): 100 INJECTION, SUSPENSION SUBCUTANEOUS at 00:47

## 2021-01-01 RX ADMIN — PANTOPRAZOLE SODIUM 40 MILLIGRAM(S): 20 TABLET, DELAYED RELEASE ORAL at 05:50

## 2021-01-01 RX ADMIN — LEVETIRACETAM 400 MILLIGRAM(S): 250 TABLET, FILM COATED ORAL at 10:11

## 2021-01-01 RX ADMIN — Medication 1 APPLICATION(S): at 23:33

## 2021-01-01 RX ADMIN — NYSTATIN CREAM 1 APPLICATION(S): 100000 CREAM TOPICAL at 17:53

## 2021-01-01 RX ADMIN — Medication 50 MILLIGRAM(S): at 15:50

## 2021-01-01 RX ADMIN — CHLORHEXIDINE GLUCONATE 1 APPLICATION(S): 213 SOLUTION TOPICAL at 05:19

## 2021-01-01 RX ADMIN — Medication 1 APPLICATION(S): at 18:01

## 2021-01-01 RX ADMIN — Medication 1 MILLIGRAM(S): at 12:34

## 2021-01-01 RX ADMIN — Medication 1 MILLIGRAM(S): at 12:03

## 2021-01-01 RX ADMIN — Medication 101.6 MILLIGRAM(S): at 14:28

## 2021-01-01 RX ADMIN — Medication 1 APPLICATION(S): at 09:14

## 2021-01-01 RX ADMIN — NYSTATIN CREAM 1 APPLICATION(S): 100000 CREAM TOPICAL at 06:18

## 2021-01-01 RX ADMIN — Medication 15 MILLILITER(S): at 12:54

## 2021-01-01 RX ADMIN — Medication 1 APPLICATION(S): at 12:37

## 2021-01-01 RX ADMIN — Medication 2: at 08:49

## 2021-01-01 RX ADMIN — Medication 40 MILLIEQUIVALENT(S): at 10:10

## 2021-01-01 RX ADMIN — Medication 8: at 06:01

## 2021-01-01 RX ADMIN — POLYETHYLENE GLYCOL 3350 17 GRAM(S): 17 POWDER, FOR SOLUTION ORAL at 06:38

## 2021-01-01 RX ADMIN — LEVETIRACETAM 400 MILLIGRAM(S): 250 TABLET, FILM COATED ORAL at 20:16

## 2021-01-01 RX ADMIN — Medication 1 APPLICATION(S): at 09:55

## 2021-01-01 RX ADMIN — Medication 4: at 00:48

## 2021-01-01 RX ADMIN — PANTOPRAZOLE SODIUM 40 MILLIGRAM(S): 20 TABLET, DELAYED RELEASE ORAL at 17:53

## 2021-01-01 RX ADMIN — PANTOPRAZOLE SODIUM 40 MILLIGRAM(S): 20 TABLET, DELAYED RELEASE ORAL at 06:08

## 2021-01-01 RX ADMIN — Medication 1 APPLICATION(S): at 22:45

## 2021-01-01 RX ADMIN — Medication 1 APPLICATION(S): at 18:10

## 2021-01-01 RX ADMIN — HUMAN INSULIN 6 UNIT(S): 100 INJECTION, SUSPENSION SUBCUTANEOUS at 05:48

## 2021-01-01 RX ADMIN — Medication 2: at 05:19

## 2021-01-01 RX ADMIN — CASPOFUNGIN ACETATE 260 MILLIGRAM(S): 7 INJECTION, POWDER, LYOPHILIZED, FOR SOLUTION INTRAVENOUS at 23:46

## 2021-01-01 RX ADMIN — Medication 5 MILLIGRAM(S): at 02:33

## 2021-01-01 RX ADMIN — Medication 101.6 MILLIGRAM(S): at 15:11

## 2021-01-01 RX ADMIN — Medication 15 MILLILITER(S): at 12:35

## 2021-01-01 RX ADMIN — Medication 101.6 MILLIGRAM(S): at 21:01

## 2021-01-01 RX ADMIN — Medication 2 GRAM(S): at 05:47

## 2021-01-01 RX ADMIN — Medication 101.6 MILLIGRAM(S): at 13:07

## 2021-01-01 RX ADMIN — Medication 4: at 12:32

## 2021-01-01 RX ADMIN — LEVETIRACETAM 400 MILLIGRAM(S): 250 TABLET, FILM COATED ORAL at 21:09

## 2021-01-01 RX ADMIN — Medication 50 MILLIGRAM(S): at 21:04

## 2021-01-01 RX ADMIN — NYSTATIN CREAM 1 APPLICATION(S): 100000 CREAM TOPICAL at 17:29

## 2021-01-01 RX ADMIN — MICAFUNGIN SODIUM 105 MILLIGRAM(S): 100 INJECTION, POWDER, LYOPHILIZED, FOR SOLUTION INTRAVENOUS at 23:27

## 2021-01-01 RX ADMIN — Medication 1 APPLICATION(S): at 18:05

## 2021-01-01 RX ADMIN — SENNA PLUS 10 MILLILITER(S): 8.6 TABLET ORAL at 21:49

## 2021-01-01 RX ADMIN — Medication 500 MILLIGRAM(S): at 13:27

## 2021-01-01 RX ADMIN — Medication 1 APPLICATION(S): at 10:37

## 2021-01-01 RX ADMIN — POTASSIUM PHOSPHATE, MONOBASIC POTASSIUM PHOSPHATE, DIBASIC 62.5 MILLIMOLE(S): 236; 224 INJECTION, SOLUTION INTRAVENOUS at 00:09

## 2021-01-01 RX ADMIN — Medication 4 MILLIGRAM(S): at 11:01

## 2021-01-01 RX ADMIN — PROPOFOL 3.35 MICROGRAM(S)/KG/MIN: 10 INJECTION, EMULSION INTRAVENOUS at 07:20

## 2021-01-01 RX ADMIN — PANTOPRAZOLE SODIUM 40 MILLIGRAM(S): 20 TABLET, DELAYED RELEASE ORAL at 05:15

## 2021-01-01 RX ADMIN — Medication 1 APPLICATION(S): at 05:06

## 2021-01-01 RX ADMIN — Medication 101.6 MILLIGRAM(S): at 15:02

## 2021-01-01 RX ADMIN — POLYETHYLENE GLYCOL 3350 17 GRAM(S): 17 POWDER, FOR SOLUTION ORAL at 17:40

## 2021-01-01 RX ADMIN — Medication 101.6 MILLIGRAM(S): at 06:08

## 2021-01-01 RX ADMIN — Medication 50 MILLIGRAM(S): at 05:56

## 2021-01-01 RX ADMIN — FENTANYL CITRATE 100 MICROGRAM(S): 50 INJECTION INTRAVENOUS at 01:16

## 2021-01-01 RX ADMIN — HUMAN INSULIN 6 UNIT(S): 100 INJECTION, SUSPENSION SUBCUTANEOUS at 05:14

## 2021-01-01 RX ADMIN — NYSTATIN CREAM 1 APPLICATION(S): 100000 CREAM TOPICAL at 17:58

## 2021-01-01 RX ADMIN — LEVETIRACETAM 400 MILLIGRAM(S): 250 TABLET, FILM COATED ORAL at 17:31

## 2021-01-01 RX ADMIN — Medication 1 MILLIGRAM(S): at 12:37

## 2021-01-01 RX ADMIN — Medication 101.6 MILLIGRAM(S): at 14:47

## 2021-01-01 RX ADMIN — LEVETIRACETAM 400 MILLIGRAM(S): 250 TABLET, FILM COATED ORAL at 11:37

## 2021-01-01 RX ADMIN — Medication 2: at 06:26

## 2021-01-01 RX ADMIN — Medication 101.6 MILLIGRAM(S): at 13:55

## 2021-01-01 RX ADMIN — Medication 650 MILLIGRAM(S): at 15:11

## 2021-01-01 RX ADMIN — PANTOPRAZOLE SODIUM 40 MILLIGRAM(S): 20 TABLET, DELAYED RELEASE ORAL at 05:06

## 2021-01-01 RX ADMIN — PIPERACILLIN AND TAZOBACTAM 25 GRAM(S): 4; .5 INJECTION, POWDER, LYOPHILIZED, FOR SOLUTION INTRAVENOUS at 06:34

## 2021-01-01 RX ADMIN — DESMOPRESSIN ACETATE 2 MICROGRAM(S): 0.1 TABLET ORAL at 20:27

## 2021-01-01 RX ADMIN — Medication 1 MILLIGRAM(S): at 11:59

## 2021-01-01 RX ADMIN — Medication 650 MILLIGRAM(S): at 03:07

## 2021-01-01 RX ADMIN — LEVETIRACETAM 400 MILLIGRAM(S): 250 TABLET, FILM COATED ORAL at 05:01

## 2021-01-01 RX ADMIN — Medication 1 APPLICATION(S): at 22:22

## 2021-01-01 RX ADMIN — Medication 2: at 17:17

## 2021-01-01 RX ADMIN — HUMAN INSULIN 6 UNIT(S): 100 INJECTION, SUSPENSION SUBCUTANEOUS at 11:31

## 2021-01-01 RX ADMIN — PANTOPRAZOLE SODIUM 40 MILLIGRAM(S): 20 TABLET, DELAYED RELEASE ORAL at 17:49

## 2021-01-01 RX ADMIN — Medication 1 APPLICATION(S): at 13:00

## 2021-01-01 RX ADMIN — Medication 8: at 15:05

## 2021-01-01 RX ADMIN — PHENYLEPHRINE HYDROCHLORIDE 9.71 MICROGRAM(S)/KG/MIN: 10 INJECTION INTRAVENOUS at 09:32

## 2021-01-01 RX ADMIN — NYSTATIN CREAM 1 APPLICATION(S): 100000 CREAM TOPICAL at 05:03

## 2021-01-01 RX ADMIN — LEVETIRACETAM 400 MILLIGRAM(S): 250 TABLET, FILM COATED ORAL at 01:44

## 2021-01-01 RX ADMIN — Medication 2 GRAM(S): at 05:50

## 2021-01-01 RX ADMIN — PIPERACILLIN AND TAZOBACTAM 25 GRAM(S): 4; .5 INJECTION, POWDER, LYOPHILIZED, FOR SOLUTION INTRAVENOUS at 15:06

## 2021-01-01 RX ADMIN — Medication 101.6 MILLIGRAM(S): at 22:13

## 2021-01-01 RX ADMIN — Medication 500 MILLIGRAM(S): at 13:33

## 2021-01-01 RX ADMIN — LEVETIRACETAM 400 MILLIGRAM(S): 250 TABLET, FILM COATED ORAL at 21:51

## 2021-01-01 RX ADMIN — Medication 101.6 MILLIGRAM(S): at 22:53

## 2021-01-01 RX ADMIN — HUMAN INSULIN 6 UNIT(S): 100 INJECTION, SUSPENSION SUBCUTANEOUS at 12:36

## 2021-01-01 RX ADMIN — Medication 1 APPLICATION(S): at 17:09

## 2021-01-01 RX ADMIN — HUMAN INSULIN 6 UNIT(S): 100 INJECTION, SUSPENSION SUBCUTANEOUS at 11:33

## 2021-01-01 RX ADMIN — PANTOPRAZOLE SODIUM 40 MILLIGRAM(S): 20 TABLET, DELAYED RELEASE ORAL at 05:27

## 2021-01-01 RX ADMIN — Medication 1 APPLICATION(S): at 17:27

## 2021-01-01 RX ADMIN — SODIUM CHLORIDE 30 MILLILITER(S): 5 INJECTION, SOLUTION INTRAVENOUS at 11:06

## 2021-01-01 RX ADMIN — PANTOPRAZOLE SODIUM 40 MILLIGRAM(S): 20 TABLET, DELAYED RELEASE ORAL at 17:18

## 2021-01-01 RX ADMIN — HUMAN INSULIN 6 UNIT(S): 100 INJECTION, SUSPENSION SUBCUTANEOUS at 17:40

## 2021-01-01 RX ADMIN — Medication 1 APPLICATION(S): at 21:09

## 2021-01-01 RX ADMIN — PANTOPRAZOLE SODIUM 40 MILLIGRAM(S): 20 TABLET, DELAYED RELEASE ORAL at 05:01

## 2021-01-01 RX ADMIN — LEVETIRACETAM 400 MILLIGRAM(S): 250 TABLET, FILM COATED ORAL at 10:07

## 2021-01-01 RX ADMIN — Medication 50 MILLIGRAM(S): at 13:28

## 2021-01-01 RX ADMIN — HUMAN INSULIN 6 UNIT(S): 100 INJECTION, SUSPENSION SUBCUTANEOUS at 23:54

## 2021-01-01 RX ADMIN — Medication 101.6 MILLIGRAM(S): at 22:10

## 2021-01-01 RX ADMIN — Medication 25 GRAM(S): at 23:20

## 2021-01-01 RX ADMIN — Medication 4 MILLIGRAM(S): at 05:55

## 2021-01-01 RX ADMIN — CHLORHEXIDINE GLUCONATE 1 APPLICATION(S): 213 SOLUTION TOPICAL at 06:12

## 2021-01-01 RX ADMIN — CHLORHEXIDINE GLUCONATE 1 APPLICATION(S): 213 SOLUTION TOPICAL at 05:47

## 2021-01-01 RX ADMIN — Medication 50 MILLIGRAM(S): at 05:27

## 2021-01-01 RX ADMIN — Medication 1 APPLICATION(S): at 06:39

## 2021-01-01 RX ADMIN — Medication 101.6 MILLIGRAM(S): at 22:08

## 2021-01-01 RX ADMIN — Medication 50 MILLIGRAM(S): at 21:23

## 2021-01-01 RX ADMIN — Medication 50 MILLIGRAM(S): at 13:47

## 2021-01-01 RX ADMIN — CASPOFUNGIN ACETATE 260 MILLIGRAM(S): 7 INJECTION, POWDER, LYOPHILIZED, FOR SOLUTION INTRAVENOUS at 23:22

## 2021-01-01 RX ADMIN — CHLORHEXIDINE GLUCONATE 1 APPLICATION(S): 213 SOLUTION TOPICAL at 05:01

## 2021-01-01 RX ADMIN — Medication 50 MILLIGRAM(S): at 13:07

## 2021-01-01 RX ADMIN — POTASSIUM PHOSPHATE, MONOBASIC POTASSIUM PHOSPHATE, DIBASIC 83.33 MILLIMOLE(S): 236; 224 INJECTION, SOLUTION INTRAVENOUS at 03:48

## 2021-01-01 RX ADMIN — Medication 1 APPLICATION(S): at 17:18

## 2021-01-01 RX ADMIN — Medication 4: at 13:01

## 2021-01-01 RX ADMIN — Medication 50 MILLIGRAM(S): at 13:33

## 2021-01-01 RX ADMIN — PROPOFOL 3.35 MICROGRAM(S)/KG/MIN: 10 INJECTION, EMULSION INTRAVENOUS at 02:42

## 2021-01-01 RX ADMIN — Medication 101.6 MILLIGRAM(S): at 14:00

## 2021-01-01 RX ADMIN — Medication 1 APPLICATION(S): at 12:00

## 2021-01-01 RX ADMIN — Medication 15 MILLILITER(S): at 13:53

## 2021-01-01 RX ADMIN — Medication 4 MILLIGRAM(S): at 10:05

## 2021-01-01 RX ADMIN — CHLORHEXIDINE GLUCONATE 1 APPLICATION(S): 213 SOLUTION TOPICAL at 05:26

## 2021-01-01 RX ADMIN — CHLORHEXIDINE GLUCONATE 1 APPLICATION(S): 213 SOLUTION TOPICAL at 06:48

## 2021-01-01 RX ADMIN — PIPERACILLIN AND TAZOBACTAM 25 GRAM(S): 4; .5 INJECTION, POWDER, LYOPHILIZED, FOR SOLUTION INTRAVENOUS at 08:58

## 2021-01-01 RX ADMIN — PIPERACILLIN AND TAZOBACTAM 25 GRAM(S): 4; .5 INJECTION, POWDER, LYOPHILIZED, FOR SOLUTION INTRAVENOUS at 05:44

## 2021-01-01 RX ADMIN — Medication 50 MILLIGRAM(S): at 05:57

## 2021-01-01 RX ADMIN — LEVETIRACETAM 400 MILLIGRAM(S): 250 TABLET, FILM COATED ORAL at 20:12

## 2021-01-01 RX ADMIN — Medication 40 MILLIEQUIVALENT(S): at 15:01

## 2021-01-01 RX ADMIN — LEVETIRACETAM 400 MILLIGRAM(S): 250 TABLET, FILM COATED ORAL at 05:03

## 2021-01-01 RX ADMIN — POLYETHYLENE GLYCOL 3350 17 GRAM(S): 17 POWDER, FOR SOLUTION ORAL at 06:11

## 2021-01-01 RX ADMIN — NYSTATIN CREAM 1 APPLICATION(S): 100000 CREAM TOPICAL at 06:05

## 2021-01-01 RX ADMIN — Medication 1: at 12:54

## 2021-01-01 RX ADMIN — Medication 2: at 00:12

## 2021-01-01 RX ADMIN — FINASTERIDE 5 MILLIGRAM(S): 5 TABLET, FILM COATED ORAL at 12:13

## 2021-01-01 RX ADMIN — CHLORHEXIDINE GLUCONATE 1 APPLICATION(S): 213 SOLUTION TOPICAL at 10:56

## 2021-01-01 RX ADMIN — CASPOFUNGIN ACETATE 260 MILLIGRAM(S): 7 INJECTION, POWDER, LYOPHILIZED, FOR SOLUTION INTRAVENOUS at 22:10

## 2021-01-01 RX ADMIN — Medication 1 APPLICATION(S): at 09:35

## 2021-01-01 RX ADMIN — Medication 1: at 23:44

## 2021-01-01 RX ADMIN — HUMAN INSULIN 7 UNIT(S): 100 INJECTION, SUSPENSION SUBCUTANEOUS at 23:12

## 2021-01-01 RX ADMIN — CASPOFUNGIN ACETATE 260 MILLIGRAM(S): 7 INJECTION, POWDER, LYOPHILIZED, FOR SOLUTION INTRAVENOUS at 22:37

## 2021-01-01 RX ADMIN — PANTOPRAZOLE SODIUM 40 MILLIGRAM(S): 20 TABLET, DELAYED RELEASE ORAL at 18:13

## 2021-01-01 RX ADMIN — Medication 4: at 06:04

## 2021-01-01 RX ADMIN — MAGNESIUM OXIDE 400 MG ORAL TABLET 400 MILLIGRAM(S): 241.3 TABLET ORAL at 12:13

## 2021-01-01 RX ADMIN — PANTOPRAZOLE SODIUM 40 MILLIGRAM(S): 20 TABLET, DELAYED RELEASE ORAL at 05:34

## 2021-01-01 RX ADMIN — POLYETHYLENE GLYCOL 3350 17 GRAM(S): 17 POWDER, FOR SOLUTION ORAL at 18:09

## 2021-01-01 RX ADMIN — HUMAN INSULIN 6 UNIT(S): 100 INJECTION, SUSPENSION SUBCUTANEOUS at 12:19

## 2021-01-01 RX ADMIN — PANTOPRAZOLE SODIUM 40 MILLIGRAM(S): 20 TABLET, DELAYED RELEASE ORAL at 05:57

## 2021-01-01 RX ADMIN — HUMAN INSULIN 6 UNIT(S): 100 INJECTION, SUSPENSION SUBCUTANEOUS at 23:59

## 2021-01-01 RX ADMIN — LEVETIRACETAM 400 MILLIGRAM(S): 250 TABLET, FILM COATED ORAL at 20:27

## 2021-01-01 RX ADMIN — Medication 101.6 MILLIGRAM(S): at 06:41

## 2021-01-01 RX ADMIN — Medication 2 GRAM(S): at 23:23

## 2021-01-01 RX ADMIN — Medication 50 MILLIGRAM(S): at 22:33

## 2021-01-01 RX ADMIN — POLYETHYLENE GLYCOL 3350 17 GRAM(S): 17 POWDER, FOR SOLUTION ORAL at 18:12

## 2021-01-01 RX ADMIN — Medication 1 APPLICATION(S): at 18:13

## 2021-01-01 RX ADMIN — Medication 50 MILLIGRAM(S): at 05:02

## 2021-01-01 RX ADMIN — LOSARTAN POTASSIUM 50 MILLIGRAM(S): 100 TABLET, FILM COATED ORAL at 05:48

## 2021-01-01 RX ADMIN — CEFEPIME 100 MILLIGRAM(S): 1 INJECTION, POWDER, FOR SOLUTION INTRAMUSCULAR; INTRAVENOUS at 07:00

## 2021-01-01 RX ADMIN — LEVETIRACETAM 400 MILLIGRAM(S): 250 TABLET, FILM COATED ORAL at 06:38

## 2021-01-01 RX ADMIN — NYSTATIN CREAM 1 APPLICATION(S): 100000 CREAM TOPICAL at 18:11

## 2021-01-01 RX ADMIN — CASPOFUNGIN ACETATE 260 MILLIGRAM(S): 7 INJECTION, POWDER, LYOPHILIZED, FOR SOLUTION INTRAVENOUS at 22:08

## 2021-01-01 RX ADMIN — Medication 50 MILLIGRAM(S): at 13:53

## 2021-01-01 RX ADMIN — Medication 500 MILLIGRAM(S): at 12:35

## 2021-01-01 RX ADMIN — SODIUM CHLORIDE 75 MILLILITER(S): 9 INJECTION, SOLUTION INTRAVENOUS at 05:04

## 2021-01-01 RX ADMIN — PIPERACILLIN AND TAZOBACTAM 25 GRAM(S): 4; .5 INJECTION, POWDER, LYOPHILIZED, FOR SOLUTION INTRAVENOUS at 18:10

## 2021-01-01 RX ADMIN — Medication 101.6 MILLIGRAM(S): at 05:39

## 2021-01-01 RX ADMIN — Medication 500 MILLIGRAM(S): at 11:27

## 2021-01-01 RX ADMIN — CHLORHEXIDINE GLUCONATE 1 APPLICATION(S): 213 SOLUTION TOPICAL at 05:28

## 2021-01-01 RX ADMIN — PIPERACILLIN AND TAZOBACTAM 25 GRAM(S): 4; .5 INJECTION, POWDER, LYOPHILIZED, FOR SOLUTION INTRAVENOUS at 05:03

## 2021-01-01 RX ADMIN — PANTOPRAZOLE SODIUM 40 MILLIGRAM(S): 20 TABLET, DELAYED RELEASE ORAL at 18:36

## 2021-01-01 RX ADMIN — Medication 4: at 06:40

## 2021-01-01 RX ADMIN — HUMAN INSULIN 6 UNIT(S): 100 INJECTION, SUSPENSION SUBCUTANEOUS at 13:46

## 2021-01-01 RX ADMIN — PIPERACILLIN AND TAZOBACTAM 25 GRAM(S): 4; .5 INJECTION, POWDER, LYOPHILIZED, FOR SOLUTION INTRAVENOUS at 05:25

## 2021-01-01 RX ADMIN — SODIUM CHLORIDE 30 MILLILITER(S): 5 INJECTION, SOLUTION INTRAVENOUS at 07:20

## 2021-01-01 RX ADMIN — Medication 50 MILLIGRAM(S): at 05:06

## 2021-01-01 RX ADMIN — Medication 1 APPLICATION(S): at 12:02

## 2021-01-01 RX ADMIN — HUMAN INSULIN 6 UNIT(S): 100 INJECTION, SUSPENSION SUBCUTANEOUS at 12:00

## 2021-01-01 RX ADMIN — Medication 8: at 02:42

## 2021-01-01 RX ADMIN — Medication 1 APPLICATION(S): at 18:04

## 2021-01-01 RX ADMIN — Medication 50 MILLIGRAM(S): at 05:36

## 2021-01-01 RX ADMIN — Medication 101.6 MILLIGRAM(S): at 14:25

## 2021-01-01 RX ADMIN — Medication 1 APPLICATION(S): at 11:50

## 2021-01-01 RX ADMIN — HUMAN INSULIN 6 UNIT(S): 100 INJECTION, SUSPENSION SUBCUTANEOUS at 12:51

## 2021-01-01 RX ADMIN — Medication 50 MILLIGRAM(S): at 21:09

## 2021-01-01 RX ADMIN — HUMAN INSULIN 6 UNIT(S): 100 INJECTION, SUSPENSION SUBCUTANEOUS at 18:36

## 2021-01-01 RX ADMIN — Medication 500 MILLIGRAM(S): at 13:13

## 2021-01-01 RX ADMIN — HUMAN INSULIN 6 UNIT(S): 100 INJECTION, SUSPENSION SUBCUTANEOUS at 12:12

## 2021-01-01 RX ADMIN — Medication 4 MILLIGRAM(S): at 17:29

## 2021-01-01 RX ADMIN — CHLORHEXIDINE GLUCONATE 1 APPLICATION(S): 213 SOLUTION TOPICAL at 10:21

## 2021-01-01 RX ADMIN — PANTOPRAZOLE SODIUM 40 MILLIGRAM(S): 20 TABLET, DELAYED RELEASE ORAL at 05:25

## 2021-01-01 RX ADMIN — PANTOPRAZOLE SODIUM 40 MILLIGRAM(S): 20 TABLET, DELAYED RELEASE ORAL at 05:56

## 2021-01-01 RX ADMIN — PANTOPRAZOLE SODIUM 40 MILLIGRAM(S): 20 TABLET, DELAYED RELEASE ORAL at 06:17

## 2021-01-01 RX ADMIN — CASPOFUNGIN ACETATE 260 MILLIGRAM(S): 7 INJECTION, POWDER, LYOPHILIZED, FOR SOLUTION INTRAVENOUS at 22:07

## 2021-01-01 RX ADMIN — HUMAN INSULIN 6 UNIT(S): 100 INJECTION, SUSPENSION SUBCUTANEOUS at 00:08

## 2021-01-01 RX ADMIN — Medication 500 MILLIGRAM(S): at 13:56

## 2021-01-01 RX ADMIN — Medication 2: at 18:44

## 2021-01-01 RX ADMIN — HUMAN INSULIN 6 UNIT(S): 100 INJECTION, SUSPENSION SUBCUTANEOUS at 00:05

## 2021-01-01 RX ADMIN — Medication 101.6 MILLIGRAM(S): at 06:36

## 2021-01-01 RX ADMIN — Medication 101.6 MILLIGRAM(S): at 13:25

## 2021-01-01 RX ADMIN — Medication 520 MILLIGRAM(S): at 12:15

## 2021-01-01 RX ADMIN — Medication 50 MILLIGRAM(S): at 14:23

## 2021-01-01 RX ADMIN — CHLORHEXIDINE GLUCONATE 1 APPLICATION(S): 213 SOLUTION TOPICAL at 05:09

## 2021-01-01 RX ADMIN — Medication 1 TABLET(S): at 18:09

## 2021-01-01 RX ADMIN — Medication 650 MILLIGRAM(S): at 01:46

## 2021-01-01 RX ADMIN — Medication 2: at 01:13

## 2021-01-01 RX ADMIN — PIPERACILLIN AND TAZOBACTAM 25 GRAM(S): 4; .5 INJECTION, POWDER, LYOPHILIZED, FOR SOLUTION INTRAVENOUS at 01:58

## 2021-01-01 RX ADMIN — Medication 15 MILLILITER(S): at 12:18

## 2021-01-01 RX ADMIN — Medication 4: at 00:08

## 2021-01-01 RX ADMIN — Medication 101.6 MILLIGRAM(S): at 05:32

## 2021-01-01 RX ADMIN — CASPOFUNGIN ACETATE 260 MILLIGRAM(S): 7 INJECTION, POWDER, LYOPHILIZED, FOR SOLUTION INTRAVENOUS at 23:12

## 2021-01-01 RX ADMIN — Medication 101.6 MILLIGRAM(S): at 22:44

## 2021-01-01 RX ADMIN — CHLORHEXIDINE GLUCONATE 1 APPLICATION(S): 213 SOLUTION TOPICAL at 06:19

## 2021-01-01 RX ADMIN — Medication 1 TABLET(S): at 17:35

## 2021-01-01 RX ADMIN — PANTOPRAZOLE SODIUM 40 MILLIGRAM(S): 20 TABLET, DELAYED RELEASE ORAL at 17:46

## 2021-01-01 RX ADMIN — NYSTATIN CREAM 1 APPLICATION(S): 100000 CREAM TOPICAL at 18:43

## 2021-01-01 RX ADMIN — Medication 101.6 MILLIGRAM(S): at 13:01

## 2021-01-01 RX ADMIN — Medication 1 MILLIGRAM(S): at 12:49

## 2021-01-01 RX ADMIN — Medication 500 MILLIGRAM(S): at 14:41

## 2021-01-01 RX ADMIN — Medication 1 MILLIGRAM(S): at 11:06

## 2021-01-01 RX ADMIN — Medication 6: at 05:49

## 2021-01-01 RX ADMIN — PIPERACILLIN AND TAZOBACTAM 25 GRAM(S): 4; .5 INJECTION, POWDER, LYOPHILIZED, FOR SOLUTION INTRAVENOUS at 22:02

## 2021-01-01 RX ADMIN — HUMAN INSULIN 7 UNIT(S): 100 INJECTION, SUSPENSION SUBCUTANEOUS at 05:16

## 2021-01-01 RX ADMIN — Medication 40 MILLIEQUIVALENT(S): at 13:01

## 2021-01-01 RX ADMIN — LEVETIRACETAM 400 MILLIGRAM(S): 250 TABLET, FILM COATED ORAL at 09:11

## 2021-01-01 RX ADMIN — FINASTERIDE 5 MILLIGRAM(S): 5 TABLET, FILM COATED ORAL at 13:01

## 2021-01-01 RX ADMIN — Medication 100 MILLIGRAM(S): at 18:06

## 2021-01-01 RX ADMIN — Medication 1 MILLIGRAM(S): at 12:54

## 2021-01-01 RX ADMIN — Medication 2: at 09:03

## 2021-01-01 RX ADMIN — PIPERACILLIN AND TAZOBACTAM 200 GRAM(S): 4; .5 INJECTION, POWDER, LYOPHILIZED, FOR SOLUTION INTRAVENOUS at 01:34

## 2021-01-01 RX ADMIN — CHLORHEXIDINE GLUCONATE 15 MILLILITER(S): 213 SOLUTION TOPICAL at 20:21

## 2021-01-01 RX ADMIN — Medication 1 APPLICATION(S): at 10:49

## 2021-01-01 RX ADMIN — HUMAN INSULIN 6 UNIT(S): 100 INJECTION, SUSPENSION SUBCUTANEOUS at 12:34

## 2021-01-01 RX ADMIN — Medication 1 APPLICATION(S): at 00:56

## 2021-01-01 RX ADMIN — Medication 1 APPLICATION(S): at 00:13

## 2021-01-01 RX ADMIN — Medication 50 MILLIGRAM(S): at 14:50

## 2021-01-01 RX ADMIN — Medication 500 MILLIGRAM(S): at 13:42

## 2021-01-01 RX ADMIN — Medication 101.6 MILLIGRAM(S): at 05:18

## 2021-01-01 RX ADMIN — Medication 50 MILLIGRAM(S): at 13:55

## 2021-01-01 RX ADMIN — Medication 2: at 21:23

## 2021-01-01 RX ADMIN — Medication 40 MILLIEQUIVALENT(S): at 12:16

## 2021-01-01 RX ADMIN — URSODIOL 300 MILLIGRAM(S): 250 TABLET, FILM COATED ORAL at 17:11

## 2021-01-01 RX ADMIN — PANTOPRAZOLE SODIUM 40 MILLIGRAM(S): 20 TABLET, DELAYED RELEASE ORAL at 17:22

## 2021-01-01 RX ADMIN — Medication 50 MILLIGRAM(S): at 21:08

## 2021-01-01 RX ADMIN — Medication 500 MILLIGRAM(S): at 12:31

## 2021-01-01 RX ADMIN — Medication 4: at 06:05

## 2021-01-01 RX ADMIN — LEVETIRACETAM 400 MILLIGRAM(S): 250 TABLET, FILM COATED ORAL at 09:36

## 2021-01-01 RX ADMIN — Medication 81 MILLIGRAM(S): at 12:12

## 2021-01-01 RX ADMIN — HUMAN INSULIN 6 UNIT(S): 100 INJECTION, SUSPENSION SUBCUTANEOUS at 17:13

## 2021-01-01 RX ADMIN — Medication 4 MILLIGRAM(S): at 21:04

## 2021-01-01 RX ADMIN — Medication 101.6 MILLIGRAM(S): at 22:41

## 2021-01-01 RX ADMIN — Medication 8: at 21:51

## 2021-01-01 RX ADMIN — HUMAN INSULIN 13 UNIT(S): 100 INJECTION, SUSPENSION SUBCUTANEOUS at 23:21

## 2021-01-01 RX ADMIN — AMLODIPINE BESYLATE 5 MILLIGRAM(S): 2.5 TABLET ORAL at 05:47

## 2021-01-01 RX ADMIN — HUMAN INSULIN 6 UNIT(S): 100 INJECTION, SUSPENSION SUBCUTANEOUS at 06:40

## 2021-01-01 RX ADMIN — Medication 50 MILLIGRAM(S): at 22:52

## 2021-01-01 RX ADMIN — Medication 500 MILLIGRAM(S): at 13:18

## 2021-01-01 RX ADMIN — Medication 1 APPLICATION(S): at 10:11

## 2021-01-01 RX ADMIN — Medication 1 MILLIGRAM(S): at 13:47

## 2021-01-01 RX ADMIN — Medication 25 MILLIGRAM(S): at 05:44

## 2021-01-01 RX ADMIN — FINASTERIDE 5 MILLIGRAM(S): 5 TABLET, FILM COATED ORAL at 18:09

## 2021-01-01 RX ADMIN — PIPERACILLIN AND TAZOBACTAM 25 GRAM(S): 4; .5 INJECTION, POWDER, LYOPHILIZED, FOR SOLUTION INTRAVENOUS at 21:16

## 2021-01-01 RX ADMIN — Medication 15 MILLILITER(S): at 13:09

## 2021-01-01 RX ADMIN — HUMAN INSULIN 6 UNIT(S): 100 INJECTION, SUSPENSION SUBCUTANEOUS at 06:14

## 2021-01-01 RX ADMIN — POLYETHYLENE GLYCOL 3350 17 GRAM(S): 17 POWDER, FOR SOLUTION ORAL at 17:29

## 2021-01-01 RX ADMIN — Medication 50 MILLIGRAM(S): at 06:00

## 2021-01-01 RX ADMIN — SODIUM CHLORIDE 1000 MILLILITER(S): 9 INJECTION INTRAMUSCULAR; INTRAVENOUS; SUBCUTANEOUS at 06:21

## 2021-01-01 RX ADMIN — PANTOPRAZOLE SODIUM 40 MILLIGRAM(S): 20 TABLET, DELAYED RELEASE ORAL at 05:00

## 2021-01-01 RX ADMIN — CHLORHEXIDINE GLUCONATE 15 MILLILITER(S): 213 SOLUTION TOPICAL at 17:30

## 2021-01-01 RX ADMIN — Medication 101.6 MILLIGRAM(S): at 13:43

## 2021-01-01 RX ADMIN — Medication 1 APPLICATION(S): at 11:46

## 2021-01-01 RX ADMIN — PIPERACILLIN AND TAZOBACTAM 25 GRAM(S): 4; .5 INJECTION, POWDER, LYOPHILIZED, FOR SOLUTION INTRAVENOUS at 02:34

## 2021-01-01 RX ADMIN — Medication 25 MILLIGRAM(S): at 17:11

## 2021-01-01 RX ADMIN — PROPOFOL 3.35 MICROGRAM(S)/KG/MIN: 10 INJECTION, EMULSION INTRAVENOUS at 21:03

## 2021-01-01 RX ADMIN — CASPOFUNGIN ACETATE 260 MILLIGRAM(S): 7 INJECTION, POWDER, LYOPHILIZED, FOR SOLUTION INTRAVENOUS at 21:31

## 2021-01-01 RX ADMIN — PROPOFOL 3.35 MICROGRAM(S)/KG/MIN: 10 INJECTION, EMULSION INTRAVENOUS at 21:41

## 2021-01-01 RX ADMIN — CHLORHEXIDINE GLUCONATE 15 MILLILITER(S): 213 SOLUTION TOPICAL at 17:06

## 2021-01-01 RX ADMIN — SENNA PLUS 10 MILLILITER(S): 8.6 TABLET ORAL at 21:09

## 2021-01-01 RX ADMIN — POLYETHYLENE GLYCOL 3350 17 GRAM(S): 17 POWDER, FOR SOLUTION ORAL at 11:34

## 2021-01-01 RX ADMIN — PIPERACILLIN AND TAZOBACTAM 25 GRAM(S): 4; .5 INJECTION, POWDER, LYOPHILIZED, FOR SOLUTION INTRAVENOUS at 18:08

## 2021-01-01 RX ADMIN — Medication 1 APPLICATION(S): at 05:39

## 2021-01-01 RX ADMIN — Medication 101.6 MILLIGRAM(S): at 21:19

## 2021-01-01 RX ADMIN — Medication 101.6 MILLIGRAM(S): at 21:00

## 2021-01-01 RX ADMIN — Medication 1 APPLICATION(S): at 06:11

## 2021-01-01 RX ADMIN — HUMAN INSULIN 6 UNIT(S): 100 INJECTION, SUSPENSION SUBCUTANEOUS at 18:15

## 2021-01-01 RX ADMIN — Medication 2 GRAM(S): at 05:44

## 2021-01-01 RX ADMIN — Medication 1 APPLICATION(S): at 23:07

## 2021-01-01 RX ADMIN — SENNA PLUS 10 MILLILITER(S): 8.6 TABLET ORAL at 23:27

## 2021-01-01 RX ADMIN — Medication 1 MILLIGRAM(S): at 12:36

## 2021-01-01 RX ADMIN — Medication 2: at 17:41

## 2021-01-01 RX ADMIN — LEVETIRACETAM 400 MILLIGRAM(S): 250 TABLET, FILM COATED ORAL at 22:44

## 2021-01-01 RX ADMIN — Medication 1 APPLICATION(S): at 00:30

## 2021-01-01 RX ADMIN — Medication 650 MILLIGRAM(S): at 01:11

## 2021-01-01 RX ADMIN — Medication 20 MILLIGRAM(S): at 03:49

## 2021-01-01 RX ADMIN — Medication 4 MILLIGRAM(S): at 22:04

## 2021-01-01 RX ADMIN — NYSTATIN CREAM 1 APPLICATION(S): 100000 CREAM TOPICAL at 05:50

## 2021-01-01 RX ADMIN — Medication 50 MILLIGRAM(S): at 18:29

## 2021-01-01 RX ADMIN — PANTOPRAZOLE SODIUM 40 MILLIGRAM(S): 20 TABLET, DELAYED RELEASE ORAL at 18:17

## 2021-01-01 RX ADMIN — FENTANYL CITRATE 50 MICROGRAM(S): 50 INJECTION INTRAVENOUS at 02:00

## 2021-01-01 RX ADMIN — HUMAN INSULIN 6 UNIT(S): 100 INJECTION, SUSPENSION SUBCUTANEOUS at 23:13

## 2021-01-01 RX ADMIN — POLYETHYLENE GLYCOL 3350 17 GRAM(S): 17 POWDER, FOR SOLUTION ORAL at 05:55

## 2021-01-01 RX ADMIN — POLYETHYLENE GLYCOL 3350 17 GRAM(S): 17 POWDER, FOR SOLUTION ORAL at 05:06

## 2021-01-01 RX ADMIN — URSODIOL 300 MILLIGRAM(S): 250 TABLET, FILM COATED ORAL at 05:47

## 2021-01-01 RX ADMIN — NYSTATIN CREAM 1 APPLICATION(S): 100000 CREAM TOPICAL at 17:19

## 2021-01-01 RX ADMIN — Medication 1: at 23:10

## 2021-01-01 RX ADMIN — LEVETIRACETAM 400 MILLIGRAM(S): 250 TABLET, FILM COATED ORAL at 23:09

## 2021-01-01 RX ADMIN — PIPERACILLIN AND TAZOBACTAM 25 GRAM(S): 4; .5 INJECTION, POWDER, LYOPHILIZED, FOR SOLUTION INTRAVENOUS at 07:04

## 2021-01-01 RX ADMIN — Medication 101.6 MILLIGRAM(S): at 06:25

## 2021-01-01 RX ADMIN — Medication 4: at 06:39

## 2021-01-01 RX ADMIN — Medication 15 MILLILITER(S): at 14:33

## 2021-01-01 RX ADMIN — Medication 1 APPLICATION(S): at 12:13

## 2021-01-01 RX ADMIN — Medication 25 GRAM(S): at 14:52

## 2021-01-01 RX ADMIN — Medication 1: at 17:39

## 2021-01-01 RX ADMIN — SENNA PLUS 10 MILLILITER(S): 8.6 TABLET ORAL at 21:00

## 2021-01-01 RX ADMIN — Medication 2: at 17:28

## 2021-01-01 RX ADMIN — Medication 5 MILLIGRAM(S): at 04:29

## 2021-01-01 RX ADMIN — Medication 2: at 14:47

## 2021-01-01 RX ADMIN — HUMAN INSULIN 6 UNIT(S): 100 INJECTION, SUSPENSION SUBCUTANEOUS at 17:48

## 2021-01-01 RX ADMIN — Medication 15 MILLILITER(S): at 13:41

## 2021-01-01 RX ADMIN — Medication 4 MILLIGRAM(S): at 23:05

## 2021-01-01 RX ADMIN — PROPOFOL 3.35 MICROGRAM(S)/KG/MIN: 10 INJECTION, EMULSION INTRAVENOUS at 11:21

## 2021-01-01 RX ADMIN — CHLORHEXIDINE GLUCONATE 1 APPLICATION(S): 213 SOLUTION TOPICAL at 05:03

## 2021-01-01 RX ADMIN — Medication 500 MILLIGRAM(S): at 12:50

## 2021-01-01 RX ADMIN — PIPERACILLIN AND TAZOBACTAM 25 GRAM(S): 4; .5 INJECTION, POWDER, LYOPHILIZED, FOR SOLUTION INTRAVENOUS at 13:05

## 2021-01-01 RX ADMIN — Medication 101.6 MILLIGRAM(S): at 21:08

## 2021-01-01 RX ADMIN — Medication 101.6 MILLIGRAM(S): at 13:41

## 2021-01-01 RX ADMIN — Medication 1 APPLICATION(S): at 12:34

## 2021-01-01 RX ADMIN — PANTOPRAZOLE SODIUM 40 MILLIGRAM(S): 20 TABLET, DELAYED RELEASE ORAL at 05:16

## 2021-01-01 RX ADMIN — LEVETIRACETAM 400 MILLIGRAM(S): 250 TABLET, FILM COATED ORAL at 20:08

## 2021-01-01 RX ADMIN — Medication 5.23 MICROGRAM(S)/KG/MIN: at 06:34

## 2021-01-01 RX ADMIN — PROPOFOL 3.35 MICROGRAM(S)/KG/MIN: 10 INJECTION, EMULSION INTRAVENOUS at 11:20

## 2021-01-01 RX ADMIN — NYSTATIN CREAM 1 APPLICATION(S): 100000 CREAM TOPICAL at 06:40

## 2021-01-01 RX ADMIN — NYSTATIN CREAM 1 APPLICATION(S): 100000 CREAM TOPICAL at 17:18

## 2021-01-01 RX ADMIN — Medication 1 MILLIGRAM(S): at 12:50

## 2021-01-01 RX ADMIN — POLYETHYLENE GLYCOL 3350 17 GRAM(S): 17 POWDER, FOR SOLUTION ORAL at 15:06

## 2021-01-01 RX ADMIN — Medication 50 MILLIGRAM(S): at 06:50

## 2021-01-01 RX ADMIN — Medication 25 MILLILITER(S): at 18:47

## 2021-01-01 RX ADMIN — Medication 101.6 MILLIGRAM(S): at 14:32

## 2021-01-01 RX ADMIN — Medication 1 APPLICATION(S): at 06:04

## 2021-01-01 RX ADMIN — Medication 1 MILLIGRAM(S): at 11:21

## 2021-01-01 RX ADMIN — Medication 500 MILLIGRAM(S): at 12:54

## 2021-01-01 RX ADMIN — SODIUM CHLORIDE 30 MILLILITER(S): 5 INJECTION, SOLUTION INTRAVENOUS at 01:44

## 2021-01-01 RX ADMIN — SENNA PLUS 10 MILLILITER(S): 8.6 TABLET ORAL at 01:30

## 2021-01-01 RX ADMIN — NYSTATIN CREAM 1 APPLICATION(S): 100000 CREAM TOPICAL at 17:54

## 2021-01-01 RX ADMIN — SENNA PLUS 10 MILLILITER(S): 8.6 TABLET ORAL at 21:26

## 2021-01-01 RX ADMIN — LEVETIRACETAM 400 MILLIGRAM(S): 250 TABLET, FILM COATED ORAL at 22:03

## 2021-01-01 RX ADMIN — HUMAN INSULIN 6 UNIT(S): 100 INJECTION, SUSPENSION SUBCUTANEOUS at 17:17

## 2021-01-01 RX ADMIN — Medication 25 GRAM(S): at 11:22

## 2021-01-01 RX ADMIN — Medication 15 MILLILITER(S): at 18:29

## 2021-01-01 RX ADMIN — Medication 15 MILLILITER(S): at 12:34

## 2021-01-01 RX ADMIN — Medication 50 MILLIGRAM(S): at 23:08

## 2021-01-01 RX ADMIN — URSODIOL 300 MILLIGRAM(S): 250 TABLET, FILM COATED ORAL at 18:18

## 2021-01-01 RX ADMIN — Medication 4: at 05:57

## 2021-01-01 RX ADMIN — Medication 101.6 MILLIGRAM(S): at 06:18

## 2021-01-01 RX ADMIN — Medication 101.6 MILLIGRAM(S): at 21:25

## 2021-01-01 RX ADMIN — Medication 15 MILLILITER(S): at 15:50

## 2021-01-01 RX ADMIN — HUMAN INSULIN 6 UNIT(S): 100 INJECTION, SUSPENSION SUBCUTANEOUS at 17:55

## 2021-01-01 RX ADMIN — PANTOPRAZOLE SODIUM 40 MILLIGRAM(S): 20 TABLET, DELAYED RELEASE ORAL at 06:27

## 2021-01-01 RX ADMIN — PIPERACILLIN AND TAZOBACTAM 25 GRAM(S): 4; .5 INJECTION, POWDER, LYOPHILIZED, FOR SOLUTION INTRAVENOUS at 21:20

## 2021-01-01 RX ADMIN — Medication 101.6 MILLIGRAM(S): at 05:22

## 2021-01-01 RX ADMIN — LEVETIRACETAM 400 MILLIGRAM(S): 250 TABLET, FILM COATED ORAL at 20:40

## 2021-01-01 RX ADMIN — Medication 40 MILLIEQUIVALENT(S): at 01:29

## 2021-01-01 RX ADMIN — PANTOPRAZOLE SODIUM 40 MILLIGRAM(S): 20 TABLET, DELAYED RELEASE ORAL at 05:12

## 2021-01-01 RX ADMIN — HUMAN INSULIN 6 UNIT(S): 100 INJECTION, SUSPENSION SUBCUTANEOUS at 17:21

## 2021-01-01 RX ADMIN — Medication 650 MILLIGRAM(S): at 16:00

## 2021-01-01 RX ADMIN — SODIUM CHLORIDE 30 MILLILITER(S): 5 INJECTION, SOLUTION INTRAVENOUS at 06:35

## 2021-01-01 RX ADMIN — PANTOPRAZOLE SODIUM 40 MILLIGRAM(S): 20 TABLET, DELAYED RELEASE ORAL at 06:38

## 2021-01-01 RX ADMIN — HUMAN INSULIN 6 UNIT(S): 100 INJECTION, SUSPENSION SUBCUTANEOUS at 00:41

## 2021-01-01 RX ADMIN — HUMAN INSULIN 6 UNIT(S): 100 INJECTION, SUSPENSION SUBCUTANEOUS at 17:34

## 2021-01-01 RX ADMIN — LEVETIRACETAM 400 MILLIGRAM(S): 250 TABLET, FILM COATED ORAL at 22:34

## 2021-01-01 RX ADMIN — NYSTATIN CREAM 1 APPLICATION(S): 100000 CREAM TOPICAL at 05:17

## 2021-01-01 RX ADMIN — Medication 101.6 MILLIGRAM(S): at 22:15

## 2021-01-01 RX ADMIN — PANTOPRAZOLE SODIUM 40 MILLIGRAM(S): 20 TABLET, DELAYED RELEASE ORAL at 18:08

## 2021-01-01 RX ADMIN — SENNA PLUS 10 MILLILITER(S): 8.6 TABLET ORAL at 22:33

## 2021-01-01 RX ADMIN — Medication 1 APPLICATION(S): at 10:30

## 2021-01-01 RX ADMIN — Medication 500 MILLIGRAM(S): at 13:06

## 2021-01-01 RX ADMIN — PANTOPRAZOLE SODIUM 40 MILLIGRAM(S): 20 TABLET, DELAYED RELEASE ORAL at 17:40

## 2021-01-01 RX ADMIN — Medication 1 APPLICATION(S): at 21:24

## 2021-01-01 RX ADMIN — Medication 1 APPLICATION(S): at 06:25

## 2021-01-01 RX ADMIN — DESMOPRESSIN ACETATE 0.2 MICROGRAM(S): 0.1 TABLET ORAL at 12:47

## 2021-01-01 RX ADMIN — Medication 2: at 23:13

## 2021-01-01 RX ADMIN — LEVETIRACETAM 400 MILLIGRAM(S): 250 TABLET, FILM COATED ORAL at 08:40

## 2021-01-01 RX ADMIN — Medication 4: at 17:19

## 2021-01-01 RX ADMIN — LEVETIRACETAM 400 MILLIGRAM(S): 250 TABLET, FILM COATED ORAL at 21:06

## 2021-01-01 RX ADMIN — Medication 4 MILLIGRAM(S): at 21:11

## 2021-01-01 RX ADMIN — TAMSULOSIN HYDROCHLORIDE 0.4 MILLIGRAM(S): 0.4 CAPSULE ORAL at 22:19

## 2021-01-01 RX ADMIN — PROPOFOL 3.35 MICROGRAM(S)/KG/MIN: 10 INJECTION, EMULSION INTRAVENOUS at 02:25

## 2021-01-01 RX ADMIN — Medication 15 MILLILITER(S): at 13:33

## 2021-01-01 RX ADMIN — HUMAN INSULIN 6 UNIT(S): 100 INJECTION, SUSPENSION SUBCUTANEOUS at 05:19

## 2021-01-01 RX ADMIN — Medication 50 MILLIGRAM(S): at 06:38

## 2021-01-01 RX ADMIN — HUMAN INSULIN 13 UNIT(S): 100 INJECTION, SUSPENSION SUBCUTANEOUS at 17:31

## 2021-01-01 RX ADMIN — Medication 50 MILLIGRAM(S): at 15:01

## 2021-01-01 RX ADMIN — Medication 1 APPLICATION(S): at 18:43

## 2021-01-01 RX ADMIN — POLYETHYLENE GLYCOL 3350 17 GRAM(S): 17 POWDER, FOR SOLUTION ORAL at 05:02

## 2021-01-01 RX ADMIN — Medication 50 MILLIGRAM(S): at 05:48

## 2021-01-01 RX ADMIN — Medication 81 MILLIGRAM(S): at 18:10

## 2021-01-01 RX ADMIN — POLYETHYLENE GLYCOL 3350 17 GRAM(S): 17 POWDER, FOR SOLUTION ORAL at 18:13

## 2021-01-01 RX ADMIN — Medication 50 MILLIGRAM(S): at 14:15

## 2021-01-01 RX ADMIN — HUMAN INSULIN 6 UNIT(S): 100 INJECTION, SUSPENSION SUBCUTANEOUS at 12:31

## 2021-01-01 RX ADMIN — Medication 1 APPLICATION(S): at 09:11

## 2021-01-01 RX ADMIN — Medication 50 MILLIGRAM(S): at 21:06

## 2021-01-01 RX ADMIN — Medication 1 APPLICATION(S): at 10:19

## 2021-01-01 RX ADMIN — Medication 2 GRAM(S): at 18:09

## 2021-01-01 RX ADMIN — HUMAN INSULIN 6 UNIT(S): 100 INJECTION, SUSPENSION SUBCUTANEOUS at 06:38

## 2021-01-01 RX ADMIN — PIPERACILLIN AND TAZOBACTAM 25 GRAM(S): 4; .5 INJECTION, POWDER, LYOPHILIZED, FOR SOLUTION INTRAVENOUS at 05:02

## 2021-01-01 RX ADMIN — NYSTATIN CREAM 1 APPLICATION(S): 100000 CREAM TOPICAL at 17:09

## 2021-01-01 RX ADMIN — Medication 100 MILLIEQUIVALENT(S): at 18:41

## 2021-01-01 RX ADMIN — SODIUM CHLORIDE 500 MILLILITER(S): 9 INJECTION INTRAMUSCULAR; INTRAVENOUS; SUBCUTANEOUS at 01:49

## 2021-01-01 RX ADMIN — Medication 50 MILLIGRAM(S): at 21:01

## 2021-01-01 RX ADMIN — PANTOPRAZOLE SODIUM 40 MILLIGRAM(S): 20 TABLET, DELAYED RELEASE ORAL at 06:41

## 2021-01-01 RX ADMIN — LEVETIRACETAM 400 MILLIGRAM(S): 250 TABLET, FILM COATED ORAL at 18:07

## 2021-01-01 RX ADMIN — NYSTATIN CREAM 1 APPLICATION(S): 100000 CREAM TOPICAL at 05:19

## 2021-01-01 RX ADMIN — CHLORHEXIDINE GLUCONATE 1 APPLICATION(S): 213 SOLUTION TOPICAL at 08:12

## 2021-01-01 RX ADMIN — PANTOPRAZOLE SODIUM 40 MILLIGRAM(S): 20 TABLET, DELAYED RELEASE ORAL at 17:54

## 2021-01-01 RX ADMIN — Medication 5 MILLIGRAM(S): at 20:05

## 2021-01-01 RX ADMIN — Medication 50 MILLIGRAM(S): at 22:22

## 2021-01-01 RX ADMIN — Medication 101.6 MILLIGRAM(S): at 23:09

## 2021-01-01 RX ADMIN — POLYETHYLENE GLYCOL 3350 17 GRAM(S): 17 POWDER, FOR SOLUTION ORAL at 18:31

## 2021-01-01 RX ADMIN — LEVETIRACETAM 400 MILLIGRAM(S): 250 TABLET, FILM COATED ORAL at 22:20

## 2021-01-01 RX ADMIN — CHLORHEXIDINE GLUCONATE 1 APPLICATION(S): 213 SOLUTION TOPICAL at 05:39

## 2021-01-01 RX ADMIN — Medication 1 APPLICATION(S): at 23:31

## 2021-01-01 RX ADMIN — PROPOFOL 3.35 MICROGRAM(S)/KG/MIN: 10 INJECTION, EMULSION INTRAVENOUS at 19:14

## 2021-01-01 RX ADMIN — Medication 1 APPLICATION(S): at 21:53

## 2021-01-01 RX ADMIN — Medication 50 MILLIGRAM(S): at 22:04

## 2021-01-01 RX ADMIN — Medication 1 APPLICATION(S): at 10:50

## 2021-01-01 RX ADMIN — Medication 101.6 MILLIGRAM(S): at 21:21

## 2021-01-01 RX ADMIN — Medication 4: at 18:14

## 2021-01-01 RX ADMIN — Medication 1 MILLIGRAM(S): at 12:04

## 2021-01-01 RX ADMIN — PANTOPRAZOLE SODIUM 40 MILLIGRAM(S): 20 TABLET, DELAYED RELEASE ORAL at 18:02

## 2021-01-01 RX ADMIN — HUMAN INSULIN 6 UNIT(S): 100 INJECTION, SUSPENSION SUBCUTANEOUS at 17:18

## 2021-01-01 RX ADMIN — PROPOFOL 3.35 MICROGRAM(S)/KG/MIN: 10 INJECTION, EMULSION INTRAVENOUS at 03:48

## 2021-01-01 RX ADMIN — CASPOFUNGIN ACETATE 260 MILLIGRAM(S): 7 INJECTION, POWDER, LYOPHILIZED, FOR SOLUTION INTRAVENOUS at 23:48

## 2021-01-01 RX ADMIN — AZITHROMYCIN 250 MILLIGRAM(S): 500 TABLET, FILM COATED ORAL at 01:44

## 2021-01-01 RX ADMIN — PROPOFOL 3.35 MICROGRAM(S)/KG/MIN: 10 INJECTION, EMULSION INTRAVENOUS at 05:13

## 2021-01-01 RX ADMIN — Medication 5.23 MICROGRAM(S)/KG/MIN: at 02:22

## 2021-01-01 RX ADMIN — HUMAN INSULIN 6 UNIT(S): 100 INJECTION, SUSPENSION SUBCUTANEOUS at 05:31

## 2021-01-01 RX ADMIN — LEVETIRACETAM 400 MILLIGRAM(S): 250 TABLET, FILM COATED ORAL at 10:39

## 2021-01-01 RX ADMIN — Medication 50 MILLIGRAM(S): at 13:39

## 2021-01-01 RX ADMIN — ENOXAPARIN SODIUM 40 MILLIGRAM(S): 100 INJECTION SUBCUTANEOUS at 12:12

## 2021-01-01 RX ADMIN — PANTOPRAZOLE SODIUM 40 MILLIGRAM(S): 20 TABLET, DELAYED RELEASE ORAL at 06:40

## 2021-01-01 RX ADMIN — Medication 101.6 MILLIGRAM(S): at 05:06

## 2021-01-01 RX ADMIN — CHLORHEXIDINE GLUCONATE 1 APPLICATION(S): 213 SOLUTION TOPICAL at 06:06

## 2021-01-01 RX ADMIN — HUMAN INSULIN 6 UNIT(S): 100 INJECTION, SUSPENSION SUBCUTANEOUS at 17:29

## 2021-01-01 RX ADMIN — HUMAN INSULIN 6 UNIT(S): 100 INJECTION, SUSPENSION SUBCUTANEOUS at 18:43

## 2021-01-01 RX ADMIN — PIPERACILLIN AND TAZOBACTAM 25 GRAM(S): 4; .5 INJECTION, POWDER, LYOPHILIZED, FOR SOLUTION INTRAVENOUS at 22:41

## 2021-01-01 RX ADMIN — POLYETHYLENE GLYCOL 3350 17 GRAM(S): 17 POWDER, FOR SOLUTION ORAL at 18:36

## 2021-01-01 RX ADMIN — CHLORHEXIDINE GLUCONATE 1 APPLICATION(S): 213 SOLUTION TOPICAL at 05:05

## 2021-01-01 RX ADMIN — Medication 50 MILLIGRAM(S): at 21:53

## 2021-01-01 RX ADMIN — PANTOPRAZOLE SODIUM 40 MILLIGRAM(S): 20 TABLET, DELAYED RELEASE ORAL at 06:11

## 2021-01-01 RX ADMIN — HUMAN INSULIN 6 UNIT(S): 100 INJECTION, SUSPENSION SUBCUTANEOUS at 18:09

## 2021-01-01 RX ADMIN — PANTOPRAZOLE SODIUM 40 MILLIGRAM(S): 20 TABLET, DELAYED RELEASE ORAL at 17:08

## 2021-01-01 RX ADMIN — SENNA PLUS 10 MILLILITER(S): 8.6 TABLET ORAL at 22:20

## 2021-01-01 RX ADMIN — CHLORHEXIDINE GLUCONATE 1 APPLICATION(S): 213 SOLUTION TOPICAL at 06:08

## 2021-01-01 RX ADMIN — LEVETIRACETAM 400 MILLIGRAM(S): 250 TABLET, FILM COATED ORAL at 05:33

## 2021-01-01 RX ADMIN — Medication 50 MILLIGRAM(S): at 21:19

## 2021-01-01 RX ADMIN — Medication 25 MILLIGRAM(S): at 05:50

## 2021-01-01 RX ADMIN — Medication 101.6 MILLIGRAM(S): at 14:48

## 2021-01-01 RX ADMIN — Medication 4 MILLIGRAM(S): at 05:28

## 2021-01-01 RX ADMIN — Medication 50 MILLIGRAM(S): at 21:11

## 2021-01-01 RX ADMIN — POLYETHYLENE GLYCOL 3350 17 GRAM(S): 17 POWDER, FOR SOLUTION ORAL at 06:47

## 2021-01-01 RX ADMIN — Medication 1 APPLICATION(S): at 21:50

## 2021-01-01 RX ADMIN — PIPERACILLIN AND TAZOBACTAM 25 GRAM(S): 4; .5 INJECTION, POWDER, LYOPHILIZED, FOR SOLUTION INTRAVENOUS at 12:08

## 2021-01-01 RX ADMIN — HUMAN INSULIN 6 UNIT(S): 100 INJECTION, SUSPENSION SUBCUTANEOUS at 17:46

## 2021-01-01 RX ADMIN — LEVETIRACETAM 400 MILLIGRAM(S): 250 TABLET, FILM COATED ORAL at 21:01

## 2021-01-01 RX ADMIN — PANTOPRAZOLE SODIUM 40 MILLIGRAM(S): 20 TABLET, DELAYED RELEASE ORAL at 17:57

## 2021-01-01 RX ADMIN — POLYETHYLENE GLYCOL 3350 17 GRAM(S): 17 POWDER, FOR SOLUTION ORAL at 17:49

## 2021-01-01 RX ADMIN — Medication 1 APPLICATION(S): at 05:36

## 2021-01-01 RX ADMIN — NYSTATIN CREAM 1 APPLICATION(S): 100000 CREAM TOPICAL at 06:12

## 2021-01-01 RX ADMIN — Medication 6: at 17:30

## 2021-01-01 RX ADMIN — CHLORHEXIDINE GLUCONATE 15 MILLILITER(S): 213 SOLUTION TOPICAL at 17:17

## 2021-01-01 RX ADMIN — CHLORHEXIDINE GLUCONATE 15 MILLILITER(S): 213 SOLUTION TOPICAL at 05:13

## 2021-01-01 RX ADMIN — Medication 101.6 MILLIGRAM(S): at 22:20

## 2021-01-01 RX ADMIN — Medication 50 MILLIGRAM(S): at 05:19

## 2021-01-01 RX ADMIN — POTASSIUM PHOSPHATE, MONOBASIC POTASSIUM PHOSPHATE, DIBASIC 62.5 MILLIMOLE(S): 236; 224 INJECTION, SOLUTION INTRAVENOUS at 22:41

## 2021-01-01 RX ADMIN — Medication 101.6 MILLIGRAM(S): at 05:40

## 2021-01-01 RX ADMIN — CHLORHEXIDINE GLUCONATE 1 APPLICATION(S): 213 SOLUTION TOPICAL at 06:38

## 2021-01-01 RX ADMIN — Medication 2: at 00:22

## 2021-01-01 RX ADMIN — Medication 1 MILLIGRAM(S): at 13:27

## 2021-01-01 RX ADMIN — Medication 500 MILLIGRAM(S): at 18:29

## 2021-01-01 RX ADMIN — HUMAN INSULIN 6 UNIT(S): 100 INJECTION, SUSPENSION SUBCUTANEOUS at 17:36

## 2021-01-01 RX ADMIN — POLYETHYLENE GLYCOL 3350 17 GRAM(S): 17 POWDER, FOR SOLUTION ORAL at 17:07

## 2021-01-01 RX ADMIN — Medication 15 MILLILITER(S): at 13:17

## 2021-01-01 RX ADMIN — PIPERACILLIN AND TAZOBACTAM 25 GRAM(S): 4; .5 INJECTION, POWDER, LYOPHILIZED, FOR SOLUTION INTRAVENOUS at 05:01

## 2021-01-01 RX ADMIN — Medication 2: at 17:54

## 2021-01-01 RX ADMIN — Medication 50 MILLIGRAM(S): at 06:03

## 2021-01-01 RX ADMIN — NYSTATIN CREAM 1 APPLICATION(S): 100000 CREAM TOPICAL at 18:10

## 2021-01-01 RX ADMIN — LOSARTAN POTASSIUM 50 MILLIGRAM(S): 100 TABLET, FILM COATED ORAL at 05:50

## 2021-01-01 RX ADMIN — LEVETIRACETAM 400 MILLIGRAM(S): 250 TABLET, FILM COATED ORAL at 08:27

## 2021-01-01 RX ADMIN — Medication 101.6 MILLIGRAM(S): at 05:15

## 2021-01-01 RX ADMIN — Medication 15 MILLILITER(S): at 13:47

## 2021-01-01 RX ADMIN — Medication 101.6 MILLIGRAM(S): at 05:01

## 2021-01-01 RX ADMIN — SENNA PLUS 10 MILLILITER(S): 8.6 TABLET ORAL at 22:52

## 2021-01-01 RX ADMIN — CHLORHEXIDINE GLUCONATE 1 APPLICATION(S): 213 SOLUTION TOPICAL at 06:17

## 2021-01-01 RX ADMIN — NYSTATIN CREAM 1 APPLICATION(S): 100000 CREAM TOPICAL at 05:14

## 2021-01-01 RX ADMIN — Medication 1: at 17:14

## 2021-01-01 RX ADMIN — POLYETHYLENE GLYCOL 3350 17 GRAM(S): 17 POWDER, FOR SOLUTION ORAL at 05:56

## 2021-01-01 RX ADMIN — Medication 101.6 MILLIGRAM(S): at 15:01

## 2021-01-01 RX ADMIN — POLYETHYLENE GLYCOL 3350 17 GRAM(S): 17 POWDER, FOR SOLUTION ORAL at 05:22

## 2021-01-01 RX ADMIN — Medication 12.5 GRAM(S): at 06:30

## 2021-01-01 RX ADMIN — NYSTATIN CREAM 1 APPLICATION(S): 100000 CREAM TOPICAL at 06:07

## 2021-01-01 RX ADMIN — HUMAN INSULIN 6 UNIT(S): 100 INJECTION, SUSPENSION SUBCUTANEOUS at 23:45

## 2021-01-01 RX ADMIN — CHLORHEXIDINE GLUCONATE 15 MILLILITER(S): 213 SOLUTION TOPICAL at 17:31

## 2021-01-01 RX ADMIN — PANTOPRAZOLE SODIUM 40 MILLIGRAM(S): 20 TABLET, DELAYED RELEASE ORAL at 05:18

## 2021-01-01 RX ADMIN — CHLORHEXIDINE GLUCONATE 15 MILLILITER(S): 213 SOLUTION TOPICAL at 05:02

## 2021-01-01 RX ADMIN — SODIUM CHLORIDE 250 MILLILITER(S): 9 INJECTION, SOLUTION INTRAVENOUS at 12:22

## 2021-01-01 RX ADMIN — Medication 1 APPLICATION(S): at 23:25

## 2021-01-01 RX ADMIN — Medication 4: at 05:38

## 2021-01-01 RX ADMIN — Medication 50 MILLIGRAM(S): at 21:54

## 2021-01-01 RX ADMIN — Medication 500 MILLIGRAM(S): at 13:54

## 2021-01-01 RX ADMIN — CASPOFUNGIN ACETATE 260 MILLIGRAM(S): 7 INJECTION, POWDER, LYOPHILIZED, FOR SOLUTION INTRAVENOUS at 22:42

## 2021-01-01 RX ADMIN — Medication 1 APPLICATION(S): at 12:04

## 2021-01-01 RX ADMIN — Medication 4: at 06:14

## 2021-01-01 RX ADMIN — PANTOPRAZOLE SODIUM 40 MILLIGRAM(S): 20 TABLET, DELAYED RELEASE ORAL at 17:15

## 2021-01-01 RX ADMIN — Medication 15 MILLILITER(S): at 14:58

## 2021-01-01 RX ADMIN — PIPERACILLIN AND TAZOBACTAM 25 GRAM(S): 4; .5 INJECTION, POWDER, LYOPHILIZED, FOR SOLUTION INTRAVENOUS at 23:23

## 2021-01-01 RX ADMIN — Medication 50 MILLIGRAM(S): at 13:06

## 2021-01-01 RX ADMIN — HUMAN INSULIN 6 UNIT(S): 100 INJECTION, SUSPENSION SUBCUTANEOUS at 17:58

## 2021-01-01 RX ADMIN — Medication 2: at 23:46

## 2021-01-01 RX ADMIN — Medication 1 APPLICATION(S): at 12:36

## 2021-01-01 RX ADMIN — Medication 101.6 MILLIGRAM(S): at 06:11

## 2021-01-01 RX ADMIN — Medication 1 APPLICATION(S): at 23:42

## 2021-01-01 RX ADMIN — Medication 500 MILLIGRAM(S): at 14:17

## 2021-01-01 RX ADMIN — HUMAN INSULIN 6 UNIT(S): 100 INJECTION, SUSPENSION SUBCUTANEOUS at 12:54

## 2021-01-01 RX ADMIN — NYSTATIN CREAM 1 APPLICATION(S): 100000 CREAM TOPICAL at 17:21

## 2021-01-01 RX ADMIN — SODIUM CHLORIDE 30 MILLILITER(S): 9 INJECTION INTRAMUSCULAR; INTRAVENOUS; SUBCUTANEOUS at 12:41

## 2021-01-01 RX ADMIN — Medication 1 APPLICATION(S): at 10:38

## 2021-01-01 RX ADMIN — Medication 15 MILLILITER(S): at 17:46

## 2021-01-01 RX ADMIN — SENNA PLUS 10 MILLILITER(S): 8.6 TABLET ORAL at 22:40

## 2021-01-01 RX ADMIN — Medication 101.6 MILLIGRAM(S): at 14:16

## 2021-01-01 RX ADMIN — Medication 50 MILLIGRAM(S): at 06:17

## 2021-01-01 RX ADMIN — LEVETIRACETAM 400 MILLIGRAM(S): 250 TABLET, FILM COATED ORAL at 05:55

## 2021-01-01 RX ADMIN — Medication 1 MILLIGRAM(S): at 11:42

## 2021-01-01 RX ADMIN — Medication 50 MILLIGRAM(S): at 05:38

## 2021-01-01 RX ADMIN — SENNA PLUS 10 MILLILITER(S): 8.6 TABLET ORAL at 22:43

## 2021-01-01 RX ADMIN — Medication 50 MILLIGRAM(S): at 05:12

## 2021-01-01 RX ADMIN — Medication 15 MILLILITER(S): at 11:27

## 2021-01-01 RX ADMIN — Medication 1 APPLICATION(S): at 17:21

## 2021-01-01 RX ADMIN — Medication 50 MILLIGRAM(S): at 21:00

## 2021-01-01 RX ADMIN — Medication 2: at 17:30

## 2021-01-01 RX ADMIN — HUMAN INSULIN 6 UNIT(S): 100 INJECTION, SUSPENSION SUBCUTANEOUS at 23:07

## 2021-01-01 RX ADMIN — Medication 4 MILLIGRAM(S): at 23:17

## 2021-01-01 RX ADMIN — LEVETIRACETAM 400 MILLIGRAM(S): 250 TABLET, FILM COATED ORAL at 22:41

## 2021-01-01 RX ADMIN — SENNA PLUS 10 MILLILITER(S): 8.6 TABLET ORAL at 22:10

## 2021-01-01 RX ADMIN — CHLORHEXIDINE GLUCONATE 1 APPLICATION(S): 213 SOLUTION TOPICAL at 17:12

## 2021-01-01 RX ADMIN — SODIUM CHLORIDE 30 MILLILITER(S): 5 INJECTION, SOLUTION INTRAVENOUS at 20:51

## 2021-01-01 RX ADMIN — NYSTATIN CREAM 1 APPLICATION(S): 100000 CREAM TOPICAL at 05:37

## 2021-01-01 RX ADMIN — HUMAN INSULIN 6 UNIT(S): 100 INJECTION, SUSPENSION SUBCUTANEOUS at 00:22

## 2021-01-01 RX ADMIN — Medication 50 MILLIGRAM(S): at 05:23

## 2021-01-01 RX ADMIN — Medication 1 APPLICATION(S): at 23:09

## 2021-01-01 RX ADMIN — Medication 2: at 00:06

## 2021-01-01 RX ADMIN — Medication 2: at 00:40

## 2021-01-01 RX ADMIN — Medication 101.6 MILLIGRAM(S): at 14:49

## 2021-01-01 RX ADMIN — PROPOFOL 3.35 MICROGRAM(S)/KG/MIN: 10 INJECTION, EMULSION INTRAVENOUS at 06:34

## 2021-01-01 RX ADMIN — NYSTATIN CREAM 1 APPLICATION(S): 100000 CREAM TOPICAL at 05:23

## 2021-01-01 RX ADMIN — Medication 1 APPLICATION(S): at 09:07

## 2021-01-01 RX ADMIN — Medication 1 TABLET(S): at 10:09

## 2021-01-01 RX ADMIN — SENNA PLUS 10 MILLILITER(S): 8.6 TABLET ORAL at 21:19

## 2021-01-01 RX ADMIN — Medication 1 APPLICATION(S): at 23:43

## 2021-01-01 RX ADMIN — Medication 50 MILLIGRAM(S): at 22:39

## 2021-01-01 RX ADMIN — PANTOPRAZOLE SODIUM 40 MILLIGRAM(S): 20 TABLET, DELAYED RELEASE ORAL at 14:57

## 2021-01-01 RX ADMIN — POLYETHYLENE GLYCOL 3350 17 GRAM(S): 17 POWDER, FOR SOLUTION ORAL at 05:41

## 2021-01-01 RX ADMIN — NYSTATIN CREAM 1 APPLICATION(S): 100000 CREAM TOPICAL at 18:17

## 2021-01-01 RX ADMIN — HUMAN INSULIN 6 UNIT(S): 100 INJECTION, SUSPENSION SUBCUTANEOUS at 01:12

## 2021-01-01 RX ADMIN — PIPERACILLIN AND TAZOBACTAM 25 GRAM(S): 4; .5 INJECTION, POWDER, LYOPHILIZED, FOR SOLUTION INTRAVENOUS at 14:57

## 2021-01-01 RX ADMIN — CHLORHEXIDINE GLUCONATE 1 APPLICATION(S): 213 SOLUTION TOPICAL at 05:23

## 2021-01-01 RX ADMIN — Medication 4: at 00:50

## 2021-01-01 RX ADMIN — Medication 50 MILLIGRAM(S): at 23:00

## 2021-01-01 RX ADMIN — Medication 100 MILLIGRAM(S): at 09:58

## 2021-01-01 RX ADMIN — Medication 15 MILLILITER(S): at 15:25

## 2021-01-01 RX ADMIN — Medication 101.6 MILLIGRAM(S): at 21:10

## 2021-01-01 RX ADMIN — LEVETIRACETAM 400 MILLIGRAM(S): 250 TABLET, FILM COATED ORAL at 21:25

## 2021-01-01 RX ADMIN — MAGNESIUM OXIDE 400 MG ORAL TABLET 400 MILLIGRAM(S): 241.3 TABLET ORAL at 17:36

## 2021-01-01 RX ADMIN — LEVETIRACETAM 400 MILLIGRAM(S): 250 TABLET, FILM COATED ORAL at 17:12

## 2021-01-01 RX ADMIN — LEVETIRACETAM 400 MILLIGRAM(S): 250 TABLET, FILM COATED ORAL at 09:14

## 2021-01-01 RX ADMIN — Medication 4 MILLIGRAM(S): at 17:12

## 2021-01-01 RX ADMIN — Medication 2: at 23:23

## 2021-01-01 RX ADMIN — Medication 50 MILLIGRAM(S): at 06:09

## 2021-01-01 RX ADMIN — Medication 4 MILLIGRAM(S): at 11:21

## 2021-01-01 RX ADMIN — Medication 102 MILLIGRAM(S): at 10:51

## 2021-01-01 RX ADMIN — Medication 2: at 00:54

## 2021-01-01 RX ADMIN — Medication 25 MILLIGRAM(S): at 18:10

## 2021-01-01 RX ADMIN — ETOMIDATE 20 MILLIGRAM(S): 2 INJECTION INTRAVENOUS at 01:07

## 2021-01-01 RX ADMIN — Medication 101.6 MILLIGRAM(S): at 13:26

## 2021-01-01 RX ADMIN — HUMAN INSULIN 6 UNIT(S): 100 INJECTION, SUSPENSION SUBCUTANEOUS at 13:39

## 2021-01-01 RX ADMIN — Medication 50 MILLIGRAM(S): at 13:27

## 2021-01-01 RX ADMIN — Medication 500 MILLIGRAM(S): at 14:23

## 2021-01-01 RX ADMIN — Medication 3: at 06:30

## 2021-01-01 RX ADMIN — PANTOPRAZOLE SODIUM 40 MILLIGRAM(S): 20 TABLET, DELAYED RELEASE ORAL at 18:09

## 2021-01-01 RX ADMIN — HUMAN INSULIN 6 UNIT(S): 100 INJECTION, SUSPENSION SUBCUTANEOUS at 05:58

## 2021-01-01 RX ADMIN — HUMAN INSULIN 13 UNIT(S): 100 INJECTION, SUSPENSION SUBCUTANEOUS at 05:26

## 2021-01-01 RX ADMIN — HUMAN INSULIN 7 UNIT(S): 100 INJECTION, SUSPENSION SUBCUTANEOUS at 17:23

## 2021-01-01 RX ADMIN — Medication 101.6 MILLIGRAM(S): at 05:47

## 2021-01-01 RX ADMIN — Medication 500 MILLIGRAM(S): at 14:26

## 2021-01-01 RX ADMIN — URSODIOL 300 MILLIGRAM(S): 250 TABLET, FILM COATED ORAL at 05:44

## 2021-01-01 RX ADMIN — SODIUM CHLORIDE 30 MILLILITER(S): 5 INJECTION, SOLUTION INTRAVENOUS at 02:14

## 2021-01-01 RX ADMIN — CHLORHEXIDINE GLUCONATE 15 MILLILITER(S): 213 SOLUTION TOPICAL at 05:27

## 2021-01-01 RX ADMIN — Medication 1 APPLICATION(S): at 11:31

## 2021-01-01 RX ADMIN — Medication 50 MILLIGRAM(S): at 06:11

## 2021-01-01 RX ADMIN — Medication 1 APPLICATION(S): at 23:08

## 2021-01-01 RX ADMIN — PANTOPRAZOLE SODIUM 40 MILLIGRAM(S): 20 TABLET, DELAYED RELEASE ORAL at 05:37

## 2021-01-01 RX ADMIN — NYSTATIN CREAM 1 APPLICATION(S): 100000 CREAM TOPICAL at 17:46

## 2021-01-01 RX ADMIN — HUMAN INSULIN 6 UNIT(S): 100 INJECTION, SUSPENSION SUBCUTANEOUS at 00:09

## 2021-01-01 RX ADMIN — LEVETIRACETAM 400 MILLIGRAM(S): 250 TABLET, FILM COATED ORAL at 08:30

## 2021-01-01 RX ADMIN — Medication 50 MILLIGRAM(S): at 13:17

## 2021-01-01 RX ADMIN — Medication 4 MILLIGRAM(S): at 05:25

## 2021-01-01 RX ADMIN — NYSTATIN CREAM 1 APPLICATION(S): 100000 CREAM TOPICAL at 18:51

## 2021-01-01 RX ADMIN — NYSTATIN CREAM 1 APPLICATION(S): 100000 CREAM TOPICAL at 18:03

## 2021-01-01 RX ADMIN — NYSTATIN CREAM 1 APPLICATION(S): 100000 CREAM TOPICAL at 06:56

## 2021-01-01 RX ADMIN — LOSARTAN POTASSIUM 50 MILLIGRAM(S): 100 TABLET, FILM COATED ORAL at 17:35

## 2021-01-01 RX ADMIN — CASPOFUNGIN ACETATE 260 MILLIGRAM(S): 7 INJECTION, POWDER, LYOPHILIZED, FOR SOLUTION INTRAVENOUS at 00:48

## 2021-01-01 RX ADMIN — Medication 4 MILLIGRAM(S): at 04:31

## 2021-01-01 RX ADMIN — HUMAN INSULIN 6 UNIT(S): 100 INJECTION, SUSPENSION SUBCUTANEOUS at 17:54

## 2021-01-01 RX ADMIN — Medication 50 MILLIGRAM(S): at 13:08

## 2021-01-01 RX ADMIN — AMLODIPINE BESYLATE 5 MILLIGRAM(S): 2.5 TABLET ORAL at 05:50

## 2021-01-01 RX ADMIN — Medication 4 MILLIGRAM(S): at 17:06

## 2021-01-01 RX ADMIN — SENNA PLUS 10 MILLILITER(S): 8.6 TABLET ORAL at 00:40

## 2021-01-01 RX ADMIN — Medication 101.6 MILLIGRAM(S): at 05:34

## 2021-01-01 RX ADMIN — LEVETIRACETAM 400 MILLIGRAM(S): 250 TABLET, FILM COATED ORAL at 09:00

## 2021-01-01 RX ADMIN — Medication 101.6 MILLIGRAM(S): at 06:09

## 2021-01-01 RX ADMIN — Medication 1 APPLICATION(S): at 12:55

## 2021-01-01 RX ADMIN — Medication 1 MILLIGRAM(S): at 17:33

## 2021-01-01 RX ADMIN — Medication 101.6 MILLIGRAM(S): at 14:13

## 2021-01-01 RX ADMIN — Medication 1 APPLICATION(S): at 11:38

## 2021-01-01 RX ADMIN — Medication 4: at 00:13

## 2021-01-01 RX ADMIN — Medication 101.6 MILLIGRAM(S): at 13:16

## 2021-01-01 RX ADMIN — Medication 50 MILLIGRAM(S): at 06:08

## 2021-01-01 RX ADMIN — CASPOFUNGIN ACETATE 260 MILLIGRAM(S): 7 INJECTION, POWDER, LYOPHILIZED, FOR SOLUTION INTRAVENOUS at 22:01

## 2021-01-01 RX ADMIN — LEVETIRACETAM 400 MILLIGRAM(S): 250 TABLET, FILM COATED ORAL at 10:38

## 2021-01-01 RX ADMIN — DESMOPRESSIN ACETATE 2 MICROGRAM(S): 0.1 TABLET ORAL at 04:30

## 2021-01-01 RX ADMIN — CASPOFUNGIN ACETATE 260 MILLIGRAM(S): 7 INJECTION, POWDER, LYOPHILIZED, FOR SOLUTION INTRAVENOUS at 22:40

## 2021-01-01 RX ADMIN — CHLORHEXIDINE GLUCONATE 1 APPLICATION(S): 213 SOLUTION TOPICAL at 06:07

## 2021-01-01 RX ADMIN — Medication 1 APPLICATION(S): at 05:03

## 2021-01-01 RX ADMIN — NYSTATIN CREAM 1 APPLICATION(S): 100000 CREAM TOPICAL at 06:09

## 2021-01-01 RX ADMIN — PANTOPRAZOLE SODIUM 40 MILLIGRAM(S): 20 TABLET, DELAYED RELEASE ORAL at 05:44

## 2021-01-01 RX ADMIN — NYSTATIN CREAM 1 APPLICATION(S): 100000 CREAM TOPICAL at 17:38

## 2021-01-01 RX ADMIN — Medication 2: at 17:46

## 2021-01-01 RX ADMIN — TAMSULOSIN HYDROCHLORIDE 0.4 MILLIGRAM(S): 0.4 CAPSULE ORAL at 22:22

## 2021-01-01 RX ADMIN — PROPOFOL 3.35 MICROGRAM(S)/KG/MIN: 10 INJECTION, EMULSION INTRAVENOUS at 17:06

## 2021-01-01 RX ADMIN — Medication 101.6 MILLIGRAM(S): at 22:33

## 2021-01-01 RX ADMIN — Medication 4: at 05:26

## 2021-01-01 RX ADMIN — Medication 4 MILLIGRAM(S): at 17:30

## 2021-01-01 RX ADMIN — Medication 1 APPLICATION(S): at 11:21

## 2021-01-01 RX ADMIN — PROPOFOL 3.35 MICROGRAM(S)/KG/MIN: 10 INJECTION, EMULSION INTRAVENOUS at 21:20

## 2021-01-01 RX ADMIN — Medication 500 MILLIGRAM(S): at 14:33

## 2021-01-01 RX ADMIN — CHLORHEXIDINE GLUCONATE 15 MILLILITER(S): 213 SOLUTION TOPICAL at 06:35

## 2021-01-01 RX ADMIN — LEVETIRACETAM 400 MILLIGRAM(S): 250 TABLET, FILM COATED ORAL at 10:00

## 2021-01-01 RX ADMIN — Medication 500 MILLIGRAM(S): at 14:58

## 2021-01-01 RX ADMIN — Medication 2: at 11:41

## 2021-01-01 RX ADMIN — Medication 8: at 17:23

## 2021-01-01 RX ADMIN — Medication 81 MILLIGRAM(S): at 17:36

## 2021-01-01 RX ADMIN — NYSTATIN CREAM 1 APPLICATION(S): 100000 CREAM TOPICAL at 17:07

## 2021-01-01 RX ADMIN — Medication 101.6 MILLIGRAM(S): at 05:17

## 2021-01-01 RX ADMIN — Medication 50 MILLIGRAM(S): at 14:00

## 2021-01-01 RX ADMIN — LEVETIRACETAM 400 MILLIGRAM(S): 250 TABLET, FILM COATED ORAL at 17:59

## 2021-01-01 RX ADMIN — HUMAN INSULIN 6 UNIT(S): 100 INJECTION, SUSPENSION SUBCUTANEOUS at 12:15

## 2021-01-01 RX ADMIN — Medication 500 MILLIGRAM(S): at 13:08

## 2021-01-01 RX ADMIN — CASPOFUNGIN ACETATE 260 MILLIGRAM(S): 7 INJECTION, POWDER, LYOPHILIZED, FOR SOLUTION INTRAVENOUS at 22:20

## 2021-01-01 RX ADMIN — PIPERACILLIN AND TAZOBACTAM 25 GRAM(S): 4; .5 INJECTION, POWDER, LYOPHILIZED, FOR SOLUTION INTRAVENOUS at 13:27

## 2021-01-01 RX ADMIN — Medication 1 MILLIGRAM(S): at 11:46

## 2021-01-01 RX ADMIN — PIPERACILLIN AND TAZOBACTAM 25 GRAM(S): 4; .5 INJECTION, POWDER, LYOPHILIZED, FOR SOLUTION INTRAVENOUS at 13:22

## 2021-01-01 RX ADMIN — POLYETHYLENE GLYCOL 3350 17 GRAM(S): 17 POWDER, FOR SOLUTION ORAL at 17:30

## 2021-01-01 RX ADMIN — Medication 2: at 00:30

## 2021-01-01 RX ADMIN — LEVETIRACETAM 400 MILLIGRAM(S): 250 TABLET, FILM COATED ORAL at 09:17

## 2021-01-01 RX ADMIN — Medication 500 MILLIGRAM(S): at 15:09

## 2021-01-01 RX ADMIN — LEVETIRACETAM 400 MILLIGRAM(S): 250 TABLET, FILM COATED ORAL at 10:19

## 2021-01-01 RX ADMIN — PIPERACILLIN AND TAZOBACTAM 25 GRAM(S): 4; .5 INJECTION, POWDER, LYOPHILIZED, FOR SOLUTION INTRAVENOUS at 21:02

## 2021-01-01 RX ADMIN — Medication 15 MILLILITER(S): at 13:27

## 2021-01-01 RX ADMIN — Medication 25 GRAM(S): at 01:09

## 2021-01-01 RX ADMIN — Medication 50 MILLIGRAM(S): at 13:09

## 2021-01-01 RX ADMIN — PANTOPRAZOLE SODIUM 40 MILLIGRAM(S): 20 TABLET, DELAYED RELEASE ORAL at 18:12

## 2021-01-01 RX ADMIN — NYSTATIN CREAM 1 APPLICATION(S): 100000 CREAM TOPICAL at 05:57

## 2021-01-01 RX ADMIN — HUMAN INSULIN 6 UNIT(S): 100 INJECTION, SUSPENSION SUBCUTANEOUS at 06:09

## 2021-01-01 RX ADMIN — Medication 50 MILLIGRAM(S): at 05:15

## 2021-01-01 RX ADMIN — CHLORHEXIDINE GLUCONATE 1 APPLICATION(S): 213 SOLUTION TOPICAL at 05:16

## 2021-01-01 RX ADMIN — Medication 1 APPLICATION(S): at 13:58

## 2021-01-01 RX ADMIN — Medication 2 GRAM(S): at 17:11

## 2021-01-01 RX ADMIN — SODIUM CHLORIDE 1000 MILLILITER(S): 9 INJECTION INTRAMUSCULAR; INTRAVENOUS; SUBCUTANEOUS at 23:46

## 2021-01-01 RX ADMIN — Medication 1 APPLICATION(S): at 05:23

## 2021-01-01 RX ADMIN — Medication 101.6 MILLIGRAM(S): at 13:53

## 2021-01-01 RX ADMIN — FINASTERIDE 5 MILLIGRAM(S): 5 TABLET, FILM COATED ORAL at 17:40

## 2021-01-01 RX ADMIN — Medication 500 MILLIGRAM(S): at 13:49

## 2021-01-01 RX ADMIN — NYSTATIN CREAM 1 APPLICATION(S): 100000 CREAM TOPICAL at 05:06

## 2021-01-01 RX ADMIN — NYSTATIN CREAM 1 APPLICATION(S): 100000 CREAM TOPICAL at 05:47

## 2021-01-01 RX ADMIN — Medication 4 MILLIGRAM(S): at 23:56

## 2021-01-01 RX ADMIN — LEVETIRACETAM 400 MILLIGRAM(S): 250 TABLET, FILM COATED ORAL at 20:06

## 2021-01-01 RX ADMIN — Medication 1 APPLICATION(S): at 00:45

## 2021-01-01 RX ADMIN — POLYETHYLENE GLYCOL 3350 17 GRAM(S): 17 POWDER, FOR SOLUTION ORAL at 06:55

## 2021-01-01 RX ADMIN — NYSTATIN CREAM 1 APPLICATION(S): 100000 CREAM TOPICAL at 05:02

## 2021-01-01 RX ADMIN — HUMAN INSULIN 6 UNIT(S): 100 INJECTION, SUSPENSION SUBCUTANEOUS at 00:50

## 2021-01-01 RX ADMIN — Medication 1 MILLIGRAM(S): at 12:35

## 2021-01-01 RX ADMIN — Medication 15 MILLILITER(S): at 13:26

## 2021-01-01 RX ADMIN — LEVETIRACETAM 400 MILLIGRAM(S): 250 TABLET, FILM COATED ORAL at 20:22

## 2021-01-01 RX ADMIN — CHLORHEXIDINE GLUCONATE 15 MILLILITER(S): 213 SOLUTION TOPICAL at 17:58

## 2021-01-01 RX ADMIN — Medication 50 MILLIGRAM(S): at 22:13

## 2021-01-01 RX ADMIN — PANTOPRAZOLE SODIUM 40 MILLIGRAM(S): 20 TABLET, DELAYED RELEASE ORAL at 17:36

## 2021-01-01 RX ADMIN — Medication 5 MILLIGRAM(S): at 10:06

## 2021-01-01 RX ADMIN — Medication 1 APPLICATION(S): at 05:47

## 2021-01-01 RX ADMIN — NYSTATIN CREAM 1 APPLICATION(S): 100000 CREAM TOPICAL at 06:25

## 2021-01-01 RX ADMIN — Medication 4: at 23:55

## 2021-01-01 RX ADMIN — HUMAN INSULIN 6 UNIT(S): 100 INJECTION, SUSPENSION SUBCUTANEOUS at 00:23

## 2021-01-01 RX ADMIN — HUMAN INSULIN 6 UNIT(S): 100 INJECTION, SUSPENSION SUBCUTANEOUS at 06:25

## 2021-01-01 RX ADMIN — SENNA PLUS 2 TABLET(S): 8.6 TABLET ORAL at 21:02

## 2021-01-01 RX ADMIN — HUMAN INSULIN 6 UNIT(S): 100 INJECTION, SUSPENSION SUBCUTANEOUS at 06:04

## 2021-01-01 RX ADMIN — CHLORHEXIDINE GLUCONATE 1 APPLICATION(S): 213 SOLUTION TOPICAL at 05:04

## 2021-01-01 RX ADMIN — NYSTATIN CREAM 1 APPLICATION(S): 100000 CREAM TOPICAL at 17:49

## 2021-01-01 RX ADMIN — Medication 101.6 MILLIGRAM(S): at 13:46

## 2021-01-01 RX ADMIN — NYSTATIN CREAM 1 APPLICATION(S): 100000 CREAM TOPICAL at 17:30

## 2021-01-01 RX ADMIN — HUMAN INSULIN 6 UNIT(S): 100 INJECTION, SUSPENSION SUBCUTANEOUS at 23:21

## 2021-01-01 RX ADMIN — Medication 6: at 06:34

## 2021-01-01 RX ADMIN — Medication 50 MILLIGRAM(S): at 05:33

## 2021-01-01 RX ADMIN — Medication 1 APPLICATION(S): at 17:43

## 2021-01-01 RX ADMIN — Medication 2: at 05:14

## 2021-01-01 RX ADMIN — PANTOPRAZOLE SODIUM 40 MILLIGRAM(S): 20 TABLET, DELAYED RELEASE ORAL at 17:35

## 2021-01-01 RX ADMIN — Medication 1 APPLICATION(S): at 05:19

## 2021-01-01 RX ADMIN — HUMAN INSULIN 6 UNIT(S): 100 INJECTION, SUSPENSION SUBCUTANEOUS at 23:23

## 2021-01-01 RX ADMIN — HUMAN INSULIN 6 UNIT(S): 100 INJECTION, SUSPENSION SUBCUTANEOUS at 14:16

## 2021-01-01 RX ADMIN — LEVETIRACETAM 400 MILLIGRAM(S): 250 TABLET, FILM COATED ORAL at 21:45

## 2021-01-01 RX ADMIN — Medication 1 APPLICATION(S): at 22:42

## 2021-01-01 RX ADMIN — LEVETIRACETAM 400 MILLIGRAM(S): 250 TABLET, FILM COATED ORAL at 21:53

## 2021-01-01 RX ADMIN — Medication 1 APPLICATION(S): at 21:52

## 2021-01-01 RX ADMIN — Medication 1 APPLICATION(S): at 21:02

## 2021-01-01 RX ADMIN — PANTOPRAZOLE SODIUM 40 MILLIGRAM(S): 20 TABLET, DELAYED RELEASE ORAL at 17:06

## 2021-01-01 RX ADMIN — Medication 50 MILLIGRAM(S): at 06:47

## 2021-01-01 RX ADMIN — Medication 1 MILLIGRAM(S): at 12:18

## 2021-01-01 RX ADMIN — HUMAN INSULIN 6 UNIT(S): 100 INJECTION, SUSPENSION SUBCUTANEOUS at 00:55

## 2021-01-01 RX ADMIN — Medication 1 MILLIGRAM(S): at 14:26

## 2021-01-01 RX ADMIN — Medication 100 MILLIEQUIVALENT(S): at 17:31

## 2021-01-01 RX ADMIN — Medication 15 MILLILITER(S): at 13:08

## 2021-01-01 RX ADMIN — Medication 1 APPLICATION(S): at 21:00

## 2021-01-01 RX ADMIN — LEVETIRACETAM 400 MILLIGRAM(S): 250 TABLET, FILM COATED ORAL at 08:26

## 2021-01-01 RX ADMIN — HUMAN INSULIN 6 UNIT(S): 100 INJECTION, SUSPENSION SUBCUTANEOUS at 05:36

## 2021-01-01 RX ADMIN — HUMAN INSULIN 6 UNIT(S): 100 INJECTION, SUSPENSION SUBCUTANEOUS at 13:26

## 2021-01-01 RX ADMIN — PANTOPRAZOLE SODIUM 40 MILLIGRAM(S): 20 TABLET, DELAYED RELEASE ORAL at 18:30

## 2021-01-01 RX ADMIN — PANTOPRAZOLE SODIUM 40 MILLIGRAM(S): 20 TABLET, DELAYED RELEASE ORAL at 05:54

## 2021-01-01 RX ADMIN — SENNA PLUS 10 MILLILITER(S): 8.6 TABLET ORAL at 21:08

## 2021-01-01 RX ADMIN — Medication 101.6 MILLIGRAM(S): at 21:54

## 2021-01-01 RX ADMIN — HUMAN INSULIN 6 UNIT(S): 100 INJECTION, SUSPENSION SUBCUTANEOUS at 05:28

## 2021-01-01 RX ADMIN — Medication 101.6 MILLIGRAM(S): at 13:33

## 2021-01-01 RX ADMIN — Medication 3: at 12:36

## 2021-01-01 RX ADMIN — NYSTATIN CREAM 1 APPLICATION(S): 100000 CREAM TOPICAL at 05:33

## 2021-01-01 RX ADMIN — Medication 1 APPLICATION(S): at 23:39

## 2021-01-01 RX ADMIN — Medication 4 MILLIGRAM(S): at 21:07

## 2021-01-01 RX ADMIN — SENNA PLUS 10 MILLILITER(S): 8.6 TABLET ORAL at 21:50

## 2021-01-01 RX ADMIN — PIPERACILLIN AND TAZOBACTAM 25 GRAM(S): 4; .5 INJECTION, POWDER, LYOPHILIZED, FOR SOLUTION INTRAVENOUS at 13:33

## 2021-01-01 RX ADMIN — Medication 1 APPLICATION(S): at 21:18

## 2021-01-01 RX ADMIN — Medication 4: at 23:17

## 2021-01-01 RX ADMIN — AMLODIPINE BESYLATE 5 MILLIGRAM(S): 2.5 TABLET ORAL at 17:36

## 2021-01-01 RX ADMIN — CASPOFUNGIN ACETATE 260 MILLIGRAM(S): 7 INJECTION, POWDER, LYOPHILIZED, FOR SOLUTION INTRAVENOUS at 21:18

## 2021-01-01 RX ADMIN — LOSARTAN POTASSIUM 50 MILLIGRAM(S): 100 TABLET, FILM COATED ORAL at 05:44

## 2021-01-01 RX ADMIN — Medication 50 MILLIGRAM(S): at 05:17

## 2021-01-01 RX ADMIN — SODIUM CHLORIDE 30 MILLILITER(S): 5 INJECTION, SOLUTION INTRAVENOUS at 05:03

## 2021-01-01 RX ADMIN — Medication 101.6 MILLIGRAM(S): at 23:27

## 2021-01-01 RX ADMIN — Medication 4 MILLIGRAM(S): at 05:12

## 2021-01-01 RX ADMIN — CASPOFUNGIN ACETATE 260 MILLIGRAM(S): 7 INJECTION, POWDER, LYOPHILIZED, FOR SOLUTION INTRAVENOUS at 21:43

## 2021-01-01 RX ADMIN — POLYETHYLENE GLYCOL 3350 17 GRAM(S): 17 POWDER, FOR SOLUTION ORAL at 05:18

## 2021-01-01 RX ADMIN — Medication 101.6 MILLIGRAM(S): at 14:40

## 2021-01-01 RX ADMIN — PROPOFOL 3.35 MICROGRAM(S)/KG/MIN: 10 INJECTION, EMULSION INTRAVENOUS at 11:01

## 2021-01-01 RX ADMIN — Medication 15 MILLILITER(S): at 14:23

## 2021-01-01 RX ADMIN — Medication 5.23 MICROGRAM(S)/KG/MIN: at 07:21

## 2021-01-01 RX ADMIN — PIPERACILLIN AND TAZOBACTAM 25 GRAM(S): 4; .5 INJECTION, POWDER, LYOPHILIZED, FOR SOLUTION INTRAVENOUS at 18:45

## 2021-01-01 RX ADMIN — LEVETIRACETAM 400 MILLIGRAM(S): 250 TABLET, FILM COATED ORAL at 08:49

## 2021-01-01 RX ADMIN — LEVETIRACETAM 400 MILLIGRAM(S): 250 TABLET, FILM COATED ORAL at 12:18

## 2021-01-01 RX ADMIN — Medication 2: at 18:10

## 2021-01-01 RX ADMIN — ENOXAPARIN SODIUM 40 MILLIGRAM(S): 100 INJECTION SUBCUTANEOUS at 13:02

## 2021-01-01 RX ADMIN — Medication 101.6 MILLIGRAM(S): at 05:02

## 2021-01-01 RX ADMIN — Medication 1 TABLET(S): at 12:13

## 2021-01-01 RX ADMIN — LEVETIRACETAM 400 MILLIGRAM(S): 250 TABLET, FILM COATED ORAL at 09:51

## 2021-01-01 RX ADMIN — Medication 50 MILLIGRAM(S): at 14:59

## 2021-01-01 RX ADMIN — Medication 1 MILLIGRAM(S): at 11:38

## 2021-01-01 RX ADMIN — POLYETHYLENE GLYCOL 3350 17 GRAM(S): 17 POWDER, FOR SOLUTION ORAL at 06:18

## 2021-01-01 RX ADMIN — Medication 4: at 23:47

## 2021-01-01 RX ADMIN — LEVETIRACETAM 400 MILLIGRAM(S): 250 TABLET, FILM COATED ORAL at 18:25

## 2021-01-01 RX ADMIN — CHLORHEXIDINE GLUCONATE 1 APPLICATION(S): 213 SOLUTION TOPICAL at 05:33

## 2021-01-01 RX ADMIN — Medication 4 MILLIGRAM(S): at 11:20

## 2021-01-01 RX ADMIN — Medication 50 MILLIGRAM(S): at 22:09

## 2021-01-01 RX ADMIN — NYSTATIN CREAM 1 APPLICATION(S): 100000 CREAM TOPICAL at 05:01

## 2021-01-01 RX ADMIN — Medication 1 APPLICATION(S): at 10:14

## 2021-01-01 RX ADMIN — NYSTATIN CREAM 1 APPLICATION(S): 100000 CREAM TOPICAL at 17:43

## 2021-01-01 RX ADMIN — Medication 2: at 12:35

## 2021-01-01 RX ADMIN — Medication 2: at 05:27

## 2021-01-21 NOTE — CONSULT NOTE ADULT - ASSESSMENT
JOSÉ ANTONIO MELENDREZ  80M w/ pmh of HTN and DM presents with recurrent urinary incontinence transferred from OSH for concern for SAH and cord compression. Per family, patient has been recently confused, has intermittent urinary incontinence and was noted to have trouble using his R leg. In the ED, he denies any recent trauma, no headache, back pain, neck pain, numbness, radiculopathy, nausea, no bowel incontinence. On asa at home.   Imaging: area of hyperdensity anteriorly, para falcine, in area of MARQUES, not typical of a SAH. Lumbar spine CT shows severe degenerative disease L3-5 with air in the discs, large disc herniations worst at L5/S1 on the R.   Exam: aaox3, UE 5/5, no hoffmans, LLE 5/5, RLE 4/5, no clonus, SILT  -repeat HCT and CTA head and neck  -MR L spine

## 2021-01-21 NOTE — CONSULT NOTE ADULT - SUBJECTIVE AND OBJECTIVE BOX
Chief Complaint: Confusion    HPI:    79 y/o M w/ hx of DM, HTN, and recurrent urinary incontinence transferred from OSH due to concern for SAH and cord compression, found to have elevated WBC, abnormal liver enzymes, and choledocholithiasis with ductal dilatation on CT A/P.    The patient currently denies fevers/chills, nausea/vomiting, abdominal pain, and yellowing of the skin and eyes.    Allergies:  No Known Allergies    Home Medications:  cholestyramine 4 g/5.5 g oral powder for reconstitution: 1 packet(s) orally once a day  Ecotrin Adult Low Strength 81 mg oral delayed release tablet: 1 tab(s) orally once a day  Flomax 0.4 mg oral capsule: 1 cap(s) orally once a day  losartan 50 mg oral tablet: 1 tab(s) orally once a day  Lovaza 1000 mg oral capsule: 2 cap(s) orally 2 times a day  Mag-Ox 400 oral tablet: 1 tab(s) orally once a day  metFORMIN 500 mg oral tablet, extended release: 1 tab(s) orally once a day  multivitamin: 1 tab(s) orally once a day  Norvasc 5 mg oral tablet: 1 tab(s) orally once a day  omeprazole 20 mg oral delayed release capsule: 1 cap(s) orally once a day  Proscar 5 mg oral tablet: 1 tab(s) orally once a day  Toprol-XL 25 mg oral tablet, extended release: 1 tab(s) orally 2 times a day  Vitamin D3:     Allergies:   No Known Allergies    PMHX/PSHX:      Family history:      Denies family history of colon cancer/polyps, stomach cancer/polyps, pancreatic cancer/masses, liver cancer/disease, ovarian cancer and endometrial cancer.    Social History:     Tob: Denies  EtOH: Denies  Illicit Drugs: Denies    ROS:     General:  No wt loss, fevers, chills, night sweats, fatigue  Eyes:  Good vision, no reported pain  ENT:  No sore throat, pain, runny nose, dysphagia  CV:  No pain, palpitations, hypo/hypertension  Pulm:  No dyspnea, cough, tachypnea, wheezing  GI:  No pain, No nausea, No vomiting, No diarrhea, No constipation, No weight loss, No fever, No pruritis, No bright red blood per rectum, No tarry stools, No dysphagia,  :  No pain, bleeding, incontinence, nocturia  Muscle:  No pain, weakness  Neuro:  No weakness, tingling, memory problems  Psych:  No fatigue, insomnia, mood problems, depression  Endocrine:  No polyuria, polydipsia, cold/heat intolerance  Heme:  No petechiae, ecchymosis, easy bruisability  Skin:  No rash, tattoos, scars, edema    PHYSICAL EXAM:     Vital Signs:  Vital Signs Last 24 Hrs  T(C): 36.5 (2021 11:36), Max: 37.3 (2021 02:30)  T(F): 97.7 (2021 11:36), Max: 99.1 (2021 02:30)  HR: 75 (2021 11:36) (68 - 90)  BP: 125/85 (2021 11:36) (125/85 - 174/77)  BP(mean): --  RR: 21 (2021 11:36) (14 - 21)  SpO2: 100% (2021 11:36) (97% - 100%)  Daily Height in cm: 170.18 (2021 02:16)    Daily     GENERAL:  No acute distress  HEENT:  Normocephalic/atraumatic, no scleral icterus  CHEST:  Normal effort, no accessory muscle use  HEART:  Regular rate and rhythm  ABDOMEN:  Soft, non-tender, non-distended  EXTREMITIES: No cyanosis, clubbing, or edema  SKIN:  No rash/erythema  NEURO:  Alert and oriented x 2    LABS:                        9.9    30.59 )-----------( 126      ( 2021 03:41 )             31.5     Mean Cell Volume: 70.3 fl (21 @ 03:41)        137  |  102  |  21  ----------------------------<  337<H>  3.7   |  21<L>  |  1.08    Ca    8.9      2021 03:42    TPro  6.6  /  Alb  3.5  /  TBili  2.7<H>  /  DBili  x   /  AST  231<H>  /  ALT  214<H>  /  AlkPhos  302<H>      LIVER FUNCTIONS - ( 2021 03:42 )  Alb: 3.5 g/dL / Pro: 6.6 g/dL / ALK PHOS: 302 U/L / ALT: 214 U/L / AST: 231 U/L / GGT: x           PT/INR - ( 2021 03:41 )   PT: 11.4 sec;   INR: 0.95 ratio         PTT - ( 2021 03:41 )  PTT:24.8 sec  Urinalysis Basic - ( 2021 06:03 )    Color: Yellow / Appearance: Clear / S.032 / pH: x  Gluc: x / Ketone: Negative  / Bili: Small / Urobili: Negative   Blood: x / Protein: 30 mg/dL / Nitrite: Negative   Leuk Esterase: Negative / RBC: 1 /hpf / WBC 1 /HPF   Sq Epi: x / Non Sq Epi: 1 / Bacteria: Few               9.9    30.59 )-----------( 126      ( 2021 03:41 )             31.5     Imaging:    < from: CT Abdomen and Pelvis w/ IV Cont (21 @ 08:48) >    EXAM:  CT ABDOMEN AND PELVIS IC                            PROCEDURE DATE:  2021            INTERPRETATION:  CLINICAL INFORMATION: Altered mental status, nausea. Evaluate for mass or infection.    COMPARISON: None.    PROCEDURE:  CT of the Abdomen and Pelvis was performed with intravenous contrast.  Intravenous contrast: 90 ml Omnipaque 350. 10 ml discarded.  Oral contrast: None.  Sagittal and coronal reformats were performed.    FINDINGS:  LOWER CHEST: Within normal limits.    LIVER: Dilated intrahepatic bile ducts.  BILE DUCTS: Dilated common bile duct measuring up to 2 cm in diameter with choledocholithiasis.  GALLBLADDER: Dilated. Cholelithiasis.  SPLEEN: Within normal limits.  PANCREAS: Within normal limits.  ADRENALS: 1.2 cm left adrenal nodule, indeterminate. Right adrenal nodules within normal limits.  KIDNEYS/URETERS: Bilateral renal cysts. No renal calculi or hydronephrosis.    BLADDER: Within normal limits.  REPRODUCTIVE ORGANS: Prostate is normal in size.    BOWEL: Diverticulosis coli. No bowel obstruction. Appendix is normal.  PERITONEUM: No ascites.  VESSELS: Atherosclerotic changes.  RETROPERITONEUM/LYMPH NODES: No lymphadenopathy.  ABDOMINAL WALL: Small bilateral fat-containing inguinal hernias.  BONES: Degenerative changes. Minimal anterolisthesis of L4 on L5.    IMPRESSION:    Choledocholithiasis with dilated intrahepatic and extrahepatic biliary system.                  NELSON JACK MD; Resident Radiology  This document has been electronically signed.  JULISSA MCDANIELS MD; Attending Radiologist  This document has been electronically signed. 2021 10:31AM    < end of copied text >     Chief Complaint: Confusion    HPI:    81 y/o M w/ hx of DM, HTN, and recurrent urinary incontinence transferred from OSH due to concern for SAH and cord compression, found to have elevated WBC, abnormal liver enzymes, and choledocholithiasis with ductal dilatation on CT A/P.    The patient currently denies fevers/chills, nausea/vomiting, abdominal pain, and yellowing of the skin and eyes.    Allergies:  No Known Allergies    Home Medications:  cholestyramine 4 g/5.5 g oral powder for reconstitution: 1 packet(s) orally once a day  Ecotrin Adult Low Strength 81 mg oral delayed release tablet: 1 tab(s) orally once a day  Flomax 0.4 mg oral capsule: 1 cap(s) orally once a day  losartan 50 mg oral tablet: 1 tab(s) orally once a day  Lovaza 1000 mg oral capsule: 2 cap(s) orally 2 times a day  Mag-Ox 400 oral tablet: 1 tab(s) orally once a day  metFORMIN 500 mg oral tablet, extended release: 1 tab(s) orally once a day  multivitamin: 1 tab(s) orally once a day  Norvasc 5 mg oral tablet: 1 tab(s) orally once a day  omeprazole 20 mg oral delayed release capsule: 1 cap(s) orally once a day  Proscar 5 mg oral tablet: 1 tab(s) orally once a day  Toprol-XL 25 mg oral tablet, extended release: 1 tab(s) orally 2 times a day  Vitamin D3:     Allergies:   No Known Allergies    PMHX/PSHX:      Family history:      Denies family history of colon cancer/polyps, stomach cancer/polyps, pancreatic cancer/masses, liver cancer/disease, ovarian cancer and endometrial cancer.      Social History:     Tob: Denies  EtOH: Denies  Illicit Drugs: Denies    ROS:     General:  No wt loss, fevers, chills, night sweats, fatigue  Eyes:  Good vision, no reported pain  ENT:  No sore throat, pain, runny nose, dysphagia  CV:  No pain, palpitations, hypo/hypertension  Pulm:  No dyspnea, cough, tachypnea, wheezing  GI:  No pain, No nausea, No vomiting, No diarrhea, No constipation, No weight loss, No fever, No pruritis, No bright red blood per rectum, No tarry stools, No dysphagia,  :  No pain, bleeding, incontinence, nocturia  Muscle:  No pain, weakness  Neuro:  No weakness, tingling, memory problems  Psych:  No fatigue, insomnia, mood problems, depression  Endocrine:  No polyuria, polydipsia, cold/heat intolerance  Heme:  No petechiae, ecchymosis, easy bruisability  Skin:  No rash, tattoos, scars, edema    PHYSICAL EXAM:     Vital Signs:  Vital Signs Last 24 Hrs  T(C): 36.5 (2021 11:36), Max: 37.3 (2021 02:30)  T(F): 97.7 (2021 11:36), Max: 99.1 (2021 02:30)  HR: 75 (2021 11:36) (68 - 90)  BP: 125/85 (2021 11:36) (125/85 - 174/77)  BP(mean): --  RR: 21 (2021 11:36) (14 - 21)  SpO2: 100% (2021 11:36) (97% - 100%)  Daily Height in cm: 170.18 (2021 02:16)    Daily     GENERAL:  No acute distress  HEENT:  Normocephalic/atraumatic, no scleral icterus  CHEST:  Normal effort, no accessory muscle use  HEART:  Regular rate and rhythm  ABDOMEN:  Soft, non-tender, non-distended  EXTREMITIES: No cyanosis, clubbing, or edema  SKIN:  No rash/erythema  NEURO:  Alert and oriented x 2    LABS:                        9.9    30.59 )-----------( 126      ( 2021 03:41 )             31.5     Mean Cell Volume: 70.3 fl (21 @ 03:41)        137  |  102  |  21  ----------------------------<  337<H>  3.7   |  21<L>  |  1.08    Ca    8.9      2021 03:42    TPro  6.6  /  Alb  3.5  /  TBili  2.7<H>  /  DBili  x   /  AST  231<H>  /  ALT  214<H>  /  AlkPhos  302<H>      LIVER FUNCTIONS - ( 2021 03:42 )  Alb: 3.5 g/dL / Pro: 6.6 g/dL / ALK PHOS: 302 U/L / ALT: 214 U/L / AST: 231 U/L / GGT: x           PT/INR - ( 2021 03:41 )   PT: 11.4 sec;   INR: 0.95 ratio         PTT - ( 2021 03:41 )  PTT:24.8 sec  Urinalysis Basic - ( 2021 06:03 )    Color: Yellow / Appearance: Clear / S.032 / pH: x  Gluc: x / Ketone: Negative  / Bili: Small / Urobili: Negative   Blood: x / Protein: 30 mg/dL / Nitrite: Negative   Leuk Esterase: Negative / RBC: 1 /hpf / WBC 1 /HPF   Sq Epi: x / Non Sq Epi: 1 / Bacteria: Few               9.9    30.59 )-----------( 126      ( 2021 03:41 )             31.5     Imaging:    < from: CT Abdomen and Pelvis w/ IV Cont (21 @ 08:48) >    EXAM:  CT ABDOMEN AND PELVIS IC                            PROCEDURE DATE:  2021            INTERPRETATION:  CLINICAL INFORMATION: Altered mental status, nausea. Evaluate for mass or infection.    COMPARISON: None.    PROCEDURE:  CT of the Abdomen and Pelvis was performed with intravenous contrast.  Intravenous contrast: 90 ml Omnipaque 350. 10 ml discarded.  Oral contrast: None.  Sagittal and coronal reformats were performed.    FINDINGS:  LOWER CHEST: Within normal limits.    LIVER: Dilated intrahepatic bile ducts.  BILE DUCTS: Dilated common bile duct measuring up to 2 cm in diameter with choledocholithiasis.  GALLBLADDER: Dilated. Cholelithiasis.  SPLEEN: Within normal limits.  PANCREAS: Within normal limits.  ADRENALS: 1.2 cm left adrenal nodule, indeterminate. Right adrenal nodules within normal limits.  KIDNEYS/URETERS: Bilateral renal cysts. No renal calculi or hydronephrosis.    BLADDER: Within normal limits.  REPRODUCTIVE ORGANS: Prostate is normal in size.    BOWEL: Diverticulosis coli. No bowel obstruction. Appendix is normal.  PERITONEUM: No ascites.  VESSELS: Atherosclerotic changes.  RETROPERITONEUM/LYMPH NODES: No lymphadenopathy.  ABDOMINAL WALL: Small bilateral fat-containing inguinal hernias.  BONES: Degenerative changes. Minimal anterolisthesis of L4 on L5.    IMPRESSION:    Choledocholithiasis with dilated intrahepatic and extrahepatic biliary system.                  NELSON JACK MD; Resident Radiology  This document has been electronically signed.  JULISSA MCDANIELS MD; Attending Radiologist  This document has been electronically signed. 2021 10:31AM    < end of copied text >

## 2021-01-21 NOTE — H&P ADULT - PROBLEM SELECTOR PLAN 1
#cholangitis  - with nausea, intolerance of PO, abd pain, hx of gallstones, "body shaking" suggestive of rigors at home,   WBC of 30, hx of gallstones, elevated bili, lactate 3.5, CT scan showing choledocholithiasis with dilated biliary duct.   -zosyn x1 at OSH, cefepime 1000mg x1, metronidazole 500mg x1, vancomycin 1g x1, ~3L IVF boluses  -c/w zosyn 3.375 q6h  -GI consult: planning for ERCP tmr

## 2021-01-21 NOTE — CONSULT NOTE ADULT - ASSESSMENT
JOSÉ ANTONIO MELENDREZ  80M w/ pmh of HTN and DM presents with recurrent urinary incontinence transferred from OSH for concern for SAH and cord compression. Per family, patient has been recently confused, has intermittent urinary incontinence and was noted to have trouble using his R leg. In the ED, he denies any recent trauma, no headache, back pain, neck pain, numbness, radiculopathy, nausea, no bowel incontinence. On asa at home.   Imaging: area of hyperdensity anteriorly, para falcine, in area of MARQUES, not typical of a SAH. Lumbar spine CT shows severe degenerative disease L3-5 with air in the discs, large disc herniations worst at L5/S1 on the R. anterolisthesis of 4 on 5.  Exam: aaox3, UE 5/5, no hoffmans, LLE 5/5, RLE 4/5, no clonus, SILT, normal rectal  -repeat HCT and CTA head and neck  -MR L/ T spine

## 2021-01-21 NOTE — H&P ADULT - PROBLEM SELECTOR PLAN 2
#Urinary incontinence  -With recent urinary incontinence. Transferred for spinal cord compression and c/f SAH. Here CTH no evidence of SAH. MRI of T and L spine no benson cord compression.   -neurosurgery consult appreciated  -possibly with overflow incontinence given BPH hx and glucosuria

## 2021-01-21 NOTE — H&P ADULT - NSHPLABSRESULTS_GEN_ALL_CORE
9.9    30.59 )-----------( 126      ( 2021 03:41 )             31.5           137  |  102  |  21  ----------------------------<  337<H>  3.7   |  21<L>  |  1.08    Ca    8.9      2021 03:42    TPro  6.6  /  Alb  3.5  /  TBili  2.7<H>  /  DBili  x   /  AST  231<H>  /  ALT  214<H>  /  AlkPhos  302<H>                Urinalysis Basic - ( 2021 06:03 )    Color: Yellow / Appearance: Clear / S.032 / pH: x  Gluc: x / Ketone: Negative  / Bili: Small / Urobili: Negative   Blood: x / Protein: 30 mg/dL / Nitrite: Negative   Leuk Esterase: Negative / RBC: 1 /hpf / WBC 1 /HPF   Sq Epi: x / Non Sq Epi: 1 / Bacteria: Few        PT/INR - ( 2021 03:41 )   PT: 11.4 sec;   INR: 0.95 ratio         PTT - ( 2021 03:41 )  PTT:24.8 sec    Lactate Trend            CAPILLARY BLOOD GLUCOSE      POCT Blood Glucose.: 312 mg/dL (2021 02:30)          < end of copied text >    < from: MR Lumbar Spine No Cont (21 @ 13:36) >    IMPRESSION: 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 Small disc osteophyte complex mildly effaces the ventral cord at T7-8. Dorsal ligamentous hypertrophy narrows the canal at T11-12. No benson cord compression.    Degenerative changes of the lumbar spine with anterolisthesis of L3 on L4 which causes moderate to severe spinal stenosis. Degenerative anterolisthesis L4 on L5 causes severe spinal stenosis.    < end of copied text >

## 2021-01-21 NOTE — ED PROVIDER NOTE - PROGRESS NOTE DETAILS
Odilon JONES: Patien evaluated by neurosurg, recommends further imaging with CT head, CTA head and neck. Does not believe patient needs an emergent MRI Aury Sal MD PGY-3  pt signed out to me. bedside US performed given elevated wbc count and LFTs, intrahepatic duct dilation noted, MRCP noted and abx ordered. Daughter taisha tanner ordered lj pt endorsed to me at signout - no nsx intervention as repeat ct as no bleed nsx no acute spine intervention -- pt w severe leukocytosis and transaminitis - abx given empirically -- pocus with cbd and intrahepatic dilatation seen -- needs admission with out abd pain ct ordered to eval for panc mass  mri ordered to eval for choledocholithiasis - however much less likely as pt is wo pain - Aury Sal MD PGY-3  pt signed out to me. bedside US performed given elevated wbc count and LFTs, intrahepatic duct dilation noted, MRCP ordered and abx ordered. Daughter taisha tanner updated. hospitalist reinaldo informed of pending ct abd/pelvis with IV contrast and MRCP Aury Sal MD PGY-3 choledocholithiasis on CT, GI aware. Aury Sal MD PGY-3 MRCP cancelled given patient will likely require ERCP

## 2021-01-21 NOTE — ED PROVIDER NOTE - PHYSICAL EXAMINATION
Gen: AAOx1, non-toxic  Head: NCAT  HEENT: EOMI, oral mucosa moist, normal conjunctiva  Lung: CTAB, no respiratory distress, no wheezes/rhonchi/rales B/L, speaking in full sentences  CV: RRR, no murmurs, rubs or gallops  Abd: soft, NTND, no guarding  MSK: no visible deformities           Strength 5/5 UE b/l                      4/5 RLE, 5/5 LLE  Neuro: Unable to fully assess   Skin: Warm, well perfused, no rash  Psych: Unable to assess   ~Jimmy Donald M.D. Resident

## 2021-01-21 NOTE — H&P ADULT - HISTORY OF PRESENT ILLNESS
80yM PMH of gallstones (30-40 years ago), BPH, HLD, h/o spinal stenosis  presents as transfer from [place] for further evaluation for subarachnoid and severe spinal stenosis CT head and CT L spine. 2 months ago pt suddenly felt very cold with subjective fever, whole body shaking (?rigors), took tylenol then resolved. Then 2 weeks later, had fever, fatigue, headache, with whole body shaking. For the past 2 weeks pt had been having urinary incontinence, no fecal incontinence. Then yesterday he started feeling feverish, abdominal pain and bloating, nausea, dry retching, intolerance of PO, anorexia, heart racing. Had BM yesterday. He also had problem even doing log roll in the bed. He had severe weakness in arms and legs. Then his wife tried to talk to him, but pt was unresponsive. The family then called 911 to bring pt to hospital.      Hx above from daughter    Currently, he states he had one episode of vomiting in the hospital. Currently feeling nauseous. Denies SOB, chest pain, abdominal pain, weakness.    Baseline:   Mentally coherent, aaox4. Reports sometimes has trouble walking and occ dizziness for several years. Hard of hearing.    At OSH received: IVF 1905cc bolus of NS, zosyn 3.375 mg IV , tylenol 650mg,  famotidine, and antacid    In the ED: VS T 97.5, HR 87, /75, RR 14, 97% on RA.  cefepime 1000mg x1, metronidazole 500mg x1, vancomycin 1g x1, 1L NS bolus      PMH:  Diabetes  HTN       80yM PMH of gallstones (30-40 years ago), BPH, HLD, h/o spinal stenosis  presents as transfer from [place] for further evaluation for subarachnoid and severe spinal stenosis CT head and CT L spine. 2 months ago pt suddenly felt very cold with subjective fever, whole body shaking (?rigors), took tylenol then resolved. Then 2 weeks later, had fever, fatigue, headache, with whole body shaking. For the past 2 weeks pt had been having urinary incontinence, no fecal incontinence. Then yesterday he started feeling feverish, abdominal pain and bloating, nausea, dry retching, intolerance of PO, anorexia, heart racing. Had BM yesterday. He also had problem even doing log roll in the bed. He had severe weakness in arms and legs. Then his wife tried to talk to him, but pt was unresponsive. The family then called 911 to bring pt to hospital ([place]).  Hx above from daughter.  He was then transferred from [place] for c/f SAH and cord compression, however imaging negative here.  Currently, he states he had one episode of vomiting in the hospital. Currently feeling nauseous. Denies SOB, chest pain, abdominal pain, weakness.    Baseline:   Mentally coherent, aaox4. Reports sometimes has trouble walking and occ dizziness for several years. Hard of hearing.    At OSH received: IVF 1905cc bolus of NS, zosyn 3.375 mg IV , tylenol 650mg,  famotidine, and antacid.    In the ED: VS T 97.5, HR 87, /75, RR 14, 97% on RA.  cefepime 1000mg x1, metronidazole 500mg x1, vancomycin 1g x1, 1L NS bolus

## 2021-01-21 NOTE — H&P ADULT - NSHPPHYSICALEXAM_GEN_ALL_CORE
VITALS:   T(C): 36.5 (01-21-21 @ 11:36), Max: 37.3 (01-21-21 @ 02:30)  HR: 75 (01-21-21 @ 11:36) (68 - 90)  BP: 125/85 (01-21-21 @ 11:36) (125/85 - 174/77)  RR: 21 (01-21-21 @ 11:36) (14 - 21)  SpO2: 100% (01-21-21 @ 11:36) (97% - 100%)    GENERAL: NAD, lying in bed comfortably  HEAD:  Atraumatic, Normocephalic  EYES: EOMI, PERRLA, + icteric sclera  ENT: Moist mucous membranes  NECK: Supple, No JVD  CHEST/LUNG: Clear to auscultation bilaterally; No rales, rhonchi, wheezing, or rubs. Unlabored respirations  HEART: Regular rate and rhythm; No murmurs, rubs, or gallops  ABDOMEN: BSx4; distended, no tenderness to palpation, no organomegaly appreciated  EXTREMITIES:  2+ Peripheral Pulses, brisk capillary refill. No clubbing, cyanosis, or edema  NERVOUS SYSTEM:  A&Ox3, no focal deficits   SKIN: No rashes or lesions  Psych: Normal speech, normal behavior, normal affect

## 2021-01-21 NOTE — ED ADULT NURSE REASSESSMENT NOTE - NS ED NURSE REASSESS COMMENT FT1
Patient remains A&Ox4 speaking in full sentences,  used #[id number]. Pupils 2mm pinpoint. No facial droop noted. Patient able to follow commands. Patient strong in bilateral upper and lower extremities.

## 2021-01-21 NOTE — ED ADULT NURSE NOTE - OBJECTIVE STATEMENT
80yM transferred from [facility] for neurology consult. Per EMS, pt was found to have a subarachnoid hemorrhage and r/o chord compression and transferred to [facility] ED for neurology consult. Per EMS, PMHx of HTN and DM. Mandarin  #[id number] used for patient report. Pt states he went to [facility] for "peeing a lot". Pt reports urinary incontinence but no bowel incontinence. Pt AAOx4, awake, VS as documented, speech clear. Pupils 2mm BL. No facial droop, no arm drift. +finger  BL. +sensation of extremities BL. Pt denies any recent falls/trauma. Pt denies lower back pain, shooting pain into BL legs, numbness of BL LE, back pain with ambulation, vision changes, blurred vision, headache. Pt denies CP, SOB, N/V, abdominal pain, back pain, pain in UE/LE, fevers/chills, dizziness/lightheadedness. Dysphagia screen performed and patient passed. Neuro consult at bedside. Bed locked in lowest position with appropriate side rails raised. Pt given call bell and explained call bell system. Pt given urinal. IV placed and labs drawn and sent. Pt has no other questions or concerns at this time.

## 2021-01-21 NOTE — CHART NOTE - NSCHARTNOTEFT_GEN_A_CORE
MRI appreciated, poor quality, No clear instability/compression. Neurosurgery to sign off. Activity as tolerated with fall precautions.

## 2021-01-21 NOTE — ED ADULT NURSE NOTE - NSIMPLEMENTINTERV_GEN_ALL_ED
Implemented All Fall Risk Interventions:  [place] to call system. Call bell, personal items and telephone within reach. Instruct patient to call for assistance. Room bathroom lighting operational. Non-slip footwear when patient is off stretcher. Physically safe environment: no spills, clutter or unnecessary equipment. Stretcher in lowest position, wheels locked, appropriate side rails in place. Provide visual cue, wrist band, yellow gown, etc. Monitor gait and stability. Monitor for mental status changes and reorient to person, place, and time. Review medications for side effects contributing to fall risk. Reinforce activity limits and safety measures with patient and family.

## 2021-01-21 NOTE — ED PROVIDER NOTE - CLINICAL SUMMARY MEDICAL DECISION MAKING FREE TEXT BOX
80yM h/o spinal stenosis  transfer patient from [place] for further evaluation for subarachnoid and severe spinal stenosis CT head and CT L spine. Will get ekg, labs, coags, blood gas, consult neuro surg, +/- new imaging and reassess

## 2021-01-21 NOTE — H&P ADULT - PROBLEM SELECTOR PLAN 4
#Diabetes  -with glucosuria here, glucose >1000, this is after receiving nearly 2L of fluid at OSH  -a1c  -low dose ISS  -FSG qac, qhs

## 2021-01-21 NOTE — ED ADULT NURSE REASSESSMENT NOTE - NS ED NURSE REASSESS COMMENT FT1
Straight cath done using sterile technique with 2RN's present as ordered. Clear and yellow urine noted. 60 ml obtained. Specimens collected and sent to lab.  used #[id number] to explain procedure to patient. Straight cath done using sterile technique with 2RN's present as ordered. Clear and yellow urine noted. 60 ml obtained. Specimens collected and sent to lab.  used #[id number] to educate procedure to patient. Straight cath done using sterile technique with 2RN's present as ordered. Clear and yellow urine noted. 60 ml obtained. Specimens collected and sent to lab.

## 2021-01-21 NOTE — H&P ADULT - PROBLEM SELECTOR PLAN 5
#BPH  c/w home tamsulosin, finasteride  -bladder scan x1 to check for any obstruction - for c/f overflow incontinence

## 2021-01-21 NOTE — H&P ADULT - NSHPREVIEWOFSYSTEMS_GEN_ALL_CORE
REVIEW OF SYSTEMS:    CONSTITUTIONAL: No weakness, fevers or chills  EYES/ENT: No visual changes;  No vertigo or throat pain   NECK: No pain or stiffness  RESPIRATORY: No cough, wheezing, hemoptysis; No shortness of breath  CARDIOVASCULAR: No chest pain or palpitations  GASTROINTESTINAL: No abdominal or epigastric pain. No nausea, vomiting, or hematemesis; No diarrhea or constipation. No melena or hematochezia.  GENITOURINARY: No dysuria, frequency or hematuria  NEUROLOGICAL: No numbness or weakness  SKIN: No itching, burning, rashes, or lesions   HEME: no easy bruising or unexplained bleeding  ENDO: no heat intolerance, no cold intolerance  PSYCH: no SI, or depression  All other review of systems is negative unless indicated above.

## 2021-01-21 NOTE — CONSULT NOTE ADULT - SUBJECTIVE AND OBJECTIVE BOX
p ([id number])     HPI:  80M w/ pmh of HTN and DM presents with recurrent urinary incontinence transferred from OSH for concern for SAH and cord compression. Per family, patient has been recently confused, has intermittent urinary incontinence and was noted to have trouble using his R leg. In the ED, he denies any recent trauma, no headache, back pain, neck pain, numbness, radiculopathy, nausea, no bowel incontinence. On asa at home.     Exam:  aaox3, UE 5/5, no hoffmans, LLE 5/5, RLE 4/5, no clonus, SILT, normal rectal  --Anticoagulation:    =====================  PAST MEDICAL HISTORY     PAST SURGICAL HISTORY         MEDICATIONS:  Antibiotics:    Neuro:    Other:      SOCIAL HISTORY:   Occupation:   Marital Status:     FAMILY HISTORY:      ROS: Negative except per HPI    LABS:  PT/INR - ( 21 Jan 2021 03:41 )   PT: 11.4 sec;   INR: 0.95 ratio         PTT - ( 21 Jan 2021 03:41 )  PTT:24.8 sec                        9.9    30.59 )-----------( 126      ( 21 Jan 2021 03:41 )             31.5     01-21    137  |  102  |  21  ----------------------------<  337<H>  3.7   |  21<L>  |  1.08    Ca    8.9      21 Jan 2021 03:42    TPro  6.6  /  Alb  3.5  /  TBili  2.7<H>  /  DBili  x   /  AST  231<H>  /  ALT  214<H>  /  AlkPhos  302<H>  01-21

## 2021-01-21 NOTE — ED PROVIDER NOTE - OBJECTIVE STATEMENT
80yM transfer patient from [place] for further evaluation for subarachnoid and severe spinal stenosis . 80yM h/o spinal stenosis  transfer patient from [place] for further evaluation for subarachnoid and severe spinal stenosis CT head and CT L spine. Patient initially presented due to intermittent R LE weakness, urinary incontinence, weakness and AMS of 3-4 day duration. 80yM h/o spinal stenosis  transfer patient from [place] for further evaluation for subarachnoid and severe spinal stenosis CT head and CT L spine. Patient initially presented due to intermittent R LE weakness, urinary incontinence, weakness and AMS of 3-4 day duration.    Collateral Information from daughter [phone]: Patient previously 2 prior episodes within the past two months of inability to control urine, trembling, feeling cold, weak. Presented yesterday to worsening symptoms and started becoming confused around 5pm yesterday along with nausea, headache and RLE weakness and numbness.

## 2021-01-21 NOTE — ED PROVIDER NOTE - ATTENDING CONTRIBUTION TO CARE
Pt transferred from OSH after having imaging there showing possible SAH and spinal stenosis. pt's symptoms seem to have been present for a while and worsenign recently including ams, gen weakness, urinary incontinence, last night worsened becoming confused, HA. in ED, pt is in NAD, no focal neuro deficits, distsended abd, non tender. neurosurg consulted upon arrival, recommended CTA h/N, which showed no acute process without a bleed - unclear what sah finding was based on but per NS here does not seem to be anything that needs emergent intervention. they recommend inpt mri for further spinal eval but no acute signs of cord compression.    Pt found to be febrile with lekuocytosis in ED - given cefepine and will order RUQ given elevated LFTs. At the end of my shift, I signed off the care of the patient to oncoming physician. Plan is for await RUQ imaging, final neurosurg recs, TBA.

## 2021-01-21 NOTE — CONSULT NOTE ADULT - ASSESSMENT
Impression:    81 y/o M w/ hx of DM, HTN, and recurrent urinary incontinence transferred from OSH due to concern for SAH and cord compression, found to have elevated WBC, abnormal liver enzymes, and choledocholithiasis with ductal dilatation on CT A/P    # Choledocholithiasis / cholangitis: Elevated WBC, however, bilirubin only mildly elevated. Elevated WBC may be due to recent steroid use - unclear if patient received steroids at OSH  # C/f cord compression: Neurosurgery following. MRI spine pending.  # DM  # HTN    Recommendations:  - Can plan for ERCP pending results of MRI spine and work-up of possible cord compression  - F/U neurosurgery recommendations  - F/U MRI/MRCP  - F/U blood cultures  - Continue IV antibiotics  - Rest of care per primary team    Gato Mcgrath MD  Gastroenterology and Hepatology Fellow  Please contact via Microsoft Teams    Please call GI ([phone]) or e-mail [email or web address] if there are any additional questions or concerns, during weekdays from 8 am to 5 pm.     Please call answering service for on-call GI fellow ([phone]) after 5pm and before 8am, and on weekends. Impression:    81 y/o M w/ hx of DM, HTN, and recurrent urinary incontinence transferred from OSH due to concern for SAH and cord compression, found to have elevated WBC, abnormal liver enzymes, and choledocholithiasis with ductal dilatation on CT A/P    # Choledocholithiasis / cholangitis: Elevated WBC, however, bilirubin only mildly elevated. Elevated WBC may be due to recent steroid use - unclear if patient received steroids at OSH  # C/f cord compression: Neurosurgery following. MRI spine pending.  # DM  # HTN    Recommendations:  - Can plan for ERCP pending results of MRI spine and work-up of possible cord compression  - Monitor CBC, CMP, and INR daily  - F/U neurosurgery recommendations  - F/U MRI/MRCP  - F/U blood cultures  - Continue IV antibiotics  - Rest of care per primary team    Gato Mcgrath MD  Gastroenterology and Hepatology Fellow  Please contact via Microsoft Teams    Please call GI ([phone]) or e-mail [email or web address] if there are any additional questions or concerns, during weekdays from 8 am to 5 pm.     Please call answering service for on-call GI fellow ([phone]) after 5pm and before 8am, and on weekends.

## 2021-01-21 NOTE — H&P ADULT - ATTENDING COMMENTS
Briefly, this is a 79 y/o M w/ hx of DM, HTN, and recurrent urinary incontinence who was transferred for NSX eval given concern for SAH and possible cord compression. CTH negative for SAH and MRI negative for cord compression. Pt afebrile, VSS. Pt found to have significant leukocytosis on labs with transaminitis and minor elevation in bilirubin. Imaging c/w choledocholithiasis and biliary ductal dilation. Pt meeting criteria for cholangitis. Will c/w abx (Zosyn). GI consulted. Plan for ERCP tomorrow as GI. Trend CBC, monitor fever curve. Start liquid diet. NPO after midnight

## 2021-01-21 NOTE — H&P ADULT - ASSESSMENT
80yM PMH of gallstones (30-40 years ago), BPH, HLD, h/o spinal stenosis  presents as transfer from [place] for further evaluation for subarachnoid and severe spinal stenosis CT head and CT L spine, with imaging findings not showing evidence of SAH or cord compression, however with CT A/P showing biliary duct dilation and choledocolithiasis, elevated wbc, concerning for cholangitis.    #cholangitis  - with nausea, intolerance of PO, abd pain, hx of gallstones, "body shaking" suggestive of rigors at home,   WBC of 30, hx of gallstones, elevated bili, lactate 3.5, CT scan showing choledocholithiasis with dilated biliary duct.   -GI consult: planning for ERCP tmr      #Urinary incontinence  -With recent urinary incontinence. Transferred for spinal cord compression and c/f SAH. Here CTH no evidence of SAH. MRI of T and L spine no benson cord compression.   -    #Diabetes  -with glucosuria here, glucose >1000, this is after receiving nearly 2L of fluid at OSH  -a1c    #HLD     80yM PMH of gallstones (30-40 years ago), BPH, HLD and hypertriglyceridemia, DM, HTN, radiculopathy w/ h/o spinal stenosis  presents as transfer from [place] for further evaluation for subarachnoid and severe spinal stenosis CT head and CT L spine, with imaging findings not showing evidence of SAH or cord compression, however with CT A/P showing biliary duct dilation and choledocolithiasis, elevated wbc, concerning for cholangitis.    #cholangitis  - with nausea, intolerance of PO, abd pain, hx of gallstones, "body shaking" suggestive of rigors at home,   WBC of 30, hx of gallstones, elevated bili, lactate 3.5, CT scan showing choledocholithiasis with dilated biliary duct.   -zosyn x1 at OSH, cefepime 1000mg x1, metronidazole 500mg x1, vancomycin 1g x1, ~3L IVF boluses  -GI consult: planning for ERCP tmr      #Urinary incontinence  -With recent urinary incontinence. Transferred for spinal cord compression and c/f SAH. Here CTH no evidence of SAH. MRI of T and L spine no benson cord compression.   -neurosurgery consult appreciated      #Diabetes  -with glucosuria here, glucose >1000, this is after receiving nearly 2L of fluid at OSH  -a1c    #HLD    #HTN  c/w amlodpine 5mg qd, metoprolol-XL 25mg      #BPH  c/w home tamsulosin, finasteride    #  IMPROVE Score of 2  -lovenox 40qd       80yM PMH of gallstones (30-40 years ago), BPH, HLD and hypertriglyceridemia, DM, HTN, radiculopathy w/ h/o spinal stenosis  presents as transfer from [place] for further evaluation for subarachnoid and severe spinal stenosis CT head and CT L spine, with imaging findings not showing evidence of SAH or cord compression, however with CT A/P showing biliary duct dilation and choledocolithiasis, elevated wbc, concerning for cholangitis.    #cholangitis  - with nausea, intolerance of PO, abd pain, hx of gallstones, "body shaking" suggestive of rigors at home,   WBC of 30, hx of gallstones, elevated bili, lactate 3.5, CT scan showing choledocholithiasis with dilated biliary duct.   -zosyn x1 at OSH, cefepime 1000mg x1, metronidazole 500mg x1, vancomycin 1g x1, ~3L IVF boluses  -c/w zosyn 3.375 q6h  -GI consult: planning for ERCP tmr      #Urinary incontinence  -With recent urinary incontinence. Transferred for spinal cord compression and c/f SAH. Here CTH no evidence of SAH. MRI of T and L spine no benson cord compression.   -neurosurgery consult appreciated  -possibly with overflow incontinence given BPH hx and glucosuria      #Diabetes  -with glucosuria here, glucose >1000, this is after receiving nearly 2L of fluid at OSH  -a1c  -low dose ISS  -FSG qac, qhs      #HLD  hold home cholestryamine and omega acid -3    #HTN  c/w amlodpine 5mg qd, metoprolol-XL 25mg      #BPH  c/w home tamsulosin, finasteride  -bladder scan x1 to check for any obstruction    #  IMPROVE Score of 2  -SCDs until ERCP  -Diet liquid     80yM PMH of gallstones (30-40 years ago), BPH, HLD and hypertriglyceridemia, DM, HTN, radiculopathy w/ h/o spinal stenosis  presents as transfer from [place] for further evaluation for subarachnoid and severe spinal stenosis CT head and CT L spine, with imaging findings not showing evidence of SAH or cord compression,  -- presenting with nausea, PO intolerance, abd pain, however with CT A/P showing biliary duct dilation and choledocolithiasis, elevated wbc, concerning for cholangitis.

## 2021-01-21 NOTE — ED ADULT NURSE REASSESSMENT NOTE - NS ED NURSE REASSESS COMMENT FT1
Report taken from [facility]. PT A&Ox3. VSS. Pt denies any pain at current time. Repeat VBG to be drawn after NS bolus. B/L PIV's intact and patent. Neuro checks intact. Pupils 2 and nonreactive; pts baseline. VSS. Will continue to monitor.

## 2021-01-21 NOTE — CONSULT NOTE ADULT - ATTENDING COMMENTS
As above  Transferred for neurosurgical eval, found to have elevated TB, elevated white count, dilated CBD with choledocholithiasis.  Possible cholangitis though alternate source of elevated white count could be steroid use  Follow up neurosurgery recs/clearance for endoscopy  Tentative plan for ERCP tomorrow  Continue IV antibiotics  Trend labs    Thank you for this interesting consult.  Please call the advanced GI service with any questions or concerns.

## 2021-01-21 NOTE — H&P ADULT - NSHPSOCIALHISTORY_GEN_ALL_CORE
lives with his wife at home  daughter lives nearby    never smoker  used to socially drink but now does not drink at all  denies any drug use    Formerly worked office job

## 2021-01-21 NOTE — CONSULT NOTE ADULT - SUBJECTIVE AND OBJECTIVE BOX
p (1174)     HPI:   80yM transferred from [facility] for neurology consult. Per EMS, pt was found to have a subarachnoid hemorrhage and r/o chord compression and transferred to [facility] ED for neurology consult. Per EMS, PMHx of HTN and DM. Mandarin  #[id number] used for patient report. Pt states he went to [facility] for "peeing a lot". Pt reports urinary incontinence but no bowel incontinence.  Exam:  AOx3, FC, PERRL, EOMI, no facial, 5/5 throughout, no drift    --Anticoagulation:    =====================  PAST MEDICAL HISTORY     PAST SURGICAL HISTORY         MEDICATIONS:  Antibiotics:    Neuro:    Other:      SOCIAL HISTORY:   Occupation:   Marital Status:     FAMILY HISTORY:      ROS: Negative except per HPI    LABS:      01-21    134<L>  |  99  |  21  ----------------------------<  324<H>  4.2   |  22  |  1.05    Ca    8.7      21 Jan 2021 02:40    TPro  6.9  /  Alb  3.5  /  TBili  2.7<H>  /  DBili  x   /  AST  270<H>  /  ALT  222<H>  /  AlkPhos  321<H>  01-21

## 2021-01-22 NOTE — DISCHARGE NOTE PROVIDER - NSDCMRMEDTOKEN_GEN_ALL_CORE_FT
cholestyramine 4 g/5.5 g oral powder for reconstitution: 1 packet(s) orally once a day  Ecotrin Adult Low Strength 81 mg oral delayed release tablet: 1 tab(s) orally once a day  Flomax 0.4 mg oral capsule: 1 cap(s) orally once a day  losartan 50 mg oral tablet: 1 tab(s) orally once a day  Lovaza 1000 mg oral capsule: 2 cap(s) orally 2 times a day  Mag-Ox 400 oral tablet: 1 tab(s) orally once a day  metFORMIN 500 mg oral tablet, extended release: 1 tab(s) orally once a day  multivitamin: 1 tab(s) orally once a day  Norvasc 5 mg oral tablet: 1 tab(s) orally once a day  omeprazole 20 mg oral delayed release capsule: 1 cap(s) orally once a day  Proscar 5 mg oral tablet: 1 tab(s) orally once a day  Toprol-XL 25 mg oral tablet, extended release: 1 tab(s) orally 2 times a day  Vitamin D3:    amoxicillin-clavulanate 875 mg-125 mg oral tablet: 1 tab(s) orally 2 times a day   Dextrose 5% in Water intravenous solution: 1000 milliliter(s) intravenous   Dextrose 5% in Water intravenous solution: 1000 milliliter(s) intravenous   Ecotrin Adult Low Strength 81 mg oral delayed release tablet: 1 tab(s) orally once a day  Flomax 0.4 mg oral capsule: 1 cap(s) orally once a day  losartan 50 mg oral tablet: 1 tab(s) orally once a day  Lovaza 1000 mg oral capsule: 2 cap(s) orally 2 times a day  Mag-Ox 400 oral tablet: 1 tab(s) orally once a day  metFORMIN 1000 mg oral tablet: 1 tab(s) orally 2 times a day   multivitamin: 1 tab(s) orally once a day  Norvasc 5 mg oral tablet: 1 tab(s) orally once a day  omeprazole 20 mg oral delayed release capsule: 1 cap(s) orally once a day  Proscar 5 mg oral tablet: 1 tab(s) orally once a day  rolling walker : Rolling Walker  ICD 10:  R54  Toprol-XL 25 mg oral tablet, extended release: 1 tab(s) orally 2 times a day  ursodiol 300 mg oral capsule: 1 cap(s) orally 2 times a day  Vitamin D3:    amoxicillin-clavulanate 875 mg-125 mg oral tablet: 1 tab(s) orally 2 times a day   Ecotrin Adult Low Strength 81 mg oral delayed release tablet: 1 tab(s) orally once a day  Flomax 0.4 mg oral capsule: 1 cap(s) orally once a day  losartan 50 mg oral tablet: 1 tab(s) orally once a day  Lovaza 1000 mg oral capsule: 2 cap(s) orally 2 times a day  Mag-Ox 400 oral tablet: 1 tab(s) orally once a day  metFORMIN 1000 mg oral tablet: 1 tab(s) orally 2 times a day   multivitamin: 1 tab(s) orally once a day  Norvasc 5 mg oral tablet: 1 tab(s) orally once a day  omeprazole 20 mg oral delayed release capsule: 1 cap(s) orally once a day  Proscar 5 mg oral tablet: 1 tab(s) orally once a day  rolling walker : Rolling Walker  ICD 10:  R54  Toprol-XL 25 mg oral tablet, extended release: 1 tab(s) orally 2 times a day  ursodiol 300 mg oral capsule: 1 cap(s) orally 2 times a day  Vitamin D3:    amoxicillin-clavulanate 875 mg-125 mg oral tablet: 1 tab(s) orally 2 times a day   Ecotrin Adult Low Strength 81 mg oral delayed release tablet: 1 tab(s) orally once a day  Flomax 0.4 mg oral capsule: 1 cap(s) orally once a day  losartan 50 mg oral tablet: 1 tab(s) orally once a day  Lovaza 1000 mg oral capsule: 2 cap(s) orally 2 times a day  Mag-Ox 400 oral tablet: 1 tab(s) orally once a day  metFORMIN 1000 mg oral tablet: 1 tab(s) orally 2 times a day   multivitamin: 1 tab(s) orally once a day  Norvasc 5 mg oral tablet: 1 tab(s) orally once a day  omeprazole 20 mg oral delayed release capsule: 1 cap(s) orally once a day  Proscar 5 mg oral tablet: 1 tab(s) orally once a day  rolling walker : Rolling Walker  ICD 10:  R54  Toprol-XL 25 mg oral tablet, extended release: 1 tab(s) orally 2 times a day  ursodiol 300 mg oral capsule: 1 cap(s) orally 2 times a day  Vitamin D3: 1 tab(s) orally once a day

## 2021-01-22 NOTE — DISCHARGE NOTE PROVIDER - NSFOLLOWUPCLINICS_GEN_ALL_ED_FT
[facility]  Gastroenterology  [address] [place] [id number]  Phone: [phone]  Fax:   Follow Up Time: 1 week

## 2021-01-22 NOTE — DISCHARGE NOTE PROVIDER - PROVIDER TOKENS
PROVIDER:[TOKEN:[[id number]],FOLLOWUP:[1 week]] PROVIDER:[TOKEN:[[id number]],FOLLOWUP:[1 week]],PROVIDER:[TOKEN:[[id number]],FOLLOWUP:[1 week]]

## 2021-01-22 NOTE — PROGRESS NOTE ADULT - PROBLEM SELECTOR PLAN 1
#cholangitis  - with nausea, intolerance of PO, abd pain, hx of gallstones, "body shaking" suggestive of rigors at home,   WBC of 30, hx of gallstones, elevated bili, lactate 3.5, CT scan showing choledocholithiasis with dilated biliary duct.   -zosyn x1 at OSH, cefepime 1000mg x1, metronidazole 500mg x1, vancomycin 1g x1, ~3L IVF boluses  -c/w zosyn 3.375 q6h  -GI consult: planning for ERCP tmr #cholangitis  - with nausea, intolerance of PO, abd pain, hx of gallstones, "body shaking" suggestive of rigors at home,   WBC of 30, hx of gallstones, elevated bili, lactate 3.5, CT scan showing choledocholithiasis with dilated biliary duct.   -zosyn x1 at OSH, cefepime 1000mg x1, metronidazole 500mg x1, vancomycin 1g x1, ~3L IVF boluses  -c/w zosyn 4.5 g - plan for likely 5 day course could switch to PO earlier  -GI consult: ercp 1/22

## 2021-01-22 NOTE — PHYSICAL THERAPY INITIAL EVALUATION ADULT - PERTINENT HX OF CURRENT PROBLEM, REHAB EVAL
Pt is a 80yM PMH of gallstones, BPH, HLD and hypertriglyceridemia, DM, HTN, radiculopathy w/ h/o spinal stenosis  presents as transfer from [place] for further evaluation for subarachnoid and severe spinal stenosis CT head and CT L spine, with imaging findings not showing evidence of SAH or cord compression.Neurosurgery: No clear instability/compression. No acute intervention

## 2021-01-22 NOTE — DISCHARGE NOTE PROVIDER - CARE PROVIDER_API CALL
Michael Schmidt)  Urology  [address] [place] [address]  Phone: [phone]  Fax: [phone]  Follow Up Time: 1 week   Michael Schmidt)  Urology  [address] [place] [address]  Phone: [phone]  Fax: [phone]  Follow Up Time: 1 week    Ed Schmidt)  Gastroenterology; Internal Medicine  Division of Gastroenterology  [address] [place] [address]  Phone: [phone]  Fax: [phone]  Follow Up Time: 1 week

## 2021-01-22 NOTE — PHYSICAL THERAPY INITIAL EVALUATION ADULT - STRENGTHENING, PT EVAL
GOAL: Pt will improve MMT grade in BLE/BUE 1 full grade in 4 weeks to increase overall functional mobility and strength.

## 2021-01-22 NOTE — PROGRESS NOTE ADULT - ASSESSMENT
80yM PMH of gallstones (30-40 years ago), BPH, HLD and hypertriglyceridemia, DM, HTN, radiculopathy w/ h/o spinal stenosis  presents as transfer from [place] for further evaluation for subarachnoid and severe spinal stenosis CT head and CT L spine, with imaging findings not showing evidence of SAH or cord compression,  -- presenting with nausea, PO intolerance, abd pain, however with CT A/P showing biliary duct dilation and choledocolithiasis, elevated wbc, concerning for cholangitis. - planning for ERCP today

## 2021-01-22 NOTE — DISCHARGE NOTE PROVIDER - CARE PROVIDERS DIRECT ADDRESSES
,DirectAddress_Unknown ,DirectAddress_Unknown,[email or web address]

## 2021-01-22 NOTE — PHYSICAL THERAPY INITIAL EVALUATION ADULT - ADDITIONAL COMMENTS
Pt lives with wife in apt building with +elevator use. PTA, pt independent in ambulation and transfers with SAC, requiring assist from dtr Brooklynn t/o the week prn. Dtr reports that she is able to provide assist at home and stays there t/o week prn.

## 2021-01-22 NOTE — PHYSICAL THERAPY INITIAL EVALUATION ADULT - GENERAL OBSERVATIONS, REHAB EVAL
Received pt rec'd in bed, +IVL, and NAD noted. Pt Mandarin speaking, dtr via phone used for translation t/o session.

## 2021-01-22 NOTE — PRE PROCEDURE NOTE - PRE PROCEDURE EVALUATION
Pre-Endoscopy Evaluation                                  Indication for Procedure: Choledocholithiasis    Sedation by Anesthesia [X ]    PAST MEDICAL & SURGICAL HISTORY:  Gallstones    Benign essential HTN    Diabetes    Spinal stenosis    S/P cataract surgery    Latex allergy: [ ] yes [X] no    Smoking: [ ] yes  [X] no    AICD/PPM: [ ] yes   [X] no    Pertinent lab data:                        10.0   14.58 )-----------( 113      ( 22 Jan 2021 07:30 )             31.9     01-22    140  |  102  |  16  ----------------------------<  146<H>  3.1<L>   |  25  |  0.89    Ca    8.9      22 Jan 2021 07:30  Phos  2.4     01-22  Mg     1.8     01-22    TPro  6.8  /  Alb  3.4  /  TBili  3.5<H>  /  DBili  x   /  AST  88<H>  /  ALT  149<H>  /  AlkPhos  260<H>  01-22    PT/INR - ( 22 Jan 2021 07:30 )   PT: 12.0 sec;   INR: 1.00 ratio      PTT - ( 22 Jan 2021 07:30 )  PTT:28.0 sec    Physical Examination:  Vital Signs Last 24 Hrs  T(C): 36.3 (22 Jan 2021 05:11), Max: 36.8 (21 Jan 2021 16:17)  T(F): 97.4 (22 Jan 2021 05:11), Max: 98.3 (22 Jan 2021 01:10)  HR: 79 (22 Jan 2021 05:11) (67 - 79)  BP: 120/70 (22 Jan 2021 05:11) (120/70 - 182/69)  BP(mean): --  RR: 18 (22 Jan 2021 05:11) (18 - 21)  SpO2: 97% (22 Jan 2021 05:11) (97% - 100%)    Constitutional: NAD    HEENT: PERRLA, EOMI,       Neck:  No JVD    Respiratory: CTAB/L    Cardiovascular: S1 and S2    Gastrointestinal: BS+, soft, NT/ND    Extremities: No peripheral edema    Neurological: A/O x 3, no focal deficits    Psychiatric: Normal mood, normal affect    : No Bello    Skin: No rashes    Comments:    ASA Class: I [ ]  II [ ]  III [X]  IV [ ]    The patient is a suitable candidate for the planned procedure unless box checked [ ]  No, explain:

## 2021-01-22 NOTE — PHYSICAL THERAPY INITIAL EVALUATION ADULT - PRECAUTIONS/LIMITATIONS, REHAB EVAL
continued from above:  Hospital course complicated by  nausea, PO intolerance, abd pain, however with CT A/P showing biliary duct dilation and choledocolithiasis, elevated wbc, concerning for cholangitis. 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 Small disc osteophyte complex mildly effaces the ventral cord at T7-8. Dorsal ligamentous hypertrophy narrows the canal at T11-12. No benson cord compression. Degenerative changes of the lumbar spine with anterolisthesis of L3 on L4 which causes moderate to severe spinal stenosis. Degenerative anterolisthesis L4 on L5 causes severe spinal stenosis. 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 Small disc osteophyte complex mildly effaces the ventral cord at T7-8. Dorsal ligamentous hypertrophy narrows the canal at T11-12. No benson cord compression. Degenerative changes of the lumbar spine with anterolisthesis of L3 on L4 which causes moderate to severe spinal stenosis. Degenerative anterolisthesis L4 on L5 causes severe spinal stenosis.

## 2021-01-22 NOTE — DISCHARGE NOTE PROVIDER - NSDCCAREPROVSEEN_GEN_ALL_CORE_FT
Rick Urena, Ani    [facility] Team 1  Oral Roca Romel, Rick Diez, Ani Roca, Oral  [facility] [place] Team 1

## 2021-01-22 NOTE — DISCHARGE NOTE PROVIDER - NSDCCPCAREPLAN_GEN_ALL_CORE_FT
PRINCIPAL DISCHARGE DIAGNOSIS  Diagnosis: Altered mental status  Assessment and Plan of Treatment:       SECONDARY DISCHARGE DIAGNOSES  Diagnosis: Urinary incontinence  Assessment and Plan of Treatment: You have had urinary incontinence for 2 weeks. We checked your bladder to see if you were retaining. There was no evidence that you were retaining at that time. There were no signs of infection in your urine. Please folllow up with a urologist for work up of your incontinence.  Please make an appointment with Dr. Michael Schmidt or Dr. Christopher Goyal at [phone].    Diagnosis: Weakness  Assessment and Plan of Treatment:      PRINCIPAL DISCHARGE DIAGNOSIS  Diagnosis: Acute cholangitis  Assessment and Plan of Treatment: You came in with a change in your mental status, weakness, nausea and vomiting. You were found to have a gallstone in your bile duct (choledocholithiasis) and an infection in your bile duct due to back up of your bile from the gallstone causing a blockage.  You were treated with intravenous antibiotics and improved. We had the gastroenterologists evaluate you. You had a procedure called an ERCP (endoscopic retrograde cholangiopancreatography) with the gastroenterologist. They put in a stent in the bile duct and removed a stone. There was a large stone which could not be retrieved. You were started on a medication called ursodiol to soften the stone. You need to follow up with gastroenterology doctors, for a repeat ERCP to remove the stent and remove another stone in 4-8 weeks.   Call [phone] after discharge to setup an appointment.  If you start having worsening abdominal pain, fever, nausea or vomiting return to the hospital.        SECONDARY DISCHARGE DIAGNOSES  Diagnosis: Urinary incontinence  Assessment and Plan of Treatment: You have had urinary incontinence for 2 weeks. We checked your bladder to see if you were retaining. There was no evidence that you were retaining at that time. There were no signs of infection in your urine. Please folllow up with a urologist for work up of your incontinence.  Please make an appointment with Dr. Michael Schmidt or Dr. Christopher Goyal at [phone].    Diagnosis: Weakness  Assessment and Plan of Treatment:      PRINCIPAL DISCHARGE DIAGNOSIS  Diagnosis: Acute cholangitis  Assessment and Plan of Treatment: You came in with a change in your mental status, weakness, nausea and vomiting. You were found to have a gallstone in your bile duct (choledocholithiasis) and an infection in your bile duct due to back up of your bile from the gallstone causing a blockage.  You were treated with intravenous antibiotics and improved. We had the gastroenterologists evaluate you. You had a procedure called an ERCP (endoscopic retrograde cholangiopancreatography) with the gastroenterologist. They put in a stent in the bile duct and removed a stone. There was a large stone which could not be retrieved. You were started on a medication called ursodiol to soften the stone.   Continue taking the ursodiol as prescribed. You need to follow up with gastroenterology doctors, for a repeat ERCP to remove the stent and remove another stone in 4-8 weeks. Call [phone] after discharge to setup an appointment.  If you start having worsening abdominal pain, fever, nausea or vomiting return to the hospital.        SECONDARY DISCHARGE DIAGNOSES  Diagnosis: Urinary incontinence  Assessment and Plan of Treatment: You have had urinary incontinence for 2 weeks. We checked your bladder to see if you were retaining. There was no evidence that you were retaining at that time. There were no signs of infection in your urine. Please folllow up with a urologist for work up of your incontinence.  Please make an appointment with Dr. Michael Schmidt or Dr. Christopher Goyal at [phone].    Diagnosis: Weakness  Assessment and Plan of Treatment:      PRINCIPAL DISCHARGE DIAGNOSIS  Diagnosis: Acute cholangitis  Assessment and Plan of Treatment: You came in with a change in your mental status, weakness, nausea and vomiting. You were found to have a gallstone in your bile duct (choledocholithiasis) and an infection in your bile duct due to back up of your bile from the gallstone causing a blockage.  You were treated with intravenous antibiotics and improved. We had the gastroenterologists evaluate you. You had a procedure called an ERCP (endoscopic retrograde cholangiopancreatography) with the gastroenterologist. They put in a stent in the bile duct and removed a stone. There was a large stone which could not be retrieved. You were started on a medication called ursodiol to soften the stone.   Continue taking the ursodiol as prescribed. You need to follow up with gastroenterology doctors, for a repeat ERCP to remove the stent and remove another stone in 4-8 weeks. Call [phone] after discharge to setup an appointment.  If you start having worsening abdominal pain, fever, nausea or vomiting return to the hospital.        SECONDARY DISCHARGE DIAGNOSES  Diagnosis: Urinary incontinence  Assessment and Plan of Treatment: You have had urinary incontinence for 2 weeks. We checked your bladder to see if you were retaining. There was no evidence that you were retaining at that time. There were no signs of infection in your urine. Please folllow up with a urologist for work up of your incontinence.  Please make an appointment with Dr. Michael Schmidt or Dr. Christopher Goyal at [phone].     PRINCIPAL DISCHARGE DIAGNOSIS  Diagnosis: Acute cholangitis  Assessment and Plan of Treatment: You came in with a change in your mental status, weakness, nausea and vomiting. You were found to have a gallstone in your bile duct (choledocholithiasis) and an infection in your bile duct due to back up of your bile from the gallstone causing a blockage.  You were treated with intravenous antibiotics and improved. We had the gastroenterologists evaluate you. You had a procedure called an ERCP (endoscopic retrograde cholangiopancreatography) with the gastroenterologist. They put in a stent in the bile duct and removed a stone. There was a large stone which could not be retrieved. You were started on a medication called ursodiol to soften the stone.   Continue taking the ursodiol as prescribed. You need to follow up with gastroenterology doctors, for a repeat ERCP to remove the stent and remove another stone in 4-8 weeks. Call [phone] after discharge to setup an appointment.  If you start having worsening abdominal pain, fever, nausea or vomiting return to the hospital.        SECONDARY DISCHARGE DIAGNOSES  Diagnosis: Urinary incontinence  Assessment and Plan of Treatment: You have had urinary incontinence for 2 weeks. We checked your bladder to see if you were retaining. There was no evidence that you were retaining at that time. There were no signs of infection in your urine. Please folllow up with a urologist for work up of your incontinence.  Please make an appointment with Dr. Michael Schmidt or Dr. Christopher Goyal at [phone].    Diagnosis: Diabetes  Assessment and Plan of Treatment: You have a history of Diabetes that required you to take metformin to control your blood sugar. Your A1c this admission was 8.0 meaning that you could benefit from tighter control and       Diagnosis: HTN (hypertension)  Assessment and Plan of Treatment: HTN (hypertension)     PRINCIPAL DISCHARGE DIAGNOSIS  Diagnosis: Acute cholangitis  Assessment and Plan of Treatment: You came in with a change in your mental status, weakness, nausea and vomiting. You were found to have a gallstone in your bile duct (choledocholithiasis) and an infection in your bile duct due to back up of your bile from the gallstone causing a blockage.  You were treated with intravenous antibiotics and improved. We had the gastroenterologists evaluate you. You had a procedure called an ERCP (endoscopic retrograde cholangiopancreatography) with the gastroenterologist. They put in a stent in the bile duct and removed a stone. There was a large stone which could not be retrieved. You were started on a medication called ursodiol to soften the stone.   Continue taking the ursodiol as prescribed. You need to follow up with gastroenterology doctors, for a repeat ERCP to remove the stent and remove another stone in 4-8 weeks. Call [phone] after discharge to setup an appointment.  If you start having worsening abdominal pain, fever, nausea or vomiting return to the hospital.        SECONDARY DISCHARGE DIAGNOSES  Diagnosis: Urinary incontinence  Assessment and Plan of Treatment: You have had urinary incontinence for 2 weeks. We checked your bladder to see if you were retaining. There was no evidence that you were retaining at that time. There were no signs of infection in your urine. Please folllow up with a urologist for work up of your incontinence.  Please make an appointment with Dr. Michael Schmidt or Dr. Christopher Goyal at [phone].    Diagnosis: Diabetes  Assessment and Plan of Treatment: You have a history of Diabetes that required you to take metformin to control your blood sugar. Your A1c this admission was 8.0 meaning that you could benefit from tighter control and close follow up with your PCP, as well as lifestyle modifications. We increased the dose of your metformin to 1g twice a day. If your A1c does not improve you made need to add an additional medication.       Diagnosis: HTN (hypertension)  Assessment and Plan of Treatment: You were previously diagnosed with high blood pressure. Please continue on your home medications at this time and follow up with your PMD for further surveillance and management of your high blood pressure. You may need to increase the dose of your amlodipine when you follow up with your PCP.

## 2021-01-22 NOTE — DISCHARGE NOTE PROVIDER - NSDCCPTREATMENT_GEN_ALL_CORE_FT
PRINCIPAL PROCEDURE  Procedure: ERCP  Findings and Treatment: You had a procedure called an ERCP (endoscopic retrograde cholangiopancreatography). This is wehre they stick a special type of camera through your mouth and via your stomach and small intestine reach the bile duct. They retrieved a stone and placed a stent for further drainage of bile. There was another stone that was too large and hard to be removed. You will r[place] another ERCP in 4-8 weeks to remove the stent and additional stone.

## 2021-01-22 NOTE — DISCHARGE NOTE PROVIDER - HOSPITAL COURSE
80yM PMH of gallstones (30-40 years ago), BPH, HLD and hypertriglyceridemia, DM, HTN, radiculopathy w/ h/o spinal stenosis  presents as transfer from [place] for further evaluation for subarachnoid and severe spinal stenosis CT head and CT L spine. Imaging findings including MRI T and L spine, and CTH not showing evidence of SAH or cord compression. At home he had nausea, PO intolerance, abd pain, AMS, generalized weakness, and what sounded like rigors. On admission here, he was noted to have an elevated WBC of 30, elevated bili, and CT A/P showing biliary duct dilation and choledocolithiasis concerning for moderate-severe cholangitis. At the OSH he had already received 2L IVF bolus and zosyn and tylenol. In the [facility] ED, he received cefipime, vanc, and metronidazole and another 1L bolus. He was admitted for cholangitis and treated with zosyn, with improvement in mental status. On day 2 of admission he had an ERCP with GI.    He also reported a 2 week history of urinary incontinence (hence the initial concern at [facility] for cord compression). He states he cannot sense when the urine comes, and it just comes out on its own. He continued to have incontinence here recorded in chart. He was continued on home finasteride and tamsulosin. Bladder scan had PVR ~120cc, no evidence of obstruction. Notably UA negative for infection, but with glucose in 1000s. Urology was curbsided and recommended outpatient f/u for w/u of urinary incontinence.   80yM PMH of gallstones (30-40 years ago), BPH, HLD and hypertriglyceridemia, DM, HTN, radiculopathy w/ h/o spinal stenosis  presents as transfer from [place] for further evaluation for subarachnoid and severe spinal stenosis CT head and CT L spine. Imaging findings including MRI T and L spine, and CTH not showing evidence of SAH or cord compression. At home he had nausea, PO intolerance, abd pain, AMS, generalized weakness, and what sounded like rigors. On admission here, he was noted to have an elevated WBC of 30, elevated bili, and CT A/P showing biliary duct dilation and choledocolithiasis concerning for moderate-severe cholangitis. At the OSH he had already received 2L IVF bolus and zosyn and tylenol. In the [facility] ED, he received cefipime, vanc, and metronidazole and another 1L bolus. He was admitted for cholangitis and treated with zosyn, with improvement in mental status. On day 2 of admission he had an ERCP with GI. Partial removal was accomplished with biliary sphincterotomy and balloon sweeps. Started on ursodiol to soften stone. Will need repeat ERCP in 4-8 weeks for stent and stone removal. After ERCP bili and LFTs improved.    He also reported a 2 week history of urinary incontinence (hence the initial concern at [facility] for cord compression). He states he cannot sense when the urine comes, and it just comes out on its own. He continued to have incontinence here recorded in chart. He was continued on home finasteride and tamsulosin. Bladder scan had PVR ~120cc, no evidence of obstruction. Notably UA negative for infection, but with glucose in 1000s. Urology was curbsided and recommended outpatient f/u for w/u of urinary incontinence.   80yM PMH of gallstones (30-40 years ago), BPH, HLD and hypertriglyceridemia, DM, HTN, radiculopathy w/ h/o spinal stenosis  presents as transfer from [place] for further evaluation for subarachnoid and severe spinal stenosis CT head and CT L spine. Imaging findings including MRI T and L spine, and CTH not showing evidence of SAH or cord compression. At home he had nausea, PO intolerance, abd pain, AMS, generalized weakness, and what sounded like rigors. On admission here, he was noted to have an elevated WBC of 30, elevated bili, and CT A/P showing biliary duct dilation and choledocolithiasis concerning for moderate-severe cholangitis. At the OSH he had already received 2L IVF bolus and zosyn and tylenol. In the [facility] ED, he received cefipime, vanc, and metronidazole and another 1L bolus. He was admitted for cholangitis vs choledocholithiasis and treated with zosyn, with improvement in mental status. On day 2 of admission he had an ERCP with GI. Partial removal was accomplished with biliary sphincterotomy and balloon sweeps. Started on ursodiol to soften stone. Will need repeat ERCP in 4-8 weeks for stent and stone removal. After ERCP bili and LFTs improved.    He also reported a 2 week history of urinary incontinence (hence the initial concern at [facility] for cord compression). He states he cannot sense when the urine comes, and it just comes out on its own. He continued to have incontinence here recorded in chart. He was continued on home finasteride and tamsulosin. Bladder scan had PVR ~120cc, no evidence of obstruction. Notably UA negative for infection, but with glucose in 1000s. Urology was curbsided and recommended outpatient f/u for w/u of urinary incontinence. The patient was seen and evaluated and deemed medically stable for discharge.   80yM PMH of gallstones (30-40 years ago), BPH, HLD and hypertriglyceridemia, DM, HTN, radiculopathy w/ h/o spinal stenosis  presents as transfer from [place] for further evaluation for subarachnoid and severe spinal stenosis CT head and CT L spine. Imaging findings including MRI T and L spine, and CTH not showing evidence of SAH or cord compression. At home he had nausea, PO intolerance, abd pain, AMS, generalized weakness, and what sounded like rigors. On admission here, he was noted to have an elevated WBC of 30, elevated bili, and CT A/P showing biliary duct dilation and choledocolithiasis concerning for moderate-severe cholangitis. At the OSH he had already received 2L IVF bolus and zosyn and tylenol. In the [facility] ED, he received cefipime, vanc, and metronidazole and another 1L bolus. He was admitted for cholangitis vs choledocholithiasis and treated with zosyn, with improvement in mental status. On day 2 of admission he had an ERCP with GI. Partial removal was accomplished with biliary sphincterotomy and balloon sweeps. Started on ursodiol to soften stone. Will need repeat ERCP in 4-8 weeks for stent and stone removal. After ERCP bili and LFTs improved.    He also reported a 2 week history of urinary incontinence (hence the initial concern at [facility] for cord compression). He states he cannot sense when the urine comes, and it just comes out on its own. He continued to have incontinence here recorded in chart. He was continued on home finasteride and tamsulosin. Bladder scan had PVR ~120cc, no evidence of obstruction. Notably UA negative for infection, but with glucose in 1000s. Urology was curbsided and recommended outpatient f/u for w/u of urinary incontinence. The patient was seen and evaluated and deemed medically stable for discharge.

## 2021-01-23 NOTE — PROGRESS NOTE ADULT - PROBLEM SELECTOR PLAN 9
#  IMPROVE Score of 2  -SCDs until ERCP  -Diet liquid CC, DASH/TLC #  IMPROVE Score of 2  -SCDs for now given thrombocytopenia  -Diet liquid CC, DASH/TLC adv as tolerated  -PT eval   -Fall risk

## 2021-01-23 NOTE — PROGRESS NOTE ADULT - ASSESSMENT
80yM PMH of gallstones (30-40 years ago), BPH, HLD and hypertriglyceridemia, DM, HTN, radiculopathy w/ h/o spinal stenosis initially presented as transfer form OSH for c/f SAH and cord compression, however imaging here negative, presenting with nausea, PO intolerance, abd pain, found to have acute cholangitis s/p ERCP 1/22 with stent placement.

## 2021-01-23 NOTE — PROGRESS NOTE ADULT - PROBLEM SELECTOR PLAN 2
Microcytic anemia  ddx: iron deficiency anemia ( not UTD on colonoscopy), AOCD, thalassemia  -no reported hx of thalassemia  -retic ct, iron studies  -b12 and folate as well (because pt c/o balance issues) Microcytic anemia: - per daughter has hx of thalassemia, ddx: thalassemia, iron deficiency anemia ( not UTD on colonoscopy), AOCD  -retic ct, iron studies  -b12 and folate as well (because pt c/o balance issues)

## 2021-01-23 NOTE — PROGRESS NOTE ADULT - PROBLEM SELECTOR PLAN 1
#cholangitis  - with nausea, intolerance of PO, abd pain, hx of gallstones, "body shaking" suggestive of rigors at home,  WBC of 30, hx of gallstones, elevated bili, lactate 3.5, CT scan showing choledocholithiasis with dilated biliary duct.   -zosyn x1 at OSH, cefepime 1000mg x1, metronidazole 500mg x1, vancomycin 1g x1, ~3L IVF boluses  -c/w zosyn 4.5 g - plan for likely 5 day course could switch to PO earlier  -GI consult: ercp 1/22 stent placed and 1 stone removed, 1 big stone still in biliary duct. Ursodiol to soften stone, repeat ERCP in 4-8 weeks to retrieve stone.  - LFTs and bili improved after stent placement

## 2021-01-23 NOTE — PROGRESS NOTE ADULT - ASSESSMENT
79 y/o Mandarin-speaking M w/ hx of DM, HTN, and recurrent urinary incontinence transferred from OSH due to concern for SAH and cord compression, found to have elevated WBC, abnormal liver enzymes, and choledocholithiasis with ductal dilatation on CT A/P.  - CT Head negative for SAH. MRI negative for cord compression  S/P ERCP 01/22 - Choledocholithiasis was found in a severely dilated common bile duct. Partial removal was accomplished with biliary sphincterotomy and balloon sweeps.   - A 10 Fr x 5 cm stent was inserted.    Impression:  # Choledocholithiasis s/p Partial removal was accomplished with biliary sphincterotomy and balloon sweeps.   # Severely dilated common bile duct- A 10 Fr x 5 cm stent placement     Recommendation:       - Advance as tolerated   - Trend liver tests, CBC, INR.  - Continue Ursodiol 300 mg PO BID until next ERCP to soften stone.  - Repeat ERCP in 4-8 weeks for stent and stone removal. [phone] to schedule.      Allen Montanez   Gastroenterology Fellow  Pager: [phone]  Please call answering service [phone] / on-call GI fellow after 5pm and before 8am, and on weekends. 79 y/o Mandarin-speaking M w/ hx of DM, HTN, and recurrent urinary incontinence transferred from OSH due to concern for SAH and cord compression, found to have elevated WBC, abnormal liver enzymes, and choledocholithiasis with ductal dilatation on CT A/P.  - CT Head negative for SAH. MRI negative for cord compression  S/P ERCP 01/22 - Choledocholithiasis was found in a severely dilated common bile duct. Partial removal was accomplished with biliary sphincterotomy and balloon sweeps.   - A 10 Fr x 5 cm stent was inserted.    Impression:  # Choledocholithiasis s/p Partial removal was accomplished with biliary sphincterotomy and balloon sweeps.   # Severely dilated common bile duct- A 10 Fr x 5 cm stent placement     Recommendation:       - Advance diet as tolerated   - Trend liver tests, CBC, INR.  - Continue Ursodiol 300 mg PO BID until next ERCP to soften stone.  - Repeat ERCP in 4-8 weeks for stent and stone removal. [phone] to schedule.      Allen Montanez   Gastroenterology Fellow  Pager: [phone]  Please call answering service [phone] / on-call GI fellow after 5pm and before 8am, and on weekends.

## 2021-01-24 NOTE — PROGRESS NOTE ADULT - PROBLEM SELECTOR PLAN 1
#cholangitis  Presented with nausea, intolerance of PO, abd pain, AMS, hx of gallstones, "body shaking" suggestive of rigors at home. On admission WBC of 30, hx of gallstones, elevated bili, lactate 3.5, CT scan showing choledocholithiasis with dilated biliary duct.   -zosyn x1 at OSH, cefepime 1000mg x1, metronidazole 500mg x1, vancomycin 1g x1, ~3L IVF boluses  -c/w zosyn 4.5 g (1/21-) plan for likely 5 day course could switch to PO earlier  -GI consult: ercp 1/22 stent placed and 1 stone removed, 1 big stone still in biliary duct. Ursodiol to soften stone, repeat ERCP in 4-8 weeks to retrieve stone.  - LFTs and bili improved after stent placement

## 2021-01-24 NOTE — PROGRESS NOTE ADULT - ASSESSMENT
80yM PMH of gallstones (30-40 years ago), BPH, HLD and hypertriglyceridemia, DM, HTN, radiculopathy w/ h/o spinal stenosis initially presented as transfer form OSH for r/o SAH and cord compression given AMS and urinary incontinence- workup negative; found to have acute cholangitis s/p ERCP 1/22 with stent placement.

## 2021-01-24 NOTE — PROGRESS NOTE ADULT - PROBLEM SELECTOR PLAN 5
#BPH  c/w home tamsulosin, finasteride  -bladder scan x1 to check for any obstruction - for c/f overflow incontinence
#Diabetes  -with glucosuria here, glucose >1000, this is after receiving nearly 2L of fluid at OSH  -a1c 8%, uncontrolled  -low dose ISS  -FSG qac, qhs
#Diabetes  -a1c 8%, uncontrolled  -low dose ISS  -FSG qac, qhs  - will increase metformin to 1g BID upon discharge and have pt follow up with PCP  for A1c check in 3 months

## 2021-01-24 NOTE — PROGRESS NOTE ADULT - PROBLEM SELECTOR PLAN 8
#HLD, hx hypertriglyceridemia  hold home cholestryamine and   c/w omega acid -3
#HLD, hx hypertriglyceridemia  hold home cholestryamine   c/w omega acid -3  Ideally would also initiate statin therapy given T2DM but given age would be more appropriate to discuss with his PCP and have shared decision making if statin is warranted.

## 2021-01-24 NOTE — PROGRESS NOTE ADULT - PROBLEM SELECTOR PLAN 9
#  IMPROVE Score of 2  -SCDs for now given thrombocytopenia  -Diet liquid CC, DASH/TLC adv as tolerated  -PT eval - Rolling walker, home PT

## 2021-01-24 NOTE — DISCHARGE NOTE NURSING/CASE MANAGEMENT/SOCIAL WORK - INFLUENZA IMMUNIZATION DATE (APPROXIMATE):
After Visit Summary   8/1/2017    Terrence Carter    MRN: [id number]           Patient Information     Date Of Birth          1950        Visit Information        Provider Department      8/1/2017 8:40 AM Jimena Paredes DO [facility]        Today's Diagnoses     Rash and nonspecific skin eruption    -  1    Dizziness        Hypertension goal BP (blood pressure) < 140/90        Candida infection          Care Instructions    Hold your cholesterol medication(simvastatin for 5 days)  Take antifungal med for 5 days  Use cream on rash  Don't itch and touch the spots as they can spread  Use all meds  Close monitoring of bp  Follow up in 1-2 weeks here with pcp for recheck all your symptoms  Jimena Paredes D.O.      [facility]   Discharged by : Anitra BARAHONA CMA ([facility])    Paper scripts provided to patient : none      If you have any questions regarding your visit please contact your care team:     [facility] Clinic Hours Telephone Number   Dr. Emma Aguilar PARicardaC  Dr. Joaquín Mcdonald   7am-7pm Monday - Thursday   7am-5pm Fridays  [phone]   (Appointment scheduling available 24/7)   RN Line   [phone] option 2       For a Price Quote for your services, please call our Consumer Price Line at [phone].     What options do I have for visits at the clinic other than the traditional office visit?     To expand how we care for you, many of our providers are utilizing electronic visits (e-visits) and telephone visits, when medically appropriate, for interactions with their patients rather than a visit in the clinic. We also offer nurse visits for many medical concerns. Just like any other service, we will bill your insurance company for this type of visit based on time spent on the phone with your provider. Not all insurance companies cover these visits. Please check with your medical insurance if this  01-Sep-2020 type of visit is covered. You will be responsible for any charges that are not paid by your insurance.   E-visits via Ozmosis: generally incur a $35.00 fee.     Telephone visits:   Time spent on the phone: *charged based on time that is spent on the phone in increments of 10 minutes. Estimated cost:   5-10 mins $30.00   11-20 mins. $59.00   21-30 mins. $85.00     Use Ozmosis (secure email communication and access to your chart) to send your primary care provider a message or make an appointment. Ask someone on your Team how to sign up for Ozmosis.     As always, Thank you for trusting us with your health care needs!      [facility]:    Scheduling Appointments  [place] [place]  Call: [phone]    [facility]  Call: [phone]    [facility]  Call: [phone]      WHERE TO GO FOR CARE?    Clinic    Make an appointment if you:       Are sick (cold, cough, flu, sore throat, earache or in pain).       Have a small injury (sprain, small cut, burn or broken bone).       Need a physical exam, Pap smear, vaccine or prescription refill.       Have questions about your health or medicines.    To reach us:      Call [phone] ([phone]). Open 24 hours every day. (For counseling services, call [phone].)    Log into Ozmosis at [email or web address]. (Visit [email or web address] to create an account.) Hospital emergency room    An emergency is a serious or life- threatening problem that must be treated right away.    Call [phone] or get to the hospital if you have:      Very bad or sudden:            - Chest pain or pressure         - Bleeding         - Head or belly pain         - Dizziness or trouble seeing, walking or                          Speaking      Problems breathing      Blood in your vomit or you are coughing up blood      A major injury (knocked out, loss of a finger or limb, rape, broken bone protruding from  skin)    A mental health crisis. (Or call the Mental Health Crisis line at 1-342.771.1256 or Suicide Prevention Hotline at 1-171.850.3793.)    Open 24 hours every day. You don't need an appointment.     Urgent care    Visit urgent care for sickness or small injuries when the clinic is closed. You don't need an appointment. To check hours or find an urgent care near you, visit [email or web address]. Online care    Get online care from [facility] for more than 70 common problems, like colds, allergies and infections. Open 24 hours every day at:   [email or web address]   Need help deciding?    For advice about where to be seen, you may call your clinic and ask to speak with a nurse. We're here for you 24 hours every day.         If you are deaf or hard of hearing, please let us know. We provide many free services including sign language interpreters, oral interpreters, TTYs, telephone amplifiers, note takers and written materials.                         Follow-ups after your visit        Who to contact     If you have questions or need follow up information about today's clinic visit or your schedule please contact [facility] directly at [phone].  Normal or non-critical lab and imaging results will be communicated to you by MyChart, letter or phone within 4 business days after the clinic has received the results. If you do not hear from us within 7 days, please contact the clinic through [employer] or phone. If you have a critical or abnormal lab result, we will notify you by phone as soon as possible.  Submit refill requests through [employer] or call your pharmacy and they will forward the refill request to us. Please allow 3 business days for your refill to be completed.          Additional Information About Your Visit        [employer] Information     [employer] lets you send messages to your doctor, view your test results, renew your prescriptions, schedule appointments and more. To sign up, go to  [email or web address] . Click on \"Log in\" on the left side of the screen, which will take you to the Welcome page. Then click on \"Sign up Now\" on the right side of the page.     You will be asked to enter the access code listed below, as well as some personal information. Please follow the directions to create your username and password.     Your access code is: [email or web address]  Expires: 10/30/2017  9:39 AM     Your access code will  in 90 days. If you need help or a new code, please call your [facility] clinic or [phone].        Care EveryWhere ID     This is your Care EveryWhere ID. This could be used by other organizations to access your [facility] medical records  [id number]        Your Vitals Were     Pulse Temperature Height Pulse Oximetry BMI (Body Mass Index)       83 98.5  F (36.9  C) (Oral) 5' (1.524 m) 96% 23.9 kg/m2        Blood Pressure from Last 3 Encounters:   17 134/76   17 (!) 210/100   17 (!) 191/91    Weight from Last 3 Encounters:   17 122 lb 6 oz (55.5 kg)   17 126 lb (57.2 kg)   17 126 lb (57.2 kg)              We Performed the Following     KOH prep (skin, hair or nails only)          Today's Medication Changes          These changes are accurate as of: 17  9:39 AM.  If you have any questions, ask your nurse or doctor.               Start taking these medicines.        Dose/Directions    fluconazole 150 MG tablet   Commonly known as:  DIFLUCAN   Used for:  Candida infection   Started by:  Jimena Paredes DO        Dose:  150 mg   Take 1 tablet (150 mg) by mouth daily   Quantity:  5 tablet   Refills:  0       nystatin-triamcinolone cream   Commonly known as:  MYCOLOG II   Used for:  Candida infection   Started by:  Jimena Paredes DO        Apply topically 2 times daily   Quantity:  30 g   Refills:  1            Where to get your medicines      These medications were sent to [facility]  [place] - [address]  [address] [place] [address] [facility] [address]     Phone:  [phone]     fluconazole 150 MG tablet    nystatin-triamcinolone cream                Primary Care Provider Office Phone # Fax #    Bhumi Paniagua [phone]       [facility] [address] [facility] [address]        Equal Access to Services     BRIANNA REES : Hadii aad ku hadasho Soomaali, waaxda luqadaha, qaybta kaalmada adeegyada, waxay idiin hayaan adeeg kharash lakurtn ah. So [facility] [phone].    ATENCIÓN: Si habla español, tiene a grimes disposición servicios gratuitos de asistencia lingüística. Ranjit al [phone].    We comply with applicable federal civil rights laws and [place] laws. We do not discriminate on the basis of race, color, national origin, age, disability sex, sexual orientation or gender identity.            Thank you!     Thank you for choosing [facility]  for your care. Our goal is always to provide you with excellent care. Hearing back from our patients is one way we can continue to improve our services. Please take a few minutes to complete the written survey that you may receive in the mail after your visit with us. Thank you!             Your Updated Medication List - Protect others around you: Learn how to safely use, store and throw away your medicines at www.disposemymeds.org.          This list is accurate as of: 8/1/17  9:39 AM.  Always use your most recent med list.                   Brand Name Dispense Instructions for use Diagnosis    alendronate 35 MG tablet    FOSAMAX    12 tablet    Take 1 tablet (35 mg) by mouth every 7 days Take with over 8 ounces water and stay upright for at least 30 minutes after dose.  Take at least 60 minutes before breakfast    Osteoporosis       fluconazole 150 MG tablet    DIFLUCAN    5 tablet    Take 1 tablet (150 mg) by mouth daily    Candida infection       hydrochlorothiazide 25 MG tablet    HYDRODIURIL          *  losartan 50 MG tablet    COZAAR    90 tablet    Take 1 tablet (50 mg) by mouth daily    Hypertension goal BP (blood pressure) < 140/90       * losartan 100 MG tablet    COZAAR          nystatin-triamcinolone cream    MYCOLOG II    30 g    Apply topically 2 times daily    Candida infection       OMEGA-3 FISH OIL PO      Take by mouth daily        simvastatin 20 MG tablet    ZOCOR    90 tablet    Take 1 tablet (20 mg) by mouth At Bedtime    Hyperlipidemia LDL goal <130       traZODone 50 MG tablet    DESYREL    30 tablet    Take 1 tablet (50 mg) by mouth nightly as needed for sleep    Insomnia, unspecified type       * Notice:  This list has 2 medication(s) that are the same as other medications prescribed for you. Read the directions carefully, and ask your doctor or other care provider to review them with you.

## 2021-01-24 NOTE — DISCHARGE NOTE NURSING/CASE MANAGEMENT/SOCIAL WORK - CASE MANAGER'S NAME
"Ilana Copeland, RN Utilization Review [phone]  Fax # [phone]  Ref # [id number]          Gladys Vega (55 y.o. Female)     Date of Birth Social Security Number Address Home Phone MRN    1963  [address] [place] [address] [phone] [id number]    [employer] Marital Status          None Single       Admission Date Admission Type Admitting Provider Attending Provider Department, Room/Bed    19 Emergency Dain Roy MD Sloan, Walker E, MD Psychiatric 2F, S211    Discharge Date Discharge Disposition Discharge Destination                       Attending Provider:  Dain Roy MD    Allergies:  Propoxyphene-acetaminophen    Isolation:  None   Infection:  None   Code Status:  CPR    Ht:  154.9 cm (61\")   Wt:  79.4 kg (175 lb)    Admission Cmt:  None   Principal Problem:  None                Active Insurance as of 2019     Primary Coverage     Payor Plan Insurance Group Employer/Plan Group    ANTHEM MEDICARE REPLACEMENT ANTHEM MEDICARE ADVANTAGE [id number]     Payor Plan Address Payor Plan Phone Number Payor Plan Fax Number Effective Dates    [address] [phone]  2019 - None Entered    [facility] [id number]       Subscriber Name Subscriber Birth Date Member ID       GLADYS VEGA 1963 [id number]                 Emergency Contacts      (Rel.) Home Phone Work Phone Mobile Phone    Ludin Vega (Son) [phone] -- --               History & Physical      DayJazmine MD at 2019 10:26 PM              [facility] Medicine Services  HISTORY AND PHYSICAL    Patient Name: Gladys Vega  : 1963  MRN: [id number]  Primary Care Physician: Justina Vines MD  Date of admission: 2019      Subjective   Subjective     Chief Complaint:  rle pain    HPI:  Gladys Vega is a 55 y.o. female with hx of aortobifemoral bypass surgery   at [facility] who then developed subsequent incision site infection w/ inpt " iv abx/revison of bypass for 6 wks at .  Has had recurrent cellulitis since that time in RLE w/ most recent even in 8/18.  Currently pt c/o same rle pain and swelling as well as f/c w/ temps 101.7 pta.  Pt states current sx have been present for 2 days; states pain worse w/ this episode than w/ prior.      Pt has received vanc and rocephin in er.      Review of Systems    Otherwise complete ROS reviewed and is negative except as mentioned in the HPI.    Personal History     Past Medical History:   Diagnosis Date   • Cancer (CMS/HCC)     skin   • COPD (chronic obstructive pulmonary disease) (CMS/HCC)    • Coronary artery disease    • Diabetes mellitus (CMS/HCC)    • Hyperlipidemia    • Hypertension        Past Surgical History:   Procedure Laterality Date   • APPENDECTOMY     • CARDIAC SURGERY     • CHOLECYSTECTOMY     • COLONOSCOPY     • EYE SURGERY     • HYSTERECTOMY     • SKIN BIOPSY     • TUBAL ABDOMINAL LIGATION     • VASCULAR SURGERY         Family History: family history is not on file. Otherwise pertinent FHx was reviewed and unremarkable.     Social History:  reports that she has been smoking cigarettes.  She does not have any smokeless tobacco history on file. She reports that she uses drugs. Drug: Marijuana. She reports that she does not drink alcohol.  Social History     Social History Narrative   • Not on file       Medications:    Available home medication information reviewed.    (Not in a hospital admission)    Allergies   Allergen Reactions   • Propoxyphene-Acetaminophen Nausea And Vomiting       Objective   Objective     Vital Signs:   Temp:  [98.7 °F (37.1 °C)-99.4 °F (37.4 °C)] 98.7 °F (37.1 °C)  Heart Rate:  [101-141] 103  Resp:  [18] 18  BP: (116-135)/(65-82) 116/67        Physical Exam    gen; alert, oriented, nad  Heent; perrla, eomi, mmm  Cv; rr w/ tachycardia, no mrg  L; end exp wheeze t/o  Abd; soft, +bs, mild diffuse ttp t/o; no r/g  Ext; rle w/ erythema half way up leg ant/post  including foot w/ diminished pulses   Skin; see above  Neuro; grossly intact  Psych; mood and affect appropriate    Results Reviewed:  I have personally reviewed current lab, radiology, and data and agree.    Results from last 7 days   Lab Units 05/01/19 2117   WBC 10*3/mm3 17.02*   HEMOGLOBIN g/dL 14.3   HEMATOCRIT % 45.9   PLATELETS 10*3/mm3 237     Results from last 7 days   Lab Units 05/01/19 2117   SODIUM mmol/L 132*   POTASSIUM mmol/L 4.1   CHLORIDE mmol/L 95*   CO2 mmol/L 25.0   BUN mg/dL 17   CREATININE mg/dL 0.78   GLUCOSE mg/dL 111*   CALCIUM mg/dL 8.6   ALT (SGPT) U/L 9   AST (SGOT) U/L 12     Estimated Creatinine Clearance: 75.6 mL/min (by C-G formula based on SCr of 0.78 mg/dL).  Brief Urine Lab Results     None        No results found for: BNP  Imaging Results (last 24 hours)     Procedure Component Value Units Date/Time    XR Chest 1 View [[id number]] Collected:  05/01/19 2116     Updated:  05/01/19 2119    Narrative:       CR Chest 1 Vw    INDICATION:   Penicillin resistance staph infection in the right lower leg for the past couple of days. COPD. Cough and wheezing with fever for years.     COMPARISON:    8/17/2018    FINDINGS:  Single portable AP view of the chest.  Cardiac silhouette is borderline enlarged and stable. Elongation of the thoracic aorta. Underlying COPD. Vascularity is within normal limits. Lungs appear clear with exception of some probable linear atelectasis or  scarring in the left base. No pneumothorax.      Impression:       Chronic appearing lung changes. No definite superimposed active disease.    Signer Name: Mannie Ojeda MD   Signed: 5/1/2019 9:16 PM   Workstation Name: [id number]                Assessment/Plan   Assessment / Plan     Active Hospital Problems    Diagnosis POA   • Cellulitis [L03.90] Yes   • Sepsis (CMS/HCC) [A41.9] Yes   • Smoker [F17.200] Yes   • Hypertension [I10] Yes   • Hyperlipidemia [E78.5] Yes   • Diabetes mellitus (CMS/HCC) [E11.9] Yes   •  Coronary artery disease [I25.10] Yes   • COPD (chronic obstructive pulmonary disease) (CMS/[facility]) [J44.9] Yes         54 y/o female w/ hx of bilateral aortofemoral bypass and hx of recurrent RLE cellulitis here again w/  1. RLE cellulitis/sepsis; cultures, vanc/rocephin.  Fluids.  PT REQUESTS NO NARCOTICS AS SHE HAS CONCERNS THAT SHE MAY BECOME ADDICTED.  Ibuprofen for pain.  May need ID given hx.  2. Tobacco abuse; jonnathan patch offered and educated on importance of quitting.  3. Oxygen dependent copd; cont home meds/nebs  4. DM; ssi      DVT prophylaxis:  heparin    CODE STATUS:    There are no questions and answers to display.       Admission Status:  I believe this patient meets  INPATIENT status due to the need for care which can only be reasonably provided in an hospital setting such as possible need for aggressive/expedited ancillary services and/or consultation services, IV medications, close physician monitoring, and/or procedures.  In such, I feel patient’s risk for adverse outcomes and need for care warrant INPATIENT evaluation and predict the patient’s care encounter to likely last beyond 2 midnights.      Electronically signed by Jazmine Love MD, 05/01/19, 10:26 PM.          Electronically signed by Jazmine Love MD at 5/2/2019 12:00 AM          Emergency Department Notes      Lizy Doyle PA-C at 5/1/2019  8:06 PM     Attestation signed by Timmy Salinas MD at 5/2/2019  1:31 AM          For this patient encounter, I reviewed the NP or PA documentation, treatment plan, and medical decision making. Timmy Salinas MD 5/2/2019 1:31 AM                  Subjective   Ms. Gladys Iglesias is a 55-year-old female presenting to the emergency department with complaints of right lower extremity pain and swelling. The patient reports that she has a history of aortobifemoral bypass surgery in April 2016 at [facility]. Shortly after the surgery, she developed an abscess near the incision site in the right groin that  was draining and infected. She was admitted at  for six weeks following the surgery and received IV Rocephin. Since that time, she has had intermittent flare ups of cellulitis throughout the right lower extremity, typically in the distal portion. She was most recently admitted in August of 2018 to receive IV antibiotics. She reports that in this episode, she has had gradually worsening pain, redness, and swelling from the right foot spreading up to just below the knee. There is no break in the skin, drainage, or weeping. She endorses accompanying fevers up to 101.7 degrees and chills. Of note, Ms. Iglesias is a current everyday smoker and is on oxygen at all times. Her past medical history is remarkable for COPD, coronary artery disease, hypertension, hyperlipidemia, MRSA, and diabetes mellitus. There are no additional acute complaints at this time.        History provided by:  Patient  Extremity Pain   Location:  Distal right lower extremity  Quality:  Painful, redness, swollen  Severity:  Moderate  Onset quality:  Gradual  Duration:  2 days  Timing:  Constant  Progression:  Worsening  Chronicity:  Recurrent  Context:  Hx aortobifemoral bypass surgery, multiple flare ups of cellulitis since  Relieved by:  None tried  Worsened by:  Nothing  Ineffective treatments:  None tried  Associated symptoms: fever        Review of Systems   Constitutional: Positive for chills and fever.   HENT: Negative.    Respiratory: Negative.    Cardiovascular: Positive for leg swelling.   Gastrointestinal: Negative.    Genitourinary: Negative.    Musculoskeletal: Negative.    Skin: Positive for color change.        Erythema to RLE with history of MRSA.   Neurological: Negative.    Psychiatric/Behavioral: Negative.    All other systems reviewed and are negative.      Past Medical History:   Diagnosis Date   • Cancer (CMS/HCC)     skin   • COPD (chronic obstructive pulmonary disease) (CMS/[facility])    • Coronary artery disease    • Diabetes  mellitus (CMS/HCC)    • Hyperlipidemia    • Hypertension        Allergies   Allergen Reactions   • Propoxyphene-Acetaminophen Nausea And Vomiting       Past Surgical History:   Procedure Laterality Date   • APPENDECTOMY     • CARDIAC SURGERY     • CHOLECYSTECTOMY     • COLONOSCOPY     • EYE SURGERY     • HYSTERECTOMY     • SKIN BIOPSY     • TUBAL ABDOMINAL LIGATION     • VASCULAR SURGERY         History reviewed. No pertinent family history.    Social History     Socioeconomic History   • Marital status: Single     Spouse name: Not on file   • Number of children: Not on file   • Years of education: Not on file   • Highest education level: Not on file   Tobacco Use   • Smoking status: Current Every Day Smoker     Types: Cigarettes   Substance and Sexual Activity   • Alcohol use: No   • Drug use: Yes     Types: Marijuana         Objective   Physical Exam   Constitutional: She is oriented to person, place, and time. She appears well-developed and well-nourished. No distress.   HENT:   Head: Normocephalic and atraumatic.   Nose: Nose normal.   Eyes: Conjunctivae are normal. No scleral icterus.   Neck: Normal range of motion. Neck supple.   Cardiovascular: Regular rhythm and normal heart sounds. Tachycardia present.   No murmur heard.  Strong bilateral DP and PT pulses.   Pulmonary/Chest: Effort normal and breath sounds normal. No respiratory distress.   Bilateral wheezes with inspiration and expiration. Oxygen in place per nasal cannula.   Abdominal: Soft. Bowel sounds are normal. There is no tenderness.   Musculoskeletal: Normal range of motion.   Trace edema to the right lower extremity, see skin exam for more details.   Neurological: She is alert and oriented to person, place, and time.   Skin: Skin is warm and dry.   Bright, confluent erythema to the right lower extremity that extends just below the knee with warmth. No central abscess or break in the skin. Exam is consistent with cellulitis.   Psychiatric: She has  "a normal mood and affect. Her behavior is normal.   Nursing note and vitals reviewed.      Procedures        ED Course  ED Course as of May 01 2237   Wed May 01, 2019   2151 CBC reveals leukocytosis with white blood cell count of 17,000.  Differential shows 86% neutrophils.  Chemistries are within normal limits.  Lactic acid is normal at 0.7.  Blood cultures x2 sets are in process.  Chest x-ray reveals no evidence of pneumonia, only chronic changes.  History and exam is consistent with right lower extremity cellulitis.  Temp at home was 101.  We initiated Rocephin and vancomycin.  I will page the hospitalist to discuss admission.  [FC]   2223 Discussed the case with Dr. Osborne.  She is agreeable with admission to telemetry.  Vital signs are stable.  HR initially 141 and it is improved 103.  We will give duo neb tx prior to admission.  [FC]      ED Course User Index  [FC] Lizy Doyle PA-C                     [facility]    Final diagnoses:   Cellulitis of right leg   History of MRSA infection   Peripheral vascular disease (CMS/HCC)   Chronic obstructive pulmonary disease, unspecified COPD type (CMS/HCC)   Tobacco dependence   Type 2 diabetes mellitus without complication, without long-term current use of insulin (CMS/[facility])   Tachycardia       Documentation assistance provided by anand Elam.  Information recorded by the scribe was done at my direction and has been verified and validated by me.     Salvador Elam  05/01/19 2116       Lizy Doyle PA-C  05/01/19 2237      Electronically signed by Timmy Salinas MD at 5/2/2019  1:31 AM       ICU Vital Signs     Row Name 05/02/19 1104 05/02/19 0737 05/02/19 0600 05/02/19 0400 05/02/19 0314       Height and Weight    Height  --  --  --  --  154.9 cm (61\")    Height Method  --  --  --  --  Stated    Weight  --  --  --  --  79.4 kg (175 lb)    Weight Method  --  --  --  --  Stated    Ideal Body Weight (IBW) (kg)  --  --  --  --  48.15    BSA (Calculated - sq m)  -- "  --  --  --  1.78 sq meters    BMI (Calculated)  --  --  --  --  33.1    Weight in (lb) to have BMI = 25  --  --  --  --  132       Vitals    Temp  97.7 °F (36.5 °C)  98 °F (36.7 °C)  --  --  98.3 °F (36.8 °C)    Temp src  Oral  Oral  --  --  Oral    Pulse  97  100  --  --  102    Heart Rate Source  Monitor  Monitor  --  --  Monitor    Resp  16  16  --  --  18    Resp Rate Source  Visual  Visual  --  --  Visual    BP  101/56  96/52  --  --  130/76    Noninvasive MAP (mmHg)  --  --  --  --  95    BP Location  Left arm  Left arm  --  --  Left arm    BP Method  Automatic  Automatic  --  --  Automatic    Patient Position  Lying  Lying  --  --  Lying       Oxygen Therapy    SpO2  95 %  92 %  --  --  93 %    Pulse Oximetry Type  Continuous  Continuous  --  --  Continuous    Device (Oxygen Therapy)  nasal cannula  nasal cannula  nasal cannula  nasal cannula  room air    Flow (L/min)  --  --  2  2  --    Row Name 05/02/19 0301 05/02/19 0300 05/02/19 0248 05/02/19 0234 05/02/19 0230       Vitals    Pulse  101  --  101  100  --    BP  --  108/65  --  --  102/68       Oxygen Therapy    SpO2  94 %  --  95 %  95 %  --    Row Name 05/02/19 0214 05/02/19 0200 05/02/19 0130 05/02/19 0125 05/02/19 0102       Vitals    Pulse  99  100  95  96  99    BP  --  116/72  117/72  --  --       Oxygen Therapy    SpO2  92 %  94 %  95 %  94 %  96 %    Row Name 05/02/19 0100 05/02/19 0049 05/02/19 0012 05/02/19 0000 05/01/19 2358       Vitals    Pulse  --  103  99  --  102    BP  101/55  --  --  108/73  --       Oxygen Therapy    SpO2  --  96 %  95 %  --  95 %    Row Name 05/01/19 2331 05/01/19 2330 05/01/19 2246 05/01/19 2230 05/01/19 2200       Vitals    Pulse  100  --  102  108  106    Heart Rate Source  --  --  Monitor  --  --    Resp  --  --  16  --  --    Resp Rate Source  --  --  Visual  --  --    BP  --  100/66  --  107/74  121/98    Noninvasive MAP (mmHg)  --  --  --  89  105       Oxygen Therapy    SpO2  98 %  --  97 %  96 %  92 %     "Pulse Oximetry Type  --  --  Continuous  --  --    Device (Oxygen Therapy)  --  --  nasal cannula  --  --    Flow (L/min)  --  --  2  --  --    Row Name 05/01/19 21:47:22 05/01/19 2143 05/01/19 2137 05/01/19 2030 05/01/19 2009       Vitals    Temp  98.7 °F (37.1 °C)  --  --  --  --    Temp src  Oral  --  --  --  --    Pulse  --  102  103  101  111    BP  --  110/69  --  116/67  129/82    Noninvasive MAP (mmHg)  --  86  --  79  98       Oxygen Therapy    SpO2  --  97 %  94 %  94 %  95 %    Row Name 05/01/19 1734                   Height and Weight    Height  154.9 cm (61\")        Height Method  Stated        Weight  78.9 kg (174 lb)        Weight Method  Stated        Ideal Body Weight (IBW) (kg)  48.15        BSA (Calculated - sq m)  1.78 sq meters        BMI (Calculated)  32.9        Weight in (lb) to have BMI = 25  132           Vitals    Temp  99.4 °F (37.4 °C)        Temp src  Oral        Pulse  141  (Abnormal)         Heart Rate Source  Monitor        Resp  18        Resp Rate Source  Visual        BP  135/65        BP Location  Left arm        BP Method  Automatic        Patient Position  Sitting           Oxygen Therapy    SpO2  89 %  (Abnormal)         Pulse Oximetry Type  Continuous        Device (Oxygen Therapy)  room air            Hospital Medications (active)       Dose Frequency Start End    ! Vancomycin trough 5/3 at 1130.  Please hold vancomycin dose 5/3 at noon until pharmacy can evaluate level  Once 5/3/2019     Sig - Route: 1 (One) Time. - Does not apply    aspirin EC tablet 81 mg 81 mg Daily 5/2/2019     Sig - Route: Take 1 tablet by mouth Daily. - Oral    atorvastatin (LIPITOR) tablet 20 mg 20 mg Daily 5/2/2019     Sig - Route: Take 1 tablet by mouth Daily. - Oral    budesonide-formoterol (SYMBICORT) 160-4.5 MCG/ACT inhaler 2 puff 2 puff 2 Times Daily - RT 5/2/2019     Sig - Route: Inhale 2 puffs 2 (Two) Times a Day. - Inhalation    cefTRIAXone (ROCEPHIN) IVPB 1 g 1 g Once 5/1/2019 5/1/2019    " Sig - Route: Infuse 50 mL into a venous catheter 1 (One) Time. - Intravenous    dextrose (D50W) 25 g/ 50mL Intravenous Solution 25 g 25 g Every 15 Minutes PRN 5/2/2019     Sig - Route: Infuse 50 mL into a venous catheter Every 15 (Fifteen) Minutes As Needed for Low Blood Sugar (Blood Sugar Less Than 70). - Intravenous    dextrose (GLUTOSE) oral gel 15 g 15 g Every 15 Minutes PRN 5/2/2019     Sig - Route: Take 15 application by mouth Every 15 (Fifteen) Minutes As Needed for Low Blood Sugar (Blood sugar less than 70). - Oral    glucagon (human recombinant) (GLUCAGEN DIAGNOSTIC) injection 1 mg 1 mg As Needed 5/2/2019     Sig - Route: Inject 1 mg under the skin into the appropriate area as directed As Needed (Blood Glucose Less Than 70). - Subcutaneous    heparin (porcine) 5000 UNIT/ML injection 5,000 Units 5,000 Units Every 8 Hours Scheduled 5/2/2019     Sig - Route: Inject 1 mL under the skin into the appropriate area as directed Every 8 (Eight) Hours. - Subcutaneous    ibuprofen (ADVIL,MOTRIN) tablet 600 mg 600 mg Every 6 Hours PRN 5/2/2019     Sig - Route: Take 1 tablet by mouth Every 6 (Six) Hours As Needed for Mild Pain . - Oral    insulin lispro (humaLOG) injection 0-7 Units 0-7 Units 4 Times Daily With Meals & Nightly 5/2/2019     Sig - Route: Inject 0-7 Units under the skin into the appropriate area as directed 4 (Four) Times a Day With Meals & at Bedtime. - Subcutaneous    ipratropium (ATROVENT) nebulizer solution 0.5 mg 0.5 mg 4 Times Daily - RT 5/2/2019     Sig - Route: Take 2.5 mL by nebulization 4 (Four) Times a Day. - Nebulization    ipratropium-albuterol (DUO-NEB) nebulizer solution 3 mL 3 mL Once 5/1/2019 5/1/2019    Sig - Route: Take 3 mL by nebulization 1 (One) Time. - Nebulization    ipratropium-albuterol (DUO-NEB) nebulizer solution 3 mL 3 mL Every 4 Hours PRN 5/2/2019     Sig - Route: Take 3 mL by nebulization Every 4 (Four) Hours As Needed for Wheezing. - Nebulization    lisinopril  "(PRINIVIL,ZESTRIL) tablet 10 mg 10 mg Daily 5/2/2019     Sig - Route: Take 1 tablet by mouth Daily. - Oral    nicotine (NICODERM CQ) 21 MG/24HR patch 1 patch 1 patch Every 24 Hours Scheduled 5/2/2019     Sig - Route: Place 1 patch on the skin as directed by provider Daily. - Transdermal    nicotine (NICODERM CQ) 21 MG/24HR patch 1 patch 1 patch Once 5/1/2019     Sig - Route: Place 1 patch on the skin as directed by provider 1 (One) Time. - Transdermal    Pharmacy to dose vancomycin  Continuous PRN 5/2/2019 5/5/2019    Sig - Route: Continuous As Needed for Consult. - Does not apply    Linked Group 1:  \"And\" Linked Group Details        piperacillin-tazobactam (ZOSYN) 3.375 g in iso-osmotic dextrose 50 ml (premix) 3.375 g Once 5/2/2019 5/2/2019    Sig - Route: Infuse 50 mL into a venous catheter 1 (One) Time. - Intravenous    piperacillin-tazobactam (ZOSYN) 3.375 g in iso-osmotic dextrose 50 ml (premix) 3.375 g Every 8 Hours 5/2/2019 5/5/2019    Sig - Route: Infuse 50 mL into a venous catheter Every 8 (Eight) Hours. - Intravenous    polyethylene glycol 3350 powder (packet) 17 g Daily PRN 5/2/2019     Sig - Route: Take 17 g by mouth Daily As Needed for Constipation. - Oral    sodium chloride 0.9 % flush 3 mL 3 mL Every 12 Hours Scheduled 5/2/2019     Sig - Route: Infuse 3 mL into a venous catheter Every 12 (Twelve) Hours. - Intravenous    sodium chloride 0.9 % flush 3-10 mL 3-10 mL As Needed 5/2/2019     Sig - Route: Infuse 3-10 mL into a venous catheter As Needed for Line Care. - Intravenous    sodium chloride 0.9 % infusion 100 mL/hr Continuous 5/2/2019     Sig - Route: Infuse 100 mL/hr into a venous catheter Continuous. - Intravenous    vancomycin 1250 mg/250 mL 0.9% NS IVPB ([facility]) 15 mg/kg × 79.4 kg Every 12 Hours 5/2/2019 5/4/2019    Sig - Route: Infuse 250 mL into a venous catheter Every 12 (Twelve) Hours. - Intravenous    vancomycin 1500 mg/500 mL 0.9% NS IVPB ([facility]) 20 mg/kg × 78.9 kg Once 5/1/2019 5/2/2019    Sig " "- Route: Infuse 500 mL into a venous catheter 1 (One) Time. - Intravenous    vancomycin 1500 mg/500 mL 0.9% NS IVPB ([facility]) (Discontinued) 20 mg/kg × 78.9 kg Once 2019    Sig - Route: Infuse 500 mL into a venous catheter 1 (One) Time. - Intravenous    Reason for Discontinue: Duplicate order    Linked Group 1:  \"And\" Linked Group Details              Operative/Procedure Notes (all)     No notes of this type exist for this encounter.           Physician Progress Notes (all)      Dain Roy MD at 2019  9:06 AM              [facility] Medicine Services  PROGRESS NOTE    Patient Name: Gladys Iglesias  : 1963  MRN: [id number]    Date of Admission: 2019  Length of Stay: 1  Primary Care Physician: Justina Vines MD    Subjective   Subjective     CC:    cellulitis    HPI:  Right lower leg still somewhat red and a little painful when walking. No weeping. No fever overnight, but states she had an elevated temp at home.    Review of Systems  Gen- No fevers, chills  CV- No chest pain, palpitations  Resp- No cough, dyspnea  GI- No N/V/D, abd pain        Otherwise ROS is negative except as mentioned in the HPI.    Objective   Objective     Vital Signs:   Temp:  [98 °F (36.7 °C)-99.4 °F (37.4 °C)] 98 °F (36.7 °C)  Heart Rate:  [] 100  Resp:  [16-18] 16  BP: ()/(52-98) 96/52        Physical Exam:  Constitutional -no acute distress, non toxic, in bed  HEENT-NCAT, mucous membranes moist  CV-RRR, S1 S2 normal, no m/r/g  Resp-CTAB, no wheezes, rhonchi or rales  Abd-soft, non-tender, non-distended, normo active bowel sounds, overweight  Ext-right lower leg with trace edema  Neuro-alert and oriented, speech clear, moves all extremities   Psych-normal affect   Skin- erythema right calf and extending distally over right lower leg.    Results Reviewed:  I have personally reviewed current lab, radiology, and data and agree.    Results from last 7 days   Lab Units " 05/02/19 0447 05/01/19 2117   WBC 10*3/mm3 10.94* 17.02*   HEMOGLOBIN g/dL 13.9 14.3   HEMATOCRIT % 45.2 45.9   PLATELETS 10*3/mm3 201 237     Results from last 7 days   Lab Units 05/02/19 0447 05/01/19 2117   SODIUM mmol/L 133* 132*   POTASSIUM mmol/L 4.0 4.1   CHLORIDE mmol/L 99 95*   CO2 mmol/L 21.0* 25.0   BUN mg/dL 16 17   CREATININE mg/dL 0.77 0.78   GLUCOSE mg/dL 176* 111*   CALCIUM mg/dL 8.3* 8.6   ALT (SGPT) U/L  --  9   AST (SGOT) U/L  --  12     Estimated Creatinine Clearance: 76.9 mL/min (by C-G formula based on SCr of 0.77 mg/dL).    No results found for: BNP    Microbiology Results Abnormal     None          Imaging Results (last 24 hours)     Procedure Component Value Units Date/Time    XR Chest 1 View [[id number]] Collected:  05/01/19 2116     Updated:  05/01/19 2119    Narrative:       CR Chest 1 Vw    INDICATION:   Penicillin resistance staph infection in the right lower leg for the past couple of days. COPD. Cough and wheezing with fever for years.     COMPARISON:    8/17/2018    FINDINGS:  Single portable AP view of the chest.  Cardiac silhouette is borderline enlarged and stable. Elongation of the thoracic aorta. Underlying COPD. Vascularity is within normal limits. Lungs appear clear with exception of some probable linear atelectasis or  scarring in the left base. No pneumothorax.      Impression:       Chronic appearing lung changes. No definite superimposed active disease.    Signer Name: Mannie Ojeda MD   Signed: 5/1/2019 9:16 PM   Workstation Name: [email or web address]                  I have reviewed the medications:    Current Facility-Administered Medications:   •  [START ON 5/3/2019] ! Vancomycin trough 5/3 at 1130.  Please hold vancomycin dose 5/3 at noon until pharmacy can evaluate level, , Does not apply, Once, Vern Moscoso, [facility]  •  aspirin EC tablet 81 mg, 81 mg, Oral, Daily, Day, Jazmine CHRISTIANSON MD  •  atorvastatin (LIPITOR) tablet 20 mg, 20 mg, Oral, Daily, Day, Jazmine CHRISTIANSON MD  •   budesonide-formoterol (SYMBICORT) 160-4.5 MCG/ACT inhaler 2 puff, 2 puff, Inhalation, BID - RT, Day, Jazmine CHRISTIANSON MD, 2 puff at 05/02/19 0758  •  dextrose (D50W) 25 g/ 50mL Intravenous Solution 25 g, 25 g, Intravenous, Q15 Min PRN, Day, Jazmine CHRISTIANSON MD  •  dextrose (GLUTOSE) oral gel 15 g, 15 g, Oral, Q15 Min PRN, Day, Jazmine CHRISTIANSON MD  •  glucagon (human recombinant) (GLUCAGEN DIAGNOSTIC) injection 1 mg, 1 mg, Subcutaneous, PRN, Day, Jazmine CHRISTIANSON MD  •  heparin (porcine) 5000 UNIT/ML injection 5,000 Units, 5,000 Units, Subcutaneous, Q8H, Day, Jazmine CHRISTIANSON MD, 5,000 Units at 05/02/19 0548  •  ibuprofen (ADVIL,MOTRIN) tablet 600 mg, 600 mg, Oral, Q6H PRN, Day, Jazmine CHRISTIANSON MD, 600 mg at 05/02/19 0548  •  insulin lispro (humaLOG) injection 0-7 Units, 0-7 Units, Subcutaneous, 4x Daily With Meals & Nightly, Day, Jazmine CHRISTIANSON MD  •  ipratropium (ATROVENT) nebulizer solution 0.5 mg, 0.5 mg, Nebulization, 4x Daily - RT, Day, Jazmine CHRISTIANSON MD, 0.5 mg at 05/02/19 0757  •  ipratropium-albuterol (DUO-NEB) nebulizer solution 3 mL, 3 mL, Nebulization, Q4H PRN, Day, Jazmine CHRISTIANSON MD  •  lisinopril (PRINIVIL,ZESTRIL) tablet 10 mg, 10 mg, Oral, Daily, Day, Jazmine CHRISTIANSON MD  •  nicotine (NICODERM CQ) 21 MG/24HR patch 1 patch, 1 patch, Transdermal, Q24H, Lizy Doyle PA-C  •  nicotine (NICODERM CQ) 21 MG/24HR patch 1 patch, 1 patch, Transdermal, Once, Lizy Doyle PA-C, Stopped at 05/02/19 0549  •  [DISCONTINUED] vancomycin 1500 mg/500 mL 0.9% NS IVPB ([facility]), 20 mg/kg, Intravenous, Once **AND** Pharmacy to dose vancomycin, , Does not apply, Continuous PRN, Jazmine Love MD  •  piperacillin-tazobactam (ZOSYN) 3.375 g in iso-osmotic dextrose 50 ml (premix), 3.375 g, Intravenous, Q8H, Jazmine Love MD  •  sodium chloride 0.9 % flush 3 mL, 3 mL, Intravenous, Q12H, Jazmine Love MD  •  sodium chloride 0.9 % flush 3-10 mL, 3-10 mL, Intravenous, PRN, Jazmine Love MD  •  sodium chloride 0.9 % infusion, 100 mL/hr, Intravenous, Continuous, Jazmine Love MD, Last  Rate: 100 mL/hr at 05/02/19 0530, 100 mL/hr at 05/02/19 0530  •  vancomycin 1250 mg/250 mL 0.9% NS IVPB ([facility]), 15 mg/kg, Intravenous, Q12H, Vern Moscoso, [facility]      [START ON 5/3/2019] Pharmacy Consult  Does not apply Once   aspirin 81 mg Oral Daily   atorvastatin 20 mg Oral Daily   budesonide-formoterol 2 puff Inhalation BID - RT   heparin (porcine) 5,000 Units Subcutaneous Q8H   insulin lispro 0-7 Units Subcutaneous 4x Daily With Meals & Nightly   ipratropium 0.5 mg Nebulization 4x Daily - RT   lisinopril 10 mg Oral Daily   nicotine 1 patch Transdermal Q24H   nicotine 1 patch Transdermal Once   piperacillin-tazobactam 3.375 g Intravenous Q8H   sodium chloride 3 mL Intravenous Q12H   vancomycin 15 mg/kg Intravenous Q12H       Assessment/Plan   Assessment / Plan     Active Hospital Problems    Diagnosis POA   • Cellulitis [L03.90] Yes   • Sepsis (CMS/HCC) [A41.9] Yes   • Smoker [F17.200] Yes   • Hypertension [I10] Yes   • Hyperlipidemia [E78.5] Yes   • Diabetes mellitus (CMS/HCC) [E11.9] Yes   • Coronary artery disease [I25.10] Yes   • COPD (chronic obstructive pulmonary disease) (CMS/[facility]) [J44.9] Yes          Brief Hospital Course to date:  Gladys Iglesias is a 55 y.o. female with hx of aortobifemoral bypass surgery 4/ 2016 at OSH who then developed subsequent incision site infection w/ inpt iv abx/revison of bypass for 6 wks at .  Has had recurrent cellulitis since that time in [facility] w/ most recent even in 8/2018.  Currently pt c/o same rle pain and swelling as well as f/c w/ temps 101.7 pta.  Pt states current sx have been present for 2 days; states pain worse w/ this episode than w/ prior.       Cellulitis  - continue vanc and zosyn  - suspect will need at least 14 days antibiotic therapy given underlying bypass grafts  - will ask ID to evaluate and make recommendations    Leukocytosis  - improved    Hyponatremia  - mild, asymptomatic, monitor    COPD    Tobacco abuse  -  cessation    DMII    DVT  Prophylaxis:  heparin    Disposition: I expect the patient to be discharged home in 1-2 days    CODE STATUS:   Code Status and Medical Interventions:   Ordered at: 05/02/19 0009     Code Status:    CPR     Medical Interventions (Level of Support Prior to Arrest):    Full         Electronically signed by Dain Roy MD, 05/02/19, 9:06 AM.        Electronically signed by Dain Roy MD at 5/2/2019  9:13 AM       Consult Notes (all)     No notes of this type exist for this encounter.         Leia Pascual, MSRN

## 2021-01-24 NOTE — PROGRESS NOTE ADULT - ATTENDING COMMENTS
[facility]    [address]    [place] [address]    Phone:  [phone]    Fax:  [phone]       Thank You for choosing us for your health care visit. We are glad to serve you and happy to provide you with this summary of your visit. Please help us to ensure we have accurate records. If you find anything that needs to be changed, please let our staff know as soon as possible.          Your Demographic Information     Patient Name Sex Joanne Ko Female 1962       Ethnic Group Patient Race    Not of  or  Origin White      Your Visit Details     Date & Time Provider Department    2017 9:30 AM Beltran Sims MD [facility]      Your Upcoming Appointment*(Max 10)     2017 12:15 PM CST   Follow-Up Therapy with Jimena Sands PT   [facility] ([facility])    [address] [facility] [id number] [phone]            2017 10:00 AM CST   Bone Densitometry with KELSEY BD   [facility]ast [facility] ([facility])    [address]  [place] [address]   [phone]             10:15 AM CST   Nurse Visit with SAMIRA IM NURSE 3   [facility] [address] ([facility] [place])    [address]  [address]  [place] [address] [phone]            Friday February 10, 2017 10:30 AM CST   MAMM SCREENING with KELSEY MAMMO 2   [facility]  Breast Imaging ([facility])    [address]  [place] [address]   [phone]            2017  1:30 PM CST   Consultation with Contreras Randolph MD   [facility] ([place] [facility])    [address]  [place] [id number] [phone]              Your Vitals Were     Height Weight BMI Smoking Status          5' 4\" (1.626 m) 180 lb (81.6 kg) 30.9 kg/m2 Never Smoker        
  Medications Prescribed or Re-Ordered Today     None      Your Current Medications Are        Disp Refills Start End    omeprazole (PRILOSEC) 40 MG capsule 30 capsule 11 1/19/2017     Sig - Route: Take 1 capsule by mouth daily. - Oral    Class: Script Not Printed    enalapril (VASOTEC) 10 MG tablet 30 tablet 1 11/26/2016     Sig: TAKE ONE TABLET BY MOUTH ONE TIME DAILY    Class: Eprescribe    mometasone (NASONEX) 50 MCG/ACT nasal spray 17 g 2 11/8/2016     Sig: SPRAY 2 SPRAYS IN EACH NOSTRIL DAILY.    Class: Eprescribe    Pseudoephedrine HCl (SUDAFED PO)        Sig - Route: Take 2 tablets by mouth every 6 hours as needed. - Oral    Class: Historical Med    hydrochlorothiazide (HYDRODIURIL) 25 MG tablet 90 tablet 0 9/26/2016     Sig: TAKE 1 TABLET BY MOUTH DAILY.    Class: Eprescribe    Cyanocobalamin (VITAMIN B-12 PO)        Sig - Route: Take 1 tablet by mouth daily. Taking one 1500 mcg tablet po daily - Oral    Class: Historical Med    estrogens, conjugated (PREMARIN) cream 30 g 0 4/19/2016     Sig: INSERT 0.5 GRAM VAGINALLY AT NIGHT FOR 21 DAYS, THEN OFF FOR 7 DAYS.  CONTINUE TO REPEAT CYCLE.    Class: Eprescribe    Cosign for Ordering: Accepted by Mimi Epps MD on 4/19/2016 10:06 PM    ADVAIR DISKUS 100-50 MCG/DOSE AEROSOL POWDER, BREATH ACTIVATED 1 each 2 2/29/2016     Sig: INHALE ONE PUFF BY MOUTH TWICE DAILY     Class: Eprescribe    Notes to Pharmacy: Patient must schedule appointment for further refills    Multiple Vitamins-Minerals (MULTIVITAMIN PO)        Sig - Route: Take 1 tablet by mouth daily. - Oral    Class: Historical Med    albuterol (PROAIR RESPICLICK) 108 (90 BASE) MCG/ACT inhaler 1 Inhaler 2 10/20/2015     Sig - Route: Inhale 2 puffs into the lungs every 4 hours as needed for Shortness of Breath or Wheezing. - Inhalation    Class: Eprescribe    Cosign for Ordering: Accepted by Mimi Epps MD on 10/21/2015  9:44 PM    LORazepam (ATIVAN) 0.5 MG tablet 30 tablet 0      Sig: Take 1 tablet every 8 
hours as needed for extreme anxiety or 30 minutes prior to flying    Cosign for Ordering: Accepted by Mimi Epps MD on 4/7/2015  3:23 PM    Cholecalciferol (VITAMIN D) 2000 UNITS CAPS   8/19/2014     Sig - Route: Take 2,000 Units by mouth daily. - Oral    Class: Historical Med    Notes to Pharmacy: Per DR. Epps based on 8/18 labs    fexofenadine (ALLEGRA) 30 MG tablet        Sig - Route: Take 30 mg by mouth 2 times daily. - Oral    Class: Historical Med    Spacer/Aero-Holding Chambers MELANY 1 Device 0 8/12/2014     Sig: Use as directed w/albuterol inhaler    Class: Eprescribe    Coenzyme Q10 (COQ10 PO)        Sig - Route: Take 1 each by mouth. Take 1 soft gel po daily - Oral    Class: Historical Med      Allergies     Contrast Media HIVES    Fish based dye    Erythromycin NAUSEA    Any \"mycin\" med gets nauseated, \"cillins\" are ok    Morphine Nausea & Vomiting    Shellfish Allergy HIVES, PRURITUS    Hives, itching, throat closes      Immunizations History as of 1/30/2017     Name Date    INFLUENZA QUADRIVALENT 10/20/2015 10:58 AM, 12/22/2014    Influenza 11/15/2016    Pneumococcal Conjugate 13 Valent 11/3/2015  2:02 AM    Pneumococcal Polysaccharide Adult 8/12/2014  9:30 AM    Tdap 8/28/2014  2:41 PM      Problem List as of 1/30/2017     BETTIE (obstructive sleep apnea)    Erosive gastritis    Gastroesophageal reflux disease with hiatal hernia    HTN (hypertension)    Iron deficiency anemia    Essential hypertension    Elevated cholesterol    Vitamin D deficiency    Chronic cough    Rhinitis, chronic    Diverticulosis    Esophageal erosions            Patient Instructions     None      
Cholangitis improving. Leukocytosis resolving, transaminitis improving  Continuing of Zosyn. Planned for ERCP today  Monitor for post ERCP pain/pancreatitis  f/u GI rec's
S/P ERCP - partial stone removal and stent placed, needs repeat ERCP in 4-8 weeks for stent and further stone removal.  Medically stable for discharge. Discharge home with oral abx for total of 7 day course. 35 minutes spent coordinating discharge.
S/P ERCP - partial stone removal and stent placed, needs repeat ERCP in 4-8 weeks for stent and further stone removal.  Monitor for today while advancing diet  Per PT eval can go home with home PT   Likely DC tomorrow

## 2021-01-24 NOTE — DISCHARGE NOTE NURSING/CASE MANAGEMENT/SOCIAL WORK - PATIENT PORTAL LINK FT
You can access the FollowMyHealth Patient Portal offered by [facility] by registering at the following website: [email or web address]. By joining [employer]s FollowMyHealth portal, you will also be able to view your health information using other applications (apps) compatible with our system.

## 2021-01-24 NOTE — PROGRESS NOTE ADULT - SUBJECTIVE AND OBJECTIVE BOX
Interval Events:   No abdominal pain  No nausea /vomiting / diarrhea   No melena / bloody bm     Hospital Medications:  amLODIPine   Tablet 5 milliGRAM(s) Oral daily  aspirin enteric coated 81 milliGRAM(s) Oral daily  dextrose 40% Gel 15 Gram(s) Oral once  dextrose 5%. 1000 milliLiter(s) IV Continuous <Continuous>  dextrose 5%. 1000 milliLiter(s) IV Continuous <Continuous>  dextrose 50% Injectable 25 Gram(s) IV Push once  dextrose 50% Injectable 12.5 Gram(s) IV Push once  dextrose 50% Injectable 25 Gram(s) IV Push once  finasteride 5 milliGRAM(s) Oral daily  glucagon  Injectable 1 milliGRAM(s) IntraMuscular once  insulin lispro (ADMELOG) corrective regimen sliding scale   SubCutaneous three times a day before meals  insulin lispro (ADMELOG) corrective regimen sliding scale   SubCutaneous at bedtime  losartan 50 milliGRAM(s) Oral daily  magnesium oxide 400 milliGRAM(s) Oral daily  metoprolol succinate ER 25 milliGRAM(s) Oral two times a day  multivitamin 1 Tablet(s) Oral daily  omega-3-Acid Ethyl Esters 2 Gram(s) Oral two times a day  pantoprazole    Tablet 40 milliGRAM(s) Oral before breakfast  piperacillin/tazobactam IVPB.. 4.5 Gram(s) IV Intermittent every 8 hours  tamsulosin 0.4 milliGRAM(s) Oral at bedtime  ursodiol Capsule 300 milliGRAM(s) Oral two times a day        ROS:   General:  No fevers, chills or night sweats  ENT:  No sore throat or dysphagia  CV:  No pain or palpitations  Resp:  No dyspnea, cough or  wheezing  GI:  as above  Skin:  No rash or edema  Neuro: no weakness   Hematologic: no bleeding  Musculoskeletal: no muscle pain or join pain  Psych: no agitation      PHYSICAL EXAM:   Vital Signs:  Vital Signs Last 24 Hrs  T(C): 36.4 (23 Jan 2021 04:43), Max: 36.8 (22 Jan 2021 21:25)  T(F): 97.5 (23 Jan 2021 04:43), Max: 98.2 (22 Jan 2021 21:25)  HR: 60 (23 Jan 2021 04:43) (60 - 79)  BP: 168/70 (23 Jan 2021 04:43) (133/64 - 168/70)  BP(mean): --  RR: 18 (23 Jan 2021 04:43) (15 - 22)  SpO2: 98% (23 Jan 2021 04:43) (96% - 100%)  Daily Height in cm: 170.18 (22 Jan 2021 12:34)    Daily     GENERAL:  NAD, Appears stated age  HEENT:  NC/AT,  conjunctivae clear and pink, sclera -anicteric  CHEST:  Normal Effort, Breath sounds clear  HEART:  RRR, S1 + S2, no murmurs  ABDOMEN:  Soft, non-tender, non-distended, normoactive bowel sounds,  no masses  EXTREMITIES:  no cyanosis or edema  SKIN:  Warm & Dry. No rash or erythema  NEURO:  Alert, oriented, no focal deficit    LABS:                        10.0   14.58 )-----------( 113      ( 22 Jan 2021 07:30 )             31.9     Mean Cell Volume: 69.5 fl (01-22-21 @ 07:30)    01-22    140  |  102  |  16  ----------------------------<  146<H>  3.1<L>   |  25  |  0.89    Ca    8.9      22 Jan 2021 07:30  Phos  2.4     01-22  Mg     1.8     01-22    TPro  6.8  /  Alb  3.4  /  TBili  3.5<H>  /  DBili  x   /  AST  88<H>  /  ALT  149<H>  /  AlkPhos  260<H>  01-22    LIVER FUNCTIONS - ( 22 Jan 2021 07:30 )  Alb: 3.4 g/dL / Pro: 6.8 g/dL / ALK PHOS: 260 U/L / ALT: 149 U/L / AST: 88 U/L / GGT: x           PT/INR - ( 22 Jan 2021 07:30 )   PT: 12.0 sec;   INR: 1.00 ratio         PTT - ( 22 Jan 2021 07:30 )  PTT:28.0 sec                            10.0   14.58 )-----------( 113      ( 22 Jan 2021 07:30 )             31.9                         9.9    30.59 )-----------( 126      ( 21 Jan 2021 03:41 )             31.5       Imaging:    < from: ERCP (01.22.21 @ 11:32) >  Findings:       The  film was normal.       The duodenoscope was passed to the second portion of the duodenum to the major papilla. The        CBD was cannulated with a 4.4F sphincterotome over a 0.035in guidewire. Contrast was injected        and revealed several large filling defects in a very dilated duct. A sphincterotomy was made        with the sphincterotome, exposing biliary epithelium. The bile duct was then swept from the        hilum with an 8.5mm, 11.5mm and 15 mm extraction balloon, removing 1 stone and sludge. 1        rhomboid shaped stone that was approximately 1cm in size remained in the distal duct and was        not able to be extracted. Thus, a [id number] straight plastic CBD stent was deployed into the        duct over a wire. The stent was in good position on endoscopic and fluoroscopic views. Final        fluoroscopic images demonstrated no air in unusual places.                                                                                          Impression:          - Choledocholithiasis was found in a severely dilated common bile duct.                        Partial removal was accomplished with biliary sphincterotomy and balloon                        sweeps.                       - A 10 Fr x 5 cm stent was inserted.  Recommendation:      - Return patient to hospital [facility] for ongoing care.                       - Clear liquid diet today, advance as tolerated tomorrow if feeling well.                       - Trend liver tests, CBC, INR.                       - Ursodiol 300 mg PO BID until next ERCP to soften stone.                       - Repeat ERCP in 4-8 weeks for stent and stone removal. [phone] to                        schedule.                       - If patient develops fever, significant abdominal pain, recurrent vomiting,                        or melena tonight, obtain STAT CBC/CMP/amylase/lipase/upright CXR/abdominal                      Xray and page on call GI.    < end of copied text >  
PROGRESS NOTE:     CONTACT INFO:  Ani Diez MD | PGY-1  Pager: [phone] [place], [id number] [facility]  After 7pm page night float      Patient is a 80y old  Male who presents with a chief complaint of transfer from (2021 14:59)      SUBJECTIVE / OVERNIGHT EVENTS:    - No acute events overnight.   - No fevers/chills.  - Patient seen and evaluated at bedside.  - Denies SOB at rest, chest pain, palpitations, abdominal pain, nausea/vomiting  -Feeling better. Aaox3. Does not seem confused anymore.  -Planning for ERCP today    ADDITIONAL REVIEW OF SYSTEMS:    MEDICATIONS  (STANDING):  amLODIPine   Tablet 5 milliGRAM(s) Oral daily  aspirin enteric coated 81 milliGRAM(s) Oral daily  dextrose 40% Gel 15 Gram(s) Oral once  dextrose 5%. 1000 milliLiter(s) (50 mL/Hr) IV Continuous <Continuous>  dextrose 5%. 1000 milliLiter(s) (100 mL/Hr) IV Continuous <Continuous>  dextrose 50% Injectable 25 Gram(s) IV Push once  dextrose 50% Injectable 12.5 Gram(s) IV Push once  dextrose 50% Injectable 25 Gram(s) IV Push once  finasteride 5 milliGRAM(s) Oral daily  glucagon  Injectable 1 milliGRAM(s) IntraMuscular once  insulin lispro (ADMELOG) corrective regimen sliding scale   SubCutaneous three times a day before meals  insulin lispro (ADMELOG) corrective regimen sliding scale   SubCutaneous at bedtime  losartan 50 milliGRAM(s) Oral daily  magnesium oxide 400 milliGRAM(s) Oral daily  metoprolol succinate ER 25 milliGRAM(s) Oral two times a day  multivitamin 1 Tablet(s) Oral daily  omega-3-Acid Ethyl Esters 2 Gram(s) Oral two times a day  pantoprazole    Tablet 40 milliGRAM(s) Oral before breakfast  piperacillin/tazobactam IVPB.. 4.5 Gram(s) IV Intermittent every 8 hours  potassium chloride    Tablet ER 40 milliEquivalent(s) Oral once  potassium phosphate / sodium phosphate Tablet (K-PHOS No. 2) 1 Tablet(s) Oral once  tamsulosin 0.4 milliGRAM(s) Oral at bedtime    MEDICATIONS  (PRN):      CAPILLARY BLOOD GLUCOSE      POCT Blood Glucose.: 166 mg/dL (2021 08:45)  POCT Blood Glucose.: 140 mg/dL (2021 21:37)  POCT Blood Glucose.: 138 mg/dL (2021 17:35)    I&O's Summary    2021 07:01  -  2021 07:00  --------------------------------------------------------  IN: 175 mL / OUT: 0 mL / NET: 175 mL        PHYSICAL EXAM:  Vital Signs Last 24 Hrs  T(C): 36.3 (2021 05:11), Max: 36.8 (2021 16:17)  T(F): 97.4 (2021 05:11), Max: 98.3 (2021 01:10)  HR: 79 (2021 05:11) (67 - 79)  BP: 120/70 (2021 05:11) (120/70 - 182/69)  BP(mean): --  RR: 18 (2021 05:11) (18 - 21)  SpO2: 97% (2021 05:11) (97% - 100%)    CONSTITUTIONAL: NAD, well-developed  RESPIRATORY: Normal respiratory effort; lungs are clear to auscultation bilaterally  CARDIOVASCULAR: Regular rate and rhythm, normal S1 and S2, no murmur/rub/gallop; No lower extremity edema; Peripheral pulses are 2+ bilaterally  ABDOMEN: Nontender to palpation, normoactive bowel sounds, no rebound/guarding; No hepatosplenomegaly  MUSCLOSKELETAL: no clubbing or cyanosis of digits; no joint swelling or tenderness to palpation  PSYCH: A+O to person, place, and time; affect appropriate    LABS:                        10.0   14.58 )-----------( 113      ( 2021 07:30 )             31.9     22    140  |  102  |  16  ----------------------------<  146<H>  3.1<L>   |  25  |  0.89    Ca    8.9      2021 07:30  Phos  2.4       Mg     1.8         TPro  6.8  /  Alb  3.4  /  TBili  3.5<H>  /  DBili  x   /  AST  88<H>  /  ALT  149<H>  /  AlkPhos  260<H>      PT/INR - ( 2021 07:30 )   PT: 12.0 sec;   INR: 1.00 ratio         PTT - ( 2021 07:30 )  PTT:28.0 sec      Urinalysis Basic - ( 2021 06:03 )    Color: Yellow / Appearance: Clear / S.032 / pH: x  Gluc: x / Ketone: Negative  / Bili: Small / Urobili: Negative   Blood: x / Protein: 30 mg/dL / Nitrite: Negative   Leuk Esterase: Negative / RBC: 1 /hpf / WBC 1 /HPF   Sq Epi: x / Non Sq Epi: 1 / Bacteria: Few          RADIOLOGY & ADDITIONAL TESTS:  Results Reviewed:   Imaging Personally Reviewed:  Electrocardiogram Personally Reviewed:    COORDINATION OF CARE:  Care Discussed with Consultants/Other Providers [Y/N]: Y  Prior or Outpatient Records Reviewed [Y/N]: Y  
PROGRESS NOTE:   Authoted by Dr. Oral Roca MD, [place]  Pager [phone] [facility], [facility]     Patient is a 80y old  Male who presents with a chief complaint of transfer from (23 Jan 2021 09:24)    SUBJECTIVE / OVERNIGHT EVENTS:  [facility]  offered, pt deferred to have daughter Brooklynn translate via telephone   No acute events overnight.  Patient denies fevers, chills, and abdominal pain  Patient reports that he tolerated a regular diet without difficulty swallowing or abdominal pain.   Daughter updated during rounds.     MEDICATIONS  (STANDING):  amLODIPine   Tablet 5 milliGRAM(s) Oral daily  aspirin enteric coated 81 milliGRAM(s) Oral daily  dextrose 40% Gel 15 Gram(s) Oral once  dextrose 5%. 1000 milliLiter(s) (50 mL/Hr) IV Continuous <Continuous>  dextrose 5%. 1000 milliLiter(s) (100 mL/Hr) IV Continuous <Continuous>  dextrose 50% Injectable 25 Gram(s) IV Push once  dextrose 50% Injectable 12.5 Gram(s) IV Push once  dextrose 50% Injectable 25 Gram(s) IV Push once  enoxaparin Injectable 40 milliGRAM(s) SubCutaneous daily  finasteride 5 milliGRAM(s) Oral daily  glucagon  Injectable 1 milliGRAM(s) IntraMuscular once  insulin lispro (ADMELOG) corrective regimen sliding scale   SubCutaneous three times a day before meals  insulin lispro (ADMELOG) corrective regimen sliding scale   SubCutaneous at bedtime  losartan 50 milliGRAM(s) Oral daily  magnesium oxide 400 milliGRAM(s) Oral daily  metoprolol succinate ER 25 milliGRAM(s) Oral two times a day  multivitamin 1 Tablet(s) Oral daily  omega-3-Acid Ethyl Esters 2 Gram(s) Oral two times a day  pantoprazole    Tablet 40 milliGRAM(s) Oral before breakfast  piperacillin/tazobactam IVPB.. 4.5 Gram(s) IV Intermittent every 8 hours  tamsulosin 0.4 milliGRAM(s) Oral at bedtime  ursodiol Capsule 300 milliGRAM(s) Oral two times a day    OBJECTIVE:  Vital Signs Last 24 Hrs  T(C): 36.1 (24 Jan 2021 05:02), Max: 36.7 (23 Jan 2021 20:36)  T(F): 97 (24 Jan 2021 05:02), Max: 98 (23 Jan 2021 20:36)  HR: 64 (24 Jan 2021 05:02) (60 - 79)  BP: 169/77 (24 Jan 2021 05:02) (151/79 - 169/77)  RR: 18 (24 Jan 2021 05:02) (18 - 18)  SpO2: 98% (24 Jan 2021 05:02) (98% - 99%)    I&O's Summary    23 Jan 2021 07:01  -  24 Jan 2021 07:00  --------------------------------------------------------  IN: 300 mL / OUT: 1000 mL / NET: -700 mL    24 Jan 2021 07:01  -  24 Jan 2021 11:10  --------------------------------------------------------  IN: 240 mL / OUT: 350 mL / NET: -110 mL    CONSTITUTIONAL: NAD  HEAD:  Atraumatic, Normocephalic  EYES: EOMI, conjunctiva and sclera clear  NECK: Supple, No JVD  NERVOUS SYSTEM: AOX3, motor and sensation grossly intact in b/l UE and b/l LE  PSYCHIATRIC: Appropriate affect and mood  CHEST/LUNG: Clear to auscultation bilaterally;   HEART: Regular rate and rhythm; No murmurs. No LE edema  ABDOMEN: Soft, Nontender, +distended; Bowel sounds present  EXTREMITIES:  2+ Peripheral Pulses, No clubbing, cyanosis  SKIN: No rashes or lesions    LABS:                        9.5    5.13  )-----------( 119      ( 24 Jan 2021 07:18 )             30.0     01-24    137  |  101  |  16  ----------------------------<  160<H>  3.6   |  25  |  1.01    Ca    8.3<L>      24 Jan 2021 07:17  Phos  2.3     01-24  Mg     1.9     01-24    TPro  6.4  /  Alb  3.2<L>  /  TBili  1.2  /  DBili  x   /  AST  40  /  ALT  80<H>  /  AlkPhos  202<H>  01-24    PT/INR - ( 24 Jan 2021 07:18 )   PT: 10.5 sec;   INR: 0.87 ratio      Culture - Blood (collected 22 Jan 2021 10:19)  Source: .Blood Blood-Peripheral  Preliminary Report (23 Jan 2021 11:01):    No growth to date.    Culture - Blood (collected 22 Jan 2021 10:19)  Source: .Blood Blood-Venous  Preliminary Report (23 Jan 2021 11:01):    No growth to date.    CAPILLARY BLOOD GLUCOSE  POCT Blood Glucose.: 162 mg/dL (24 Jan 2021 08:24)  POCT Blood Glucose.: 215 mg/dL (23 Jan 2021 21:47)  POCT Blood Glucose.: 164 mg/dL (23 Jan 2021 17:28)  POCT Blood Glucose.: 172 mg/dL (23 Jan 2021 14:07)    COORDINATION OF CARE:  Care Discussed with Consultants/Other Providers [Y/N]: Gastroenterology 
PROGRESS NOTE:     CONTACT INFO:  Ani Diez MD | PGY-1  Pager: [phone] [place], [id number] [facility]  After 7pm page night float      Patient is a 80y old  Male who presents with a chief complaint of transfer from (23 Jan 2021 06:51)      SUBJECTIVE / OVERNIGHT EVENTS:    - No acute events overnight.   - No fevers/chills.  - Patient seen and evaluated at bedside.  - Denies SOB at rest, chest pain, palpitations, abdominal pain, nausea/vomiting  -Feeling well. States still feels weak in general, and slightly dizzy.    ADDITIONAL REVIEW OF SYSTEMS:    MEDICATIONS  (STANDING):  amLODIPine   Tablet 5 milliGRAM(s) Oral daily  aspirin enteric coated 81 milliGRAM(s) Oral daily  dextrose 40% Gel 15 Gram(s) Oral once  dextrose 5%. 1000 milliLiter(s) (50 mL/Hr) IV Continuous <Continuous>  dextrose 5%. 1000 milliLiter(s) (100 mL/Hr) IV Continuous <Continuous>  dextrose 50% Injectable 25 Gram(s) IV Push once  dextrose 50% Injectable 12.5 Gram(s) IV Push once  dextrose 50% Injectable 25 Gram(s) IV Push once  finasteride 5 milliGRAM(s) Oral daily  glucagon  Injectable 1 milliGRAM(s) IntraMuscular once  insulin lispro (ADMELOG) corrective regimen sliding scale   SubCutaneous three times a day before meals  insulin lispro (ADMELOG) corrective regimen sliding scale   SubCutaneous at bedtime  losartan 50 milliGRAM(s) Oral daily  magnesium oxide 400 milliGRAM(s) Oral daily  metoprolol succinate ER 25 milliGRAM(s) Oral two times a day  multivitamin 1 Tablet(s) Oral daily  omega-3-Acid Ethyl Esters 2 Gram(s) Oral two times a day  pantoprazole    Tablet 40 milliGRAM(s) Oral before breakfast  piperacillin/tazobactam IVPB.. 4.5 Gram(s) IV Intermittent every 8 hours  potassium chloride    Tablet ER 40 milliEquivalent(s) Oral once  tamsulosin 0.4 milliGRAM(s) Oral at bedtime  ursodiol Capsule 300 milliGRAM(s) Oral two times a day    MEDICATIONS  (PRN):      CAPILLARY BLOOD GLUCOSE      POCT Blood Glucose.: 148 mg/dL (23 Jan 2021 08:39)  POCT Blood Glucose.: 181 mg/dL (22 Jan 2021 22:13)  POCT Blood Glucose.: 187 mg/dL (22 Jan 2021 17:41)  POCT Blood Glucose.: 117 mg/dL (22 Jan 2021 12:38)  POCT Blood Glucose.: 123 mg/dL (22 Jan 2021 10:55)    I&O's Summary    22 Jan 2021 07:01  -  23 Jan 2021 07:00  --------------------------------------------------------  IN: 450 mL / OUT: 600 mL / NET: -150 mL        PHYSICAL EXAM:  Vital Signs Last 24 Hrs  T(C): 36.4 (23 Jan 2021 04:43), Max: 36.8 (22 Jan 2021 21:25)  T(F): 97.5 (23 Jan 2021 04:43), Max: 98.2 (22 Jan 2021 21:25)  HR: 60 (23 Jan 2021 04:43) (60 - 79)  BP: 168/70 (23 Jan 2021 04:43) (133/64 - 168/70)  BP(mean): --  RR: 18 (23 Jan 2021 04:43) (15 - 22)  SpO2: 98% (23 Jan 2021 04:43) (96% - 100%)    CONSTITUTIONAL: NAD, well-developed  RESPIRATORY: Normal respiratory effort; lungs are clear to auscultation bilaterally  CARDIOVASCULAR: Regular rate and rhythm, normal S1 and S2, no murmur/rub/gallop; No lower extremity edema; Peripheral pulses are 2+ bilaterally  ABDOMEN: Nontender to palpation, normoactive bowel sounds, no rebound/guarding; No hepatosplenomegaly, distended, soft  MUSCLOSKELETAL: no clubbing or cyanosis of digits; no joint swelling or tenderness to palpation  PSYCH: A+O to person, place, and time; affect appropriate    LABS:                        9.0    8.51  )-----------( 101      ( 23 Jan 2021 06:49 )             28.2     01-23    139  |  103  |  18  ----------------------------<  156<H>  3.3<L>   |  25  |  1.02    Ca    8.1<L>      23 Jan 2021 06:48  Phos  3.2     01-23  Mg     2.0     01-23    TPro  6.0  /  Alb  3.1<L>  /  TBili  1.6<H>  /  DBili  x   /  AST  48<H>  /  ALT  95<H>  /  AlkPhos  206<H>  01-23    PT/INR - ( 23 Jan 2021 06:50 )   PT: 11.3 sec;   INR: 0.94 ratio         PTT - ( 22 Jan 2021 07:30 )  PTT:28.0 sec            RADIOLOGY & ADDITIONAL TESTS:  Results Reviewed:   Imaging Personally Reviewed:  Electrocardiogram Personally Reviewed:    COORDINATION OF CARE:  Care Discussed with Consultants/Other Providers [Y/N]: Y  Prior or Outpatient Records Reviewed [Y/N]: Y

## 2021-01-24 NOTE — PROGRESS NOTE ADULT - PROBLEM SELECTOR PLAN 2
Microcytic anemia: - per daughter has hx of thalassemia, ddx: thalassemia, iron deficiency anemia ( not UTD on colonoscopy), AOCD  - iron studies normal   -b12 and folate wnl   - can complete w/u as outpatient

## 2021-01-24 NOTE — PROGRESS NOTE ADULT - PROBLEM SELECTOR PLAN 6
#BPH  c/w home tamsulosin, finasteride  -bladder scan x1 - PVR <200cc
#BPH  c/w home tamsulosin, finasteride  -bladder scan x1 - PVR <200cc
#HLD, hx hypertriglyceridemia  hold home cholestryamine and omega acid -3

## 2021-01-24 NOTE — PROGRESS NOTE ADULT - PROBLEM SELECTOR PLAN 4
#HTN  BP elevated to 180s systolics on adm, improved with restarting of home meds  c/w home amlodpine 5mg qd, metoprolol-XL 25mg BID
#HTN  BP elevated to 180s systolics on adm, improved with restarting of home meds  c/w home amlodpine 5mg qd, metoprolol-XL 25mg BID, losartan 50mg qd  - Will likely need to increase amlodipine to 10mg after discharge will need to f/u with primary care provider
#Diabetes  -with glucosuria here, glucose >1000, this is after receiving nearly 2L of fluid at OSH  -a1c  -low dose ISS  -FSG qac, qhs

## 2021-01-24 NOTE — PROGRESS NOTE ADULT - PROBLEM SELECTOR PLAN 3
#HTN  BP elevated to 180s systolics  c/w home amlodpine 5mg qd, metoprolol-XL 25mg BID
#Urinary incontinence  -With recent urinary incontinence. Transferred for spinal cord compression and c/f SAH. Here CTH no evidence of SAH. MRI of T and L spine no benson cord compression.   -neurosurgery consult appreciated  -possibly with overflow incontinence given BPH hx and glucosuria  -PVR approx 100cc, so not appearing to be obsturcted  -curbsided urology- outpt f/u for urodynamic studies
#Urinary incontinence  -With recent urinary incontinence. Transferred here to r/o spinal cord compression and c/f SAH. Here CTH no evidence of SAH. MRI of T and L spine no benson cord compression.   -neurosurgery consult appreciated  -possibly with overflow incontinence given BPH hx and glucosuria  -PVR approx 100cc, so not appearing to be obsturcted  -curbsided urology- outpt f/u for urodynamic studies

## 2021-02-08 PROBLEM — Z46.89 ENCOUNTER FOR REMOVAL OF BILIARY STENT: Status: ACTIVE | Noted: 2021-01-01

## 2021-02-08 PROBLEM — Z00.00 ENCOUNTER FOR PREVENTIVE HEALTH EXAMINATION: Status: ACTIVE | Noted: 2021-01-01

## 2021-02-10 PROBLEM — E11.9 TYPE 2 DIABETES MELLITUS WITHOUT COMPLICATIONS: Chronic | Status: ACTIVE | Noted: 2021-01-01

## 2021-02-10 PROBLEM — K80.20 CALCULUS OF GALLBLADDER WITHOUT CHOLECYSTITIS WITHOUT OBSTRUCTION: Chronic | Status: ACTIVE | Noted: 2021-01-01

## 2021-02-10 PROBLEM — I10 ESSENTIAL (PRIMARY) HYPERTENSION: Chronic | Status: ACTIVE | Noted: 2021-01-01

## 2021-02-10 PROBLEM — M48.00 SPINAL STENOSIS, SITE UNSPECIFIED: Chronic | Status: ACTIVE | Noted: 2021-01-01

## 2021-02-20 PROBLEM — Z01.818 PREOP TESTING: Status: ACTIVE | Noted: 2021-01-01

## 2021-03-28 PROBLEM — K80.50 CHOLEDOCHOLITHIASIS: Status: ACTIVE | Noted: 2021-01-01

## 2021-03-29 PROBLEM — E11.9 DIABETES: Status: ACTIVE | Noted: 2021-01-01

## 2021-03-29 PROBLEM — U07.1 COVID-19: Status: RESOLVED | Noted: 2021-01-01 | Resolved: 2021-01-01

## 2021-03-29 PROBLEM — Z86.73 HISTORY OF STROKE: Status: ACTIVE | Noted: 2021-01-01

## 2021-03-29 PROBLEM — R13.10 DYSPHAGIA, UNSPECIFIED: Status: ACTIVE | Noted: 2021-01-01

## 2021-03-29 PROBLEM — G20 PARKINSON'S DISEASE: Status: ACTIVE | Noted: 2021-01-01

## 2021-03-29 NOTE — PLAN
[FreeTextEntry1] : Impression:\par \par #1.  Choledocholithiasis with history of cholangitis, s/p partial clearance by ERCP and biliary stenting Jan 2021\par \par #2.  Parkinson's Disease, now s/p recent small stroke, with mild dysphagia.  \par \par #3.  COVID-19 infection Feb 2021\par \par #4.  Diabetes\par \par Recommendations:\par \par #1,  Will need neurology evaluation and optimization prior to next routine ERCP for stent removal, stone clearance.\par \par #2.  EGD and ERCP to be scheduled in May 2021\par Risks, benefits, alternatives of the procedure were discussed with the patient and patient's family and they were educated about the procedure. Risks include, but are not limited to, pancreatitis, bleeding, infection, injury to internal organs, possible need for further procedures including emergency surgery, missed lesions, risk of anesthesia, and risk of IV site problems.  Patient understands and agrees to proceed.\par

## 2021-03-29 NOTE — HISTORY OF PRESENT ILLNESS
[Home] : at home, [unfilled] , at the time of the visit. [Medical Office: ([facility])___] : at the medical office located in  [Family Member] : family member [Verbal consent obtained from patient] : the patient, [unfilled] [Time Spent: ___ minutes] : I have spent [unfilled] minutes with the patient on the telephone [FreeTextEntry1] : Patient follows up for cholangitis due to choledocholithiasis in Feb 2021.\par \par Was hospitalized at [facility] in Feb 2021, transferred for evaluation of spinal stenosis and altered metal status, was found not to have cholangitis and choledocholithiasis, underwent ERCP with partial clearance of CBD stones and placement of a biliary stent.  Altered mental status resolved after the procedure.\par \par Since hospitalization:  \par Slow chewing, slow to swallow. \par No fever, chills, abd pain, n/v, melena, jaundice, weight loss.\par \par Hospitalized at [facility] Feb 2021\par Small stroke\par COVID-19 positive, treated, has lung infiltrates\par Memory loss (?Parkinson's)\par Urinary incontinence, bag\par Treated for UTI\par \par CT scan reviewed: 1/21/21\par LIVER: Dilated intrahepatic bile ducts.\par BILE DUCTS: Dilated common bile duct measuring up to 2 cm in diameter with choledocholithiasis.\par GALLBLADDER: Dilated. Cholelithiasis.\par SPLEEN: Within normal limits.\par PANCREAS: Within normal limits.\par ADRENALS: 1.2 cm left adrenal nodule, indeterminate. Right adrenal nodules within normal limits.\par KIDNEYS/URETERS: Bilateral renal cysts. No renal calculi or hydronephrosis.\par \par BLADDER: Within normal limits.\par REPRODUCTIVE ORGANS: Prostate is normal in size.\par \par BOWEL: Diverticulosis coli. No bowel obstruction. Appendix is normal.\par PERITONEUM: No ascites.\par VESSELS: Atherosclerotic changes.\par RETROPERITONEUM/LYMPH NODES: No lymphadenopathy.\par ABDOMINAL WALL: Small bilateral fat-containing inguinal hernias.\par BONES: Degenerative changes. Minimal anterolisthesis of L4 on L5.\par \par IMPRESSION:\par \par Choledocholithiasis with dilated intrahepatic and extrahepatic biliary system.\par \par ERCP: 1/22/21\par Findings:\par      The  film was normal.\par      The duodenoscope was passed to the second portion of the duodenum to the major papilla. The \par      CBD was cannulated with a 4.4F sphincterotome over a 0.035in guidewire. Contrast was injected \par      and revealed several large filling defects in a very dilated duct. A sphincterotomy was made \par      with the sphincterotome, exposing biliary epithelium. The bile duct was then swept from the \par      hilum with an 8.5mm, 11.5mm and 15 mm extraction balloon, removing 1 stone and sludge. 1 \par      rhomboid shaped stone that was approximately 1cm in size remained in the distal duct and was \par      not able to be extracted. Thus, a [id number] straight plastic CBD stent was deployed into the \par      duct over a wire. The stent was in good position on endoscopic and fluoroscopic views. Final \par      fluoroscopic images demonstrated no air in unusual places.\par                                                                                                     \par Impression:          - Choledocholithiasis was found in a severely dilated common bile duct. \par                      Partial removal was accomplished with biliary sphincterotomy and balloon \par                      sweeps.\par                      - A 10 Fr x 5 cm stent was inserted.

## 2021-07-21 NOTE — PRE-OP CHECKLIST - AS TEMP SITE
I spoke w/ Raven on behalf of pts [employer] plan. Raven wanted to know if you will be prescribing a statin for pt; pt has diabetes. She said that a form will be sent to office for you to complete and fax back regarding this. Pt not on a statin. Raven is asking if this is something that can be discussed w/ pt at next visit. Pls advise.    oral

## 2021-09-29 NOTE — ED PROVIDER NOTE - PROGRESS NOTE DETAILS
Attending Maria Teresa West: blood work showed evidence of sig hyponatremia. d/w family at baseline normally opens his eyes. no reports of falls or trauma. ct head ordered to further evaluate as well showing evidence of intracranial hemorrhage. d/w neurosurgery who recommends cta head/neck and venogram. will need to double check with family for any blood thinners spoke with family regarding GOC again as patient's clinic status deteriorates. Explained that patient may not be able to come off the ventilator once intubated. Daughter Brooklynn Maxwell understands and still wants intubation. Attending Maria Teresa West: pt became more sonorous concern for airway. d/w family pt is full code. deciion made to intubate. family recalls pt did start taking aspirin 2 days ago. neurosrugery recommending repeat imaging. MICU aware. nephrology following Attending Maria Teresa West: pt became more sonorous concern for airway. d/w family pt is full code. deciion made to intubate. family recalls pt did start taking aspirin 2 days ago. neurosrugery recommending repeat imaging. MICU aware. nephrology following. will repeat CT scans with cta on way to micu Attending Maria Teresa West: pt becoming more hypotensive. will start levophed.

## 2021-09-29 NOTE — ED PROVIDER NOTE - ATTENDING CONTRIBUTION TO CARE
Attending MD Maria Teresa West:  I personally have seen and examined this patient.  Resident note reviewed and agree on plan of care and except where noted.  See HPI, PE, and MDM for details.

## 2021-09-29 NOTE — CONSULT NOTE ADULT - ASSESSMENT
80yM PMH of gallstones (30-40 years ago), BPH, HLD, h/o spinal stenosis, subarachnoid and severe spinal stenosis, recent PNA, penile infection (?abscess) &  sacral wound on antibiotic with PICC line presenting with possible aspiration PNA, hemorrhagic stroke & hyponatremia. Nephrology was called for hyponatremia.             Hyponatremia- likely hypovolemic hyponatremia & poor solute intake  Upon review of [facility] [facility] pt's last serum Na was 137 in Jan 2021. In the ED Serum sodium is 110 & Chloride 79. Check Sam, UOsm, POsm, Serum uric acid, TSH, cortisol. s/p 1L NS in the ED. Start 3% HTS @ 30cc/hr x 6 hrs. Check BMP q3 hrs. Goal rise in sodium is 6-9 meq in 24 hrs as discussed with neurosurgery to prevent brain edema. Ultimate goal is 145-155 meq/L. Free water restriction to <1L for now. If at any point, urine output becomes > 100cc/hr or pt exceeds sodium goal then stop HTS & start D5W. Suggest MICU consult. Goals of care per primary team.           If you have any questions, please feel free to contact me  Roxann Man  Nephrology Fellow  Pager NS: [phone]/ [facility]: [phone]    (After 5 pm or on weekends please page the on-call fellow, can check [email or web address] for schedule. Login is demond vera, schedule under [facility] medicine, psych, derm)   80yM PMH of gallstones (30-40 years ago), BPH, HLD, h/o spinal stenosis, subarachnoid and severe spinal stenosis, recent PNA, penile infection (?abscess) &  sacral wound on antibiotic with PICC line presenting with possible aspiration PNA, hemorrhagic stroke & hyponatremia. Nephrology was called for hyponatremia.             Hyponatremia- likely ADH mediated hypovolemic hyponatremia  [facility]'s last serum Na was 137 in Jan 2021. In the ED Serum sodium is 110 & Chloride 79. Check Sam, UOsm, POsm, Serum uric acid, TSH, cortisol. s/p 1L NS in the ED. Start 3% HTS @ 30cc/hr x 6 hrs. Check BMP q3 hrs. Goal rise in sodium is 6-9 meq in 24 hrs as discussed with neurosurgery to prevent brain edema. Ultimate goal is 145-155 meq/L. Free water restriction to <1L for now. If at any point, urine output becomes > 100cc/hr or pt exceeds sodium goal then stop HTS & start D5W. Suggest MICU consult. Goals of care per primary team.         NAGMA- likely from recent diarrhea. f/u BMP. Diarrhea now resolved. Will consider bicarb supplementation if diarrhea recurs          If you have any questions, please feel free to contact me  Roxann Man  Nephrology Fellow  Pager NS: [phone]/ [facility]: [phone]    (After 5 pm or on weekends please page the on-call fellow, can check [email or web address] for schedule. Login is demond vera, schedule under [facility] medicine, psych, derm)   80yM PMH of gallstones (30-40 years ago), BPH, HLD, h/o spinal stenosis, subarachnoid and severe spinal stenosis, recent PNA, penile infection (?abscess) &  sacral wound on antibiotic with PICC line presenting with possible aspiration PNA, extensive right frontal intraparenchymal hemorrhage, herniation & hyponatremia. Nephrology was called for hyponatremia.             Hyponatremia- likely ADH mediated hypovolemic hyponatremia  [facility] Na was 137 in Jan 2021. In the ED Serum sodium is 110 & Chloride 79. Check Sam, UOsm, POsm, Serum uric acid, TSH, cortisol. s/p 1L NS in the ED. Start 3% HTS @ 30cc/hr x 6 hrs. Check BMP q3 hrs. Goal rise in sodium is 6-9 meq in 24 hrs as discussed with neurosurgery to prevent brain edema. Ultimate goal is 145-155 meq/L. Free water restriction to <1L for now. If at any point, urine output becomes > 100cc/hr or pt exceeds sodium goal then stop HTS & start D5W. Suggest MICU consult. Goals of care per primary team.         NAGMA- likely from recent diarrhea. f/u BMP. Diarrhea now resolved. Will consider bicarb supplementation if diarrhea recurs          If you have any questions, please feel free to contact me  Roxann Man  Nephrology Fellow  Pager NS: [phone]/ [facility]: [phone]    (After 5 pm or on weekends please page the on-call fellow, can check [email or web address] for schedule. Login is demond vera, schedule under [facility] medicine, psych, derm)

## 2021-09-29 NOTE — ED PROVIDER NOTE - OBJECTIVE STATEMENT
80yM PMH of gallstones (30-40 years ago), BPH, HLD, h/o spinal stenosis, subarachnoid and severe spinal stenosis CT head and CT L spine, presenting with cough for a week ago but worsened today after daughter attempted to give him ensure by mouth. She noticed breathing sounds like he was snoring. Pelvic infection on antibiotic with PICC line. 80yM PMH of gallstones (30-40 years ago), BPH, HLD, h/o spinal stenosis, subarachnoid and severe spinal stenosis CT head and CT L spine, presenting with cough for a week ago but worsened today after daughter attempted to give him ensure by mouth. hx obtained from daughter. She noticed breathing sounds like he was snoring. PNA and penile infection (?abscess), R button wound and sacral wound on antibiotic with PICC line (cefepime 2gq8 8/20-9/28; doxycycline 8/20---). Noncovid vaccinated. No sick contact. No diarrhea.     Full code  pcp: Elizabet Murguia [phone] (cell) [phone] (office)

## 2021-09-29 NOTE — CONSULT NOTE ADULT - ATTENDING COMMENTS
Hyponatremia: severe   Urine lytes appear pre renal   Given the severity of hyponatremia and intracranial bleed, would use hypertonic saline  Serum sodium is 116 this am, can likely correct up to 10 meq in 24 hours given the bleed/cereberal edema  Restart hypertonic saline   Please check BMP Q3 hours  Please check urine lytes, ur osmolality, Serm uric acid, TShH  Family discussion regarding GOC  Rest per Dr Magda Ruiz MD  O: [phone]  C:

## 2021-09-29 NOTE — ED PROVIDER NOTE - CARE PLAN
Principal Discharge DX:	Intracranial hemorrhage  Secondary Diagnosis:	Hyponatremia  Secondary Diagnosis:	Pneumonia   1

## 2021-09-29 NOTE — ED PROVIDER NOTE - CLINICAL SUMMARY MEDICAL DECISION MAKING FREE TEXT BOX
Attending Maria Teresa West: 79 yo male with multiple medical issues presenting with concern for resolved mental status changes. upon arrival pt rhonchorous, ill appearing. d/w family for collateral. pt is nonverbal at baseline. no reports of trauma or falls. no new medications. pt was given ensure yesterday, concern for aspiration event, pt requiring supplemental oxygen. pt landeros cultured and started on abx. blood work showed sig hyponatremia, urine lytes and osm sent. ct head performed to further evaluate showing ICH with edema. dw neurosurgery recommend cta imaging to further evaluate, mass vs bleed. while in the ed concern for pt;s airway. d/w family and pt is full code. multiople medical issues currently including ICH, hyponatremia and PNA. family request intubation. neurosurgery and micu following, will need repeat ct head to further evaluate. plan to admit icu. nephrology consulted and recommending hypertonic saline. 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

## 2021-09-29 NOTE — CONSULT NOTE ADULT - SUBJECTIVE AND OBJECTIVE BOX
[facility] DIVISION OF KIDNEY DISEASES AND HYPERTENSION -- [phone]  -- INITIAL CONSULT NOTE  --------------------------------------------------------------------------------  HPI: 80yM PMH of gallstones (30-40 years ago), BPH, HLD, h/o spinal stenosis, subarachnoid and severe spinal stenosis CT head and CT L spine, presenting with cough for a week ago but worsened today after daughter attempted to give him ensure by mouth. hx obtained from daughter. She noticed breathing sounds like he was snoring. Off note, pt had recent PNA, penile infection (?abscess) &  sacral wound on antibiotic with PICC line (cefepime 2gq8 8/20-9/28; doxycycline 8/20---).  No sick contact. No diarrhea.           Patient seen & examined. Denies chest pain, fever, chills, increased frequency, dysuria, hematuria, pus in urine, frothy urine, SOB, leg edema, loss of appetite, pruritis, N/V.      PAST HISTORY  --------------------------------------------------------------------------------  PAST MEDICAL & SURGICAL HISTORY:  Spinal stenosis    Diabetes    Benign essential HTN    Gallstones    S/P cataract surgery      FAMILY HISTORY:    PAST SOCIAL HISTORY:    ALLERGIES & MEDICATIONS  --------------------------------------------------------------------------------  Allergies    No Known Allergies    Intolerances      Standing Inpatient Medications  azithromycin  IVPB 500 milliGRAM(s) IV Intermittent once  piperacillin/tazobactam IVPB. 3.375 Gram(s) IV Intermittent Once  sodium chloride 0.9% Bolus 1000 milliLiter(s) IV Bolus once    PRN Inpatient Medications      REVIEW OF SYSTEMS  --------------------------------------------------------------------------------  Gen: No  fevers/chills  Respiratory: No dyspnea, cough  CV: No chest pain  GI: No abdominal pain, diarrhea,  nausea, vomiting  : No increased frequency, dysuria, hematuria  MSK:  no edema  Neuro: No dizziness/lightheadedness    All other systems were reviewed and are negative, except as noted.    VITALS/PHYSICAL EXAM  --------------------------------------------------------------------------------  T(C): 37.3 (09-29-21 @ 21:27), Max: 37.3 (09-29-21 @ 21:27)  HR: 109 (09-29-21 @ 21:27) (109 - 109)  BP: 117/70 (09-29-21 @ 21:27) (117/70 - 117/70)  RR: 22 (09-29-21 @ 21:27) (22 - 22)  SpO2: 95% (09-29-21 @ 21:27) (95% - 95%)  Wt(kg): --  Height (cm): 170.2 (09-29-21 @ 21:27)  Weight (kg): 55.8 (09-29-21 @ 21:27)  BMI (kg/m2): 19.3 (09-29-21 @ 21:27)  BSA (m2): 1.64 (09-29-21 @ 21:27)      Physical Exam:  	Gen: NAD  	HEENT: MMM  	Pulm: CTA B/L  	CV: S1S2  	Abd: Soft, +BS   	Ext: No LE edema B/L, no asterixis  	Neuro: Awake, alert  	Skin: Warm and dry  	Vascular access:    LABS/STUDIES  --------------------------------------------------------------------------------              10.1   9.78  >-----------<  175      [09-29-21 @ 22:08]              31.1     110  |  79  |  17  ----------------------------<  160      [09-29-21 @ 22:08]  4.9   |  14  |  <0.30        Ca     8.8     [09-29-21 @ 22:08]      Mg     1.8     [09-29-21 @ 22:08]      Phos  2.7     [09-29-21 @ 22:08]    TPro  6.4  /  Alb  3.4  /  TBili  0.7  /  DBili  x   /  AST  29  /  ALT  12  /  AlkPhos  110  [09-29-21 @ 22:08]    PT/INR: PT 12.0 , INR 1.00       [09-29-21 @ 22:08]  PTT: 24.2       [09-29-21 @ 22:08]      Creatinine Trend:  SCr <0.30 [09-29 @ 22:08]    Urinalysis - [01-21-21 @ 06:03]      Color Yellow / Appearance Clear / SG 1.032 / pH 6.0      Gluc 1000 mg/dL / Ketone Negative  / Bili Small / Urobili Negative       Blood Negative / Protein 30 mg/dL / Leuk Est Negative / Nitrite Negative      RBC 1 / WBC 1 / Hyaline 1 / Gran 1 / Sq Epi  / Non Sq Epi 1 / Bacteria Few      Iron 122, TIBC 246, %sat 50      [01-24-21 @ 10:03]  Ferritin 292      [01-24-21 @ 09:57]    HBsAb Reactive      [01-23-21 @ 10:16]  HBsAg Nonreact      [01-23-21 @ 10:16]     [facility] DIVISION OF KIDNEY DISEASES AND HYPERTENSION -- [phone]  -- INITIAL CONSULT NOTE  --------------------------------------------------------------------------------  HPI: 80yM PMH of gallstones (30-40 years ago), BPH, HLD, h/o spinal stenosis, subarachnoid and severe spinal stenosis CT head and CT L spine, presenting with cough for a week ago but worsened today after daughter attempted to give him ensure by mouth. hx obtained from daughter. Off note, pt had recent PNA, penile infection (?abscess) &  sacral wound on antibiotic with PICC line (cefepime 2gq8 8/20-9/28; doxycycline 8/20---).  No sick contact. No diarrhea.   Nephrology was called for hyponatremia. Upon review of [facility] pt's last serum Na was 137 in Jan 2021. In the ED Serum sodium is 110 & Chloride 79. Pt appears non verbal & gets fed via PEG & PO by daughter. History is provided by daughter. She reports a hx of recent cough & SOB while trying to feed him PO. Also reported a history of recent diarrhea & vomited once today. Pt was found to have a brain bleed likely secondary to a bleeding cavernoma vs malignancy. CXR consistent with bilateral infiltrates.               PAST HISTORY  --------------------------------------------------------------------------------  PAST MEDICAL & SURGICAL HISTORY:  Spinal stenosis    Diabetes    Benign essential HTN    Gallstones    S/P cataract surgery      FAMILY HISTORY:    PAST SOCIAL HISTORY:    ALLERGIES & MEDICATIONS  --------------------------------------------------------------------------------  Allergies    No Known Allergies    Intolerances      Standing Inpatient Medications  azithromycin  IVPB 500 milliGRAM(s) IV Intermittent once  piperacillin/tazobactam IVPB. 3.375 Gram(s) IV Intermittent Once  sodium chloride 0.9% Bolus 1000 milliLiter(s) IV Bolus once    PRN Inpatient Medications      REVIEW OF SYSTEMS  --------------------------------------------------------------------------------  unable to obtain    VITALS/PHYSICAL EXAM  --------------------------------------------------------------------------------  T(C): 37.3 (09-29-21 @ 21:27), Max: 37.3 (09-29-21 @ 21:27)  HR: 109 (09-29-21 @ 21:27) (109 - 109)  BP: 117/70 (09-29-21 @ 21:27) (117/70 - 117/70)  RR: 22 (09-29-21 @ 21:27) (22 - 22)  SpO2: 95% (09-29-21 @ 21:27) (95% - 95%)  Wt(kg): --  Height (cm): 170.2 (09-29-21 @ 21:27)  Weight (kg): 55.8 (09-29-21 @ 21:27)  BMI (kg/m2): 19.3 (09-29-21 @ 21:27)  BSA (m2): 1.64 (09-29-21 @ 21:27)      Physical Exam:  	Gen: SOB  	HEENT: dry mucous membranes  	Pulm: B/L rhonchi  	CV: tachy  	Abd: Soft, +BS   	Ext: No LE edema B/L, contracted  	Neuro: lethargic, non verbal  	Skin: Warm and dry  	Vascular access:              +Ace    LABS/STUDIES  --------------------------------------------------------------------------------              10.1   9.78  >-----------<  175      [09-29-21 @ 22:08]              31.1     110  |  79  |  17  ----------------------------<  160      [09-29-21 @ 22:08]  4.9   |  14  |  <0.30        Ca     8.8     [09-29-21 @ 22:08]      Mg     1.8     [09-29-21 @ 22:08]      Phos  2.7     [09-29-21 @ 22:08]    TPro  6.4  /  Alb  3.4  /  TBili  0.7  /  DBili  x   /  AST  29  /  ALT  12  /  AlkPhos  110  [09-29-21 @ 22:08]    PT/INR: PT 12.0 , INR 1.00       [09-29-21 @ 22:08]  PTT: 24.2       [09-29-21 @ 22:08]      Creatinine Trend:  SCr <0.30 [09-29 @ 22:08]    Urinalysis - [01-21-21 @ 06:03]      Color Yellow / Appearance Clear / SG 1.032 / pH 6.0      Gluc 1000 mg/dL / Ketone Negative  / Bili Small / Urobili Negative       Blood Negative / Protein 30 mg/dL / Leuk Est Negative / Nitrite Negative      RBC 1 / WBC 1 / Hyaline 1 / Gran 1 / Sq Epi  / Non Sq Epi 1 / Bacteria Few      Iron 122, TIBC 246, %sat 50      [01-24-21 @ 10:03]  Ferritin 292      [01-24-21 @ 09:57]    HBsAb Reactive      [01-23-21 @ 10:16]  HBsAg Nonreact      [01-23-21 @ 10:16]

## 2021-09-29 NOTE — ED PROVIDER NOTE - NS ED ROS FT
Per daughter  CONSTITUTIONAL: No fevers or chills   NECK: No stiffness  RESPIRATORY: +cough, no wheezing, hemoptysis; No shortness of breath  GASTROINTESTINAL: No abdominal or epigastric pain. No nausea, vomiting, or hematemesis; No diarrhea or constipation. No melena or hematochezia.  GENITOURINARY: figueroa  NEUROLOGICAL: baseline nonverbal since august 2021

## 2021-09-29 NOTE — ED PROVIDER NOTE - PHYSICAL EXAMINATION
GENERAL: resting comfortably  HEAD:  Atraumatic, Normocephalic  EYES: PERRLA, conjunctiva and sclera clear  NECK: Supple  CHEST/LUNG: diffusely coarse breath sound  HEART: Regular rate and rhythm; No murmurs, rubs, or gallops  ABDOMEN: Soft, Nontender, Nondistended; Bowel sounds present  EXTREMITIES:  No clubbing, cyanosis, or edema  PSYCH: AAOx0  NEUROLOGY: nonverbal  SKIN: No rashes or lesions GENERAL: resting comfortably  HEAD:  Atraumatic, Normocephalic  EYES: PERRLA, conjunctiva and sclera clear  NECK: Supple  CHEST/LUNG: diffusely coarse breath sound  HEART: Regular rate and rhythm; No murmurs, rubs, or gallops  ABDOMEN: Soft, Nontender, Nondistended; Bowel sounds present  EXTREMITIES:  No clubbing, cyanosis, or edema  PSYCH: AAOx0  NEUROLOGY: nonverbal  SKIN: No rashes or lesions  Attending Maria Teresa West: Gen: ill appearing, heent: atrauamtic, pupils pinpoint, gaze to left, op pink, , neck; nttp, no nuchal rigidity, chest: nttp, no crepitus, cv: rrr,  lungs: diffuse crackles  abd: soft, mild lower abd ttp, peg tube in place, nondistended, no peritoneal signs, , no guarding, ext: wwp, neg homans, skin: no rash, neuro: sleeping, nonresponsive to commands.

## 2021-09-30 NOTE — H&P ADULT - ATTENDING COMMENTS
Patient seen and examined.  Agree with resident note as above.  Patient with hx as noted including chronic debility s/p CVA and is nonverbal/ AOx0, PEG, chronic figueroa, decubs, and is on abx via PICC for recent penile abscess.  Patient presented to [facility] with cough and altered respirations after family tried to feed ensure by mouth.  CT CAP shows multifocal PNA.  Labs show severe hyponatremia.  CT head shows large ICH with mass effect and effacement of his right lateral ventricle.  While in the ER, patient had further worsening of his mental status and required intubation.  Now accepted to MICU in that setting.    On arrival to MICU POCUS shows B lines c/w multifocal PNA, good biventricular function, and small IVC.  Will volume resuscitate.    Will support on vent, follow cx, empiric abx for PNA.  Appreciate NSGY recs - await read for CTA/CTV of head; Keppra, dexamethsone.  3% saline to correct Na by 6-8meq/24h.    Family is shocked by patient's decline.  Daughter is surrogate decision maker and requests FULL CODE for now.    DVT ppx with SCDs given acute bleed.    Rest of plan as above and I have edited as appropriate.

## 2021-09-30 NOTE — PROGRESS NOTE ADULT - ASSESSMENT
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 MICU consulted for hyponatremia.    #Neuro  Hx of CVA, A&Ox0, nonverbal at baseline   Found to have extensive right frontal intraparenchymal hemorrhage, and herniation. Findings may represent a ruptured bleeding cavernoma, as previously there was a linear hyperdensity consistent with DVA with possible cavernoma; however, malignancy cannot be excluded.   Neuro Consulted, appreciate recs   - Repeat CTH for stability and to r/o sinus thrombosis as cause  - Further evaluation with CTA head/neck and MRI.  - MR w/wo contrast   - Q1 neuro checks   - C/w Keppra 500 BID     #Resp  Intubated for airway protection   Mode: AC   Settings: RR:18/Tv: 400/ Fio2: 70%/ PEEP: 5  - Aspirations precautions   - F/u ABG       #CV  Hypotension 92/52  - On Levophed 8mg infused at 5.23 mL/Hr. Titrate to MAP >65  - Cont. to monitor       #GI  PEG   Currently NPO   -Restart tube feeds in AM once stable       #Renal/  Hx of penile abscess   Cr 0.30  Bello  - Monitor I/O  - C/w ABX as above         #ID   lactate 2.5. 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 through peg   Nephro recs appreciated   - C/w 3% hypertonic saline 30cc  - BMP q3 , Ultimate goal is 145-155 meq/L  - Free water restrictions to < 1L   - If urine output becomes > 100cc/hr or pt exceeds sodium goal then stop HTS & start D5W       #Heme   Hgb of 10 on admission  - DVT Prophylaxis: SCDs       #Ethics   - FULL CODE  80yM PMH of gallstones (30-40 years ago), BPH, HLD, h/o spinal stenosis, CVA (nonverbal, 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 Admitted to MICU for management of hyponatremia.    #Neuro  Hx of CVA, A&Ox0, nonverbal at baseline   Found to have extensive right frontal intraparenchymal hemorrhage and herniation. Findings may represent a ruptured bleeding cavernoma, as previously there was a linear hyperdensity consistent with DVA with possible cavernoma; however, malignancy cannot be excluded.   Neuro Consulted, appreciate recs   - Repeat CTH for stability and to r/o sinus thrombosis as cause  - Further evaluation with CTA head/neck and MRI.  - MR w/wo contrast   - Q1 neuro checks   - C/w Keppra 500 BID     #Resp  Intubated for airway protection   Mode: AC   Settings: RR:18/Tv: 400/ Fio2: 70%/ PEEP: 5  - Rhonchorous throughout bilat lung fields, per daughter, recent hx of PNA; started azithromycin and zosyn in ED  - Aspirations precautions   - F/u ABG       #CV  Hypotension 90s/40s-60s  - On Levophed 8mg infused at 5.23 mL/Hr. 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 rhonchi throughout bilateral lung fields and CXR with bilateral patchy opacities R>L c/w PNA, recent penile infection (?abscess) & sacral wound on antibiotic with PICC line (cefepime 2gq8 8/20-9/28; doxycycline 8/20---) presenting with cough  WBC 9.78, Febrile 100.5  - C/w Zosyn 3.375  - C/w Azithromycin 500mg   - F/U Blood Cx   - F/u legionella  - Can DC Azithro if urine Legionella is negative       #Endo  Hyponatremic to 110 on admission likely multifactorial: SIADH vs excess free water through PEG  Nephro recs appreciated   - C/w 3% hypertonic saline 30cc  - BMP q3 , Ultimate goal is 145-155 meq/L  - Repeat BMP 9/30 AM with rise in Na to 116, hold hypertonic saline  - Free water restrictions to < 1L   - If urine output becomes > 100cc/hr or if pt exceeds sodium goal then stop HTS & start D5W       #Heme   Hgb of 10 on admission, hold pharmacologic AC i/s/o intracranial hemorrhage  - DVT Prophylaxis: SCDs       #Ethics   - GOC discussed with daughter in ED: FULL CODE  80yM PMH of gallstones (30-40 years ago), BPH, HLD, h/o spinal stenosis, CVA (nonverbal, 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 Admitted to MICU for management of hyponatremia and hypotension requiring pressors.    #Neuro  Hx of CVA, A&Ox0, nonverbal at baseline   Found to have extensive right frontal intraparenchymal hemorrhage, edema and herniation. Findings may represent a ruptured bleeding cavernoma, as previously there was a linear hyperdensity consistent with DVA with possible cavernoma; however, malignancy cannot be excluded.   Neuro and neurosurgery consulted, appreciate recs   - Repeat CTH w/ suspicion for malignancy vs. r/o sinus thrombosis as cause  - Per discussion with neurosurgery, hold off on MRI prior to GOC discussion w/ family. Per neurosurgery, given poor functional status at baseline, no current indication for neurosurgical intervention  CTA head/neck and MRI.  - Will start dex 10 then 4q6 i/s/o likely intracranial mass   - Q1 neuro checks   - C/w Keppra 500 BID     #Resp  Intubated for airway protection   Mode: AC   Settings: RR:18/Tv: 400/ Fio2: 70%/ PEEP: 5  - Rhonchi throughout bilat lung fields, per daughter, recent hx of PNA; started azithromycin and zosyn in ED  - Aspirations precautions  - F/u ABG  - Resp alkalosis i/s/o hyperventilation, metabolic acidosis (AGMA and NAGMA)      #CV  Hypotension 90s/40s-60s  - On Levophed 8mg infused at 5.23 mL/Hr. Titrate to MAP >65. 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), no signs of infection or abscess on physical exam  - Cr 0.32  - Bello in place, replaced upon admission to MICU  - Monitor I/O  - C/w abx as above     Electrolytes  - Hyponatremia:   -  Correct as needed  - D/c'ed 3% hypertonic saline 30cc due to increase of Na to 116 9/30 AM, f/u next BMP in 4h  - Per discussion w/ nephro, if Na rises to 119-120, continue to hold hypertonic saline; if persistently low, can correct more leniently i/s/o intracranial edema  - Monitor BMP q3 , Per neurosurg, ultimate goal is 145-155 meq/L  - Free water restrictions to < 1L   - If urine output becomes > 100cc/hr or if pt exceeds sodium goal then stop HTS & start D5W       #ID  - lactate 2.5. rhonchi throughout bilateral lung fields and CXR with bilateral patchy opacities R>L c/w PNA, recent penile infection (?abscess) & sacral wound on antibiotic with PICC line (cefepime 2gq8 8/20-9/28; doxycycline 8/20---) presenting with cough  WBC 9.78, Febrile 100.5  - C/w Zosyn 3.375  - C/w Azithromycin 500mg   - F/U Blood Cx   - F/u legionella  - Can DC Azithro if urine Legionella is negative       #Endo  Hyponatremic to 110 on admission likely multifactorial: SIADH vs dehydration i/s/o vomiting and diarrhea prior to admission vs. 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 Admitted to MICU for management of hyponatremia and hypotension requiring pressors.    #Neuro  Hx of CVA, A&Ox0, nonverbal at baseline   Found to have extensive right frontal intraparenchymal hemorrhage, edema and herniation. Findings may represent a ruptured bleeding cavernoma, as previously there was a linear hyperdensity consistent with DVA with possible cavernoma; however, malignancy cannot be excluded.   Neuro and neurosurgery consulted, appreciate recs   - Repeat CTH w/ suspicion for malignancy vs. r/o sinus thrombosis as cause  - Per discussion with neurosurgery, hold off on MRI prior to GOC discussion w/ family  as given poor functional status at baseline, no current indication for neurosurgical intervention  CTA head/neck and MRI.  - Will start dex 10 then 4q6 i/s/o likely intracranial mass   - Q1 neuro checks   - C/w Keppra 500 BID     #Resp  Intubated for airway protection   Mode: AC   Settings: RR:18/Tv: 400/ Fio2: 70%/ PEEP: 5  - Rhonchi throughout bilat lung fields, per daughter, recent hx of PNA; started azithromycin and zosyn in ED  - Aspirations precautions  - F/u ABG  - Resp alkalosis i/s/o hyperventilation, metabolic acidosis (AGMA and NAGMA)      #CV  Hypotension 90s/40s-60s  - On Levophed 8mg infused at 5.23 mL/Hr. Titrate to MAP >65. 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), no signs of infection or abscess on physical exam  - Cr 0.32  - Bello in place, replaced upon admission to MICU  - Monitor I/O  - C/w abx as above     Electrolytes  - Hyponatremia:   -  Correct as needed  - D/c'ed 3% hypertonic saline 30cc due to increase of Na to 116 9/30 AM, f/u next BMP in 4h  - Per discussion w/ nephro, if Na rises to 119-120, continue to hold hypertonic saline; if persistently low, can correct more leniently i/s/o intracranial edema  - Monitor BMP q3 , Per neurosurg, ultimate goal is 145-155 meq/L  - Free water restrictions to < 1L   - If urine output becomes > 100cc/hr or if pt exceeds sodium goal then stop HTS & start D5W       #ID  - lactate 2.5. rhonchi throughout bilateral lung fields and CXR with bilateral patchy opacities R>L c/w PNA, recent penile infection (?abscess) & sacral wound on antibiotic with PICC line (cefepime 2gq8 8/20-9/28; doxycycline 8/20---) presenting with cough  WBC 9.78, Febrile 100.5  - C/w Zosyn 3.375  - C/w Azithromycin 500mg   - F/U Blood Cx   - F/u legionella  - Can DC Azithro if urine Legionella is negative       #Endo  Hyponatremic to 110 on admission likely multifactorial: SIADH vs dehydration i/s/o vomiting and diarrhea prior to admission vs. excess free water through PEG  - Nephro recs appreciated      #Heme   Hgb of 10 on admission, hold pharmacologic AC i/s/o intracranial hemorrhage  - DVT Prophylaxis: SCDs       #Ethics   - GOC discussed with daughter in ED: FULL CODE   - Will reach out to family to discuss goals of care given functional status and acute clinical status

## 2021-09-30 NOTE — CONSULT NOTE ADULT - SUBJECTIVE AND OBJECTIVE BOX
CHIEF COMPLAINT:    HPI:    PAST MEDICAL & SURGICAL HISTORY:  Spinal stenosis    Diabetes    Benign essential HTN    Gallstones    S/P cataract surgery        FAMILY HISTORY:      SOCIAL HISTORY:  Smoking: __ packs x ___ years  EtOH Use:  Marital Status:  Occupation:  Recent Travel:  Country of Birth:  Advance Directives:    Allergies    No Known Allergies    Intolerances        HOME MEDICATIONS:    REVIEW OF SYSTEMS:  Constitutional:   Eyes:  ENT:  CV:  Resp:  GI:  :  MSK:  Integumentary:  Neurological:  Psychiatric:  Endocrine:  Hematologic/Lymphatic:  Allergic/Immunologic:  [ ] All other systems negative  [ ] Unable to assess ROS because ________    OBJECTIVE:  ICU Vital Signs Last 24 Hrs  T(C): 37.3 (29 Sep 2021 21:27), Max: 37.3 (29 Sep 2021 21:27)  T(F): 99.2 (29 Sep 2021 21:27), Max: 99.2 (29 Sep 2021 21:27)  HR: 102 (29 Sep 2021 23:50) (102 - 109)  BP: 118/58 (29 Sep 2021 23:50) (117/70 - 118/58)  BP(mean): --  ABP: --  ABP(mean): --  RR: 21 (29 Sep 2021 23:50) (21 - 22)  SpO2: 97% (29 Sep 2021 23:50) (95% - 97%)        CAPILLARY BLOOD GLUCOSE          PHYSICAL EXAM:  General:   HEENT:   Lymph Nodes:  Neck:   Respiratory:   Cardiovascular:   Abdomen:   Extremities:   Skin:   Neurological:  Psychiatry:    HOSPITAL MEDICATIONS:  MEDICATIONS  (STANDING):  azithromycin  IVPB 500 milliGRAM(s) IV Intermittent once  piperacillin/tazobactam IVPB. 3.375 Gram(s) IV Intermittent Once  piperacillin/tazobactam IVPB.. 3.375 Gram(s) IV Intermittent every 8 hours    MEDICATIONS  (PRN):      LABS:                        10.1   9.78  )-----------( 175      ( 29 Sep 2021 22:08 )             31.1     09-    110<LL>  |  79<L>  |  17  ----------------------------<  160<H>  4.9   |  14<L>  |  <0.30<L>    Ca    8.8      29 Sep 2021 22:08  Phos  2.7       Mg     1.8         TPro  6.4  /  Alb  3.4  /  TBili  0.7  /  DBili  x   /  AST  29  /  ALT  12  /  AlkPhos  110      PT/INR - ( 29 Sep 2021 22:08 )   PT: 12.0 sec;   INR: 1.00 ratio         PTT - ( 29 Sep 2021 22:08 )  PTT:24.2 sec  Urinalysis Basic - ( 30 Sep 2021 00:01 )    Color: Yellow / Appearance: Slightly Turbid / S.033 / pH: x  Gluc: x / Ketone: Trace  / Bili: Negative / Urobili: Negative   Blood: x / Protein: 100 mg/dL / Nitrite: Negative   Leuk Esterase: Negative / RBC: 132 /hpf / WBC 50 /HPF   Sq Epi: x / Non Sq Epi: 6 / Bacteria: Negative        Venous Blood Gas:   @ 22:08  7.38/29/40/17/69.0  VBG Lactate: 2.5      MICROBIOLOGY:     RADIOLOGY:  [ ] Reviewed and interpreted by me    EKG:     HPI: 80yM PMH of gallstones (30-40 years ago), BPH, HLD, h/o spinal stenosis, CVA (nonverbal, 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 Hx obtained from daughter. She noticed breathing sounds like he was snoring and called EMS.  No sick contact. No diarrhea.     In the ED, VSS. Labs significant for Na 110, CO2 14, lactate 2.5. CXR w/ b/l patchy opacities R>L c/w PNA. 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 MICU consulted for hyponatremia.        PAST MEDICAL & SURGICAL HISTORY:  Spinal stenosis    Diabetes    Benign essential HTN    Gallstones    S/P cataract surgery          Allergies    No Known Allergies    Intolerances        HOME MEDICATIONS:    REVIEW OF SYSTEMS:  Complete review of systems unable to be obtained at this time due to patient condition:    OBJECTIVE:  ICU Vital Signs Last 24 Hrs  T(C): 37.3 (29 Sep 2021 21:27), Max: 37.3 (29 Sep 2021 21:27)  T(F): 99.2 (29 Sep 2021 21:27), Max: 99.2 (29 Sep 2021 21:27)  HR: 102 (29 Sep 2021 23:50) (102 - 109)  BP: 118/58 (29 Sep 2021 23:50) (117/70 - 118/58)  BP(mean): --  ABP: --  ABP(mean): --  RR: 21 (29 Sep 2021 23:50) (21 - 22)  SpO2: 97% (29 Sep 2021 23:50) (95% - 97%)        CAPILLARY BLOOD GLUCOSE          PHYSICAL EXAM:  Gen: obtunded, AAOx0, nonverbal minimally responsive to painful stimuli  HEENT: atraumatic, normocephalic, pupils equally round and reactive to light, extraocular muscles intact, no conjunctival injection  CV: regular rate and rhythym, normal S1/S2, no murmurs, rubs, or gallops  Resp: lungs rhoncorous b/l throughout  GI: PEG tube in place, C/D/I, abd soft, nontender, nondistended, BSx4  MSK: LUE PICC C/D/I, extremities atraumatic, no cyanosis or clubbing  Skin: warm, dry, no rashes or lesions  Neuro: minimally responsive to pain      HOSPITAL MEDICATIONS:  MEDICATIONS  (STANDING):  azithromycin  IVPB 500 milliGRAM(s) IV Intermittent once  piperacillin/tazobactam IVPB. 3.375 Gram(s) IV Intermittent Once  piperacillin/tazobactam IVPB.. 3.375 Gram(s) IV Intermittent every 8 hours    MEDICATIONS  (PRN):      LABS:                        10.1   9.78  )-----------( 175      ( 29 Sep 2021 22:08 )             31.1         110<LL>  |  79<L>  |  17  ----------------------------<  160<H>  4.9   |  14<L>  |  <0.30<L>    Ca    8.8      29 Sep 2021 22:08  Phos  2.7       Mg     1.8         TPro  6.4  /  Alb  3.4  /  TBili  0.7  /  DBili  x   /  AST  29  /  ALT  12  /  AlkPhos  110      PT/INR - ( 29 Sep 2021 22:08 )   PT: 12.0 sec;   INR: 1.00 ratio         PTT - ( 29 Sep 2021 22:08 )  PTT:24.2 sec  Urinalysis Basic - ( 30 Sep 2021 00:01 )    Color: Yellow / Appearance: Slightly Turbid / S.033 / pH: x  Gluc: x / Ketone: Trace  / Bili: Negative / Urobili: Negative   Blood: x / Protein: 100 mg/dL / Nitrite: Negative   Leuk Esterase: Negative / RBC: 132 /hpf / WBC 50 /HPF   Sq Epi: x / Non Sq Epi: 6 / Bacteria: Negative        Venous Blood Gas:   @ 22:08  7.38/29/40/17/69.0  VBG Lactate: 2.5      MICROBIOLOGY:     RADIOLOGY:  [ ] Reviewed and interpreted by me    EKG:

## 2021-09-30 NOTE — CHART NOTE - NSCHARTNOTEFT_GEN_A_CORE
:  Fredy Byrne MD  INDICATION:  Respiratory failure , Shock  PROCEDURE:  [ x] LIMITED ECHO  [ x] LIMITED CHEST  [ ] LIMITED RETROPERITONEAL  [ ] LIMITED ABDOMINAL  [ ] LIMITED DVT  [ ] NEEDLE GUIDANCE VASCULAR  [ ] NEEDLE GUIDANCE THORACENTESIS  [ ] NEEDLE GUIDANCE PARACENTESIS  [ ] NEEDLE GUIDANCE PERICARDIOCENTESIS  [ ] OTHER    FINDINGS:  CHEST: B lines with irregular pleura R> L. Small R pleural effusion.. No focal consolidation seen.  Cardiac. Normal LVF. IVC 1.9cm    INTERPRETATION: B lines R>L consistent with pneumonia.

## 2021-09-30 NOTE — ED PROCEDURE NOTE - PROCEDURE ADDITIONAL DETAILS
7.5cm ETT secured at 23cm at the lip.  Initial attempt via DL intubation using a mac 3 blade.  Vocal cords visualized, but unable to pass ETT.  Subsequent intubation using glidescope successful.  B/l breath sounds, none in the epigastrium.  +color change, b/l breath sounds, EtCo2 wnl.  Tube in trachea on CXR.

## 2021-09-30 NOTE — H&P ADULT - HISTORY OF PRESENT ILLNESS
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 Hx obtained from daughter. She noticed breathing sounds like he was snoring and called EMS.  No sick contact. No diarrhea.     In the ED, VSS. Labs significant for Na 110, CO2 14, lactate 2.5. CXR w/ b/l patchy opacities R>L c/w PNA. 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 MICU consulted for hyponatremia.

## 2021-09-30 NOTE — H&P ADULT - ASSESSMENT
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 MICU consulted for hyponatremia. 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 MICU consulted for hyponatremia.    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 MICU consulted for hyponatremia.    #Neuro  Hx of CVA, A&Ox0, nonverbal at baseline   Found to have extensive right frontal intraparenchymal hemorrhage, and herniation. 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 MICU consulted for hyponatremia.    #Neuro  Hx of CVA, A&Ox0, nonverbal at baseline   Found to have extensive right frontal intraparenchymal hemorrhage, and herniation. 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 MICU consulted for hyponatremia.    #Neuro  Hx of CVA, A&Ox0, nonverbal at baseline   Found to have extensive right frontal intraparenchymal hemorrhage, and herniation. 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 MICU consulted for hyponatremia.    #Neuro  Hx of CVA, A&Ox0, nonverbal at baseline   Found to have extensive right frontal intraparenchymal hemorrhage, and herniation. Neurosurgery consulted  Neuro Consulted, appreciate recs   - Repeat CTH   - MR w/wo contrast   - Q1 neuro checks   - C/w Keppra 500 BID     #Resp  Intubated for airway protection   -       #CV  - On Levophed 8mg infused at 5.23 mL/Hr       #GI  PEG       #Renal/  Hx of penile abscess   Cr 0.30  -        #ID  Recent PNA, penile infection (?abscess) & Sacral wound on antibiotic with PICC line (cefepime 2gq8 8/20-9/28; doxycycline 8/20---) presenting with cough  WBC 9.78, Afebrile   - C/w Zosyn 3.375  - F/U Blood Cx   - F/u legionella      #Endo  Hyponatremic to 110  - C/w 3% hypertonic saline 50cc      #Heme   Hgb of 10 on admission  -       #Ethics   - FULL CODE  80yM PMH of gallstones (30-40 years ago), BPH, HLD, h/o spinal stenosis, CVA (nonverbal, 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 MICU consulted for hyponatremia.    #Neuro  Hx of CVA, A&Ox0, nonverbal at baseline   Found to have extensive right frontal intraparenchymal hemorrhage, and herniation. Neurosurgery consulted  Neuro Consulted, appreciate recs   - Repeat CTH   - MR w/wo contrast   - Q1 neuro checks   - C/w Keppra 500 BID     #Resp  Intubated for airway protection   -       #CV  - On Levophed 8mg infused at 5.23 mL/Hr       #GI  PEG       #Renal/  Hx of penile abscess   Cr 0.30  -        #ID   lactate 2.5. 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 MICU consulted for hyponatremia.    #Neuro  Hx of CVA, A&Ox0, nonverbal at baseline   Found to have extensive right frontal intraparenchymal hemorrhage, and herniation. Neurosurgery consulted  Neuro Consulted, appreciate recs   - Repeat CTH   - MR w/wo contrast   - Q1 neuro checks   - C/w Keppra 500 BID     #Resp  Intubated for airway protection   Vent settings:   - F/u ABG       #CV  Hypotension  - On Levophed 8mg infused at 5.23 mL/Hr. Titrate to MAP >65      #GI  PEG   -Restart tube feeds in AM once stable       #Renal/  Hx of penile abscess   Cr 0.30  - C/w ABX as above         #ID   lactate 2.5. 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) & Sacral wound on antibiotic with PICC line (cefepime 2gq8 8/20-9/28; doxycycline 8/20---) presenting with cough  WBC 9.78, Afebrile   - C/w Zosyn 3.375  - C/w Azithromycin 500mg   - F/U Blood Cx   - F/u legionella      #Endo  Hyponatremic to 110  Nephro recs appreciated   - C/w 3% hypertonic saline 50cc  - BMP q3 , goal is 145-155 meq/L  - Free water restrictions to < 1L   - If urine output becomes > 100cc/hr or pt exceeds sodium goal then stop HTS & start D5W       #Heme   Hgb of 10 on admission  -       #Ethics   - FULL CODE  80yM PMH of gallstones (30-40 years ago), BPH, HLD, h/o spinal stenosis, CVA (nonverbal, 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 MICU consulted for hyponatremia.    #Neuro  Hx of CVA, A&Ox0, nonverbal at baseline   Found to have extensive right frontal intraparenchymal hemorrhage, and herniation. Neurosurgery consulted  Neuro Consulted, appreciate recs   - Repeat CTH   - MR w/wo contrast   - Q1 neuro checks   - C/w Keppra 500 BID     #Resp  Intubated for airway protection   Vent settings:   - F/u ABG       #CV  Hypotension  - On Levophed 8mg infused at 5.23 mL/Hr. Titrate to MAP >65      #GI  PEG   -Restart tube feeds in AM once stable       #Renal/  Hx of penile abscess   Cr 0.30  - C/w ABX as above         #ID   lactate 2.5. 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) & Sacral wound on antibiotic with PICC line (cefepime 2gq8 8/20-9/28; doxycycline 8/20---) presenting with cough  WBC 9.78, Afebrile   - C/w Zosyn 3.375  - C/w Azithromycin 500mg   - F/U Blood Cx   - F/u legionella      #Endo  Hyponatremic to 110  Nephro recs appreciated   - C/w 3% hypertonic saline 30cc  - BMP q3 , goal is 145-155 meq/L  - Free water restrictions to < 1L   - If urine output becomes > 100cc/hr or pt exceeds sodium goal then stop HTS & start D5W       #Heme   Hgb of 10 on admission  -       #Ethics   - FULL CODE  80yM PMH of gallstones (30-40 years ago), BPH, HLD, h/o spinal stenosis, CVA (nonverbal, 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 MICU consulted for hyponatremia.    #Neuro  Hx of CVA, A&Ox0, nonverbal at baseline   Found to have extensive right frontal intraparenchymal hemorrhage, and herniation. Neurosurgery consulted  Neuro Consulted, appreciate recs   - Repeat CTH   - MR w/wo contrast   - Q1 neuro checks   - C/w Keppra 500 BID     #Resp  Intubated for airway protection   Vent settings:   - F/u ABG       #CV  Hypotension  - On Levophed 8mg infused at 5.23 mL/Hr. Titrate to MAP >65      #GI  PEG   -Restart tube feeds in AM once stable       #Renal/  Hx of penile abscess   Cr 0.30  - C/w ABX as above         #ID   lactate 2.5. 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) & Sacral wound on antibiotic with PICC line (cefepime 2gq8 8/20-9/28; doxycycline 8/20---) presenting with cough  WBC 9.78, Afebrile   - C/w Zosyn 3.375  - C/w Azithromycin 500mg   - F/U Blood Cx   - F/u legionella      #Endo  Hyponatremic to 110  Nephro recs appreciated   - C/w 3% hypertonic saline 30cc  - BMP q3 , goal is 145-155 meq/L  - Free water restrictions to < 1L   - If urine output becomes > 100cc/hr or pt exceeds sodium goal then stop HTS & start D5W       #Heme   Hgb of 10 on admission  - DVT Prophylaxis: SCDs       #Ethics   - FULL CODE  80yM PMH of gallstones (30-40 years ago), BPH, HLD, h/o spinal stenosis, CVA (nonverbal, 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 MICU consulted for hyponatremia.    #Neuro  Hx of CVA, A&Ox0, nonverbal at baseline   Found to have extensive right frontal intraparenchymal hemorrhage, and herniation. Neurosurgery consulted  Neuro Consulted, appreciate recs   - Repeat CTH for stability and to r/o sinus thrombosis as cause  - MR w/wo contrast   - Q1 neuro checks   - C/w Keppra 500 BID     #Resp  Intubated for airway protection   Vent settings:   - F/u ABG       #CV  Hypotension  - On Levophed 8mg infused at 5.23 mL/Hr. Titrate to MAP >65      #GI  PEG   -Restart tube feeds in AM once stable       #Renal/  Hx of penile abscess   Cr 0.30  - C/w ABX as above         #ID   lactate 2.5. 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) & Sacral wound on antibiotic with PICC line (cefepime 2gq8 8/20-9/28; doxycycline 8/20---) presenting with cough  WBC 9.78, Afebrile   - C/w Zosyn 3.375  - C/w Azithromycin 500mg   - F/U Blood Cx   - F/u legionella      #Endo  Hyponatremic to 110  Nephro recs appreciated   - C/w 3% hypertonic saline 30cc  - BMP q3 , goal is 145-155 meq/L  - Free water restrictions to < 1L   - If urine output becomes > 100cc/hr or pt exceeds sodium goal then stop HTS & start D5W       #Heme   Hgb of 10 on admission  - DVT Prophylaxis: SCDs       #Ethics   - FULL CODE  80yM PMH of gallstones (30-40 years ago), BPH, HLD, h/o spinal stenosis, CVA (nonverbal, 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 MICU consulted for hyponatremia.    #Neuro  Hx of CVA, A&Ox0, nonverbal at baseline   Found to have extensive right frontal intraparenchymal hemorrhage, and herniation. Findings may represent a ruptured bleeding cavernoma, as previously there was a linear hyperdensity consistent with DVA with possible cavernoma; however, malignancy cannot be excluded.   Neuro Consulted, appreciate recs   - Repeat CTH for stability and to r/o sinus thrombosis as cause  - Further evaluation with CTA head/neck and MRI with contrast.  - MR w/wo contrast   - Q1 neuro checks   - C/w Keppra 500 BID     #Resp  Intubated for airway protection   Mode: AC   Settings: RR:18/Tv: 400/ Fio2: 70%/ PEEP: 5  - F/u ABG       #CV  Hypotension  - On Levophed 8mg infused at 5.23 mL/Hr. Titrate to MAP >65      #GI  PEG   Currently NPO   -Restart tube feeds in AM once stable       #Renal/  Hx of penile abscess   Cr 0.30  - C/w ABX as above         #ID   lactate 2.5. 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) & Sacral wound on antibiotic with PICC line (cefepime 2gq8 8/20-9/28; doxycycline 8/20---) presenting with cough  WBC 9.78, Afebrile   - C/w Zosyn 3.375  - C/w Azithromycin 500mg   - F/U Blood Cx   - F/u legionella  - Can DC Azithro if legionella is negative       #Endo  Hyponatremic to 110  Nephro recs appreciated   - C/w 3% hypertonic saline 30cc  - BMP q3 , Ultimate goal is 145-155 meq/L  - Free water restrictions to < 1L   - If urine output becomes > 100cc/hr or pt exceeds sodium goal then stop HTS & start D5W       #Heme   Hgb of 10 on admission  - DVT Prophylaxis: SCDs       #Ethics   - FULL CODE  80yM PMH of gallstones (30-40 years ago), BPH, HLD, h/o spinal stenosis, CVA (nonverbal, 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 MICU consulted for hyponatremia.    #Neuro  Hx of CVA, A&Ox0, nonverbal at baseline   Found to have extensive right frontal intraparenchymal hemorrhage, and herniation. Findings may represent a ruptured bleeding cavernoma, as previously there was a linear hyperdensity consistent with DVA with possible cavernoma; however, malignancy cannot be excluded.   Neuro Consulted, appreciate recs   - Repeat CTH for stability and to r/o sinus thrombosis as cause  - Further evaluation with CTA head/neck and MRI with contrast.  - MR w/wo contrast   - Q1 neuro checks   - C/w Keppra 500 BID     #Resp  Intubated for airway protection   Mode: AC   Settings: RR:18/Tv: 400/ Fio2: 70%/ PEEP: 5  - F/u ABG       #CV  Hypotension 92/52  - On Levophed 8mg infused at 5.23 mL/Hr. Titrate to MAP >65  - Cont. to monitor       #GI  PEG   Currently NPO   -Restart tube feeds in AM once stable       #Renal/  Hx of penile abscess   Cr 0.30  - C/w ABX as above         #ID   lactate 2.5. 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) & Sacral wound on antibiotic with PICC line (cefepime 2gq8 8/20-9/28; doxycycline 8/20---) presenting with cough  WBC 9.78, Afebrile   - C/w Zosyn 3.375  - C/w Azithromycin 500mg   - F/U Blood Cx   - F/u legionella  - Can DC Azithro if legionella is negative       #Endo  Hyponatremic to 110  Nephro recs appreciated   - C/w 3% hypertonic saline 30cc  - BMP q3 , Ultimate goal is 145-155 meq/L  - Free water restrictions to < 1L   - If urine output becomes > 100cc/hr or pt exceeds sodium goal then stop HTS & start D5W       #Heme   Hgb of 10 on admission  - DVT Prophylaxis: SCDs       #Ethics   - FULL CODE  80yM PMH of gallstones (30-40 years ago), BPH, HLD, h/o spinal stenosis, CVA (nonverbal, 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 MICU consulted for hyponatremia.    #Neuro  Hx of CVA, A&Ox0, nonverbal at baseline   Found to have extensive right frontal intraparenchymal hemorrhage, and herniation. Findings may represent a ruptured bleeding cavernoma, as previously there was a linear hyperdensity consistent with DVA with possible cavernoma; however, malignancy cannot be excluded.   Neuro Consulted, appreciate recs   - Repeat CTH for stability and to r/o sinus thrombosis as cause  - Further evaluation with CTA head/neck and MRI.  - MR w/wo contrast   - Q1 neuro checks   - C/w Keppra 500 BID     #Resp  Intubated for airway protection   Mode: AC   Settings: RR:18/Tv: 400/ Fio2: 70%/ PEEP: 5  - F/u ABG       #CV  Hypotension 92/52  - On Levophed 8mg infused at 5.23 mL/Hr. Titrate to MAP >65  - Cont. to monitor       #GI  PEG   Currently NPO   -Restart tube feeds in AM once stable       #Renal/  Hx of penile abscess   Cr 0.30  - C/w ABX as above         #ID   lactate 2.5. 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) & Sacral wound on antibiotic with PICC line (cefepime 2gq8 8/20-9/28; doxycycline 8/20---) presenting with cough  WBC 9.78, Afebrile   - C/w Zosyn 3.375  - C/w Azithromycin 500mg   - F/U Blood Cx   - F/u legionella  - Can DC Azithro if legionella is negative       #Endo  Hyponatremic to 110  Nephro recs appreciated   - C/w 3% hypertonic saline 30cc  - BMP q3 , Ultimate goal is 145-155 meq/L  - Free water restrictions to < 1L   - If urine output becomes > 100cc/hr or pt exceeds sodium goal then stop HTS & start D5W       #Heme   Hgb of 10 on admission  - DVT Prophylaxis: SCDs       #Ethics   - FULL CODE  80yM PMH of gallstones (30-40 years ago), BPH, HLD, h/o spinal stenosis, CVA (nonverbal, 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 MICU consulted for hyponatremia.    #Neuro  Hx of CVA, A&Ox0, nonverbal at baseline   Found to have extensive right frontal intraparenchymal hemorrhage, and herniation. Findings may represent a ruptured bleeding cavernoma, as previously there was a linear hyperdensity consistent with DVA with possible cavernoma; however, malignancy cannot be excluded.   Neuro Consulted, appreciate recs   - Repeat CTH for stability and to r/o sinus thrombosis as cause  - Further evaluation with CTA head/neck and MRI.  - MR w/wo contrast   - Q1 neuro checks   - C/w Keppra 500 BID     #Resp  Intubated for airway protection   Mode: AC   Settings: RR:18/Tv: 400/ Fio2: 70%/ PEEP: 5  - Aspirations precautions   - F/u ABG       #CV  Hypotension 92/52  - On Levophed 8mg infused at 5.23 mL/Hr. Titrate to MAP >65  - Cont. to monitor       #GI  PEG   Currently NPO   -Restart tube feeds in AM once stable       #Renal/  Hx of penile abscess   Cr 0.30  - C/w ABX as above         #ID   lactate 2.5. 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) & Sacral wound on antibiotic with PICC line (cefepime 2gq8 8/20-9/28; doxycycline 8/20---) presenting with cough  WBC 9.78, Afebrile   - C/w Zosyn 3.375  - C/w Azithromycin 500mg   - F/U Blood Cx   - F/u legionella  - Can DC Azithro if legionella is negative       #Endo  Hyponatremic to 110  Nephro recs appreciated   - C/w 3% hypertonic saline 30cc  - BMP q3 , Ultimate goal is 145-155 meq/L  - Free water restrictions to < 1L   - If urine output becomes > 100cc/hr or pt exceeds sodium goal then stop HTS & start D5W       #Heme   Hgb of 10 on admission  - DVT Prophylaxis: SCDs       #Ethics   - FULL CODE  80yM PMH of gallstones (30-40 years ago), BPH, HLD, h/o spinal stenosis, CVA (nonverbal, 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 MICU consulted for hyponatremia.    #Neuro  Hx of CVA, A&Ox0, nonverbal at baseline   Found to have extensive right frontal intraparenchymal hemorrhage, and herniation. Findings may represent a ruptured bleeding cavernoma, as previously there was a linear hyperdensity consistent with DVA with possible cavernoma; however, malignancy cannot be excluded.   Neuro Consulted, appreciate recs   - Repeat CTH for stability and to r/o sinus thrombosis as cause  - Further evaluation with CTA head/neck and MRI.  - MR w/wo contrast   - Q1 neuro checks   - C/w Keppra 500 BID     #Resp  Intubated for airway protection   Mode: AC   Settings: RR:18/Tv: 400/ Fio2: 70%/ PEEP: 5  - Aspirations precautions   - F/u ABG       #CV  Hypotension 92/52  - On Levophed 8mg infused at 5.23 mL/Hr. Titrate to MAP >65  - Cont. to monitor       #GI  PEG   Currently NPO   -Restart tube feeds in AM once stable       #Renal/  Hx of penile abscess   Cr 0.30  - C/w ABX as above         #ID   lactate 2.5. 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) & Sacral wound on antibiotic with PICC line (cefepime 2gq8 8/20-9/28; doxycycline 8/20---) presenting with cough  WBC 9.78, Febrile 100.5  - C/w Zosyn 3.375  - C/w Azithromycin 500mg   - F/U Blood Cx   - F/u legionella  - Can DC Azithro if legionella is negative       #Endo  Hyponatremic to 110  Nephro recs appreciated   - C/w 3% hypertonic saline 30cc  - BMP q3 , Ultimate goal is 145-155 meq/L  - Free water restrictions to < 1L   - If urine output becomes > 100cc/hr or pt exceeds sodium goal then stop HTS & start D5W       #Heme   Hgb of 10 on admission  - DVT Prophylaxis: SCDs       #Ethics   - FULL CODE  80yM PMH of gallstones (30-40 years ago), BPH, HLD, h/o spinal stenosis, CVA (nonverbal, 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 MICU consulted for hyponatremia.    #Neuro  Hx of CVA, A&Ox0, nonverbal at baseline   Found to have extensive right frontal intraparenchymal hemorrhage, and herniation. Findings may represent a ruptured bleeding cavernoma, as previously there was a linear hyperdensity consistent with DVA with possible cavernoma; however, malignancy cannot be excluded.   Neuro Consulted, appreciate recs   - Repeat CTH for stability and to r/o sinus thrombosis as cause  - Further evaluation with CTA head/neck and MRI.  - MR w/wo contrast   - Q1 neuro checks   - C/w Keppra 500 BID     #Resp  Intubated for airway protection   Mode: AC   Settings: RR:18/Tv: 400/ Fio2: 70%/ PEEP: 5  - Aspirations precautions   - F/u ABG       #CV  Hypotension 92/52  - On Levophed 8mg infused at 5.23 mL/Hr. Titrate to MAP >65  - Cont. to monitor       #GI  PEG   Currently NPO   -Restart tube feeds in AM once stable       #Renal/  Hx of penile abscess   Cr 0.30  [place]  - Monitor I/O  - C/w ABX as above         #ID   lactate 2.5. 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) & Sacral wound on antibiotic with PICC line (cefepime 2gq8 8/20-9/28; doxycycline 8/20---) presenting with cough  WBC 9.78, Febrile 100.5  - C/w Zosyn 3.375  - C/w Azithromycin 500mg   - F/U Blood Cx   - F/u legionella  - Can DC Azithro if legionella is negative       #Endo  Hyponatremic to 110  Nephro recs appreciated   - C/w 3% hypertonic saline 30cc  - BMP q3 , Ultimate goal is 145-155 meq/L  - Free water restrictions to < 1L   - If urine output becomes > 100cc/hr or pt exceeds sodium goal then stop HTS & start D5W       #Heme   Hgb of 10 on admission  - DVT Prophylaxis: SCDs       #Ethics   - FULL CODE  80yM PMH of gallstones (30-40 years ago), BPH, HLD, h/o spinal stenosis, CVA (nonverbal, 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 MICU consulted for hyponatremia.    #Neuro  Hx of CVA, A&Ox0, nonverbal at baseline   Found to have extensive right frontal intraparenchymal hemorrhage, and herniation. Findings may represent a ruptured bleeding cavernoma, as previously there was a linear hyperdensity consistent with DVA with possible cavernoma; however, malignancy cannot be excluded.   Neuro Consulted, appreciate recs   - Repeat CTH for stability and to r/o sinus thrombosis as cause  - Further evaluation with CTA head/neck and MRI.  - MR w/wo contrast   - Q1 neuro checks   - C/w Keppra 500 BID     #Resp  Intubated for airway protection   Mode: AC   Settings: RR:18/Tv: 400/ Fio2: 70%/ PEEP: 5  - Aspirations precautions   - F/u ABG       #CV  Hypotension 92/52  - On Levophed 8mg infused at 5.23 mL/Hr. Titrate to MAP >65  - Cont. to monitor       #GI  PEG   Currently NPO   -Restart tube feeds in AM once stable       #Renal/  Hx of penile abscess   Cr 0.30  Bello  - Monitor I/O  - C/w ABX as above         #ID   lactate 2.5. 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 through peg   Nephro recs appreciated   - C/w 3% hypertonic saline 30cc  - BMP q3 , Ultimate goal is 145-155 meq/L  - Free water restrictions to < 1L   - If urine output becomes > 100cc/hr or pt exceeds sodium goal then stop HTS & start D5W       #Heme   Hgb of 10 on admission  - DVT Prophylaxis: SCDs       #Ethics   - FULL CODE  80yM PMH of gallstones (30-40 years ago), BPH, HLD, h/o spinal stenosis, CVA (nonverbal, 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 MICU consulted for hyponatremia.    #Neuro  Hx of CVA, A&Ox0, nonverbal at baseline   Found to have extensive right frontal intraparenchymal hemorrhage, and herniation. Findings may represent a ruptured bleeding cavernoma, as previously there was a linear hyperdensity consistent with DVA with possible cavernoma; however, malignancy cannot be excluded.   Neuro Consulted, appreciate recs   - Repeat CTH for stability and to r/o sinus thrombosis as cause  - Further evaluation with CTA head/neck and MRI.  - MR w/wo contrast   - Q1 neuro checks   - C/w Keppra 500 BID     #Resp  Intubated for airway protection   Mode: AC   Settings: RR:18/Tv: 400/ Fio2: 70%/ PEEP: 5  - Aspirations precautions   - F/u ABG       #CV  Hypotension 92/52  - On Levophed 8mg infused at 5.23 mL/Hr. Titrate to MAP >65  - Cont. to monitor       #GI  PEG   Currently NPO   -Restart tube feeds in AM once stable       #Renal/  Hx of penile abscess   Cr 0.30  Bello  - Monitor I/O  - C/w ABX as above         #ID   lactate 2.5. 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

## 2021-09-30 NOTE — CONSULT NOTE ADULT - ASSESSMENT
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 MICU consulted for hyponatremia.

## 2021-09-30 NOTE — CONSULT NOTE ADULT - ATTENDING COMMENTS
Pt seen and examined on 9/30/21.  Agree with above resident evaluation and assessment.  Sedated on ventilator with no movement on propofol.  CT with right frontal hyperdensities, possible hemorrhage.  CT with contrast showed heterogeneous enhancing lesions, large in right frontal lobe extending to anterior corpus callosum and smaller lesion in the left parasuprasellar cistern, suspicious for high-grade glioma.  Given age and co-morbidities with failure to thrive, likely prognosis is poor.  Can entertain biopsy if desired, but agree with goals of care discussion with family.

## 2021-09-30 NOTE — CONSULT NOTE ADULT - ASSESSMENT
Alissa, [facility] [place]    80M slow worsening of mental status for past 2mo, in January was Ox3 SIM strong, now does not speak, does not move at all. Today threw up after being fed by mouth, began gurgling, now p/w pneumonia, hyponatremia 110, and R sided significant edema with some hyperdensity and shift, c/f mass vs venous infarct. Exam before intubation: GHULAM, pupils 2 sluggish, UE flex, BLE TF, snoring.   -CTA/CT+C/Repeat CTH  -MR W/WO  -MICU for PNA/Hyponatremia  -Q1 neurochecks  -If CTH c/w mass, dex 10 then 4q6  -Keppra 500 BID  -GOC   Alissa, [facility]    80M slow worsening of mental status for past 2mo, in January was Ox3 SIM strong, now does not speak, does not move at all. Today threw up after being fed by mouth, began gurgling, now p/w pneumonia, hyponatremia 110, and R sided significant edema with some hyperdensity and shift, c/f mass vs venous infarct. Exam before intubation: GHULAM, pupils 2 sluggish, UE flex, BLE TF, snoring.   -CTA/CT+C/Repeat CTH  -MR W/WO  -MICU for PNA/Hyponatremia  -Q1 neurochecks  -If CTH c/w mass, dex 10 then 4q6  -Keppra 500 BID  -GOC  -Na Goal 145-155  -ARU therapeutic, ER giving DDAVP per them   Alissa, [facility] [place]    80M slow worsening of mental status for past 2mo, in January was Ox3 SIM strong, now does not speak, does not move at all. Today threw up after being fed by mouth, began gurgling, now p/w pneumonia, hyponatremia 110, and R sided significant edema with some hyperdensity and shift, c/f mass vs venous infarct. Exam before intubation: GHULAM, pupils 2 sluggish, UE flex, BLE TF, snoring.   -CTA/CT+C/Repeat CTH  -MR W/WO  -MICU for PNA/Hyponatremia  -Q1 neurochecks  -If CTH c/w mass, dex 10 then 4q6  -Keppra 500 BID  -Rec minimizing sedation to easily reversible agent to facilitate neuro checks  -GOC  -Na Goal 145-155  -ARU therapeutic, ER giving DDAVP per them   Alissa, [facility]    80M slow worsening of mental status for past 2mo, in January was Ox3 SIM strong, now does not speak, does not move at all. Today threw up after being fed by mouth, began gurgling, now p/w pneumonia, hyponatremia 110, and R sided significant edema with some hyperdensity and shift, c/f mass vs venous infarct. Exam before intubation: GHULAM, pupils 2 sluggish, UE flex, BLE TF, snoring.   -rec CTA/CT+C/Repeat CTH for stability and to r/o sinus thrombosis as cause  -MR W/WO  -MICU for PNA/Hyponatremia  -Q1 neurochecks  -If CTH c/w mass, dex 10 then 4q6  -Keppra 500 BID  -Rec minimizing sedation to easily reversible agent to facilitate neuro checks  -GOC  -Na Goal 145-155  -ARU therapeutic, ER giving DDAVP per them

## 2021-09-30 NOTE — PROGRESS NOTE ADULT - SUBJECTIVE AND OBJECTIVE BOX
INTERVAL HPI/OVERNIGHT EVENTS:      SUBJECTIVE: Patient seen and examined at bedside.       CONSTITUTIONAL: No fevers or chills  EYES/ENT: No visual or hearing changes;  No ear or throat pain   NECK: No pain or stiffness  RESPIRATORY: No cough, wheezing, hemoptysis; No shortness of breath  CARDIOVASCULAR: No chest pain or palpitations  GASTROINTESTINAL: No abdominal pain. No nausea, vomiting, or hematemesis; No diarrhea or constipation. No melena or hematochezia.  GENITOURINARY: No dysuria, frequency or hematuria  NEUROLOGICAL: No headache, numbness or weakness  SKIN: No itching, or new onset of rashes    OBJECTIVE:    VITAL SIGNS:  ICU Vital Signs Last 24 Hrs  T(C): 37 (30 Sep 2021 04:00), Max: 38.1 (30 Sep 2021 02:46)  T(F): 98.6 (30 Sep 2021 04:00), Max: 100.5 (30 Sep 2021 02:46)  HR: 112 (30 Sep 2021 07:00) (98 - 126)  BP: 90/55 (30 Sep 2021 07:00) (83/47 - 174/97)  BP(mean): 68 (30 Sep 2021 07:00) (62 - 91)  ABP: --  ABP(mean): --  RR: 21 (30 Sep 2021 07:00) (8 - 29)  SpO2: 100% (30 Sep 2021 07:00) (95% - 100%)    Mode: AC/ CMV (Assist Control/ Continuous Mandatory Ventilation), RR (machine): 18, TV (machine): 400, FiO2: 70, PEEP: 5, ITime: 1, MAP: 9, PIP: 17    09-29 @ 07:01  -  09-30 @ 07:00  --------------------------------------------------------  IN: 230.4 mL / OUT: 60 mL / NET: 170.4 mL    09-30 @ 07:01  -  09-30 @ 07:17  --------------------------------------------------------  IN: 20.4 mL / OUT: 0 mL / NET: 20.4 mL      CAPILLARY BLOOD GLUCOSE          PHYSICAL EXAM:    General: NAD  HEENT: NC/AT; PERRL, clear conjunctiva  Neck: supple  Respiratory: Normal respiratory effort; CTA b/l  Cardiovascular: +S1/S2; RRR; no murmurs, gallops or rubs appreciated  Abdomen: soft, NT/ND, no masses noted; normal BS all 4 quadrants;  Extremities: 2+ peripheral pulses b/l; no LE edema  Skin: normal color and turgor; no rash  Neurological: A+O x 3, follows commands; spontaneously moving all 4 extremities, strength grossly WNL    MEDICATIONS:  MEDICATIONS  (STANDING):  azithromycin  IVPB 500 milliGRAM(s) IV Intermittent every 24 hours  chlorhexidine 0.12% Liquid 15 milliLiter(s) Oral Mucosa every 12 hours  levETIRAcetam  IVPB 500 milliGRAM(s) IV Intermittent every 12 hours  norepinephrine Infusion 0.05 MICROgram(s)/kG/Min (5.23 mL/Hr) IV Continuous <Continuous>  pantoprazole  Injectable 40 milliGRAM(s) IV Push daily  piperacillin/tazobactam IVPB.. 3.375 Gram(s) IV Intermittent every 8 hours  propofol Infusion 10 MICROgram(s)/kG/Min (3.35 mL/Hr) IV Continuous <Continuous>  sodium chloride 3%. 500 milliLiter(s) (30 mL/Hr) IV Continuous <Continuous>    MEDICATIONS  (PRN):      ALLERGIES:  Allergies    No Known Allergies    Intolerances              LABS:                        8.3    18.41 )-----------( 180      ( 30 Sep 2021 04:22 )             25.2     09-30    114<LL>  |  84<L>  |  16  ----------------------------<  144<H>  4.2   |  12<L>  |  0.32<L>    Ca    8.0<L>      30 Sep 2021 04:22  Phos  2.5     09-30  Mg     1.7     09-30    TPro  5.6<L>  /  Alb  3.0<L>  /  TBili  0.8  /  DBili  x   /  AST  23  /  ALT  13  /  AlkPhos  88  09-30    PT/INR - ( 30 Sep 2021 04:22 )   PT: 13.5 sec;   INR: 1.13 ratio         PTT - ( 29 Sep 2021 22:08 )  PTT:24.2 sec  Urinalysis Basic - ( 30 Sep 2021 04:22 )    Color: Yellow / Appearance: Clear / SG: >1.050 / pH: x  Gluc: x / Ketone: Negative  / Bili: Negative / Urobili: Negative   Blood: x / Protein: 100 / Nitrite: Negative   Leuk Esterase: Negative / RBC: 3 /hpf / WBC 4 /HPF   Sq Epi: x / Non Sq Epi: 3 /hpf / Bacteria: Negative        RADIOLOGY & ADDITIONAL TESTS: Reviewed. INTERVAL HPI/OVERNIGHT EVENTS:      SUBJECTIVE: Patient seen and examined at bedside. Sedated and resting comfortably, unable to provide due to acute critical illness.        OBJECTIVE:    VITAL SIGNS:  ICU Vital Signs Last 24 Hrs  T(C): 37 (30 Sep 2021 04:00), Max: 38.1 (30 Sep 2021 02:46)  T(F): 98.6 (30 Sep 2021 04:00), Max: 100.5 (30 Sep 2021 02:46)  HR: 112 (30 Sep 2021 07:00) (98 - 126)  BP: 90/55 (30 Sep 2021 07:00) (83/47 - 174/97)  BP(mean): 68 (30 Sep 2021 07:00) (62 - 91)  ABP: --  ABP(mean): --  RR: 21 (30 Sep 2021 07:00) (8 - 29)  SpO2: 100% (30 Sep 2021 07:00) (95% - 100%)    Mode: AC/ CMV (Assist Control/ Continuous Mandatory Ventilation), RR (machine): 18, TV (machine): 400, FiO2: 70, PEEP: 5, ITime: 1, MAP: 9, PIP: 17    09-29 @ 07:01  -  09-30 @ 07:00  --------------------------------------------------------  IN: 230.4 mL / OUT: 60 mL / NET: 170.4 mL    09-30 @ 07:01 - 09-30 @ 07:17  --------------------------------------------------------  IN: 20.4 mL / OUT: 0 mL / NET: 20.4 mL                 PHYSICAL EXAM:    General: NAD  HEENT: NC/AT; PERRL, clear conjunctiva  Neck: supple  Respiratory: Normal respiratory effort; CTA b/l  Cardiovascular: +S1/S2; RRR; no murmurs, gallops or rubs appreciated  Abdomen: soft, NT/ND, no masses noted; normal BS all 4 quadrants;  Extremities: 2+ peripheral pulses b/l; no LE edema  Skin: normal color and turgor; no rash  Neurological: A+O x 3, follows commands; spontaneously moving all 4 extremities, strength grossly WNL    MEDICATIONS:  MEDICATIONS  (STANDING):  azithromycin  IVPB 500 milliGRAM(s) IV Intermittent every 24 hours  chlorhexidine 0.12% Liquid 15 milliLiter(s) Oral Mucosa every 12 hours  levETIRAcetam  IVPB 500 milliGRAM(s) IV Intermittent every 12 hours  norepinephrine Infusion 0.05 MICROgram(s)/kG/Min (5.23 mL/Hr) IV Continuous <Continuous>  pantoprazole  Injectable 40 milliGRAM(s) IV Push daily  piperacillin/tazobactam IVPB.. 3.375 Gram(s) IV Intermittent every 8 hours  propofol Infusion 10 MICROgram(s)/kG/Min (3.35 mL/Hr) IV Continuous <Continuous>  sodium chloride 3%. 500 milliLiter(s) (30 mL/Hr) IV Continuous <Continuous>    MEDICATIONS  (PRN):      ALLERGIES:  Allergies    No Known Allergies    Intolerances              LABS:                        8.3    18.41 )-----------( 180      ( 30 Sep 2021 04:22 )             25.2     09-30 09-30    116<LL>  |  86<L>  |  15  ----------------------------<  127<H>  3.8   |  15<L>  |  0.32<L>    Ca    7.9<L>      30 Sep 2021 07:56  Phos  2.5     09-30  Mg     1.7     09-30    TPro  5.6<L>  /  Alb  3.0<L>  /  TBili  0.8  /  DBili  x   /  AST  23  /  ALT  13  /  AlkPhos  88  09-30      Ca    8.0<L>      30 Sep 2021 04:22  Phos  2.5     09-30  Mg     1.7     09-30    TPro  5.6<L>  /  Alb  3.0<L>  /  TBili  0.8  /  DBili  x   /  AST  23  /  ALT  13  /  AlkPhos  88  09-30    PT/INR - ( 30 Sep 2021 04:22 )   PT: 13.5 sec;   INR: 1.13 ratio    PTT - ( 29 Sep 2021 22:08 )  PTT:24.2 sec    Thromboelastography Hemostasis (09.30.21 @ 00:29)   TEG Reaction Time: 5.3 min   TEG Maximum Amplitude: 67.3 mm   Heparinase TEG R Time: 5.7 min   RapidTEG Maximum Amplitude: 68.1 mm   TEG Functional Fibrinogen: 32.7 mm         Urinalysis Basic - ( 30 Sep 2021 04:22 )    Color: Yellow / Appearance: Clear / SG: >1.050 / pH: x  Gluc: x / Ketone: Negative  / Bili: Negative / Urobili: Negative   Blood: x / Protein: 100 / Nitrite: Negative   Leuk Esterase: Negative / RBC: 3 /hpf / WBC 4 /HPF   Sq Epi: x / Non Sq Epi: 3 /hpf / Bacteria: Negative      SARS-CoV-2: NotDetec      RADIOLOGY & ADDITIONAL TESTS:    < from: CT Head No Cont (09.29.21 @ 23:35) >    IMPRESSION:    Head CT: Multiple foci of right frontal intraparenchymal hemorrhage with surrounding extensive vasogenic edema and effacement of the right lateral ventricle frontal horn. Heterogeneous hyperdensity crosses midline anterior to the lateralventricles, measuring 2.5 x 2.8 cm; findings may represent a ruptured bleeding cavernoma, as previously there was a linear hyperdensity consistent with DVA with possible cavernoma; however, malignancy cannot be excluded. Recommend further evaluation with CTA head/neck and MRI with contrast.    Cervical spine CT: No acute fracture. Cervical degenerative spondylosis, as described above.    These findings were discussed with Dr. Torres on 9/29/2021 at 11:45 PM by Dr. Adkins with read back confirmation.    --- End of Report ---    < end of copied text >        < from: CT Abdomen and Pelvis w/ IV Cont (09.29.21 @ 23:38) >  IMPRESSION:  Bilateral lower lung consolidation, possibly representing infection and/or aspiration.  There appears to be a small caliber stent in the 2nd portion of the duodenum, which may be a retained prior CBD stentor other retained foreign body; please correlate with surgical/procedural history.  No acute CT findings in the abdomen or pelvis.      --- End of Report ---    < end of copied text >   INTERVAL HPI/OVERNIGHT EVENTS: Admitted to MICU overnight, hypotensive and given levophed, hyponatremic and started hypertonic saline.      SUBJECTIVE: Patient seen and examined at bedside. Sedated and resting comfortably, unable to provide ROS due to acute critical illness.      OBJECTIVE:    VITAL SIGNS:  ICU Vital Signs Last 24 Hrs  T(C): 37 (30 Sep 2021 04:00), Max: 38.1 (30 Sep 2021 02:46)  T(F): 98.6 (30 Sep 2021 04:00), Max: 100.5 (30 Sep 2021 02:46)  HR: 112 (30 Sep 2021 07:00) (98 - 126)  BP: 90/55 (30 Sep 2021 07:00) (83/47 - 174/97)  BP(mean): 68 (30 Sep 2021 07:00) (62 - 91)  ABP: --  ABP(mean): --  RR: 21 (30 Sep 2021 07:00) (8 - 29)  SpO2: 100% (30 Sep 2021 07:00) (95% - 100%)    Mode: AC/ CMV (Assist Control/ Continuous Mandatory Ventilation), RR (machine): 18, TV (machine): 400, FiO2: 70, PEEP: 5, ITime: 1, MAP: 9, PIP: 17    09-29 @ 07:01  -  09-30 @ 07:00  --------------------------------------------------------  IN: 230.4 mL / OUT: 60 mL / NET: 170.4 mL    09-30 @ 07:01  -  09-30 @ 07:17  --------------------------------------------------------  IN: 20.4 mL / OUT: 0 mL / NET: 20.4 mL                 PHYSICAL EXAM:    General: NAD  HEENT: NC/AT; PERRL, clear conjunctiva  Neck: supple  Respiratory: Normal respiratory effort; CTA b/l  Cardiovascular: +S1/S2; RRR; no murmurs, gallops or rubs appreciated  Abdomen: soft, NT/ND, no masses noted; normal BS all 4 quadrants;  Extremities: 2+ peripheral pulses b/l; no LE edema  Skin: normal color and turgor; no rash  Neurological: A+O x 3, follows commands; spontaneously moving all 4 extremities, strength grossly WNL    MEDICATIONS:  MEDICATIONS  (STANDING):  azithromycin  IVPB 500 milliGRAM(s) IV Intermittent every 24 hours  chlorhexidine 0.12% Liquid 15 milliLiter(s) Oral Mucosa every 12 hours  levETIRAcetam  IVPB 500 milliGRAM(s) IV Intermittent every 12 hours  norepinephrine Infusion 0.05 MICROgram(s)/kG/Min (5.23 mL/Hr) IV Continuous <Continuous>  pantoprazole  Injectable 40 milliGRAM(s) IV Push daily  piperacillin/tazobactam IVPB.. 3.375 Gram(s) IV Intermittent every 8 hours  propofol Infusion 10 MICROgram(s)/kG/Min (3.35 mL/Hr) IV Continuous <Continuous>  sodium chloride 3%. 500 milliLiter(s) (30 mL/Hr) IV Continuous <Continuous>    MEDICATIONS  (PRN):      ALLERGIES:  Allergies    No Known Allergies    Intolerances              LABS:                        8.3    18.41 )-----------( 180      ( 30 Sep 2021 04:22 )             25.2     09-30 09-30    116<LL>  |  86<L>  |  15  ----------------------------<  127<H>  3.8   |  15<L>  |  0.32<L>    Ca    7.9<L>      30 Sep 2021 07:56  Phos  2.5     09-30  Mg     1.7     09-30    TPro  5.6<L>  /  Alb  3.0<L>  /  TBili  0.8  /  DBili  x   /  AST  23  /  ALT  13  /  AlkPhos  88  09-30      Ca    8.0<L>      30 Sep 2021 04:22  Phos  2.5     09-30  Mg     1.7     09-30    TPro  5.6<L>  /  Alb  3.0<L>  /  TBili  0.8  /  DBili  x   /  AST  23  /  ALT  13  /  AlkPhos  88  09-30    PT/INR - ( 30 Sep 2021 04:22 )   PT: 13.5 sec;   INR: 1.13 ratio    PTT - ( 29 Sep 2021 22:08 )  PTT:24.2 sec    Thromboelastography Hemostasis (09.30.21 @ 00:29)   TEG Reaction Time: 5.3 min   TEG Maximum Amplitude: 67.3 mm   Heparinase TEG R Time: 5.7 min   RapidTEG Maximum Amplitude: 68.1 mm   TEG Functional Fibrinogen: 32.7 mm         Urinalysis Basic - ( 30 Sep 2021 04:22 )    Color: Yellow / Appearance: Clear / SG: >1.050 / pH: x  Gluc: x / Ketone: Negative  / Bili: Negative / Urobili: Negative   Blood: x / Protein: 100 / Nitrite: Negative   Leuk Esterase: Negative / RBC: 3 /hpf / WBC 4 /HPF   Sq Epi: x / Non Sq Epi: 3 /hpf / Bacteria: Negative      SARS-CoV-2: NotDetec      RADIOLOGY & ADDITIONAL TESTS:    < from: CT Head No Cont (09.29.21 @ 23:35) >    IMPRESSION:    Head CT: Multiple foci of right frontal intraparenchymal hemorrhage with surrounding extensive vasogenic edema and effacement of the right lateral ventricle frontal horn. Heterogeneous hyperdensity crosses midline anterior to the lateralventricles, measuring 2.5 x 2.8 cm; findings may represent a ruptured bleeding cavernoma, as previously there was a linear hyperdensity consistent with DVA with possible cavernoma; however, malignancy cannot be excluded. Recommend further evaluation with CTA head/neck and MRI with contrast.    Cervical spine CT: No acute fracture. Cervical degenerative spondylosis, as described above.    These findings were discussed with Dr. Torres on 9/29/2021 at 11:45 PM by Dr. Adkins with read back confirmation.    --- End of Report ---    < end of copied text >        < from: CT Abdomen and Pelvis w/ IV Cont (09.29.21 @ 23:38) >  IMPRESSION:  Bilateral lower lung consolidation, possibly representing infection and/or aspiration.  There appears to be a small caliber stent in the 2nd portion of the duodenum, which may be a retained prior CBD stentor other retained foreign body; please correlate with surgical/procedural history.  No acute CT findings in the abdomen or pelvis.      --- End of Report ---    < end of copied text >   INTERVAL HPI/OVERNIGHT EVENTS: Admitted to MICU overnight, hypotensive and given levophed, hyponatremic and started hypertonic saline.      SUBJECTIVE: Patient seen and examined at bedside. Sedated and resting comfortably, unable to provide ROS due to acute critical illness.      OBJECTIVE:    VITAL SIGNS:  ICU Vital Signs Last 24 Hrs  T(C): 37 (30 Sep 2021 04:00), Max: 38.1 (30 Sep 2021 02:46)  T(F): 98.6 (30 Sep 2021 04:00), Max: 100.5 (30 Sep 2021 02:46)  HR: 112 (30 Sep 2021 07:00) (98 - 126)  BP: 90/55 (30 Sep 2021 07:00) (83/47 - 174/97)  BP(mean): 68 (30 Sep 2021 07:00) (62 - 91)  ABP: --  ABP(mean): --  RR: 21 (30 Sep 2021 07:00) (8 - 29)  SpO2: 100% (30 Sep 2021 07:00) (95% - 100%)    Mode: AC/ CMV (Assist Control/ Continuous Mandatory Ventilation), RR (machine): 18, TV (machine): 400, FiO2: 70, PEEP: 5, ITime: 1, MAP: 9, PIP: 17    09-29 @ 07:01  -  09-30 @ 07:00  --------------------------------------------------------  IN: 230.4 mL / OUT: 60 mL / NET: 170.4 mL    09-30 @ 07:01  -  09-30 @ 07:17  --------------------------------------------------------  IN: 20.4 mL / OUT: 0 mL / NET: 20.4 mL                 PHYSICAL EXAM:    General: NAD  HEENT: NC/AT; PERRL, clear conjunctiva  Neck: supple  Respiratory: Ronchi throughout bilat lung fields; CTA b/l  Cardiovascular: +S1/S2; tachycardic; no murmurs, gallops or rubs appreciated  Abdomen: PEG in place, soft, NT/ND, no masses noted; normal BS all 4 quadrants;  Extremities: 2+ peripheral pulses b/l; no LE edema  Skin: +necrotic normal color and turgor; no rash  Neurological: A+O x 3, follows commands; spontaneously moving all 4 extremities, strength grossly WNL    MEDICATIONS:  MEDICATIONS  (STANDING):  azithromycin  IVPB 500 milliGRAM(s) IV Intermittent every 24 hours  chlorhexidine 0.12% Liquid 15 milliLiter(s) Oral Mucosa every 12 hours  levETIRAcetam  IVPB 500 milliGRAM(s) IV Intermittent every 12 hours  norepinephrine Infusion 0.05 MICROgram(s)/kG/Min (5.23 mL/Hr) IV Continuous <Continuous>  pantoprazole  Injectable 40 milliGRAM(s) IV Push daily  piperacillin/tazobactam IVPB.. 3.375 Gram(s) IV Intermittent every 8 hours  propofol Infusion 10 MICROgram(s)/kG/Min (3.35 mL/Hr) IV Continuous <Continuous>  sodium chloride 3%. 500 milliLiter(s) (30 mL/Hr) IV Continuous <Continuous>    MEDICATIONS  (PRN):      ALLERGIES:  Allergies    No Known Allergies    Intolerances              LABS:                        8.3    18.41 )-----------( 180      ( 30 Sep 2021 04:22 )             25.2     09-30 09-30    116<LL>  |  86<L>  |  15  ----------------------------<  127<H>  3.8   |  15<L>  |  0.32<L>    Ca    7.9<L>      30 Sep 2021 07:56  Phos  2.5     09-30  Mg     1.7     09-30    TPro  5.6<L>  /  Alb  3.0<L>  /  TBili  0.8  /  DBili  x   /  AST  23  /  ALT  13  /  AlkPhos  88  09-30      Ca    8.0<L>      30 Sep 2021 04:22  Phos  2.5     09-30  Mg     1.7     09-30    TPro  5.6<L>  /  Alb  3.0<L>  /  TBili  0.8  /  DBili  x   /  AST  23  /  ALT  13  /  AlkPhos  88  09-30    PT/INR - ( 30 Sep 2021 04:22 )   PT: 13.5 sec;   INR: 1.13 ratio    PTT - ( 29 Sep 2021 22:08 )  PTT:24.2 sec    Thromboelastography Hemostasis (09.30.21 @ 00:29)   TEG Reaction Time: 5.3 min   TEG Maximum Amplitude: 67.3 mm   Heparinase TEG R Time: 5.7 min   RapidTEG Maximum Amplitude: 68.1 mm   TEG Functional Fibrinogen: 32.7 mm         Urinalysis Basic - ( 30 Sep 2021 04:22 )    Color: Yellow / Appearance: Clear / SG: >1.050 / pH: x  Gluc: x / Ketone: Negative  / Bili: Negative / Urobili: Negative   Blood: x / Protein: 100 / Nitrite: Negative   Leuk Esterase: Negative / RBC: 3 /hpf / WBC 4 /HPF   Sq Epi: x / Non Sq Epi: 3 /hpf / Bacteria: Negative      SARS-CoV-2: NotDetec      RADIOLOGY & ADDITIONAL TESTS:    < from: CT Head No Cont (09.29.21 @ 23:35) >    IMPRESSION:    Head CT: Multiple foci of right frontal intraparenchymal hemorrhage with surrounding extensive vasogenic edema and effacement of the right lateral ventricle frontal horn. Heterogeneous hyperdensity crosses midline anterior to the lateralventricles, measuring 2.5 x 2.8 cm; findings may represent a ruptured bleeding cavernoma, as previously there was a linear hyperdensity consistent with DVA with possible cavernoma; however, malignancy cannot be excluded. Recommend further evaluation with CTA head/neck and MRI with contrast.    Cervical spine CT: No acute fracture. Cervical degenerative spondylosis, as described above.    These findings were discussed with Dr. Torres on 9/29/2021 at 11:45 PM by Dr. Adkins with read back confirmation.    --- End of Report ---    < end of copied text >        < from: CT Abdomen and Pelvis w/ IV Cont (09.29.21 @ 23:38) >  IMPRESSION:  Bilateral lower lung consolidation, possibly representing infection and/or aspiration.  There appears to be a small caliber stent in the 2nd portion of the duodenum, which may be a retained prior CBD stentor other retained foreign body; please correlate with surgical/procedural history.  No acute CT findings in the abdomen or pelvis.      --- End of Report ---    < end of copied text >

## 2021-09-30 NOTE — ED ADULT NURSE NOTE - OBJECTIVE STATEMENT
Patient is a 81 year old male brought in by EMS for cough. as per ems pt is nonverbal and doesn't follow commands at baseline. pt arrived with left arm picc line, Bello and peg tube. as per ems pt has been "snoring and coughing" since family gave pt ensure by mouth.  Safety and comfort maintained. Will continue to monitor.

## 2021-09-30 NOTE — H&P ADULT - NSHPPHYSICALEXAM_GEN_ALL_CORE
Gen: obtunded, AAOx0, nonverbal minimally responsive to painful stimuli  HEENT: atraumatic, normocephalic, pupils equally round and reactive to light, extraocular muscles intact, no conjunctival injection  CV: regular rate and rhythym, normal S1/S2, no murmurs, rubs, or gallops  Resp: lungs rhoncorous b/l throughout  GI: PEG tube in place, C/D/I, abd soft, nontender, nondistended, BSx4  MSK: LUE PICC C/D/I, extremities atraumatic, no cyanosis or clubbing  Skin: warm, dry, no rashes or lesions  Neuro: minimally responsive to pain

## 2021-09-30 NOTE — CONSULT NOTE ADULT - SUBJECTIVE AND OBJECTIVE BOX
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 Hx obtained from daughter. She noticed breathing sounds like he was snoring and called EMS.  No sick contact. No diarrhea.     In the ED, VSS. Labs significant for Na 110, CO2 14, lactate 2.5. CXR w/ b/l patchy opacities R>L c/w PNA. 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 MICU consulted for hyponatremia. (30 Sep 2021 01:07)      Imaging: CTH R sided significant edema with some hyperdensity and shift, c/f mass vs venous infarct. Exam before intubation: GHULAM, pupils 2 sluggish, UE flex, BLE TF, snoring.       --Anticoagulation:    =====================  PAST MEDICAL HISTORY   Spinal stenosis    Diabetes    Benign essential HTN    Gallstones      PAST SURGICAL HISTORY   S/P cataract surgery          MEDICATIONS:  Antibiotics:  piperacillin/tazobactam IVPB.. 3.375 Gram(s) IV Intermittent every 8 hours    Neuro:  fentaNYL   Infusion... 0.5 MICROgram(s)/kG/Hr IV Continuous <Continuous>  levETIRAcetam  IVPB 500 milliGRAM(s) IV Intermittent every 12 hours  propofol Infusion 10 MICROgram(s)/kG/Min IV Continuous <Continuous>    Other:  desmopressin IVPB 25 MICROGram(s) IV Intermittent once  norepinephrine Infusion 0.05 MICROgram(s)/kG/Min IV Continuous <Continuous>  sodium chloride 3%. 500 milliLiter(s) IV Continuous <Continuous>      SOCIAL HISTORY:   Occupation:   Marital Status:     FAMILY HISTORY:      ROS: Negative except per HPI    LABS:  PT/INR - ( 29 Sep 2021 22:08 )   PT: 12.0 sec;   INR: 1.00 ratio         PTT - ( 29 Sep 2021 22:08 )  PTT:24.2 sec                        10.1   9.78  )-----------( 175      ( 29 Sep 2021 22:08 )             31.1     09-29    110<LL>  |  79<L>  |  17  ----------------------------<  160<H>  4.9   |  14<L>  |  <0.30<L>    Ca    8.8      29 Sep 2021 22:08  Phos  2.4     09-30  Mg     1.7     09-30    TPro  6.4  /  Alb  3.4  /  TBili  0.7  /  DBili  x   /  AST  29  /  ALT  12  /  AlkPhos  110  09-29

## 2021-09-30 NOTE — H&P ADULT - NSHPLABSRESULTS_GEN_ALL_CORE
10.1   9.78  )-----------( 175      ( 29 Sep 2021 22:08 )             31.1   09-29    110<LL>  |  79<L>  |  17  ----------------------------<  160<H>  4.9   |  14<L>  |  <0.30<L>    Ca    8.8      29 Sep 2021 22:08  Phos  2.4     09-30  Mg     1.7     09-30    TPro  6.4  /  Alb  3.4  /  TBili  0.7  /  DBili  x   /  AST  29  /  ALT  12  /  AlkPhos  110  09-29 10.1   9.78  )-----------( 175      ( 29 Sep 2021 22:08 )             31.1   09-29    110<LL>  |  79<L>  |  17  ----------------------------<  160<H>  4.9   |  14<L>  |  <0.30<L>    Ca    8.8      29 Sep 2021 22:08  Phos  2.4     09-30  Mg     1.7     09-30    TPro  6.4  /  Alb  3.4  /  TBili  0.7  /  DBili  x   /  AST  29  /  ALT  12  /  AlkPhos  110  09-29      RADIOLOGY:    < from: CT Abdomen and Pelvis w/ IV Cont (09.29.21 @ 23:38) >    IMPRESSION:  Bilateral lower lung consolidation, possibly representing infection and/or aspiration.  There appears to be a small caliber stent in the 2nd portion of the duodenum, which may be a retained prior CBD stentor other retained foreign body; please correlate with surgical/procedural history.  No acute CT findings in the abdomen or pelvis.    < end of copied text >    < from: CT Head No Cont (09.29.21 @ 23:35) >    IMPRESSION:    Head CT: Multiple foci of right frontal intraparenchymal hemorrhage with surrounding extensive vasogenic edema and effacement of the right lateral ventricle frontal horn. Heterogeneous hyperdensity crosses midline anterior to the lateralventricles, measuring 2.5 x 2.8 cm; findings may represent a ruptured bleeding cavernoma, as previously there was a linear hyperdensity consistent with DVA with possible cavernoma; however, malignancy cannot be excluded. Recommend further evaluation with CTA head/neck and MRI with contrast.    Cervical spine CT: No acute fracture. Cervical degenerative spondylosis, as described above.    < end of copied text >

## 2021-09-30 NOTE — PROGRESS NOTE ADULT - ATTENDING COMMENTS
1. Acute hypoxemic respiratory failure due to aspiration pneumonia with worsening mental status. Continue current AC vent settings except decrease FIO2 to 50%. Continue Zosyn and azithromycin for aspiration pneumonia.  2. Neuro. Pt with worsening R frontal bleed with compression of ventricles and vasogenic edema. Unclear if there is underlying mass.  Will start decadron as per Neurosurgery. Pt with baseline very poor mental status. Pt with multiple decubitus ulcers. Repeat CT head with contrast.  Family discussing GOC.  3. Hyponatremia. Pt was on 3% saline and stopped when . Will restart 3%. and allow NA to go to 120.  4. DVT prophylaxis SCDs.  5. GOC: Full code. Daughter is processing all information and will discuss with family. 1. Acute hypoxemic respiratory failure due to aspiration pneumonia with worsening mental status. Continue current AC vent settings except decrease FIO2 to 50%. Continue Zosyn and azithromycin for aspiration pneumonia.  2. Neuro. Pt with worsening R frontal bleed with compression of ventricles and vasogenic edema. Unclear if there is underlying mass.  Will start decadron as per Neurosurgery. Pt with baseline very poor mental status. Pt with multiple decubitus ulcers. Repeat CT head with contrast.  Family discussing GOC.  3. Hyponatremia. Pt was on 3% saline and stopped when . Will restart 3%. and allow NA to go to 120.  4. DVT prophylaxis SCDs.  6. Hypotension: Continue norepinephrine.  5. GOC: Full code. Daughter is processing all information and will discuss with family.

## 2021-09-30 NOTE — CONSULT NOTE ADULT - ASSESSMENT
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 Admitted to MICU for management of hyponatremia and hypotension requiring pressors.      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  prophylaxis measures      Pt w/poor prognosis- ongoing GOC w/ family  Rt Hip- Aquacel dressing, consider VAC if aligned w/ GOC  Sacrum/ Buttocks= TRIAD paste BID and prn soiling as per protocol w/ pericare  Posterior Ribs and Lt hip- allevyn QOD  Bilateral feet= CAVILON QD to dried blisters  Abx per MICU/ ID  Moisturize intact skin w/ SWEEN cream BID  Nutritional optimization as aligned w/ pt's GOC        Pt receiving TF at home, consider RD consult as appropriate        consider MVI & Vit C to promote wound healing        high quality protein, such as TF w/ sarbjit  Continue turning and positioning w/ offloading assistive devices as per protocol  Waffle Cushion to chair when oob to chair  Continue w/ low air loss bed surface, consider Envella bed if aligned w/ GOC as pt        w/ multiple surfaces w/ pressure injuries  Care as per micu, will follow w/ you  Upon discharge f/u as outpatient at [facility] [address] [facility] [phone]  D/w team & s/w RN  Thank you for this consult  Alessandra Perez PA-C [id number] [id number]  I spent 50minutes face to face w/ this pt of which more than 50% of the time was spent counseling & coordinating care of this pt.

## 2021-09-30 NOTE — CHART NOTE - NSCHARTNOTEFT_GEN_A_CORE
: Sandra Carlson    INDICATION: Admission Assessment    PROCEDURE:  [x] LIMITED ECHO  [x] LIMITED CHEST  [ ] LIMITED RETROPERITONEAL  [ ] LIMITED ABDOMINAL  [ ] LIMITED DVT  [ ] NEEDLE GUIDANCE VASCULAR  [ ] NEEDLE GUIDANCE THORACENTESIS  [ ] NEEDLE GUIDANCE PARACENTESIS  [ ] NEEDLE GUIDANCE PERICARDIOCENTESIS  [ ] OTHER    FINDINGS: Bilateral B lines, R > L. Small bilateral pleural effusions. Hyperdynamic LV. Trace pericardial effusion. IVC <1      INTERPRETATION:  B lines likely secondary to pneumonia. Small bilateral pleff/trace pericardial effusions. IVC < 1 likely secondary to hypovolemia.

## 2021-09-30 NOTE — PROVIDER CONTACT NOTE (OTHER) - ASSESSMENT
pt bedbound at baseline a&ox0, pressure injuries on sacrum, b/l flank, b/l hip and left fourth and fifth toe

## 2021-09-30 NOTE — CONSULT NOTE ADULT - SUBJECTIVE AND OBJECTIVE BOX
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  Pt presented with cough for a week ago but worsened today after daughter attempted to give him ensure by mouth.  Daughter noticed breathing sounds like he was snoring which wasn't normal for him and called EMS.  No sick contact. No diarrhea. In the ED, VSS. Labs significant for Na 110, CO2 14, lactate 2.5. CXR w/ b/l patchy opacities R>L c/w PNA. 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 MICU consulted for hyponatremia. (+)fevers.  no N/V/D, but pt npo.  unable to assess if palp/ sob/dyspnea/ cp.  Wound consult requested to assist w/ management of multiple pressure injuries noted on admission. Pt unable to c/o pain, drainage, odor, color change,  swelling. Pt sedentary and Incontinent of urine & stool. Pt dependent on family for his care.  upon admission offloading and pericare protocols initiated.   Allevyn foams placed while awaiting consult.     Current Diet: Diet, NPO:   Except Medications (09-30-21 @ 05:16)      PAST MEDICAL & SURGICAL HISTORY:  Spinal stenosis    Diabetes    Benign essential HTN    Gallstones    S/P cataract surgery    s/p PEG      REVIEW OF SYSTEMS: Pt unable to offer    MEDICATIONS  (STANDING):  chlorhexidine 0.12% Liquid 15 milliLiter(s) Oral Mucosa every 12 hours  chlorhexidine 2% Cloths 1 Application(s) Topical <User Schedule>  dexAMETHasone  Injectable 4 milliGRAM(s) IV Push every 6 hours  levETIRAcetam  IVPB 500 milliGRAM(s) IV Intermittent every 12 hours  norepinephrine Infusion 0.05 MICROgram(s)/kG/Min (5.23 mL/Hr) IV Continuous <Continuous>  pantoprazole  Injectable 40 milliGRAM(s) IV Push daily  phenylephrine    Infusion 0.5 MICROgram(s)/kG/Min (9.71 mL/Hr) IV Continuous <Continuous>  piperacillin/tazobactam IVPB.. 3.375 Gram(s) IV Intermittent every 8 hours  propofol Infusion 10.779 MICROgram(s)/kG/Min (3.35 mL/Hr) IV Continuous <Continuous>  sodium chloride 3%. 500 milliLiter(s) (15 mL/Hr) IV Continuous <Continuous>    No Known Allergies    SOCIAL HISTORY: single; lives w/ daughter who is caretaker; unable to assess smoking, ETOH, drugs hx    FAMILY HISTORY: no significant      PHYSICAL EXAM:  Vital Signs Last 24 Hrs  T(C): 36.5 (30 Sep 2021 16:00), Max: 38.1 (30 Sep 2021 02:46)  T(F): 97.7 (30 Sep 2021 16:00), Max: 100.5 (30 Sep 2021 02:46)  HR: 83 (30 Sep 2021 16:00) (83 - 137)  BP: 146/72 (30 Sep 2021 16:00) (66/42 - 184/88)  BP(mean): 102 (30 Sep 2021 16:00) (48 - 127)  RR: 18 (30 Sep 2021 16:00) (8 - 29)  SpO2: 100% (30 Sep 2021 16:00) (95% - 100%)    guarded but stable,  intubated/ sedated, frail  [employer]  HEENT:  NC/AT, mucosa moist,  trachea midline, neck supple  Cardiovascular: RRR   Respiratory: CTA  Gastrointestinal: soft NT/ND (+)BS  (+)PEG   Psych: unable to assess  Neurology: nonverbal, no follow commands/ paraplegic  Musculoskeletal:  passive ROM  Vascular: BLE equally warm,  mild BLE edema equal, BLE DP/PT pulses palpable      no acute ischemia noted      Lt 4th and 5th toes w/ scab that easily lifted w/o wound underneath w/o drainage        no blistering, odor, erythema, increased warmth, tenderness, induration, fluctuance  Skin:  moist w/ good turgor, anasarca  Sacrum unstageable w/ evolving DTI in 7cm x 6cm x 0cm scant serous drainage  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      5.5cm x 8xm x3cm w/ undermining from 12-3o'clock w/ greatest of 2cm at 2o'clock      moderate serosanguinous drainage  Lt Hip  4cm x 3cm x 0.1cm unstageable w/ area of evolving DTI centrally w/o drainage  Bilateral thoracic posterior ribs w/ 0.5cm x 1cm x 0cm unstageable pressure injuries  Bilateral heels, bunions. and lateral feet w/ dry resolving blisters w/o skin break, drainage  No odor, erythema, increased warmth, tenderness, induration, fluctuance    LABS/ CULTURES/ RADIOLOGY:                        8.3    18.41 )-----------( 180      ( 30 Sep 2021 04:22 )             25.2       116  |  87  |  15  ----------------------------<  115      [09-30-21 @ 12:13]  3.6   |  16  |  0.35        Ca     8.1     [09-30-21 @ 12:13]      Mg     1.7     [09-30-21 @ 04:22]      Phos  2.5     [09-30-21 @ 04:22]    TPro  5.6  /  Alb  3.0  /  TBili  0.8  /  DBili  x   /  AST  23  /  ALT  13  /  AlkPhos  88  [09-30-21 @ 04:22]    PT/INR: PT 13.5 , INR 1.13       [09-30-21 @ 04:22]  PTT: 24.2       [09-29-21 @ 22:08]        < from: CT Abdomen and Pelvis w/ IV Cont (09.29.21 @ 23:38) >  INTERPRETATION:  CLINICAL INFORMATION: Altered mental status.    COMPARISON: CT abdomen pelvis 1/21/2021.    CONTRAST/COMPLICATIONS:  IV Contrast: Omnipaque 350  90 cc administered   10 cc discarded  Oral Contrast: None  Complications: None reported at time of study completion    PROCEDURE:  CT of the Abdomen and Pelvis was performed.  Sagittal and coronal reformats were performed.    FINDINGS:  Evaluation is partially limited by motion artifact.    LOWER CHEST: Bilateral lower lobe and right middle lobe consolidations. Artery calcifications.    LIVER: Subcentimeter hypodensity too small to characterize.  BILE DUCTS: Mild intra and extrahepaticpneumobilia. CBD stent.  GALLBLADDER: Cholelithiasis.  SPLEEN: Within normal limits.  PANCREAS: Within normal limits.  ADRENALS: Redemonstrated nodular thickening of the left adrenal gland.  KIDNEYS/URETERS: Bilateral renal cysts and additional subcentimeter hypodensities too small to characterize. No hydronephrosis.    BLADDER: Belol catheter.  REPRODUCTIVE ORGANS: Prostate within normal limits.    BOWEL: No bowel obstruction. Percutaneous gastrostomy tube. Diverticulosis coli without evidence of acute diverticulitis. There appears to be a small caliber stent in the 2nd portion of the duodenum.  PERITONEUM: No ascites.  VESSELS: Atherosclerotic changes.  RETROPERITONEUM/LYMPH NODES: No lymphadenopathy.  ABDOMINAL WALL: Within normal limits.  BONES: Degenerative changes.    IMPRESSION:  Bilateral lower lung consolidation, possibly representing infection and/or aspiration.  There appears to be a small caliber stent in the 2nd portion of the duodenum, which may be a retained prior CBD stentor other retained foreign body; please correlate with surgical/procedural history.  No acute CT findings in the abdomen or pelvis.      < end of copied text >

## 2021-10-01 NOTE — PROGRESS NOTE ADULT - ATTENDING COMMENTS
1. Acute hypoxemic respiratory failure due to aspiration pneumonia with worsening mental status. Continue current AC vent settings except decrease FIO2 to 50%. Continue Zosyn and azithromycin for aspiration pneumonia.  2. Neuro. Pt with worsening R frontal bleed with compression of ventricles and vasogenic edema. CT head with contrast shows mass..  Will start decadron as per Neurosurgery. Pt with baseline very poor mental status. Pt with multiple decubitus ulcers. Repeat CT head with contrast.  Family discussing GOC.  3. Hyponatremia. Continue 3% saline . Increase rate to 30cc/hr. Stop LR.   4. DVT prophylaxis SCDs.  6. Hypotension: Continue norepinephrine.  5. GOC: Full code. Daughter is processing all information and will discuss with family. There is no good outcome for pt . Pt is in process of dying.

## 2021-10-01 NOTE — PROGRESS NOTE ADULT - ATTENDING COMMENTS
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 Low uric acid. TSH normal  Given the severity of hyponatremia and intracranial bleed, hypertonic saline was used  Serum sodium is 119 this am, can likely correct up to 10 meq in 24 hours given the bleed/cereberal edema/midline shift  Maintain on  hypertonic saline   Please check BMP Q3 hours  Family discussion regarding GOC  Prognosis is poor  Rest per Dr Gopi Ruiz MD  O:   C:

## 2021-10-01 NOTE — PROGRESS NOTE ADULT - ASSESSMENT
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 Admitted to MICU for management of hyponatremia and hypotension requiring pressors.    #Neuro  Hx of CVA, A&Ox0, nonverbal at baseline   Found to have extensive right frontal intraparenchymal hemorrhage, edema and herniation. Findings may represent a ruptured bleeding cavernoma, as previously there was a linear hyperdensity consistent with DVA with possible cavernoma; however, malignancy cannot be excluded.   Neuro and neurosurgery consulted, appreciate recs   - Repeat CTH w/ suspicion for malignancy vs. r/o sinus thrombosis as cause  - Per discussion with neurosurgery, hold off on MRI prior to GOC discussion w/ family  as given poor functional status at baseline, no current indication for neurosurgical intervention  CTA head/neck and MRI.  - Will start dex 10 then 4q6 i/s/o likely intracranial mass   - Q1 neuro checks   - C/w Keppra 500 BID     #Resp  Intubated for airway protection   Mode: AC   Settings: RR:18/Tv: 400/ Fio2: 70%/ PEEP: 5  - Rhonchi throughout bilat lung fields, per daughter, recent hx of PNA; started azithromycin and zosyn in ED  - Aspirations precautions  - F/u ABG  - Resp alkalosis i/s/o hyperventilation, metabolic acidosis (AGMA and NAGMA)      #CV  Hypotension 90s/40s-60s  - On Levophed 8mg infused at 5.23 mL/Hr. Titrate to MAP >65. 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), no signs of infection or abscess on physical exam  - Cr 0.32  - Bello in place, replaced upon admission to MICU  - Monitor I/O  - C/w abx as above     Electrolytes  - Hyponatremia:   -  Correct as needed  - D/c'ed 3% hypertonic saline 30cc due to increase of Na to 116 9/30 AM, f/u next BMP in 4h  - Per discussion w/ nephro, if Na rises to 119-120, continue to hold hypertonic saline; if persistently low, can correct more leniently i/s/o intracranial edema  - Monitor BMP q3 , Per neurosurg, ultimate goal is 145-155 meq/L  - Free water restrictions to < 1L   - If urine output becomes > 100cc/hr or if pt exceeds sodium goal then stop HTS & start D5W       #ID  - lactate 2.5. rhonchi throughout bilateral lung fields and CXR with bilateral patchy opacities R>L c/w PNA, recent penile infection (?abscess) & sacral wound on antibiotic with PICC line (cefepime 2gq8 8/20-9/28; doxycycline 8/20---) presenting with cough  WBC 9.78, Febrile 100.5  - C/w Zosyn 3.375  - C/w Azithromycin 500mg   - F/U Blood Cx   - F/u legionella  - Can DC Azithro if urine Legionella is negative       #Endo  Hyponatremic to 110 on admission likely multifactorial: SIADH vs dehydration i/s/o vomiting and diarrhea prior to admission vs. 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 Admitted to MICU for management of hyponatremia and hypotension requiring pressors. Ongoing GOC.     #Neuro  - Hx of CVA s/p PEG in Feb 21, was talking, eating and walking with assistance up to last admission (Aug 21), Mental status declined significantly to non-verbal and bedbound i/s/o infection prior to this admission. Was still awake and able to tolerate some PO feeding until the event leading up to current admission.   - Repeat CTH 9/30: Previously seen large regions of increased attenuation with surrounding edema involving the right anterior frontal lobe, anterior corpus callosum, and mesial left frontal lobe demonstrate heterogeneous enhancement on the current examination. Findings are suspicious for the presence of neoplasm. Differential diagnosis includes lymphoma, metastatic disease, and glioblastoma multiforme.  - Discussed with daughter 9/30: Holding MR head given no change in management. Neurosx is not offering any restorative interventions, and no anticipated neuro recovery regardless of interventions.   - C/w dex 4q6 i/s/o likely intracranial mass   - Q4 neuro checks   - C/w Keppra 500 BID   - C/w GOC    #Resp  Intubated for airway protection   Mode: AC Settings: RR:18/Tv: 400/ Fio2: 30%/ PEEP: 5  - Rhonchi throughout bilat lung fields, per daughter, recent hx of PNA; started azithromycin and zosyn in ED  - Resp alkalosis i/s/o hyperventilation, metabolic acidosis (AGMA and NAGMA)  - C/w Zosyn for PNA; d/c'ed AZT for neg legionella      #CV  Hypotension  - Off pressor now; HD stable   - Continue to monitor     #GI  PEG in place  - Tolerating feeds  - Occult blood (+) in gastric lavage, but no overt GIB; c/w PPI    #Renal/  - Hx of penile infection (?abscess), no signs of infection or abscess on physical exam  - Cr 0.32  - Bello in place, replaced upon admission to MICU  - Monitor I/O  - C/w abx as above     Electrolytes  - Hyponatremia:   -  Correct as needed  - D/c'ed 3% hypertonic saline 30cc due to increase of Na to 116 9/30 AM, f/u next BMP in 4h  - Per discussion w/ nephro, if Na rises to 119-120, continue to hold hypertonic saline; if persistently low, can correct more leniently i/s/o intracranial edema  - Monitor BMP q3 , Per neurosurg, ultimate goal is 145-155 meq/L  - Free water restrictions to < 1L   - If urine output becomes > 100cc/hr or if pt exceeds sodium goal then stop HTS & start D5W       #ID  - lactate 2.5. rhonchi throughout bilateral lung fields and CXR with bilateral patchy opacities R>L c/w PNA, recent penile infection (?abscess) & sacral wound on antibiotic with PICC line (cefepime 2gq8 8/20-9/28; doxycycline 8/20---) presenting with cough  WBC 9.78, Febrile 100.5  - C/w Zosyn 3.375  - C/w Azithromycin 500mg   - F/U Blood Cx   - F/u legionella  - Can DC Azithro if urine Legionella is negative       #Endo  Hyponatremic to 110 on admission likely multifactorial: SIADH vs dehydration i/s/o vomiting and diarrhea prior to admission vs. 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 Admitted to MICU for management of hyponatremia and hypotension requiring pressors. Ongoing GOC.     #Neuro  - Hx of CVA s/p PEG in Feb 21, was talking, eating and walking with assistance up to last admission (Aug 21), Mental status declined significantly to non-verbal and bedbound i/s/o infection prior to this admission. Was still awake and able to tolerate some PO feeding until the event leading up to current admission.   - Repeat CTH 9/30: Previously seen large regions of increased attenuation with surrounding edema involving the right anterior frontal lobe, anterior corpus callosum, and mesial left frontal lobe demonstrate heterogeneous enhancement on the current examination. Findings are suspicious for the presence of neoplasm. Differential diagnosis includes lymphoma, metastatic disease, and glioblastoma multiforme.  - Discussed with daughter 9/30: Holding MR head given no change in management. Neurosx is not offering any restorative interventions, and no anticipated neuro recovery regardless of interventions.   - C/w dex 4q6 i/s/o likely intracranial mass   - Q4 neuro checks   - C/w Keppra 500 BID   - C/w GOC    #Resp  Intubated for airway protection   Mode: AC Settings: RR:18/Tv: 400/ Fio2: 30%/ PEEP: 5  - Rhonchi throughout bilat lung fields, per daughter, recent hx of PNA; started azithromycin and zosyn in ED  - Resp alkalosis i/s/o hyperventilation, metabolic acidosis (AGMA and NAGMA)  - C/w Zosyn for PNA; d/c'ed AZT for neg legionella      #CV  Hypotension  - Off pressor now; HD stable   - Continue to monitor     #GI  PEG in place  - Tolerating feeds  - Occult blood (+) in gastric lavage, but no overt GIB; c/w PPI    #Renal/  - Hx of penile infection during last admission, S/p IV abx via PICC line: no signs of infection or abscess on physical exam  - Cr 0.32  - Bello in place, replaced upon admission to MICU  - Monitor I/O    Electrolytes  - Hyponatremia:   - C/w 3% hypertonic saline @ 30 cc/hr  - Per discussion w/ nephro, can correct more leniently i/s/o intracranial edema  - Monitor BMP q4      #ID  - Recent penile infection (?abscess) & sacral wound on antibiotic with PICC line (cefepime 2gq8 8/20-9/28; doxycycline 8/20---)   - Aspiration event leading up to admission; C/w Zosyn 3.375 for aspiration PNA  - Blood Cx NGTD  - Hx of COVID infection, spike ab positive      #Endo  - TSH wnl  - F/u cortisol level  - No known hx of DM, continue to monitor FS      #Heme   Hgb of 10 on admission  - Hold pharmacologic AC i/s/o intracranial hemorrhage  - DVT Prophylaxis: SCDs   - Hgb slowly downtrending without overt bleeding source  - (+) occult blood from gastric lavage, c/w PPI  - Trend CBC and keep active T & S      #Ethics   - GOC discussed with daughter in ED: FULL CODE   - Ongoing GOC discussion with daughter: Daughter understood the severity of clinical situation. Just lost mother at the beginning of this year, and no other family around to help with decision making. She will think about the options, and decide on the code status. Will continue GOC.

## 2021-10-01 NOTE — PROGRESS NOTE ADULT - PROBLEM SELECTOR PLAN 1
Pt. with severe Hyponatremia- likely ADH mediated hypovolemic hyponatremia.  On admission, SNa was low at 110. In view of severe hyponatremia and intracranial bleed, received 3%HTS. Repeat SNa was 116 on 9/30/21. U Osm was 486 with Ya of 15 on 9/30/21. SNa is 119 today. Recommend 3%HTS 30 ml /hr x12 hrs today. Repeat SNa q4 for now. Please check urine lytes, ur osmolality. If at any point, urine output becomes > 100cc/hr or pt exceeds sodium goal then stop HTS & start D5W. goal SNa correction is upto 10 mEq in 24 hr (129). Continue with GOC discussion with family.       # Anemia - FOBt positive. Blood transfusion as per primary team.       If you have any questions, please feel free to contact me  Marcelo Nguyễn  Nephrology Fellow  [phone]  (After 5pm or on weekends please page the on-call fellow).

## 2021-10-01 NOTE — PROGRESS NOTE ADULT - SUBJECTIVE AND OBJECTIVE BOX
[facility] DIVISION OF KIDNEY DISEASES AND HYPERTENSION -- FOLLOW UP NOTE  --------------------------------------------------------------------------------  Chief Complaint: 80-year-old M PMH of gallstones (30-40 years ago), BPH, HLD, h/o spinal stenosis, subarachnoid and severe spinal stenosis, recent PNA, penile infection (?abscess) &  sacral wound on antibiotic with PICC line presenting with possible aspiration PNA, extensive right frontal intraparenchymal hemorrhage, herniation & hyponatremia. Pt. was intubated for worsening mental status.  Currently in MICU. Nephrology was called for hyponatremia.     24 hour events/subjective:  No acute events overnight. Received     PAST HISTORY  --------------------------------------------------------------------------------  No significant changes to PMH, PSH, FHx, SHx, unless otherwise noted    ALLERGIES & MEDICATIONS  --------------------------------------------------------------------------------  Allergies    No Known Allergies    Intolerances    Standing Inpatient Medications  chlorhexidine 0.12% Liquid 15 milliLiter(s) Oral Mucosa every 12 hours  chlorhexidine 2% Cloths 1 Application(s) Topical <User Schedule>  dexAMETHasone  Injectable 4 milliGRAM(s) IV Push every 6 hours  levETIRAcetam  IVPB 500 milliGRAM(s) IV Intermittent every 12 hours  pantoprazole  Injectable 40 milliGRAM(s) IV Push daily  piperacillin/tazobactam IVPB.. 3.375 Gram(s) IV Intermittent every 8 hours  propofol Infusion 10.779 MICROgram(s)/kG/Min IV Continuous <Continuous>  sodium chloride 3%. 500 milliLiter(s) IV Continuous <Continuous>    PRN Inpatient Medications    REVIEW OF SYSTEMS  --------------------------------------------------------------------------------  Unable to obtain ROS    VITALS/PHYSICAL EXAM  --------------------------------------------------------------------------------  T(C): 35.8 (10-01-21 @ 08:00), Max: 36.5 (09-30-21 @ 16:00)  HR: 55 (10-01-21 @ 09:03) (55 - 128)  BP: 145/71 (10-01-21 @ 08:00) (67/39 - 184/88)  RR: 21 (10-01-21 @ 08:00) (18 - 24)  SpO2: 100% (10-01-21 @ 09:03) (99% - 100%)  Wt(kg): --  Height (cm): 152.4 (09-30-21 @ 02:46)  Weight (kg): 51.8 (09-30-21 @ 02:46)  BMI (kg/m2): 22.3 (09-30-21 @ 02:46)  BSA (m2): 1.47 (09-30-21 @ 02:46)    09-30-21 @ 07:01  -  10-01-21 @ 07:00  --------------------------------------------------------  IN: 1939.5 mL / OUT: 710 mL / NET: 1229.5 mL    Physical Exam:  	Gen: NAD  	HEENT: MMM  	Pulm: CTA B/L  	CV: S1S2  	Abd: Soft, +BS   	Ext: No LE edema B/L  	Neuro: Awake  	Skin: Warm and dry  	Vascular access:    LABS/STUDIES  --------------------------------------------------------------------------------              7.1    20.81 >-----------<  159      [10-01-21 @ 00:20]              21.0     119  |  91  |  16  ----------------------------<  146      [10-01-21 @ 06:37]  3.8   |  13  |  0.32        Ca     7.9     [10-01-21 @ 06:37]      Mg     1.8     [10-01-21 @ 06:37]      Phos  3.0     [10-01-21 @ 06:37]    TPro  5.2  /  Alb  2.5  /  TBili  0.6  /  DBili  x   /  AST  20  /  ALT  10  /  AlkPhos  77  [10-01-21 @ 02:48]    PT/INR: PT 13.5 , INR 1.13       [09-30-21 @ 04:22]  PTT: 24.2       [09-29-21 @ 22:08]    Uric acid 1.8      [09-30-21 @ 18:45]  Serum Osmolality 238      [09-30-21 @ 00:19]    Creatinine Trend:  SCr 0.32 [10-01 @ 06:37]  SCr 0.31 [10-01 @ 02:48]  SCr <0.30 [09-30 @ 22:41]  SCr 0.33 [09-30 @ 18:45]  SCr 0.35 [09-30 @ 12:13]    Urinalysis - [10-01-21 @ 00:18]      Color Yellow / Appearance Slightly Turbid / SG >1.050 / pH 6.5      Gluc Negative / Ketone Negative  / Bili Negative / Urobili Negative       Blood Small / Protein 100 mg/dL / Leuk Est Negative / Nitrite Negative      RBC 3 / WBC 19 / Hyaline 8 / Gran Few / Sq Epi  / Non Sq Epi 7 / Bacteria Negative    Urine Sodium 15      [09-30-21 @ 00:02]  Urine Chloride 27      [09-30-21 @ 00:02]  Urine Osmolality 486      [09-30-21 @ 00:02]    Iron 122, TIBC 246, %sat 50      [01-24-21 @ 10:03]  Ferritin 292      [01-24-21 @ 09:57]  TSH 1.18      [10-01-21 @ 05:15] [facility] DIVISION OF KIDNEY DISEASES AND HYPERTENSION -- FOLLOW UP NOTE  --------------------------------------------------------------------------------  Chief Complaint: 80-year-old M PMH of gallstones (30-40 years ago), BPH, HLD, h/o spinal stenosis, subarachnoid and severe spinal stenosis, recent PNA, penile infection (?abscess) &  sacral wound on antibiotic with PICC line presenting with possible aspiration PNA, extensive right frontal intraparenchymal hemorrhage, herniation & hyponatremia. Pt. was intubated for worsening mental status.  Currently in MICU. Nephrology was called for hyponatremia.     24 hour events/subjective:  No acute events overnight. Received 3%HTS on 9/29/21 and 9/30. SNa is at 119 today. Pt seen and examined at bedside. Pt. is intubated and on mech vent.      PAST HISTORY  --------------------------------------------------------------------------------  No significant changes to PMH, PSH, FHx, SHx, unless otherwise noted    ALLERGIES & MEDICATIONS  --------------------------------------------------------------------------------  Allergies    No Known Allergies    Intolerances    Standing Inpatient Medications  chlorhexidine 0.12% Liquid 15 milliLiter(s) Oral Mucosa every 12 hours  chlorhexidine 2% Cloths 1 Application(s) Topical <User Schedule>  dexAMETHasone  Injectable 4 milliGRAM(s) IV Push every 6 hours  levETIRAcetam  IVPB 500 milliGRAM(s) IV Intermittent every 12 hours  pantoprazole  Injectable 40 milliGRAM(s) IV Push daily  piperacillin/tazobactam IVPB.. 3.375 Gram(s) IV Intermittent every 8 hours  propofol Infusion 10.779 MICROgram(s)/kG/Min IV Continuous <Continuous>  sodium chloride 3%. 500 milliLiter(s) IV Continuous <Continuous>    PRN Inpatient Medications    REVIEW OF SYSTEMS  --------------------------------------------------------------------------------  Unable to obtain ROS    VITALS/PHYSICAL EXAM  --------------------------------------------------------------------------------  T(C): 35.8 (10-01-21 @ 08:00), Max: 36.5 (09-30-21 @ 16:00)  HR: 55 (10-01-21 @ 09:03) (55 - 128)  BP: 145/71 (10-01-21 @ 08:00) (67/39 - 184/88)  RR: 21 (10-01-21 @ 08:00) (18 - 24)  SpO2: 100% (10-01-21 @ 09:03) (99% - 100%)  Wt(kg): --  Height (cm): 152.4 (09-30-21 @ 02:46)  Weight (kg): 51.8 (09-30-21 @ 02:46)  BMI (kg/m2): 22.3 (09-30-21 @ 02:46)  BSA (m2): 1.47 (09-30-21 @ 02:46)    09-30-21 @ 07:01  -  10-01-21 @ 07:00  --------------------------------------------------------  IN: 1939.5 mL / OUT: 710 mL / NET: 1229.5 mL    Physical Exam:  	Gen: intubated and on mech vent  	HEENT: MMM  	Pulm: CTA B/L  	CV: S1S2  	Abd: Soft, +BS   	Ext: No LE edema B/L  	Neuro: sedated  	Skin: Warm and dry  	Vascular access: IV peripheral canula    LABS/STUDIES  --------------------------------------------------------------------------------              7.1    20.81 >-----------<  159      [10-01-21 @ 00:20]              21.0     119  |  91  |  16  ----------------------------<  146      [10-01-21 @ 06:37]  3.8   |  13  |  0.32        Ca     7.9     [10-01-21 @ 06:37]      Mg     1.8     [10-01-21 @ 06:37]      Phos  3.0     [10-01-21 @ 06:37]    TPro  5.2  /  Alb  2.5  /  TBili  0.6  /  DBili  x   /  AST  20  /  ALT  10  /  AlkPhos  77  [10-01-21 @ 02:48]    PT/INR: PT 13.5 , INR 1.13       [09-30-21 @ 04:22]  PTT: 24.2       [09-29-21 @ 22:08]    Uric acid 1.8      [09-30-21 @ 18:45]  Serum Osmolality 238      [09-30-21 @ 00:19]    Creatinine Trend:  SCr 0.32 [10-01 @ 06:37]  SCr 0.31 [10-01 @ 02:48]  SCr <0.30 [09-30 @ 22:41]  SCr 0.33 [09-30 @ 18:45]  SCr 0.35 [09-30 @ 12:13]    Urinalysis - [10-01-21 @ 00:18]      Color Yellow / Appearance Slightly Turbid / SG >1.050 / pH 6.5      Gluc Negative / Ketone Negative  / Bili Negative / Urobili Negative       Blood Small / Protein 100 mg/dL / Leuk Est Negative / Nitrite Negative      RBC 3 / WBC 19 / Hyaline 8 / Gran Few / Sq Epi  / Non Sq Epi 7 / Bacteria Negative    Urine Sodium 15      [09-30-21 @ 00:02]  Urine Chloride 27      [09-30-21 @ 00:02]  Urine Osmolality 486      [09-30-21 @ 00:02]    Iron 122, TIBC 246, %sat 50      [01-24-21 @ 10:03]  Ferritin 292      [01-24-21 @ 09:57]  TSH 1.18      [10-01-21 @ 05:15]

## 2021-10-01 NOTE — PROGRESS NOTE ADULT - ASSESSMENT
80M slow worsening of mental status for past 2mo, in January was Ox3 SIM strong, now does not speak, does not move at all. Today threw up after being fed by mouth, began gurgling, now p/w pneumonia, hyponatremia 110, and R sided significant edema with some hyperdensity and shift, c/f mass vs venous infarct.     -Q1h neurochecks  -CT AP: no acute findings  -CTA neg  -MICU for PNA/Hyponatremia  -If CTH c/w mass, dex 10 then 4q6  -Keppra 500 BID  -Rec minimizing sedation to easily reversible agent to facilitate neuro checks  -GOC  -Na Goal 145-155  -ARU therapeutic, ER gave DDAVP per them  -WBC 20.8<18.41, Na 119 <114, Hb 7.1   -C/f sepsis w/ fevef, tachycardia, hypotension 80M slow worsening of mental status for past 2mo, in January was Ox3 SIM strong, now does not speak, does not move at all. Today threw up after being fed by mouth, began gurgling, now p/w pneumonia, hyponatremia 110, and R sided significant edema with some hyperdensity and shift, c/f mass vs venous infarct.     -Q1h neurochecks  -CT AP: no acute findings  -CTA neg  -MICU for PNA/Hyponatremia  -If CTH c/w mass, dex 10 then 4q6  -Keppra 500 BID  -Rec minimizing sedation to easily reversible agent to facilitate neuro checks  -GOC  -Na Goal 145-155  -WBC 20.8<18.41, Na 119 <114, Hb 7.1   -C/f sepsis w/ fever, tachycardia, hypotension

## 2021-10-01 NOTE — PROGRESS NOTE ADULT - SUBJECTIVE AND OBJECTIVE BOX
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 Hx obtained from daughter. She noticed breathing sounds like he was snoring and called EMS.  No sick contact. No diarrhea.     In the ED, VSS. Labs significant for Na 110, CO2 14, lactate 2.5. CXR w/ b/l patchy opacities R>L c/w PNA. 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 MICU consulted for hyponatremia. (30 Sep 2021 01:07)      Imaging: CTH R sided significant edema with some hyperdensity and shift, c/f mass vs venous infarct. Exam before intubation: GHULAM, pupils 2 sluggish, UE flex, BLE TF, snoring.       --Anticoagulation:    =====================  PAST MEDICAL HISTORY   Spinal stenosis    Diabetes    Benign essential HTN    Gallstones      PAST SURGICAL HISTORY   S/P cataract surgery          MEDICATIONS:  Antibiotics:  piperacillin/tazobactam IVPB.. 3.375 Gram(s) IV Intermittent every 8 hours    Neuro:  fentaNYL   Infusion... 0.5 MICROgram(s)/kG/Hr IV Continuous <Continuous>  levETIRAcetam  IVPB 500 milliGRAM(s) IV Intermittent every 12 hours  propofol Infusion 10 MICROgram(s)/kG/Min IV Continuous <Continuous>    Other:  sodium chloride 3%. 500 milliLiter(s) IV Continuous <Continuous>      SOCIAL HISTORY:   Occupation:   Marital Status:     FAMILY HISTORY:      ROS: Negative except per HPI    Exam: GHULAM, UE minimal movement, BLE trace withdrawal    LABS:                        7.1    20.81 )-----------( 159      ( 01 Oct 2021 00:20 )             21.0     10-01    119<LL>  |  91<L>  |  16  ----------------------------<  155<H>  3.9   |  15<L>  |  0.31<L>    Ca    7.9<L>      01 Oct 2021 02:48  Phos  3.4     10-01  Mg     1.9     10-01    TPro  5.2<L>  /  Alb  2.5<L>  /  TBili  0.6  /  DBili  x   /  AST  20  /  ALT  10  /  AlkPhos  77  10-01

## 2021-10-01 NOTE — PROGRESS NOTE ADULT - SUBJECTIVE AND OBJECTIVE BOX
Dr. Patricia Sanchez  Internal Medicine, PGY-3  Pager #: [phone]      INTERVAL HPI/OVERNIGHT EVENTS:  Na slowly uptrended, c/w 3% hypertonic saline at 15 cc/hr, added LR 75 cc/hr. FiO2 decreased to 30%.     SUBJECTIVE: Patient seen and examined at bedside. Unable to elicit any response.     Unable to obtain ROS 2/2 poor mental status, intubated and sedated.     OBJECTIVE:    VITAL SIGNS:  ICU Vital Signs Last 24 Hrs  T(C): 35.9 (01 Oct 2021 04:00), Max: 36.5 (30 Sep 2021 16:00)  T(F): 96.6 (01 Oct 2021 04:00), Max: 97.7 (30 Sep 2021 16:00)  HR: 65 (01 Oct 2021 07:00) (56 - 137)  BP: 149/74 (01 Oct 2021 07:00) (66/42 - 184/88)  BP(mean): 105 (01 Oct 2021 07:00) (48 - 127)  ABP: --  ABP(mean): --  RR: 18 (01 Oct 2021 07:00) (18 - 26)  SpO2: 100% (01 Oct 2021 07:00) (99% - 100%)    Mode: AC/ CMV (Assist Control/ Continuous Mandatory Ventilation), RR (machine): 18, TV (machine): 400, FiO2: 30, PEEP: 5, ITime: 1, MAP: 9, PIP: 26    09-30 @ 07:01  -  10-01 @ 07:00  --------------------------------------------------------  IN: 1939.5 mL / OUT: 710 mL / NET: 1229.5 mL      CAPILLARY BLOOD GLUCOSE        PHYSICAL EXAM:  General: NAD, sedated  HEENT: NC/AT; pupil constricted; clear conjunctiva  Neck: supple  Respiratory: Coarse breath sounds; intubated  Cardiovascular: +S1/S2; bradycardic; no murmurs, gallops or rubs appreciated  Abdomen: PEG in place, soft, NT/ND  Extremities: No LE edema  Skin: +necrotic normal color and turgor; no rash  Neurological: Sedated, no response; LUE in pronated position, mildly spastic.     MEDICATIONS:  MEDICATIONS  (STANDING):  chlorhexidine 0.12% Liquid 15 milliLiter(s) Oral Mucosa every 12 hours  chlorhexidine 2% Cloths 1 Application(s) Topical <User Schedule>  dexAMETHasone  Injectable 4 milliGRAM(s) IV Push every 6 hours  lactated ringers. 1000 milliLiter(s) (75 mL/Hr) IV Continuous <Continuous>  levETIRAcetam  IVPB 500 milliGRAM(s) IV Intermittent every 12 hours  pantoprazole  Injectable 40 milliGRAM(s) IV Push daily  piperacillin/tazobactam IVPB.. 3.375 Gram(s) IV Intermittent every 8 hours  propofol Infusion 10.779 MICROgram(s)/kG/Min (3.35 mL/Hr) IV Continuous <Continuous>  sodium chloride 3%. 500 milliLiter(s) (15 mL/Hr) IV Continuous <Continuous>    MEDICATIONS  (PRN):      ALLERGIES:  Allergies    No Known Allergies    Intolerances        LABS:                        7.1    20.81 )-----------( 159      ( 01 Oct 2021 00:20 )             21.0     10-01    119<LL>  |  91<L>  |  16  ----------------------------<  155<H>  3.9   |  15<L>  |  0.31<L>    Ca    7.9<L>      01 Oct 2021 02:48  Phos  3.4     10-01  Mg     1.9     10-01    TPro  5.2<L>  /  Alb  2.5<L>  /  TBili  0.6  /  DBili  x   /  AST  20  /  ALT  10  /  AlkPhos  77  10-01    PT/INR - ( 30 Sep 2021 04:22 )   PT: 13.5 sec;   INR: 1.13 ratio         PTT - ( 29 Sep 2021 22:08 )  PTT:24.2 sec  Urinalysis Basic - ( 01 Oct 2021 00:18 )    Color: Yellow / Appearance: Slightly Turbid / SG: >1.050 / pH: x  Gluc: x / Ketone: Negative  / Bili: Negative / Urobili: Negative   Blood: x / Protein: 100 mg/dL / Nitrite: Negative   Leuk Esterase: Negative / RBC: 3 /hpf / WBC 19 /HPF   Sq Epi: x / Non Sq Epi: 7 /hpf / Bacteria: Negative        RADIOLOGY & ADDITIONAL TESTS: Reviewed. Dr. Patricia Sanchez  Internal Medicine, PGY-3  Pager #: [phone]      INTERVAL HPI/OVERNIGHT EVENTS:  Na slowly uptrended, c/w 3% hypertonic saline at 15 cc/hr, added LR 75 cc/hr for low UOP. FiO2 decreased to 30%.     SUBJECTIVE: Patient seen and examined at bedside. Unable to elicit any response. Per neurosx communication to night team, no restorative measures can be offered at this time, and no anticipated neuro recovery with any procedures.     Unable to obtain ROS 2/2 poor mental status, intubated and sedated.     OBJECTIVE:    VITAL SIGNS:  ICU Vital Signs Last 24 Hrs  T(C): 35.9 (01 Oct 2021 04:00), Max: 36.5 (30 Sep 2021 16:00)  T(F): 96.6 (01 Oct 2021 04:00), Max: 97.7 (30 Sep 2021 16:00)  HR: 65 (01 Oct 2021 07:00) (56 - 137)  BP: 149/74 (01 Oct 2021 07:00) (66/42 - 184/88)  BP(mean): 105 (01 Oct 2021 07:00) (48 - 127)  ABP: --  ABP(mean): --  RR: 18 (01 Oct 2021 07:00) (18 - 26)  SpO2: 100% (01 Oct 2021 07:00) (99% - 100%)    Mode: AC/ CMV (Assist Control/ Continuous Mandatory Ventilation), RR (machine): 18, TV (machine): 400, FiO2: 30, PEEP: 5, ITime: 1, MAP: 9, PIP: 26    09-30 @ 07:01  -  10-01 @ 07:00  --------------------------------------------------------  IN: 1939.5 mL / OUT: 710 mL / NET: 1229.5 mL      CAPILLARY BLOOD GLUCOSE        PHYSICAL EXAM:  General: NAD, sedated  HEENT: NC/AT; pupil constricted; clear conjunctiva  Neck: supple  Respiratory: Coarse breath sounds; intubated  Cardiovascular: +S1/S2; bradycardic; no murmurs, gallops or rubs appreciated  Abdomen: PEG in place, soft, NT/ND  Extremities: No LE edema  Skin: no acute rash, (+) sacral wound  Neurological: Sedated, no response; LUE in pronated position    MEDICATIONS:  MEDICATIONS  (STANDING):  chlorhexidine 0.12% Liquid 15 milliLiter(s) Oral Mucosa every 12 hours  chlorhexidine 2% Cloths 1 Application(s) Topical <User Schedule>  dexAMETHasone  Injectable 4 milliGRAM(s) IV Push every 6 hours  lactated ringers. 1000 milliLiter(s) (75 mL/Hr) IV Continuous <Continuous>  levETIRAcetam  IVPB 500 milliGRAM(s) IV Intermittent every 12 hours  pantoprazole  Injectable 40 milliGRAM(s) IV Push daily  piperacillin/tazobactam IVPB.. 3.375 Gram(s) IV Intermittent every 8 hours  propofol Infusion 10.779 MICROgram(s)/kG/Min (3.35 mL/Hr) IV Continuous <Continuous>  sodium chloride 3%. 500 milliLiter(s) (15 mL/Hr) IV Continuous <Continuous>    MEDICATIONS  (PRN):      ALLERGIES:  Allergies    No Known Allergies    Intolerances        LABS:                        7.1    20.81 )-----------( 159      ( 01 Oct 2021 00:20 )             21.0     10-01    119<LL>  |  91<L>  |  16  ----------------------------<  155<H>  3.9   |  15<L>  |  0.31<L>    Ca    7.9<L>      01 Oct 2021 02:48  Phos  3.4     10-01  Mg     1.9     10-01    TPro  5.2<L>  /  Alb  2.5<L>  /  TBili  0.6  /  DBili  x   /  AST  20  /  ALT  10  /  AlkPhos  77  10-01    PT/INR - ( 30 Sep 2021 04:22 )   PT: 13.5 sec;   INR: 1.13 ratio         PTT - ( 29 Sep 2021 22:08 )  PTT:24.2 sec  Urinalysis Basic - ( 01 Oct 2021 00:18 )    Color: Yellow / Appearance: Slightly Turbid / SG: >1.050 / pH: x  Gluc: x / Ketone: Negative  / Bili: Negative / Urobili: Negative   Blood: x / Protein: 100 mg/dL / Nitrite: Negative   Leuk Esterase: Negative / RBC: 3 /hpf / WBC 19 /HPF   Sq Epi: x / Non Sq Epi: 7 /hpf / Bacteria: Negative      RADIOLOGY & ADDITIONAL TESTS: Reviewed.

## 2021-10-01 NOTE — PROGRESS NOTE ADULT - ASSESSMENT
80yM PMH of gallstones (30-40 years ago), BPH, HLD, h/o spinal stenosis, subarachnoid and severe spinal stenosis, recent PNA, penile infection (?abscess) &  sacral wound on antibiotic with PICC line presenting with possible aspiration PNA, extensive right frontal intraparenchymal hemorrhage, herniation & hyponatremia. Nephrology was called for hyponatremia.

## 2021-10-02 NOTE — PROGRESS NOTE ADULT - ASSESSMENT
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 Admitted to MICU for management of hyponatremia and hypotension requiring pressors. Ongoing GOC.     #Neuro  - Hx of CVA s/p PEG in Feb 21, was talking, eating and walking with assistance up to last admission (Aug 21), Mental status declined significantly to non-verbal and bedbound i/s/o infection prior to this admission. Was still awake and able to tolerate some PO feeding until the event leading up to current admission.   - Repeat CTH 9/30: Previously seen large regions of increased attenuation with surrounding edema involving the right anterior frontal lobe, anterior corpus callosum, and mesial left frontal lobe demonstrate heterogeneous enhancement on the current examination. Findings are suspicious for the presence of neoplasm. Differential diagnosis includes lymphoma, metastatic disease, and glioblastoma multiforme.  - Discussed with daughter 9/30: Holding MR head given no change in management. Neurosx is not offering any restorative interventions, and no anticipated neuro recovery regardless of interventions.   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  - c/w 3% hypertonic saline @ 30 cc/hr, can likely hold as Na improving to wnl   - Per discussion w/ nephro, can correct more leniently i/s/o intracranial edema  - Monitor BMP q4  - Monitor I/O  - Bello in place, replaced upon admission to MICU    #ID  - Recent penile infection (?abscess) & sacral wound on antibiotic with PICC line (cefepime 2gq8 8/20-9/28; doxycycline 8/20---)   - Aspiration event leading up to admission; C/w Zosyn 3.375 for aspiration PNA  - Blood Cx including PICC Cx ngtd (9/30)  - Hx of COVID infection, spike ab positive    #Endo  - TSH wnl  - F/u cortisol level  - No known hx of DM, continue to monitor FS      #Heme   Hgb of 10 on admission  - Hold pharmacologic AC i/s/o intracranial hemorrhage  - DVT Prophylaxis: SCDs   - Hgb slowly downtrending without overt bleeding source  - (+) occult blood from gastric lavage, c/w PPI  - Trend CBC and keep active T & S    #Ethics   - GOC discussed with daughter in ED: FULL CODE   - Ongoing GOC discussion with daughter: Daughter understood the severity of clinical situation. Just lost mother at the beginning of this year, and no other family around to help with decision making. She will think about the options, and decide on the code status. Will continue GOC.  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 Admitted to MICU for management of hyponatremia and hypotension requiring pressors. Ongoing GOC.     #Neuro  - Hx of CVA s/p PEG in Feb 21, was talking, eating and walking with assistance up to last admission (Aug 21), Mental status declined significantly to non-verbal and bedbound i/s/o infection prior to this admission. Was still awake and able to tolerate some PO feeding until the event leading up to current admission.   - Repeat CTH 9/30: Previously seen large regions of increased attenuation with surrounding edema involving the right anterior frontal lobe, anterior corpus callosum, and mesial left frontal lobe demonstrate heterogeneous enhancement on the current examination. Findings are suspicious for the presence of neoplasm. Differential diagnosis includes lymphoma, metastatic disease, and glioblastoma multiforme.  - Discussed with daughter 9/30: Holding MR head given no change in management. Neurosx is not offering any restorative interventions, and no anticipated neuro recovery regardless of interventions.   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  - c/w 3% hypertonic saline @ 30 cc/hr, can likely hold as Na improving to wnl   - Per discussion w/ nephro, can correct more leniently i/s/o intracranial edema  - Monitor BMP q4  - Monitor I/O  - Bello in place, replaced upon admission to MICU    #ID  - Recent penile infection (?abscess) & sacral wound on antibiotic with PICC line (cefepime 2gq8 8/20-9/28; doxycycline 8/20---)   - Aspiration event leading up to admission; C/w Zosyn 3.375 for aspiration PNA  - Blood Cx including PICC Cx ngtd (9/30)  - Hx of COVID infection, spike ab positive    #Endo  - TSH wnl  - F/u cortisol level  - No known hx of DM, continue to monitor FS      #Heme   Hgb of 10 on admission  - Hold pharmacologic AC i/s/o intracranial hemorrhage  - DVT Prophylaxis: SCDs   - Hgb slowly downtrending without overt bleeding source  - (+) occult blood from gastric lavage, c/w PPI  - Trend CBC and keep active T & S    #Ethics   - GOC discussed with daughter in ED: FULL CODE   - Ongoing GOC discussion with daughter: Daughter understood the severity of clinical situation. Just lost mother at the beginning of this year, and no other family around to help with decision making. She will think about the options, and decide on the code status. Will continue GOC.  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 Admitted to MICU for management of hyponatremia and hypotension requiring pressors. Ongoing GOC.     #Neuro  - Hx of CVA s/p PEG in Feb 21, was talking, eating and walking with assistance up to last admission (Aug 21), Mental status declined significantly to non-verbal and bedbound i/s/o infection prior to this admission. Was still awake and able to tolerate some PO feeding until the event leading up to current admission.   - Repeat CTH 9/30: Previously seen large regions of increased attenuation with surrounding edema involving the right anterior frontal lobe, anterior corpus callosum, and mesial left frontal lobe demonstrate heterogeneous enhancement on the current examination. Findings are suspicious for the presence of neoplasm. Differential diagnosis includes lymphoma, metastatic disease, and glioblastoma multiforme.  - Discussed with daughter 9/30: Holding MR head given no change in management. Neurosx is not offering any restorative interventions, and no anticipated neuro recovery regardless of interventions.   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  - c/w 3% hypertonic saline @ 30 cc/hr, can likely hold as Na improving to wnl   - Per discussion w/ nephro, can correct more leniently i/s/o intracranial edema  - Monitor BMP q4  - Monitor I/O  - Bello in place, replaced upon admission to MICU    #ID  - Recent penile infection (?abscess) & sacral wound on antibiotic with PICC line (cefepime 2gq8 8/20-9/28; doxycycline 8/20---)   - Aspiration event leading up to admission; C/w Zosyn 3.375 for aspiration PNA  - Blood Cx including PICC Cx ngtd (9/30)  - Hx of COVID infection, spike ab positive    #Endo  - TSH wnl  - F/u cortisol level  - No known hx of DM, continue to monitor FS    #Heme   Hgb of 10 on admission  - Hold pharmacologic AC i/s/o intracranial hemorrhage  - DVT Prophylaxis: SCDs   - Hgb slowly downtrending without overt bleeding source  - (+) occult blood from gastric lavage, c/w PPI  - Trend CBC and keep active T & S    #Ethics   - GOC discussed with daughter in ED: FULL CODE   - Ongoing GOC discussion with daughter: Daughter understood the severity of clinical situation. Just lost mother at the beginning of this year, and no other family around to help with decision making. She will think about the options, and decide on the code status. Will continue GOC.  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 Admitted to MICU for further mgt.     #Neuro  - Hx of CVA s/p PEG in Feb 21, was talking, eating and walking with assistance up to last admission (Aug 21), Mental status declined significantly to non-verbal and bedbound i/s/o infection prior to this admission. Was still awake and able to tolerate some PO feeding until the event leading up to current admission.   - Repeat CTH 9/30: Previously seen large regions of increased attenuation with surrounding edema involving the right anterior frontal lobe, anterior corpus callosum, and mesial left frontal lobe demonstrate heterogeneous enhancement on the current examination. Findings are suspicious for the presence of neoplasm. Differential diagnosis includes lymphoma, metastatic disease, and glioblastoma multiforme.  - Discussed with daughter 9/30: Holding MR head given no change in management. Neurosx is not offering any restorative interventions, and no anticipated neuro recovery regardless of interventions.   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  - c/w 3% hypertonic saline @ 30 cc/hr, can likely hold as Na improving to wnl   - Per discussion w/ nephro, can correct more leniently i/s/o intracranial edema  - Monitor BMP q4  - Monitor I/O  - Bello in place, replaced upon admission to MICU    #ID  - Recent penile infection (?abscess) & sacral wound on antibiotic with PICC line (cefepime 2gq8 8/20-9/28; doxycycline 8/20---)   - Aspiration event leading up to admission; C/w Zosyn 3.375 for aspiration PNA  - Blood Cx including PICC Cx ngtd (9/30)  - Hx of COVID infection, spike ab positive    #Endo  - TSH wnl  - F/u cortisol level  - No known hx of DM, continue to monitor FS    #Heme   Hgb of 10 on admission  - Hold pharmacologic AC i/s/o intracranial hemorrhage  - DVT Prophylaxis: SCDs   - Hgb slowly downtrending without overt bleeding source  - (+) occult blood from gastric lavage, c/w PPI  - Trend CBC and keep active T & S    #Ethics   - GOC discussed with daughter in ED: FULL CODE   - Ongoing GOC discussion with daughter: Daughter understood the severity of clinical situation. Just lost mother at the beginning of this year, and no other family around to help with decision making. She will think about the options, and decide on the code status. Will continue GOC.  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 Admitted to MICU for further mgt.     #Neuro  - Hx of CVA s/p PEG in Feb 21, was talking, eating and walking with assistance up to last admission (Aug 21), Mental status declined significantly to non-verbal and bedbound i/s/o infection prior to this admission. Was still awake and able to tolerate some PO feeding until the event leading up to current admission.   - Repeat CTH 9/30: Previously seen large regions of increased attenuation with surrounding edema involving the right anterior frontal lobe, anterior corpus callosum, and mesial left frontal lobe demonstrate heterogeneous enhancement on the current examination. Findings are suspicious for the presence of neoplasm. Differential diagnosis includes lymphoma, metastatic disease, and glioblastoma multiforme.  - Discussed with daughter 9/30: Holding MR head given no change in management. Neurosx is not offering any restorative interventions, and no anticipated neuro recovery regardless of interventions.   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  - c/w 3% hypertonic saline @ 30 cc/hr, can likely hold as Na improving to wnl   - Per discussion w/ nephro, can correct more leniently i/s/o intracranial edema  - Monitor BMP q4  - Monitor I/O  - Bello in place, replaced upon admission to MICU    #ID  - Recent penile infection (?abscess) & sacral wound on antibiotic with PICC line (cefepime 2gq8 8/20-9/28; doxycycline 8/20---)   - Aspiration event leading up to admission; C/w Zosyn 3.375 for aspiration PNA  - Blood Cx including PICC Cx ngtd (9/30)  - Hx of COVID infection, spike ab positive    #Endo  - TSH wnl  - F/u cortisol level  - No known hx of DM, continue to monitor FS    #Heme   Hgb of 10 on admission  - Holding AC i/s/o intracranial hemorrhage  - Hgb slowly downtrending without overt bleeding source  - (+) occult blood from gastric lavage, c/w PPI  - Trend CBC and keep active T & S    #PPx:  - PPI  - DVT: SCDs iso ICH    #Ethics   - GOC discussed with daughter in ED: FULL CODE   - Ongoing GOC discussion with daughter: Daughter understood the severity of clinical situation. Just lost mother at the beginning of this year, and no other family around to help with decision making. She will think about the options, and decide on the code status. Will continue GOC.  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 Admitted to MICU for further mgt.     #Neuro  - Hx of CVA s/p PEG in Feb 21, was talking, eating and walking with assistance up to last admission (Aug 21), Mental status declined significantly to non-verbal and bedbound i/s/o infection prior to this admission. Was still awake and able to tolerate some PO feeding until the event leading up to current admission.   - Repeat CTH 9/30: Previously seen large regions of increased attenuation with surrounding edema involving the right anterior frontal lobe, anterior corpus callosum, and mesial left frontal lobe demonstrate heterogeneous enhancement on the current examination. Findings are suspicious for the presence of neoplasm. Differential diagnosis includes lymphoma, metastatic disease, and glioblastoma multiforme.  - Discussed with daughter 9/30: Holding MR head given no change in management. Neurosx is not offering any restorative interventions, and no anticipated neuro recovery regardless of interventions.   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  - c/w 3% hypertonic saline @ 30 cc/hr, can likely hold as Na improving to wnl   - Per discussion w/ nephro, can correct more leniently i/s/o intracranial edema  - Monitor BMP q4  - Monitor I/O  - Bello in place, replaced upon admission to MICU    #ID  - Recent penile infection (?abscess) & sacral wound on antibiotic with PICC line (cefepime 2gq8 8/20-9/28; doxycycline 8/20---)   - Aspiration event leading up to admission; C/w Zosyn 3.375 for aspiration PNA  - Blood Cx including PICC Cx ngtd (9/30)  - Hx of COVID infection, spike ab positive    #Endo  - TSH wnl  - F/u cortisol level  - No known hx of DM, continue to monitor FS    #Heme   Hgb of 10 on admission  - Holding AC i/s/o intracranial hemorrhage  - Hgb slowly downtrending without overt bleeding source  - (+) occult blood from gastric lavage, c/w PPI  - Trend CBC and keep active T & S    #PPx:  - PPI  - DVT: SCDs iso ICH    #Ethics   - GOC discussed with daughter in ED: FULL CODE   - Remains full code, Daughter to decide on code status. Placed palliative consult today.  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 Admitted to MICU for further mgt.     #Neuro  - Hx of CVA s/p PEG in Feb 21, was talking, eating and walking with assistance up to last admission (Aug 21), Mental status declined significantly to non-verbal and bedbound i/s/o infection prior to this admission. Was still awake and able to tolerate some PO feeding until the event leading up to current admission.   - Repeat CTH 9/30: Previously seen large regions of increased attenuation with surrounding edema involving the right anterior frontal lobe, anterior corpus callosum, and mesial left frontal lobe demonstrate heterogeneous enhancement on the current examination. Findings are suspicious for the presence of neoplasm. Differential diagnosis includes lymphoma, metastatic disease, and glioblastoma multiforme.  - Discussed with daughter 9/30: Holding MR head given no change in management. Neurosx is not offering any restorative interventions, and no anticipated neuro recovery regardless of interventions.   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  - c/w 3% hypertonic saline @ 30 cc/hr, currently on hold as Na improving to wnl   - Per discussion w/ nephro, can correct more leniently i/s/o intracranial edema  - Monitor BMP q4  - Monitor I/O  - Bello in place, replaced upon admission to MICU    #ID  - Recent penile infection (?abscess) & sacral wound on antibiotic with PICC line (cefepime 2gq8 8/20-9/28; doxycycline 8/20---)   - Aspiration event leading up to admission; C/w Zosyn 3.375 for aspiration PNA   - Blood Cx including PICC Cx ngtd (9/30)  - Hx of COVID infection, spike ab positive    #Endo  - TSH wnl  - F/u cortisol level  - No known hx of DM, continue to monitor FS    #Heme   Hgb of 10 on admission  - Holding AC i/s/o intracranial hemorrhage  - Hgb slowly downtrending without overt bleeding source  - (+) occult blood from gastric lavage, c/w PPI  - Trend CBC and keep active T & S    #PPx:  - PPI  - DVT: SCDs iso ICH    #Ethics   - GOC discussed with daughter in ED: FULL CODE   - Remains full code, Daughter to decide on code status. Placed palliative consult today.  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 Admitted to MICU for further mgt. CTA c/f mass vs. venous infarct.     #Neuro  - Hx of CVA s/p PEG in Feb 21, was talking, eating and walking with assistance up to last admission (Aug 21), Mental status declined significantly to non-verbal and bedbound i/s/o infection prior to this admission. Was still awake and able to tolerate some PO feeding until the event leading up to current admission.   - Repeat CTH 9/30: Previously seen large regions of increased attenuation with surrounding edema involving the right anterior frontal lobe, anterior corpus callosum, and mesial left frontal lobe demonstrate heterogeneous enhancement on the current examination. Findings are suspicious for the presence of neoplasm. Differential diagnosis includes lymphoma, metastatic disease, and glioblastoma multiforme. Per NSx,c/f mass vs. venous infarct.   - Discussed with daughter 9/30: Holding MR head given no change in management. Neurosx is not offering any restorative interventions, and no anticipated neuro recovery regardless of interventions.   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  - s/p 3% hypertonic saline @ 30 cc/hr, currently on hold as Na improving to wnl   - nephro recs appreciated: do not exceed >10mEq correction in 24 hrs, goal in 24 hrs 139  - Monitor BMP q4  - Monitor I/O  - Bello in place, replaced upon admission to MICU    #ID  - Recent penile infection (?abscess) & sacral wound on antibiotic with PICC line (cefepime 2gq8 8/20-9/28; doxycycline 8/20---)   - Aspiration event leading up to admission; C/w Zosyn 3.375 for aspiration PNA   - Blood Cx including PICC Cx ngtd (9/30)  - Hx of COVID infection, spike ab positive    #Endo  - TSH wnl  - F/u cortisol level  - No known hx of DM, continue to monitor FS    #Heme   Hgb of 10 on admission  - Holding AC i/s/o intracranial hemorrhage  - Hgb slowly downtrending without overt bleeding source  - (+) occult blood from gastric lavage, c/w PPI  - Trend CBC and keep active T & S    #PPx:  - PPI  - DVT: SCDs iso ICH    #Ethics   - GOC discussed with daughter in ED: FULL CODE   - Remains full code, Daughter to decide on code status. Placed palliative consult today.  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 Admitted to MICU for further mgt. CTA head c/f mass vs. venous infarct.     #Neuro  - Hx of CVA s/p PEG in Feb 21, was talking, eating and walking with assistance up to last admission (Aug 21), Mental status declined significantly to non-verbal and bedbound i/s/o infection prior to this admission. Was still awake and able to tolerate some PO feeding until the event leading up to current admission.   - Repeat CTH 9/30: Previously seen large regions of increased attenuation with surrounding edema involving the right anterior frontal lobe, anterior corpus callosum, and mesial left frontal lobe demonstrate heterogeneous enhancement on the current examination. Findings are suspicious for the presence of neoplasm. Differential diagnosis includes lymphoma, metastatic disease, and glioblastoma multiforme. Per NSx,c/f mass vs. venous infarct.   - Discussed with daughter 9/30: Holding MR head given no change in management. Neurosx is not offering any restorative interventions, and no anticipated neuro recovery regardless of interventions.   - C/w dex 4q6 i/s/o likely intracranial mass   - Q4 neuro checks   - C/w Keppra 500 BID   - C/w GOC    #Resp  AHRF 2/2 aspiration PNA iso worsening mental status   - Intubated for airway protection   - C/w Zosyn for PNA; d/c'ed AZT for neg legionella  - pressure support 10/5 as tolerated     #CV  Hypertension:  -SBPs elevated  -Start hydral 2.5 mg IV q6 PRN SBP >180    Hypotension  - Resolved   - Off levo/amari now; HD stable   - Continue to monitor     #GI  PEG in place  - Tolerating feeds  - Occult blood (+) in gastric lavage, but no overt GIB; c/w PPI  - start senna and miralax    #Renal/   Hyponatremia:   - 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  - s/p 3% hypertonic saline @ 30 cc/hr, currently on hold as Na improving to wnl   - nephro recs appreciated: do not exceed >10mEq correction in 24 hrs, goal in 24 hrs 139  - Monitor BMP q4  - Monitor I/O  - Bello in place, replaced upon admission to MICU    #ID  - Recent penile infection (?abscess) & sacral wound on antibiotic with PICC line (cefepime 2gq8 8/20-9/28; doxycycline 8/20---)   - Aspiration event leading up to admission; C/w Zosyn 3.375 for aspiration PNA   - Blood Cx including PICC Cx ngtd (9/30)  - Hx of COVID infection, spike ab positive    #Endo  - TSH wnl  - F/u cortisol level  - No known hx of DM, continue to monitor FS    #Heme   Hgb of 10 on admission  - Holding AC i/s/o intracranial hemorrhage  - Hgb slowly downtrending without overt bleeding source  - (+) occult blood from gastric lavage, c/w PPI  - Trend CBC and keep active T & S    #PPx:  - PPI  - DVT: SCDs iso ICH    #Ethics   - GOC discussed with daughter in ED: FULL CODE   - Remains full code, Daughter to decide on code status. Placed palliative consult today.  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 Admitted to MICU for further mgt. CTA head c/f neoplastic mass.     #Neuro  Neoplastic mass:   - Hx of CVA s/p PEG in Feb 21, was talking, eating and walking with assistance up to last admission (Aug 21), Mental status declined significantly to non-verbal and bedbound i/s/o infection prior to this admission. Was still awake and able to tolerate some PO feeding until the event leading up to current admission.   - Repeat CTH 9/30: Previously seen large regions of increased attenuation with surrounding edema involving the right anterior frontal lobe, anterior corpus callosum, and mesial left frontal lobe demonstrate heterogeneous enhancement on the current examination. Findings are suspicious for the presence of neoplasm. Differential diagnosis includes lymphoma, metastatic disease, and glioblastoma multiforme.  - Holding MR per d/w daughter 9/30 given no change in management. Neurosx is not offering any restorative interventions, and no anticipated neuro recovery regardless of interventions  - C/w dex 4q6 i/s/o likely intracranial mass   - Q4 neuro checks   - C/w Keppra 500 BID   - C/w GOC    #Resp  AHRF 2/2 aspiration PNA iso worsening mental status   - Intubated for airway protection   - C/w Zosyn for PNA; d/c'ed AZT for neg legionella  - pressure support 10/5 as tolerated     #CV  Hypertension:  -SBPs elevated  -Start hydral 2.5 mg IV q6 PRN SBP >180    Hypotension  - Resolved   - Off levo/amari now; HD stable   - Continue to monitor     #GI  PEG in place  - Tolerating feeds  - Occult blood (+) in gastric lavage, but no overt GIB; c/w PPI  - start senna and miralax    #Renal/   Hyponatremia:   - 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  - s/p 3% hypertonic saline @ 30 cc/hr, currently on hold as Na improving to wnl   - nephro recs appreciated: do not exceed >10mEq correction in 24 hrs, goal in 24 hrs 139  - Monitor BMP q4  - Monitor I/O  - Bello in place, replaced upon admission to MICU    #ID  - Recent penile infection (?abscess) & sacral wound on antibiotic with PICC line (cefepime 2gq8 8/20-9/28; doxycycline 8/20---)   - Aspiration event leading up to admission; C/w Zosyn 3.375 for aspiration PNA   - Blood Cx including PICC Cx ngtd (9/30)  - Hx of COVID infection, spike ab positive    #Endo  - TSH wnl  - F/u cortisol level  - No known hx of DM, continue to monitor FS    #Heme   Hgb of 10 on admission  - Holding AC i/s/o intracranial hemorrhage  - Hgb slowly downtrending without overt bleeding source  - (+) occult blood from gastric lavage, c/w PPI  - Trend CBC and keep active T & S    #PPx:  - PPI  - DVT: SCDs iso ICH    #Ethics   - GOC discussed with daughter in ED: FULL CODE   - Remains full code, Daughter to decide on code status. Placed palliative consult today.  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 Admitted to MICU for further mgt. CTA head c/f neoplastic mass.     #Neuro  Neoplastic mass:   - Hx of CVA s/p PEG in Feb 21, was talking, eating and walking with assistance up to last admission (Aug 21), Mental status declined significantly to non-verbal and bedbound i/s/o infection prior to this admission. Was still awake and able to tolerate some PO feeding until the event leading up to current admission.   - Repeat CTH 9/30: Previously seen large regions of increased attenuation with surrounding edema involving the right anterior frontal lobe, anterior corpus callosum, and mesial left frontal lobe demonstrate heterogeneous enhancement on the current examination. Findings are suspicious for the presence of neoplasm. Differential diagnosis includes lymphoma, metastatic disease, and glioblastoma multiforme.  - Holding MR per d/w daughter 9/30 given no change in management. Neurosx is not offering any restorative interventions, and no anticipated neuro recovery regardless of interventions  - C/w dex 4q6 i/s/o likely intracranial mass   - Q4 neuro checks   - C/w Keppra 500 BID   - C/w GOC    #Resp  AHRF 2/2 aspiration PNA iso worsening mental status   - Intubated for airway protection   - C/w Zosyn for PNA; d/c'ed AZT for neg legionella  - pressure support 10/5 as tolerated     #CV  Hypertension:  - SBPs elevated  - Start hydral 5 mg IV q6 PRN SBP >180    Hypotension  - Resolved   - Off levo/amari now; HD stable   - Continue to monitor     #GI  PEG in place  - Tolerating feeds  - Occult blood (+) in gastric lavage, but no overt GIB; c/w PPI  - start senna and miralax    #Renal/   Hyponatremia:   - 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  - s/p 3% hypertonic saline @ 30 cc/hr, currently on hold as Na improving to wnl   - nephro recs appreciated: do not exceed >10mEq correction in 24 hrs, goal in 24 hrs 139  - Monitor BMP q4  - Monitor I/O  - Bello in place, replaced upon admission to MICU    #ID  - Recent penile infection (?abscess) & sacral wound on antibiotic with PICC line (cefepime 2gq8 8/20-9/28; doxycycline 8/20---)   - Aspiration event leading up to admission; C/w Zosyn 3.375 for aspiration PNA   - Blood Cx including PICC Cx ngtd (9/30)  - Hx of COVID infection, spike ab positive    #Endo  - TSH wnl  - F/u cortisol level  - No known hx of DM, continue to monitor FS    #Heme   Hgb of 10 on admission  - Holding AC i/s/o intracranial hemorrhage  - Hgb slowly downtrending without overt bleeding source  - (+) occult blood from gastric lavage, c/w PPI  - Trend CBC and keep active T & S    #PPx:  - PPI  - DVT: SCDs iso ICH    #Ethics   - GOC discussed with daughter in ED: FULL CODE   - Remains full code, Daughter to decide on code status. Placed palliative consult today.  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 Admitted to MICU for further mgt. CTA head c/f neoplastic mass.     #Neuro  Neoplastic mass:   - Hx of CVA s/p PEG in Feb 21, was talking, eating and walking with assistance up to last admission (Aug 21), Mental status declined significantly to non-verbal and bedbound i/s/o infection prior to this admission. Was still awake and able to tolerate some PO feeding until the event leading up to current admission.   - Repeat CTH 9/30: Previously seen large regions of increased attenuation with surrounding edema involving the right anterior frontal lobe, anterior corpus callosum, and mesial left frontal lobe demonstrate heterogeneous enhancement on the current examination. Findings are suspicious for the presence of neoplasm. Differential diagnosis includes lymphoma, metastatic disease, and glioblastoma multiforme.  - Holding MR per d/w daughter 9/30 given no change in management. Neurosx is not offering any restorative interventions, and no anticipated neuro recovery regardless of interventions  - C/w dex 4q6 i/s/o intracranial mass   - Q4 neuro checks   - C/w Keppra 500 BID   - C/w GOC    #Resp  AHRF 2/2 aspiration PNA iso worsening mental status   - Intubated for airway protection   - C/w Zosyn for PNA; d/c'ed AZT for neg legionella  - pressure support 10/5 as tolerated     #CV  Hypertension:  - SBPs elevated  - Start hydral 5 mg IV q6 PRN SBP >180    Hypotension  - Resolved   - Off levo/amari now; HD stable   - Continue to monitor     #GI  PEG in place  - Tolerating feeds  - Occult blood (+) in gastric lavage, but no overt GIB; c/w PPI  - start senna and miralax    #Renal/   Hyponatremia:   - 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  - s/p 3% hypertonic saline @ 30 cc/hr, currently on hold as Na improving to wnl   - nephro recs appreciated: do not exceed >10mEq correction in 24 hrs, goal in 24 hrs 139  - Monitor BMP q4  - Monitor I/O  - Bello in place, replaced upon admission to MICU    #ID  - Recent penile infection (?abscess) & sacral wound on antibiotic with PICC line (cefepime 2gq8 8/20-9/28; doxycycline 8/20---)   - Aspiration event leading up to admission; C/w Zosyn 3.375 for aspiration PNA   - Blood Cx including PICC Cx ngtd (9/30)  - Hx of COVID infection, spike ab positive    #Endo  - TSH wnl  - F/u cortisol level  - No known hx of DM, continue to monitor FS    #Heme   Hgb of 10 on admission  - Holding AC i/s/o intracranial hemorrhage  - Hgb slowly downtrending without overt bleeding source  - (+) occult blood from gastric lavage, c/w PPI  - Trend CBC and keep active T & S    #PPx:  - PPI  - DVT: SCDs iso ICH    #Ethics   - GOC discussed with daughter in ED: FULL CODE   - Remains full code, Daughter to decide on code status. Placed palliative consult today.  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 Admitted to MICU for further mgt. CTA head c/f neoplastic mass.     #Neuro  Neoplastic mass:   - Hx of CVA s/p PEG in Feb 21, was talking, eating and walking with assistance up to last admission (Aug 21), Mental status declined significantly to non-verbal and bedbound i/s/o infection prior to this admission. Was still awake and able to tolerate some PO feeding until the event leading up to current admission.   - Repeat CTH 9/30: Previously seen large regions of increased attenuation with surrounding edema involving the right anterior frontal lobe, anterior corpus callosum, and mesial left frontal lobe demonstrate heterogeneous enhancement on the current examination. Findings are suspicious for the presence of neoplasm. Differential diagnosis includes lymphoma, metastatic disease, and glioblastoma multiforme.  - Holding MR per d/w daughter 9/30 given no change in management. Neurosx is not offering any restorative interventions, and no anticipated neuro recovery regardless of interventions  - C/w dex 4q6 i/s/o intracranial mass   - Q4 neuro checks   - C/w Keppra 500 BID   - C/w GOC    #Resp  AHRF 2/2 aspiration PNA iso worsening mental status   - Intubated for airway protection   - C/w Zosyn for PNA; d/c'ed AZT for neg legionella  - pressure support 10/5 as tolerated     #CV  Hypertension:  - SBPs elevated  - Start hydral 5 mg IV q6 PRN SBP >180    Hypotension  - Resolved   - Off levo/amari now; HD stable   - Continue to monitor     #GI  PEG in place  - Tolerating feeds  - Occult blood (+) in gastric lavage, but no overt GIB; c/w PPI  - start senna and miralax    #Renal/   Hyponatremia:   - 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  - s/p 3% hypertonic saline @ 30 cc/hr, currently on hold as Na above goal of 126  - nephro recs appreciated: goal Na 126 today, bolus 500cc D5W over 2 hrs, ddavp 2ug x 1   - repeat urine lytes   - Monitor BMP q4  - Monitor I/O  - Bello in place, replaced upon admission to MICU    #ID  - Recent penile infection (?abscess) & sacral wound on antibiotic with PICC line (cefepime 2gq8 8/20-9/28; doxycycline 8/20---)   - Aspiration event leading up to admission; C/w Zosyn 3.375 for aspiration PNA   - Blood Cx including PICC Cx ngtd (9/30)  - Hx of COVID infection, spike ab positive    #Endo  - TSH wnl  - F/u cortisol level  - No known hx of DM, continue to monitor FS    #Heme   Hgb of 10 on admission  - Holding AC i/s/o intracranial hemorrhage  - Hgb slowly downtrending without overt bleeding source  - (+) occult blood from gastric lavage, c/w PPI  - Trend CBC and keep active T & S    #PPx:  - PPI  - DVT: SCDs iso ICH    #Ethics   - GOC discussed with daughter in ED: FULL CODE   - Remains full code, Daughter to decide on code status. Placed palliative consult today.

## 2021-10-02 NOTE — DIETITIAN NUTRITION RISK NOTIFICATION - TREATMENT: THE FOLLOWING DIET HAS BEEN RECOMMENDED
Diet, NPO with Tube Feed:   Tube Feeding Modality: Orogastric  Jevity 1.5 Adria (JEVITY1.5RTH)  Total Volume for 24 Hours (mL): 900  Intermittent  Starting Tube Feed Rate {mL per Hour}: 20  Increase Tube Feed Rate by (mL): 10    Every 4 hours  Until Goal Tube Feed Rate (mL per Hour): 50  Tube Feeding Hours ON: 18  Tube Feeding OFF (Hours): 6  Tube Feed Start Time: 11:00 (10-01-21 @ 09:09) [Active]

## 2021-10-02 NOTE — PROGRESS NOTE ADULT - SUBJECTIVE AND OBJECTIVE BOX
*******************************  Toshia New, PGY3  Pager [phone]  *******************************    INTERVAL HPI/OVERNIGHT EVENTS:    DAVID ON. Remains without mental status. Na improving.     OBJECTIVE:    VITAL SIGNS:  ICU Vital Signs Last 24 Hrs  T(C): 37.2 (02 Oct 2021 06:00), Max: 37.3 (01 Oct 2021 16:00)  T(F): 99 (02 Oct 2021 06:00), Max: 99.1 (01 Oct 2021 16:00)  HR: 75 (02 Oct 2021 06:00) (55 - 76)  BP: 136/65 (02 Oct 2021 06:00) (113/60 - 161/75)  BP(mean): 94 (02 Oct 2021 06:00) (79 - 110)  ABP: --  ABP(mean): --  RR: 19 (02 Oct 2021 06:00) (0 - 24)  SpO2: 98% (02 Oct 2021 06:00) (98% - 100%)    Mode: AC/ CMV (Assist Control/ Continuous Mandatory Ventilation), RR (machine): 18, TV (machine): 400, FiO2: 30, PEEP: 5, ITime: 1, MAP: 9, PIP: 21    09-30 @ 07:01  -  10-01 @ 07:00  --------------------------------------------------------  IN: 1879.5 mL / OUT: 710 mL / NET: 1169.5 mL    10-01 @ 07:01  -  10-02 @ 06:32  --------------------------------------------------------  IN: 2047 mL / OUT: 600 mL / NET: 1447 mL      CAPILLARY BLOOD GLUCOSE    PHYSICAL EXAM:  GENERAL: elderly male in NAD vented   EYES:   NECK:   CHEST/LUNG:   HEART: Regular rate and rhythm; No murmurs, rubs, or gallops, no JVD   ABDOMEN: Soft, Nontender, Nondistended; Bowel sounds present, OGT in place   EXTREMITIES: no pedal edema   PSYCH: AAOx0  NEUROLOGY: non-focal  SKIN: decub ulcers not visualized, scrotum w/o evident abscess       MEDICATIONS:  MEDICATIONS  (STANDING):  chlorhexidine 0.12% Liquid 15 milliLiter(s) Oral Mucosa every 12 hours  chlorhexidine 2% Cloths 1 Application(s) Topical <User Schedule>  dexAMETHasone  Injectable 4 milliGRAM(s) IV Push every 6 hours  levETIRAcetam  IVPB 500 milliGRAM(s) IV Intermittent every 12 hours  pantoprazole  Injectable 40 milliGRAM(s) IV Push two times a day  piperacillin/tazobactam IVPB.. 3.375 Gram(s) IV Intermittent every 8 hours  propofol Infusion 10.779 MICROgram(s)/kG/Min (3.35 mL/Hr) IV Continuous <Continuous>  sodium chloride 3%. 500 milliLiter(s) (30 mL/Hr) IV Continuous <Continuous>    MEDICATIONS  (PRN):      ALLERGIES:  Allergies    No Known Allergies    Intolerances        LABS:                        7.1    19.62 )-----------( 176      ( 01 Oct 2021 23:41 )             21.0     10-02    130<L>  |  102  |  24<H>  ----------------------------<  242<H>  3.2<L>   |  15<L>  |  0.46<L>    Ca    8.3<L>      02 Oct 2021 04:47  Phos  3.0     10-01  Mg     2.1     10-01    TPro  5.3<L>  /  Alb  2.7<L>  /  TBili  0.4  /  DBili  x   /  AST  19  /  ALT  11  /  AlkPhos  82  10-01      Urinalysis Basic - ( 01 Oct 2021 00:18 )    Color: Yellow / Appearance: Slightly Turbid / SG: >1.050 / pH: x  Gluc: x / Ketone: Negative  / Bili: Negative / Urobili: Negative   Blood: x / Protein: 100 mg/dL / Nitrite: Negative   Leuk Esterase: Negative / RBC: 3 /hpf / WBC 19 /HPF   Sq Epi: x / Non Sq Epi: 7 /hpf / Bacteria: Negative        RADIOLOGY & ADDITIONAL TESTS: Reviewed.     *******************************  Toshia New, PGY3  Pager [phone]  *******************************    INTERVAL HPI/OVERNIGHT EVENTS:    DAVID ON. Remains intubated without mental status. Na improving.     OBJECTIVE:    VITAL SIGNS:  ICU Vital Signs Last 24 Hrs  T(C): 37.2 (02 Oct 2021 06:00), Max: 37.3 (01 Oct 2021 16:00)  T(F): 99 (02 Oct 2021 06:00), Max: 99.1 (01 Oct 2021 16:00)  HR: 75 (02 Oct 2021 06:00) (55 - 76)  BP: 136/65 (02 Oct 2021 06:00) (113/60 - 161/75)  BP(mean): 94 (02 Oct 2021 06:00) (79 - 110)  ABP: --  ABP(mean): --  RR: 19 (02 Oct 2021 06:00) (0 - 24)  SpO2: 98% (02 Oct 2021 06:00) (98% - 100%)    Mode: AC/ CMV (Assist Control/ Continuous Mandatory Ventilation), RR (machine): 18, TV (machine): 400, FiO2: 30, PEEP: 5, ITime: 1, MAP: 9, PIP: 21    09-30 @ 07:01  -  10-01 @ 07:00  --------------------------------------------------------  IN: 1879.5 mL / OUT: 710 mL / NET: 1169.5 mL    10-01 @ 07:01  -  10-02 @ 06:32  --------------------------------------------------------  IN: 2047 mL / OUT: 600 mL / NET: 1447 mL      CAPILLARY BLOOD GLUCOSE    PHYSICAL EXAM:  GENERAL: elderly male in NAD vented   EYES: pupils pinpt, right pupil sluggishly reactive, no dolls eye reflex   CHEST/LUNG: CTAB BL   HEART: Regular rate and rhythm; No murmurs, rubs, or gallops, no JVD   ABDOMEN: Soft, Nontender, Nondistended; Bowel sounds present, OGT in place   EXTREMITIES: no pedal edema   : figueroa in place   NEUROLOGY: withdraws LLE, slightly withdraws RLE   SKIN: decub ulcers not visualized, scrotum w/o evident abscess       MEDICATIONS:  MEDICATIONS  (STANDING):  chlorhexidine 0.12% Liquid 15 milliLiter(s) Oral Mucosa every 12 hours  chlorhexidine 2% Cloths 1 Application(s) Topical <User Schedule>  dexAMETHasone  Injectable 4 milliGRAM(s) IV Push every 6 hours  levETIRAcetam  IVPB 500 milliGRAM(s) IV Intermittent every 12 hours  pantoprazole  Injectable 40 milliGRAM(s) IV Push two times a day  piperacillin/tazobactam IVPB.. 3.375 Gram(s) IV Intermittent every 8 hours  propofol Infusion 10.779 MICROgram(s)/kG/Min (3.35 mL/Hr) IV Continuous <Continuous>  sodium chloride 3%. 500 milliLiter(s) (30 mL/Hr) IV Continuous <Continuous>    MEDICATIONS  (PRN):      ALLERGIES:  Allergies    No Known Allergies    Intolerances        LABS:                        7.1    19.62 )-----------( 176      ( 01 Oct 2021 23:41 )             21.0     10-02    130<L>  |  102  |  24<H>  ----------------------------<  242<H>  3.2<L>   |  15<L>  |  0.46<L>    Ca    8.3<L>      02 Oct 2021 04:47  Phos  3.0     10-01  Mg     2.1     10-01    TPro  5.3<L>  /  Alb  2.7<L>  /  TBili  0.4  /  DBili  x   /  AST  19  /  ALT  11  /  AlkPhos  82  10-01      Urinalysis Basic - ( 01 Oct 2021 00:18 )    Color: Yellow / Appearance: Slightly Turbid / SG: >1.050 / pH: x  Gluc: x / Ketone: Negative  / Bili: Negative / Urobili: Negative   Blood: x / Protein: 100 mg/dL / Nitrite: Negative   Leuk Esterase: Negative / RBC: 3 /hpf / WBC 19 /HPF   Sq Epi: x / Non Sq Epi: 7 /hpf / Bacteria: Negative        RADIOLOGY & ADDITIONAL TESTS: Reviewed.     *******************************  Toshia New, PGY3  Pager [phone]  *******************************    INTERVAL HPI/OVERNIGHT EVENTS:    DAVID ON. Remains intubated without mental status. Na improving. Hypertonic saline off since 8:30am.     OBJECTIVE:    VITAL SIGNS:  ICU Vital Signs Last 24 Hrs  T(C): 37.2 (02 Oct 2021 06:00), Max: 37.3 (01 Oct 2021 16:00)  T(F): 99 (02 Oct 2021 06:00), Max: 99.1 (01 Oct 2021 16:00)  HR: 75 (02 Oct 2021 06:00) (55 - 76)  BP: 136/65 (02 Oct 2021 06:00) (113/60 - 161/75)  BP(mean): 94 (02 Oct 2021 06:00) (79 - 110)  ABP: --  ABP(mean): --  RR: 19 (02 Oct 2021 06:00) (0 - 24)  SpO2: 98% (02 Oct 2021 06:00) (98% - 100%)    Mode: AC/ CMV (Assist Control/ Continuous Mandatory Ventilation), RR (machine): 18, TV (machine): 400, FiO2: 30, PEEP: 5, ITime: 1, MAP: 9, PIP: 21    09-30 @ 07:01  -  10-01 @ 07:00  --------------------------------------------------------  IN: 1879.5 mL / OUT: 710 mL / NET: 1169.5 mL    10-01 @ 07:01  -  10-02 @ 06:32  --------------------------------------------------------  IN: 2047 mL / OUT: 600 mL / NET: 1447 mL      CAPILLARY BLOOD GLUCOSE    PHYSICAL EXAM:  GENERAL: elderly male in NAD vented   EYES: pupils pinpt, right pupil sluggishly reactive, no dolls eye reflex   CHEST/LUNG: CTAB BL   HEART: Regular rate and rhythm; No murmurs, rubs, or gallops, no JVD   ABDOMEN: Soft, Nontender, Nondistended; Bowel sounds present, OGT in place   EXTREMITIES: no pedal edema   : figueroa in place   NEUROLOGY: withdraws LLE, slightly withdraws RLE   SKIN: decub ulcers not visualized, scrotum w/o evident abscess       MEDICATIONS:  MEDICATIONS  (STANDING):  chlorhexidine 0.12% Liquid 15 milliLiter(s) Oral Mucosa every 12 hours  chlorhexidine 2% Cloths 1 Application(s) Topical <User Schedule>  dexAMETHasone  Injectable 4 milliGRAM(s) IV Push every 6 hours  levETIRAcetam  IVPB 500 milliGRAM(s) IV Intermittent every 12 hours  pantoprazole  Injectable 40 milliGRAM(s) IV Push two times a day  piperacillin/tazobactam IVPB.. 3.375 Gram(s) IV Intermittent every 8 hours  propofol Infusion 10.779 MICROgram(s)/kG/Min (3.35 mL/Hr) IV Continuous <Continuous>  sodium chloride 3%. 500 milliLiter(s) (30 mL/Hr) IV Continuous <Continuous>    MEDICATIONS  (PRN):      ALLERGIES:  Allergies    No Known Allergies    Intolerances        LABS:                        7.1    19.62 )-----------( 176      ( 01 Oct 2021 23:41 )             21.0     10-02    130<L>  |  102  |  24<H>  ----------------------------<  242<H>  3.2<L>   |  15<L>  |  0.46<L>    Ca    8.3<L>      02 Oct 2021 04:47  Phos  3.0     10-01  Mg     2.1     10-01    TPro  5.3<L>  /  Alb  2.7<L>  /  TBili  0.4  /  DBili  x   /  AST  19  /  ALT  11  /  AlkPhos  82  10-01      Urinalysis Basic - ( 01 Oct 2021 00:18 )    Color: Yellow / Appearance: Slightly Turbid / SG: >1.050 / pH: x  Gluc: x / Ketone: Negative  / Bili: Negative / Urobili: Negative   Blood: x / Protein: 100 mg/dL / Nitrite: Negative   Leuk Esterase: Negative / RBC: 3 /hpf / WBC 19 /HPF   Sq Epi: x / Non Sq Epi: 7 /hpf / Bacteria: Negative        RADIOLOGY & ADDITIONAL TESTS: Reviewed.

## 2021-10-02 NOTE — DIETITIAN INITIAL EVALUATION ADULT. - ORAL INTAKE PTA/DIET HISTORY
Pt dependent on PEG feeds since 2/2021. A+Ox0 at baseline, non-verbal. Unclear as to baseline EN formulary, volume/rate PTA. Per H&P, "Patient presented to [facility] with cough and altered respirations after family tried to feed ensure by mouth". No documented food allergies. Per medication list via H&P, pt noted with hx of taking vitamin D3, Mag-Ox; Metformin PTA. A1c not readily available.

## 2021-10-02 NOTE — DIETITIAN INITIAL EVALUATION ADULT. - CHIEF COMPLAINT
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 Admitted to MICU for further mgt. CTA head c/f neoplastic mass. Remains intubated for airway protection/ Continues on antibiotics for pneumonia. GOC discussions pending. 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 Nephrology following.

## 2021-10-02 NOTE — PROGRESS NOTE ADULT - ATTENDING COMMENTS
# Hypotonic hyponatremia - Urine osm 480s, urine sodium 15 on 9/30. LIkely SIADH. Patient was on 3% hypertonic saline. Serum sodium has gone from 119- 133 this morning (overcorrection). He needs to be around 126. HTS has been held. Please give DDAVP IV 2 mcg X1 now. Give 500ml of d5w X 1 over 1-2 hours. Then repeat BMP. He will likely need another 500ml of d5w later to get him closer to 126.       The rest of the recommendations as per fellow's note.    Tabby Duval MD  Attending Nephrologist  [phone]

## 2021-10-02 NOTE — DIETITIAN INITIAL EVALUATION ADULT. - ENTERAL
Recommend change feeds: Vital AF at 60ml/hr x 18 hrs. To provide (based on dosing wt 51.8kg) with provision of 409kcal: 1080ml, 1705kcal (32.9kcal/kg) and 81g protein (1.56g protein/kg)

## 2021-10-02 NOTE — PROGRESS NOTE ADULT - ASSESSMENT
80M slow worsening of mental status for past 2mo, in January was Ox3 SIM strong, now does not speak, does not move at all. Today threw up after being fed by mouth, began gurgling, now p/w pneumonia, hyponatremia 110, and R sided significant edema with some hyperdensity and shift, c/f mass vs venous infarct.   -Q1h neurochecks  -CT AP: no acute findings  -CTA neg  -MICU for PNA/Hyponatremia  -Continue dex 4q6  -Continue Keppra 500 BID  -GOC   -Na Goal 145-155  -C/f sepsis w/ fever, tachycardia, hypotension 80M slow worsening of mental status for past 2mo, in January was Ox3 CHIQUIS strong, now does not speak, does not move at all. Today threw up after being fed by mouth, began gurgling, now p/w pneumonia, hyponatremia 110, and R sided significant edema with some hyperdensity and shift, c/f mass vs venous infarct.   -Q1h neurochecks  -CT AP: no acute findings  -CTA neg  -MICU for PNA/Hyponatremia  -Continue dex 4q6  -Continue Keppra 500 BID  -Na Goal 145-155  -C/f sepsis w/ fever, tachycardia, hypotension  -Neurosurgery resident spoke to daughter at length about surgical options vs. conservative management and the risks/benefits/outcomes of surgery. Daughter said she would speak to her family to decide.   -Primary team consulted palliative who will see patient Monday. Possible GOC discussion with palliative, neurosurgery, MICU, and other services involved.

## 2021-10-02 NOTE — DIETITIAN INITIAL EVALUATION ADULT. - OTHER INFO
Pt prescribed Jevity 1.5 at 50ml/hr x 18 hrs. Infusing at 40ml/hr at time of RD visit. EN feeds to provide 1350kcal and 57g protein. Appears to be tolerating without s/s of difficulty. No documented BM's recorded thus far. On propofol; 15.5ml/hr. If to continue x 24 hrs, will provide an additional 409kcal daily.     Dosing wt (9/30): 114 lbs  Wt trend (per [facility] HIE): (1/22): 139 lbs.   Indicates wt loss of 25 lbs/18% x ~9 months.     Skin: stage II: right rib cage, left rib cage, left hip; stage IV: right hip; unstageable: sacrum, left fifth toe, left fourth toe  Edema: 2+ dependent; left hand; right hand; left foot; right foot

## 2021-10-02 NOTE — DIETITIAN INITIAL EVALUATION ADULT. - ETIOLOGY
predicted inadequate energy/nutrient intake PTA with subsequent increased nutrient needs 2/2 pressure injuries

## 2021-10-02 NOTE — DIETITIAN INITIAL EVALUATION ADULT. - ADD RECOMMEND
1. Monitor GI tolerance. RD to remain available to adjust EN formulary, volume/rate PRN; monitor provision of propofol. 2. Obtain further subjective wt/diet history from pt/family PRN. 3. Antihyperglycemic regimen per team. 4. Recommend Ibrahima packets 2x daily, multivitamin, vitamin C and zinc (x 14 days) if no medication contraindications noted to aid in wound healing. 5. Monitor wt trends/labs/skin integrity/hydration status/bowel regularity.

## 2021-10-02 NOTE — DIETITIAN INITIAL EVALUATION ADULT. - REASON
pt with observable moderate body fat depletion to orbitals; muscle mass depletion to temples. Unable to conduct Nutrition Focused Physical Assessment.

## 2021-10-02 NOTE — PROGRESS NOTE ADULT - ATTENDING COMMENTS
Patient seen and examined and case reviewed in detail. Patient is critically ill requiring frequent bedside visits with therapy change. 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 He has been managed at home but developed multiple decubitus ulcercs. He has a PEG for oropharyngeal dysphagia. He presents now with cough and encephalopathy and he is found to have hyponatremia and an intraparenchymal hemorrhage with midline shift.    1. Acute hypoxemic respiratory failure due to aspiration pneumonia with worsening mental status. Continue mechanical ventilation and wean as tolerated. Maintain O2 sat > 90%. Airway clearance. Followup cultures  2. Neuro. Pt with worsening R frontal bleed with compression of ventricles and vasogenic edema. 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 Continue Decadron and Keppra. Pt with baseline very poor mental status but will followup with Neurosurgery to see if any surgical intervention would be offered. Unclear whether this would be of benefit given chronic debilitated state.  3. Severe hyponatremia, likely SIADH, now on hypertonic saline. After d/w Nephrology aiming for rapid correction of ~ 10 mEq/day. This AM Na now 133 (up from 119 yesterday) so will stop 3% and discuss with Nephro. While we want an Na goal 145-155 given neurologic issues will need to ensure not too rapid overcorrection.   4. DVT prophylaxis SCDs.  5. Shock state - continue vasopressors to maintain MAP > 65. Currently off vasopressors this AM. Continue broad spectrum antibiotics and followup cultures  5. GOC: Full code. Daughter is processing all information and will discuss with family. Patient is in the dying process and has a devastating neurologic condition. Palliative care evaluation    Prognosis poor

## 2021-10-02 NOTE — PROGRESS NOTE ADULT - SUBJECTIVE AND OBJECTIVE BOX
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 Hx obtained from daughter. She noticed breathing sounds like he was snoring and called EMS.  No sick contact. No diarrhea.     In the ED, VSS. Labs significant for Na 110, CO2 14, lactate 2.5. CXR w/ b/l patchy opacities R>L c/w PNA. 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 MICU consulted for hyponatremia. (30 Sep 2021 01:07)      Imaging: CTH R sided significant edema with some hyperdensity and shift, c/f mass vs venous infarct. Exam before intubation: GHULAM, pupils 2 sluggish, UE flex, BLE TF, snoring.       --Anticoagulation:    =====================  PAST MEDICAL HISTORY   Spinal stenosis    Diabetes    Benign essential HTN    Gallstones      PAST SURGICAL HISTORY   S/P cataract surgery          MEDICATIONS:  Antibiotics:  piperacillin/tazobactam IVPB.. 3.375 Gram(s) IV Intermittent every 8 hours    Neuro:  fentaNYL   Infusion... 0.5 MICROgram(s)/kG/Hr IV Continuous <Continuous>  levETIRAcetam  IVPB 500 milliGRAM(s) IV Intermittent every 12 hours  propofol Infusion 10 MICROgram(s)/kG/Min IV Continuous <Continuous>    Other:  sodium chloride 3%. 500 milliLiter(s) IV Continuous <Continuous>      SOCIAL HISTORY:   Occupation:   Marital Status:     FAMILY HISTORY:      ROS: Negative except per HPI    Exam: Intubated, GHULAM, UE minimal movement, BLE trace withdrawal    LABS:                        7.1    20.81 )-----------( 159      ( 01 Oct 2021 00:20 )             21.0     10-01    119<LL>  |  91<L>  |  16  ----------------------------<  155<H>  3.9   |  15<L>  |  0.31<L>    Ca    7.9<L>      01 Oct 2021 02:48  Phos  3.4     10-01  Mg     1.9     10-01    TPro  5.2<L>  /  Alb  2.5<L>  /  TBili  0.6  /  DBili  x   /  AST  20  /  ALT  10  /  AlkPhos  77  10-01

## 2021-10-02 NOTE — PROGRESS NOTE ADULT - ASSESSMENT
80yM PMH of gallstones (30-40 years ago), BPH, HLD, h/o spinal stenosis, subarachnoid and severe spinal stenosis, recent PNA, penile infection (?abscess) &  sacral wound on antibiotic with PICC line presenting with possible aspiration PNA, extensive right frontal intraparenchymal hemorrhage, herniation & hyponatremia. Nephrology was called for hyponatremia.     Hyponatremia.   Pt. 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 In view of severe hyponatremia and intracranial bleed, received 3%HTS. Repeat SNa was 116 on 9/30/21. U Osm was 486 with Ya of 15 with low uric acid on 9/30/21. SNa is 121-->133 today. Recommend to hold further 3%HTS. Repeat SNa q4 for now. Please re-check urine lytes, ur osmolality. If at any point, urine output becomes > 100cc/hr or pt exceeds sodium goal of 10 meq in 24 hrs then start D5W. goal SNa correction is upto 10 mEq in 24 hr given the bleed/cereberal edema/midline shift. Continue with GOC discussion with family.       # Anemia - FOBt positive. Blood transfusion as per primary team.             If you have any questions, please feel free to contact me  Roxann Man  Nephrology Fellow  Pager [place]: [phone]/ [facility]: [phone]    (After 5 pm or on weekends please page the on-call fellow, can check [email or web address] for schedule. Login is demond vera, schedule under [facility] medicine, psych, derm)

## 2021-10-02 NOTE — PROGRESS NOTE ADULT - SUBJECTIVE AND OBJECTIVE BOX
[facility] Division of Kidney Diseases & Hypertension  FOLLOW UP NOTE  [phone]--------------------------------------------------------------------------------  Chief Complaint:Non-traumatic intracranial hemorrhage        24 hour events/subjective: Patient seen & examined. Labs & vitals reviewed.  SNa is at 133 today. On 3% HTS. Pt seen and examined at bedside. Pt. is intubated and on mech vent.        PAST HISTORY  --------------------------------------------------------------------------------  No significant changes to PMH, PSH, FHx, SHx, unless otherwise noted    ALLERGIES & MEDICATIONS  --------------------------------------------------------------------------------  Allergies    No Known Allergies    Intolerances      Standing Inpatient Medications  chlorhexidine 0.12% Liquid 15 milliLiter(s) Oral Mucosa every 12 hours  chlorhexidine 2% Cloths 1 Application(s) Topical <User Schedule>  dexAMETHasone  Injectable 4 milliGRAM(s) IV Push every 6 hours  levETIRAcetam  IVPB 500 milliGRAM(s) IV Intermittent every 12 hours  pantoprazole  Injectable 40 milliGRAM(s) IV Push two times a day  piperacillin/tazobactam IVPB.. 3.375 Gram(s) IV Intermittent every 8 hours  polyethylene glycol 3350 17 Gram(s) Oral daily  propofol Infusion 10.779 MICROgram(s)/kG/Min IV Continuous <Continuous>  senna 2 Tablet(s) Oral at bedtime    PRN Inpatient Medications  hydrALAZINE Injectable 2.5 milliGRAM(s) IV Push every 6 hours PRN      REVIEW OF SYSTEMS  --------------------------------------------------------------------------------  unable to obtain    VITALS/PHYSICAL EXAM  --------------------------------------------------------------------------------  T(C): 36.5 (10-02-21 @ 08:00), Max: 37.3 (10-01-21 @ 16:00)  HR: 61 (10-02-21 @ 09:00) (61 - 76)  BP: 142/77 (10-02-21 @ 09:00) (116/56 - 161/75)  RR: 26 (10-02-21 @ 09:00) (0 - 26)  SpO2: 100% (10-02-21 @ 09:00) (98% - 100%)  Wt(kg): --        10-01-21 @ 07:01  -  10-02-21 @ 07:00  --------------------------------------------------------  IN: 2047 mL / OUT: 600 mL / NET: 1447 mL    10-02-21 @ 07:01  -  10-02-21 @ 10:11  --------------------------------------------------------  IN: 151.5 mL / OUT: 80 mL / NET: 71.5 mL      Physical Exam:  	Gen: intubated and on mech vent  	HEENT: MMM  	Pulm: CTA B/L  	CV: S1S2  	Abd: Soft, +BS   	Ext: No LE edema B/L  	Neuro: sedated  	Skin: Warm and dry  	Vascular access:        LABS/STUDIES  --------------------------------------------------------------------------------              7.1    19.62 >-----------<  176      [10-01-21 @ 23:41]              21.0     133  |  106  |  23  ----------------------------<  222      [10-02-21 @ 09:15]  3.8   |  15  |  0.46        Ca     8.1     [10-02-21 @ 09:15]      Mg     2.1     [10-01-21 @ 23:41]      Phos  3.0     [10-01-21 @ 23:41]    TPro  5.3  /  Alb  2.7  /  TBili  0.4  /  DBili  x   /  AST  19  /  ALT  11  /  AlkPhos  82  [10-01-21 @ 23:41]        Uric acid 1.8      [09-30-21 @ 18:45]    Creatinine Trend:  SCr 0.46 [10-02 @ 09:15]  SCr 0.46 [10-02 @ 04:47]  SCr 0.47 [10-01 @ 23:41]  SCr 0.44 [10-01 @ 19:26]  SCr 0.40 [10-01 @ 15:39]    Urinalysis - [10-01-21 @ 00:18]      Color Yellow / Appearance Slightly Turbid / SG >1.050 / pH 6.5      Gluc Negative / Ketone Negative  / Bili Negative / Urobili Negative       Blood Small / Protein 100 mg/dL / Leuk Est Negative / Nitrite Negative      RBC 3 / WBC 19 / Hyaline 8 / Gran Few / Sq Epi  / Non Sq Epi 7 / Bacteria Negative    Urine Sodium 15      [09-30-21 @ 00:02]  Urine Chloride 27      [09-30-21 @ 00:02]  Urine Osmolality 486      [09-30-21 @ 00:02]    TSH 1.18      [10-01-21 @ 05:15]

## 2021-10-03 NOTE — PROGRESS NOTE ADULT - ATTENDING COMMENTS
Patient seen and examined and case reviewed in detail. Patient is critically ill requiring frequent bedside visits with therapy change. 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 He has been managed at home but developed multiple decubitus ulcercs. He has a PEG for oropharyngeal dysphagia. He presents now with cough and encephalopathy and he is found to have hyponatremia and an intraparenchymal hemorrhage with midline shift.    1. Acute hypoxemic respiratory failure due to aspiration pneumonia with worsening mental status. Continue mechanical ventilation and wean as tolerated. Maintain O2 sat > 90%. Airway clearance. Followup cultures  2. Neuro. Pt with worsening R frontal bleed with compression of ventricles and vasogenic edema. 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 Continue Decadron and Keppra. Pt with baseline very poor mental status.  - Neurosurgery team has offered surgical intervention which family would be amenable to  3. Severe hyponatremia, likely SIADH, now on hypertonic saline. Followup with Nephrology regarding Na target improvement as patient required D5 overnight. While we want an Na goal 145-155 given neurologic issues will need to ensure not too rapid overcorrection.   4. DVT prophylaxis SCDs.  5. Shock state - continue vasopressors to maintain MAP > 65. Currently off vasopressors this AM. Continue broad spectrum antibiotics and followup cultures  5. GOC: Full code. Daughter is processing all information and will discuss with family. Patient is in the dying process and has a devastating neurologic condition. Palliative care evaluation would be appropriate however Daughter remains hopeful and would want any surgical intervention which would be offered.  7. Wound care followup    Prognosis poor

## 2021-10-03 NOTE — PROGRESS NOTE ADULT - ASSESSMENT
80yM PMH of gallstones (30-40 years ago), BPH, HLD, h/o spinal stenosis, subarachnoid and severe spinal stenosis, recent PNA, penile infection (?abscess) &  sacral wound on antibiotic with PICC line presenting with possible aspiration PNA, extensive right frontal intraparenchymal hemorrhage, herniation & hyponatremia. Nephrology was called for hyponatremia.     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 In view of severe hyponatremia and intracranial bleed, received 3%HTS. Repeat SNa was 116 on 9/30/21. U Osm was 486 with Ya of 15 with low uric acid on 9/30/21. SNa is 130-->127 today. D5W & DDAVP were given due to overcorrection yesterday. Na goal in the next 24 hrs is about 137. May resume 3% HTS @ 30cc/hr for 6 hrs & Repeat SNa q4 for now. Please re-check urine lytes, ur osmolality. If at any point, urine output becomes > 100cc/hr or pt exceeds sodium goal of 10 meq in 24 hrs then start D5W & give DDAVP 1mcg. goal SNa correction is upto 10 mEq in 24 hr given the bleed/cereberal edema/midline shift. Continue with GOC discussion with family. Ultimate goal Na per neurosurgery is 145-155.       # Anemia - FOBt positive. Blood transfusion as per primary team.             If you have any questions, please feel free to contact me  Roxann Man  Nephrology Fellow  Pager [place]: [phone]/ [facility]: [phone]    (After 5 pm or on weekends please page the on-call fellow, can check [email or web address] for schedule. Login is demond vera, schedule under [facility] medicine, psych, derm)

## 2021-10-03 NOTE — PROGRESS NOTE ADULT - ASSESSMENT
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 Admitted to MICU for further mgt. CTA head c/f neoplastic mass.     #Neuro  Neoplastic mass:   - Hx of CVA s/p PEG in Feb 21, was talking, eating and walking with assistance up to last admission (Aug 21), Mental status declined significantly to non-verbal and bedbound i/s/o infection prior to this admission. Was still awake and able to tolerate some PO feeding until the event leading up to current admission.   - Repeat CTH 9/30: Previously seen large regions of increased attenuation with surrounding edema involving the right anterior frontal lobe, anterior corpus callosum, and mesial left frontal lobe demonstrate heterogeneous enhancement on the current examination. Findings are suspicious for the presence of neoplasm. Differential diagnosis includes lymphoma, metastatic disease, and glioblastoma multiforme.  - Holding MR per d/w daughter 9/30 given no change in management. Neurosx is not offering any restorative interventions, and no anticipated neuro recovery regardless of interventions  - C/w dex 4q6 i/s/o intracranial mass   - Q4 neuro checks   - C/w Keppra 500 BID   - C/w GOC    #Resp  AHRF 2/2 aspiration PNA iso worsening mental status   - Intubated for airway protection   - C/w Zosyn for PNA; d/c'ed AZT for neg legionella  - pressure support 10/5 as tolerated     #CV  Hypertension:  - SBPs elevated  - Start hydral 5 mg IV q6 PRN SBP >180    Hypotension  - Resolved   - Off levo/amari now; HD stable   - Continue to monitor     #GI  PEG in place  - Tolerating feeds  - Occult blood (+) in gastric lavage, but no overt GIB; c/w PPI  - start senna and miralax    #Renal/   Hyponatremia:   - 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  - s/p 3% hypertonic saline @ 30 cc/hr, currently on hold as Na above goal of 126  - nephro recs appreciated: goal Na 126 today, bolus 500cc D5W over 2 hrs, ddavp 2ug x 1   - repeat urine lytes   - Monitor BMP q4  - Monitor I/O  - Bello in place, replaced upon admission to MICU    #ID  - Recent penile infection (?abscess) & sacral wound on antibiotic with PICC line (cefepime 2gq8 8/20-9/28; doxycycline 8/20---)   - Aspiration event leading up to admission; C/w Zosyn 3.375 for aspiration PNA   - Blood Cx including PICC Cx ngtd (9/30)  - Hx of COVID infection, spike ab positive    #Endo  - TSH wnl  - F/u cortisol level  - No known hx of DM, continue to monitor FS    #Heme   Hgb of 10 on admission  - Holding AC i/s/o intracranial hemorrhage  - Hgb slowly downtrending without overt bleeding source  - (+) occult blood from gastric lavage, c/w PPI  - Trend CBC and keep active T & S    #PPx:  - PPI  - DVT: SCDs iso ICH    #Ethics   - GOC discussed with daughter in ED: FULL CODE   - Remains full code, Daughter to decide on code status. Placed palliative consult today.  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 Admitted to MICU for further mgt. CTA head c/f neoplastic mass.     #Neuro  Neoplastic mass:   - Hx of CVA s/p PEG in Feb 21, was talking, eating and walking with assistance up to last admission (Aug 21), Mental status declined significantly to non-verbal and bedbound i/s/o infection prior to this admission. Was still awake and able to tolerate some PO feeding until the event leading up to current admission.   - Repeat CTH 9/30: Previously seen large regions of increased attenuation with surrounding edema involving the right anterior frontal lobe, anterior corpus callosum, and mesial left frontal lobe demonstrate heterogeneous enhancement on the current examination. Findings are suspicious for the presence of neoplasm. Differential diagnosis includes lymphoma, metastatic disease, and glioblastoma multiforme.  - Holding MR per d/w daughter 9/30 given no change in management.   - Neurosx is not offering any restorative interventions, and no anticipated neuro recovery regardless of interventions; Daughter will discuss with family regarding brain biopsy, though no anticipated change in management at this time.   - C/w dex 4q6 i/s/o intracranial mass   - Q4 neuro checks   - C/w Keppra 500 BID   - C/w GOC    #Resp  AHRF 2/2 aspiration PNA iso worsening mental status   - Intubated for airway protection   - C/w Zosyn for PNA; d/c'ed AZT for neg legionella  - pressure support 10/5 as tolerated     #CV  Hypertension:  - SBPs elevated  - C/w hydral 5 mg IV q6 PRN SBP >180    Hypotension  - Resolved   - Off levo/amari now; HD stable   - Continue to monitor     #GI  PEG in place  - Tolerating feeds  - Occult blood (+) in gastric lavage, but no overt GIB; c/w PPI  - start senna and Miralax    #Renal/   Hyponatremia:   - Multifactorial i/s/o brain mass, chronic hyponatremia, decreased PO, and ?N/V/D prior to admission  - s/p 3% hypertonic saline @ 30 cc/hr, currently on hold  - S/p DDAVP and free water bolus; AM Na 127; free water bolus d/c'ed, f/u repeat BMP  - Monitor BMP q4  - Monitor I/O  - Bello in place, replaced upon admission to MICU    #ID  - Recent penile infection (?abscess) & sacral wound on antibiotic with PICC line (cefepime 2gq8 8/20-9/28; doxycycline 8/20---)   - Aspiration event leading up to admission; C/w Zosyn 3.375 for aspiration PNA   - Blood Cx including PICC Cx ngtd (9/30)  - Hx of COVID infection, spike ab positive    #Endo  - TSH wnl  - F/u cortisol level  - No known hx of DM, possible steroid-induced hyperglycemia; continue to monitor FS with ISS; consider adding basal coverage while on steroids    #Heme   Hgb of 10 on admission  - Holding AC i/s/o intracranial hemorrhage  - Hgb slowly downtrending without overt bleeding source  - (+) occult blood from gastric lavage, c/w PPI  - Trend CBC and keep active T & S    #PPx:  - PPI  - DVT: SCDs i/s/o ICH    #Ethics   - Ongoing extensive GOC with daughter: FULL CODE   - Palliative consulted

## 2021-10-03 NOTE — PROGRESS NOTE ADULT - ATTENDING COMMENTS
# Hypotonic hyponatremia - Urine osm 480s, urine sodium 15 on 9/30. LIkely SIADH. Serum sodium now appropriate at 127. Goal serum sodium is ~137 given bleed/ edema/midline shit. Resume 3% hypertonic saline. Q4hour BMP.       The rest of the recommendations as per fellow's note.    Tabby Duval MD  Attending Nephrologist  [phone]

## 2021-10-03 NOTE — CHART NOTE - NSCHARTNOTEFT_GEN_A_CORE
Dr. Patricia Sanchez MD  Internal Medicine, PGY-3  Pager # [phone] (NS) / [phone] ([place])    Had ongoing GOC discussion with daughter in [facility] since 10/1.     Explained to daughter in details about the diagnosis (likely brain mass on imaging, cerebral edema along with other medical conditions), prognosis (low baseline functional status, no restorative interventions, no anticipated neurological recovery, poor prognosis with anticipated decline in the coming days), alternatives (complications and long term sequela of tracheostomy, complications of CPR), and option of palliative extubation in the event of irreversible neurological damage.     Daughter was surprised by the anticipated prognosis, stating that since patient's CVA in 02/21, he initially had reasonable neurological recovery between 02 - 08/21 with ability to talk, eat (in addition to PEG feeds) and walk with assistance. He was able to enjoy TV shows, make jokes, and interact with family. Patient's status declined acutely after last hospitalization for penile infection, and became bedbound and lost ability to communicate with family since. Patient was still able to wake up and tolerate some PO feeds prior to current admission. Per daughter, prior to admission, patient's vitals, FS controls, breathing and physical appearance were all at his baseline. Patient still appeared to be calm, comfortable, and having stable vitals at current time in her view (despite explanation on use of medical interventions as temporizing measures to stabilize patient at this time), that she is in somewhat of disbelief that the current condition is terminal.     Also per daughter, patient has had multiple brain imaging since the CVA, and was not informed about any masses (per imaging report from OSH, the area of interest was shown to have some hyper-density that was suspected to be a venous thrombosis in 02/21). For the past few months, daughter did notice that patient's been having some progressive weakness in the legs, but due to the CVA history, family didn't suspect any other neurological diseases, which she felt guilty about and was very shocked by the diagnosis of potential brain mass.     On the other hand, it is socially challenging for daughter to make big decisions at this time. She lost her mother earlier this year, and then father went through a series of medical events up to now. She has no other family around for support or shared decision making. They never had end of life discussion in the past due to cultural avoidance of conversations on death and sickness, and patient has never expressed any wishes when it comes to life support or resuscitation. When asked what she thought that patient would wanted, daughter stated that patient was not a very opinioned person, and would go with whatever his family wanted. When asked to think about the quality of life that the patient would have if he ended up to be trached (with long term facility placement) and bedbound in a vegetated state, daughter stated that patient's quality of life was never good to begin with due to the way he chose to live his life (hyperfocused on people around him, always place the children first, and not doing things for his own wishes or happiness), that she thought that patient wouldn't be troubled with sacrificing his quality of life in the hope that extending his life now might give him even just a tiny bit hope of recovery. She stated that in the recent past, there was one incident when patient thought he might have cancer, and he was very depressed about it. Daughter interpreted it as his wish to live.     In addition, daughter expressed her internal struggle about withdrawing care. She stated that her maternal grandmother lived to 100-year-old, but was bedbound for 50 years. It was viewed as the socially accepted norm to take care of your elders until their last moment without pulling back on any care. She has experienced several passing of her close relatives in the past, but none of those events involved active withdrawal of life support. They passed in the event of irreversible decline, and family didn't have to make a decision to take away medical care. She stated her concern that if she were to "pull the tube", she would be accused, culturally, for abandoning his father. If the medical team is sure that there will be nothing to offer to reverse patient's medical status, there is no hope for him to live, and patient happens to actively decline, it would be easier to "let go" in that situation.     Per discussion with neurosx team, initially patient was deemed not a surgical candidate, and there would be no anticipated neuro recovery regardless of available interventions. Daughter initially agreed to hold off on the MR head, knowing that no change in interventions would be anticipated, and patient may decompensate when attempt to get the study. As of 10/2, daughter was informed that there may be a potential that patient could undergo brain biopsy of the mass, and possible debulking surgery, per the neurosx team. Daughter expressed the wish to proceed with the surgery if there is any bit of chance that patient could make it through and wake up. "The worst will be going back to the state he is in now or he will die, but at least we tried everything."  I had another discussion with neuro sx resident this morning, and the conclusion was again that anyone can undergo the surgery, but for this patient, there is no anticipated neuro recovery. Patient may not be stable and cleared to go to OR. Daughter would like to talk to the surgeon who would offer surgery, and is open to talk to the neuro sx team again. Neuro sx resident is aware and will attempt to talk to daughter again later today.     Will continue GO discussion. Dr. Patricia Sanchez MD  Internal Medicine, PGY-3  Pager # [phone] (NS) / [phone] ([place])    Had ongoing GOC discussion with daughter in [facility] since 9/30.     Explained to daughter in details about the diagnosis (likely brain mass on imaging, cerebral edema along with other medical conditions), prognosis (low baseline functional status, no restorative interventions, no anticipated neurological recovery, poor prognosis with anticipated decline in the coming days), alternatives (complications and long term sequela of tracheostomy, complications of CPR), and option of palliative extubation in the event of irreversible neurological damage.     Daughter was surprised by the anticipated prognosis, stating that since patient's CVA in 02/21, he initially had reasonable neurological recovery between 02 - 08/21 with ability to talk, eat (in addition to PEG feeds) and walk with assistance. He was able to enjoy TV shows, make jokes, and interact with family. Patient's status declined acutely after last hospitalization for penile infection, and became bedbound and lost ability to communicate with family since. Patient was still able to wake up and tolerate some PO feeds prior to current admission. Per daughter, prior to admission, patient's vitals, FS controls, breathing and physical appearance were all at his baseline. Patient still appeared to be calm, comfortable, and having stable vitals at current time in her view (despite explanation on use of medical interventions as temporizing measures to stabilize patient at this time), that she is in somewhat of disbelief that the current condition is terminal.     Also per daughter, patient has had multiple brain imaging since the CVA, and was not informed about any masses (per imaging report from OSH, the area of interest was shown to have some hyper-density that was suspected to be a venous thrombosis in 02/21). For the past few months, daughter did notice that patient's been having some progressive weakness in the legs, but due to the CVA history, family didn't suspect any other neurological diseases, which she felt guilty about and was very shocked by the diagnosis of potential brain mass.     On the other hand, it is socially challenging for daughter to make big decisions at this time. She lost her mother earlier this year, and then father went through a series of medical events up to now. She has no other family around for support or shared decision making. They never had end of life discussion in the past due to cultural avoidance of conversations on death and sickness, and patient has never expressed any wishes when it comes to life support or resuscitation. When asked what she thought that patient would wanted, daughter stated that patient was not a very opinioned person, and would go with whatever his family wanted. When asked to think about the quality of life that the patient would have if he ended up to be trached (with long term facility placement) and bedbound in a vegetated state, daughter stated that patient's quality of life was never good to begin with due to the way he chose to live his life (hyperfocused on people around him, always place the children first, and not doing things for his own wishes or happiness), that she thought that patient wouldn't be troubled with sacrificing his quality of life in the hope that extending his life now might give him even just a tiny bit hope of recovery. She stated that in the recent past, there was one incident when patient thought he might have cancer, and he was very depressed about it. Daughter interpreted it as his wish to live.     In addition, daughter expressed her internal struggle about withdrawing care. She stated that her maternal grandmother lived to 100-year-old, but was bedbound for 50 years. It was viewed as the socially accepted norm to take care of your elders until their last moment without pulling back on any care. She has experienced several passing of her close relatives in the past, but none of those events involved active withdrawal of life support. They passed in the event of irreversible decline, and family didn't have to make a decision to take away medical care. She stated her concern that if she were to "pull the tube", she would be accused, culturally, for abandoning his father. If the medical team is sure that there will be nothing to offer to reverse patient's medical status, there is no hope for him to live, and patient happens to actively decline, it would be easier to "let go" in that situation.     Per discussion with neurosx team, initially patient was deemed not a surgical candidate, and there would be no anticipated neuro recovery regardless of available interventions. Daughter initially agreed to hold off on the MR head, knowing that no change in interventions would be anticipated, and patient may decompensate when attempt to get the study. As of 10/2, daughter was informed that there may be a potential that patient could undergo brain biopsy of the mass, and possible debulking surgery, per the neurosx team. Daughter expressed the wish to proceed with the surgery if there is any bit of chance that patient could make it through and wake up. "The worst will be going back to the state he is in now or he will die, but at least we tried everything."  I had another discussion with neuro sx resident this morning, and the conclusion was again that anyone can undergo the surgery, but for this patient, there is no anticipated neuro recovery. Patient may not be stable and cleared to go to OR. Daughter would like to talk to the surgeon who would offer surgery, and is open to talk to the neuro sx team again. Neuro sx resident is aware and will attempt to talk to daughter again later today.     Will continue GO discussion.

## 2021-10-03 NOTE — PROGRESS NOTE ADULT - SUBJECTIVE AND OBJECTIVE BOX
Dr. Patricia Sanchez  Internal Medicine, PGY-3  Pager #: [phone]      INTERVAL HPI/OVERNIGHT EVENTS:  Received DDAVP, and free water bolus for Na correction. AM Na 127. Remains hypertensive, and hydralazine IV was added.       SUBJECTIVE: Patient seen and examined at bedside.       Unable to elicit ROS 2/2 mental status.     OBJECTIVE:    VITAL SIGNS:  ICU Vital Signs Last 24 Hrs  T(C): 36.2 (03 Oct 2021 04:00), Max: 37.1 (02 Oct 2021 17:00)  T(F): 97.2 (03 Oct 2021 04:00), Max: 98.8 (02 Oct 2021 17:00)  HR: 56 (03 Oct 2021 07:00) (55 - 82)  BP: 173/81 (03 Oct 2021 07:00) (134/61 - 196/86)  BP(mean): 117 (03 Oct 2021 07:00) (88 - 128)  ABP: --  ABP(mean): --  RR: 21 (03 Oct 2021 07:00) (14 - 29)  SpO2: 98% (03 Oct 2021 07:00) (97% - 100%)    Mode: AC/ CMV (Assist Control/ Continuous Mandatory Ventilation), RR (machine): 18, TV (machine): 400, FiO2: 21, PEEP: 5, ITime: 1, MAP: 8, PIP: 16    10-02 @ 07:01  -  10-03 @ 07:00  --------------------------------------------------------  IN: 4382 mL / OUT: 1175 mL / NET: 3207 mL      CAPILLARY BLOOD GLUCOSE      POCT Blood Glucose.: 215 mg/dL (03 Oct 2021 05:55)      PHYSICAL EXAM:    PHYSICAL EXAM:  GENERAL: elderly male in NAD vented   EYES: pupils pinpt, right pupil sluggishly reactive, no dolls eye reflex   CHEST/LUNG: CTAB BL   HEART: Regular rate and rhythm; No murmurs, rubs, or gallops, no JVD   ABDOMEN: Soft, Nontender, Nondistended; Bowel sounds present, OGT in place   EXTREMITIES: no pedal edema   : figueroa in place   NEUROLOGY: withdraws LLE, slightly withdraws RLE   SKIN: decub ulcers not visualized, scrotum w/o evident abscess     MEDICATIONS:  MEDICATIONS  (STANDING):  chlorhexidine 0.12% Liquid 15 milliLiter(s) Oral Mucosa every 12 hours  chlorhexidine 2% Cloths 1 Application(s) Topical <User Schedule>  dexAMETHasone  Injectable 4 milliGRAM(s) IV Push every 6 hours  insulin lispro (ADMELOG) corrective regimen sliding scale   SubCutaneous every 4 hours  levETIRAcetam  IVPB 500 milliGRAM(s) IV Intermittent every 12 hours  pantoprazole  Injectable 40 milliGRAM(s) IV Push two times a day  piperacillin/tazobactam IVPB.. 3.375 Gram(s) IV Intermittent every 8 hours  polyethylene glycol 3350 17 Gram(s) Oral daily  propofol Infusion 10.779 MICROgram(s)/kG/Min (3.35 mL/Hr) IV Continuous <Continuous>  senna 2 Tablet(s) Oral at bedtime    MEDICATIONS  (PRN):  hydrALAZINE Injectable 5 milliGRAM(s) IV Push every 6 hours PRN hypertension SBP >180      ALLERGIES:  Allergies    No Known Allergies    Intolerances      LABS:                        7.0    15.32 )-----------( 182      ( 03 Oct 2021 00:50 )             22.5     10-03    127<L>  |  101  |  24<H>  ----------------------------<  220<H>  4.4   |  14<L>  |  0.42<L>    Ca    8.2<L>      03 Oct 2021 06:11  Phos  1.8     10-03  Mg     2.1     10-03    TPro  5.2<L>  /  Alb  2.9<L>  /  TBili  0.3  /  DBili  x   /  AST  12  /  ALT  12  /  AlkPhos  97  10-03          RADIOLOGY & ADDITIONAL TESTS: Reviewed. Dr. Patricia Sanchez  Internal Medicine, PGY-3  Pager #: [phone]      INTERVAL HPI/OVERNIGHT EVENTS:  Received DDAVP, and free water bolus for Na correction. AM Na 127. Remains hypertensive.      SUBJECTIVE: Patient seen and examined at bedside. Unable to elicit any responses.     Unable to elicit ROS 2/2 mental status.     OBJECTIVE:    VITAL SIGNS:  ICU Vital Signs Last 24 Hrs  T(C): 36.2 (03 Oct 2021 04:00), Max: 37.1 (02 Oct 2021 17:00)  T(F): 97.2 (03 Oct 2021 04:00), Max: 98.8 (02 Oct 2021 17:00)  HR: 56 (03 Oct 2021 07:00) (55 - 82)  BP: 173/81 (03 Oct 2021 07:00) (134/61 - 196/86)  BP(mean): 117 (03 Oct 2021 07:00) (88 - 128)  ABP: --  ABP(mean): --  RR: 21 (03 Oct 2021 07:00) (14 - 29)  SpO2: 98% (03 Oct 2021 07:00) (97% - 100%)    Mode: AC/ CMV (Assist Control/ Continuous Mandatory Ventilation), RR (machine): 18, TV (machine): 400, FiO2: 21, PEEP: 5, ITime: 1, MAP: 8, PIP: 16    10-02 @ 07:01  -  10-03 @ 07:00  --------------------------------------------------------  IN: 4382 mL / OUT: 1175 mL / NET: 3207 mL      CAPILLARY BLOOD GLUCOSE      POCT Blood Glucose.: 215 mg/dL (03 Oct 2021 05:55)      PHYSICAL EXAM:    PHYSICAL EXAM:  GENERAL: elderly male in NAD vented   EYES: pupils pinpt, no dolls eye reflex   CHEST/LUNG: CTAB BL   HEART: Regular rhythm, borderline bradycardic; No murmurs, rubs, or gallops, no JVD   ABDOMEN: Soft, Nontender, Nondistended; OGT in place   EXTREMITIES: no pedal edema  : Bello in place   NEUROLOGY: withdraws LLE, slightly withdraws RLE   SKIN: decub ulcers not visualized, scrotum w/o evident abscess     MEDICATIONS:  MEDICATIONS  (STANDING):  chlorhexidine 0.12% Liquid 15 milliLiter(s) Oral Mucosa every 12 hours  chlorhexidine 2% Cloths 1 Application(s) Topical <User Schedule>  dexAMETHasone  Injectable 4 milliGRAM(s) IV Push every 6 hours  insulin lispro (ADMELOG) corrective regimen sliding scale   SubCutaneous every 4 hours  levETIRAcetam  IVPB 500 milliGRAM(s) IV Intermittent every 12 hours  pantoprazole  Injectable 40 milliGRAM(s) IV Push two times a day  piperacillin/tazobactam IVPB.. 3.375 Gram(s) IV Intermittent every 8 hours  polyethylene glycol 3350 17 Gram(s) Oral daily  propofol Infusion 10.779 MICROgram(s)/kG/Min (3.35 mL/Hr) IV Continuous <Continuous>  senna 2 Tablet(s) Oral at bedtime    MEDICATIONS  (PRN):  hydrALAZINE Injectable 5 milliGRAM(s) IV Push every 6 hours PRN hypertension SBP >180      ALLERGIES:  Allergies    No Known Allergies    Intolerances      LABS:                        7.0    15.32 )-----------( 182      ( 03 Oct 2021 00:50 )             22.5     10-03    127<L>  |  101  |  24<H>  ----------------------------<  220<H>  4.4   |  14<L>  |  0.42<L>    Ca    8.2<L>      03 Oct 2021 06:11  Phos  1.8     10-03  Mg     2.1     10-03    TPro  5.2<L>  /  Alb  2.9<L>  /  TBili  0.3  /  DBili  x   /  AST  12  /  ALT  12  /  AlkPhos  97  10-03          RADIOLOGY & ADDITIONAL TESTS: Reviewed.

## 2021-10-03 NOTE — PROGRESS NOTE ADULT - SUBJECTIVE AND OBJECTIVE BOX
[facility] Division of Kidney Diseases & Hypertension  FOLLOW UP NOTE  [phone]--------------------------------------------------------------------------------  Chief Complaint:Non-traumatic intracranial hemorrhage        24 hour events/subjective: Patient seen & examined. Labs & vitals reviewed.  Received DDAVP, and free water bolus for Na correction. AM Na 127. UOP 1.1L in 24 hrs.     PAST HISTORY  --------------------------------------------------------------------------------  No significant changes to PMH, PSH, FHx, SHx, unless otherwise noted    ALLERGIES & MEDICATIONS  --------------------------------------------------------------------------------  Allergies    No Known Allergies    Intolerances      Standing Inpatient Medications  chlorhexidine 0.12% Liquid 15 milliLiter(s) Oral Mucosa every 12 hours  chlorhexidine 2% Cloths 1 Application(s) Topical <User Schedule>  dexAMETHasone  Injectable 4 milliGRAM(s) IV Push every 6 hours  insulin lispro (ADMELOG) corrective regimen sliding scale   SubCutaneous every 4 hours  levETIRAcetam  IVPB 500 milliGRAM(s) IV Intermittent every 12 hours  pantoprazole  Injectable 40 milliGRAM(s) IV Push two times a day  piperacillin/tazobactam IVPB.. 3.375 Gram(s) IV Intermittent every 8 hours  polyethylene glycol 3350 17 Gram(s) Oral daily  propofol Infusion 10.779 MICROgram(s)/kG/Min IV Continuous <Continuous>  senna 2 Tablet(s) Oral at bedtime    PRN Inpatient Medications  hydrALAZINE Injectable 5 milliGRAM(s) IV Push every 6 hours PRN      REVIEW OF SYSTEMS  --------------------------------------------------------------------------------  Unobtainable    VITALS/PHYSICAL EXAM  --------------------------------------------------------------------------------  T(C): 36.2 (10-03-21 @ 04:00), Max: 37.1 (10-02-21 @ 17:00)  HR: 54 (10-03-21 @ 08:00) (54 - 82)  BP: 172/78 (10-03-21 @ 08:00) (134/61 - 196/86)  RR: 21 (10-03-21 @ 08:00) (14 - 29)  SpO2: 98% (10-03-21 @ 08:00) (97% - 100%)  Wt(kg): --        10-02-21 @ 07:01  -  10-03-21 @ 07:00  --------------------------------------------------------  IN: 4382 mL / OUT: 1175 mL / NET: 3207 mL    10-03-21 @ 07:01  -  10-03-21 @ 09:20  --------------------------------------------------------  IN: 164 mL / OUT: 90 mL / NET: 74 mL      Physical Exam:  	Gen: intubated and on mech vent  	HEENT: MMM  	Pulm: CTA B/L  	CV: S1S2  	Abd: Soft, +BS   	Ext: + LE edema B/L  	Neuro: sedated  	Skin: Warm and dry  	Vascular access:        LABS/STUDIES  --------------------------------------------------------------------------------              7.0    15.32 >-----------<  182      [10-03-21 @ 00:50]              22.5     127  |  101  |  24  ----------------------------<  220      [10-03-21 @ 06:11]  4.4   |  14  |  0.42        Ca     8.2     [10-03-21 @ 06:11]      Mg     2.1     [10-03-21 @ 00:50]      Phos  1.8     [10-03-21 @ 00:50]    TPro  5.2  /  Alb  2.9  /  TBili  0.3  /  DBili  x   /  AST  12  /  ALT  12  /  AlkPhos  97  [10-03-21 @ 00:50]          Creatinine Trend:  SCr 0.42 [10-03 @ 06:11]  SCr 0.45 [10-03 @ 00:50]  SCr 0.49 [10-02 @ 21:15]  SCr 0.47 [10-02 @ 16:38]  SCr 0.46 [10-02 @ 09:15]    Urinalysis - [10-01-21 @ 00:18]      Color Yellow / Appearance Slightly Turbid / SG >1.050 / pH 6.5      Gluc Negative / Ketone Negative  / Bili Negative / Urobili Negative       Blood Small / Protein 100 mg/dL / Leuk Est Negative / Nitrite Negative      RBC 3 / WBC 19 / Hyaline 8 / Gran Few / Sq Epi  / Non Sq Epi 7 / Bacteria Negative    Urine Creatinine 32      [10-03-21 @ 01:40]  Urine Sodium 15      [09-30-21 @ 00:02]  Urine Chloride 27      [09-30-21 @ 00:02]  Urine Osmolality 523      [10-03-21 @ 01:40]    TSH 1.18      [10-01-21 @ 05:15]

## 2021-10-04 NOTE — PROGRESS NOTE ADULT - ASSESSMENT
80M slow worsening of mental status for past 2mo, in January was Ox3 CHIQUIS strong, now does not speak, does not move at all. Today threw up after being fed by mouth, began gurgling, now p/w pneumonia, hyponatremia 110, and R sided significant edema with some hyperdensity and shift, c/f mass vs venous infarct.   -Q1h neurochecks  -CT AP: no acute findings  -CTA neg  -MICU for PNA/Hyponatremia  -Continue dex 4q6  -Continue Keppra 500 BID  -Na Goal 145-155  -C/f sepsis w/ fever, tachycardia, hypotension  -Neurosurgery resident spoke to daughter at length 10/2 about surgical options vs. conservative management and the risks/benefits/outcomes of surgery. Daughter said she would speak to her family to decide.   -Nsgy team told that primary team consulted and will see patient Monday. Possible GOC discussion with palliative, neurosurgery, MICU, and other services involved.  -Will call daughter again today and d/w primary team if/when palliative meeting ocurring

## 2021-10-04 NOTE — CHART NOTE - NSCHARTNOTEFT_GEN_A_CORE
: Chelsea Sandoval MD    INDICATION: respiratory failure    PROCEDURE:  [x] LIMITED ECHO  [x] LIMITED CHEST  [ ] LIMITED RETROPERITONEAL  [ ] LIMITED ABDOMINAL  [ ] LIMITED DVT  [ ] NEEDLE GUIDANCE VASCULAR  [ ] NEEDLE GUIDANCE THORACENTESIS  [ ] NEEDLE GUIDANCE PARACENTESIS  [ ] NEEDLE GUIDANCE PERICARDIOCENTESIS  [ ] OTHER    FINDINGS: a line predominance with B lines at bases, normal LV function, RV<LV      INTERPRETATION: likely atelectasis at bases, cardiac function wnl : Chelsea Sandoval MD    INDICATION: acute hypoxemic respiratory failure     PROCEDURE:  [x] LIMITED ECHO  [x] LIMITED CHEST  [ ] LIMITED RETROPERITONEAL  [ ] LIMITED ABDOMINAL  [ ] LIMITED DVT  [ ] NEEDLE GUIDANCE VASCULAR  [ ] NEEDLE GUIDANCE THORACENTESIS  [ ] NEEDLE GUIDANCE PARACENTESIS  [ ] NEEDLE GUIDANCE PERICARDIOCENTESIS  [ ] OTHER    FINDINGS: a line predominance with B lines at bases, normal LV function, RV<LV size      INTERPRETATION: likely atelectasis vs pna at bases, cardiac function wnl    Attending Attestation:  I was present during the key portions of the procedure and immediately available during the entire procedure.  Yoseph Petty MD  Attending  Pulmonary & Critical Care Medicine

## 2021-10-04 NOTE — PROGRESS NOTE ADULT - ATTENDING COMMENTS
brain edema/midline shift.   chart reviewed   increase hypertonic saline drip to 50cc/hr. okay to increase by 10 meq in next 24 hours.     maryana harvey  nephrology attending   Cell# [phone]   Office- [phone]

## 2021-10-04 NOTE — PROGRESS NOTE ADULT - ASSESSMENT
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 Admitted to MICU for further mgt. CTA head c/f neoplastic mass.     #Neuro  Neoplastic mass:   - Hx of CVA s/p PEG in Feb 21, was talking, eating and walking with assistance up to last admission (Aug 21), Mental status declined significantly to non-verbal and bedbound i/s/o infection prior to this admission. Was still awake and able to tolerate some PO feeding until the event leading up to current admission.   - Repeat CTH 9/30: Previously seen large regions of increased attenuation with surrounding edema involving the right anterior frontal lobe, anterior corpus callosum, and mesial left frontal lobe demonstrate heterogeneous enhancement on the current examination. Findings are suspicious for the presence of neoplasm. Differential diagnosis includes lymphoma, metastatic disease, and glioblastoma multiforme.  - Holding MR per d/w daughter 9/30 given no change in management.   - Neurosx is not offering any restorative interventions, and no anticipated neuro recovery regardless of interventions; Daughter will discuss with family regarding brain biopsy, though no anticipated change in management at this time.   - C/w dex 4q6 i/s/o intracranial mass   - Q4 neuro checks   - C/w Keppra 500 BID   - C/w GOC    #Resp  AHRF 2/2 aspiration PNA iso worsening mental status   - Intubated for airway protection   - C/w Zosyn for PNA; d/c'ed AZT for neg legionella    #CV  Hypertension:  - SBPs elevated  - C/w hydral 5 mg IV q6 PRN SBP >180    Hypotension  - Resolved   - Off levo/amari now; HD stable   - Continue to monitor     #GI  PEG in place  - Tolerating feeds  - Occult blood (+) in gastric lavage, but no overt GIB; c/w PPI  - start senna and Miralax    #Renal/   Hyponatremia:   - Multifactorial i/s/o brain mass, chronic hyponatremia, decreased PO, and ?N/V/D prior to admission  - s/p 3% hypertonic saline @ 30 cc/hr, currently on hold  - S/p DDAVP and free water bolus; AM Na 127; free water bolus d/c'ed, f/u repeat BMP  - Monitor BMP q4  - Monitor I/O  - Bello in place, replaced upon admission to MICU    #ID  - Recent penile infection (?abscess) & sacral wound on antibiotic with PICC line (cefepime 2gq8 8/20-9/28; doxycycline 8/20---)   - Aspiration event leading up to admission; C/w Zosyn 3.375 for aspiration PNA   - Blood Cx including PICC Cx ngtd (9/30)  - Hx of COVID infection, spike ab positive    #Endo  - TSH wnl  - F/u cortisol level  - No known hx of DM, possible steroid-induced hyperglycemia; continue to monitor FS with ISS; consider adding basal coverage while on steroids    #Heme   Hgb of 10 on admission  - Holding AC i/s/o intracranial hemorrhage  - Hgb slowly downtrending without overt bleeding source  - (+) occult blood from gastric lavage, c/w PPI  - Trend CBC and keep active T & S    #PPx:  - PPI  - DVT: SCDs i/s/o ICH    #Ethics   - Ongoing extensive GOC with daughter: FULL CODE   - Palliative consulted 80yM PMH of gallstones (30-40 years ago), BPH, HLD, h/o spinal stenosis, CVA (nonverbal, 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 Admitted to MICU for further mgt. CTA head c/f neoplastic mass.     #Neuro  Neoplastic mass:   - Hx of CVA s/p PEG in Feb 21, was talking, eating and walking with assistance up to last admission (Aug 21), Mental status declined significantly to non-verbal and bedbound i/s/o infection prior to this admission. Was still awake and able to tolerate some PO feeding until the event leading up to current admission.   - Repeat CTH 9/30: Previously seen large regions of increased attenuation with surrounding edema involving the right anterior frontal lobe, anterior corpus callosum, and mesial left frontal lobe demonstrate heterogeneous enhancement on the current examination. Findings are suspicious for the presence of neoplasm. Differential diagnosis includes lymphoma, metastatic disease, and glioblastoma multiforme.  - Holding MR per d/w daughter 9/30 given no change in management.   - Neurosx is not offering any restorative interventions, and no anticipated neuro recovery regardless of interventions; Daughter will discuss with family regarding brain biopsy, though no anticipated change in management at this time.   - extensive GOC d/w daughter 10/3 per chart, will c/s palliative and f/u neurosg re interventions and dexamethasone course  - prop dc'd, will add precedex as necessary  - C/w dex 4q6 i/s/o intracranial mass   - Q4 neuro checks   - C/w Keppra 500 BID   - C/w GOC    #Resp  AHRF 2/2 aspiration PNA iso worsening mental status   - Intubated for airway protection   - C/w Zosyn for PNA; d/c'ed AZT for neg legionella    #CV  Hypertension:  - SBPs elevated  - C/w hydral 5 mg IV q6 PRN SBP >180    Hypotension  - Resolved   - Off levo/amari now; HD stable   - Continue to monitor     #GI  PEG in place  - Tolerating feeds  - Occult blood (+) in gastric lavage, but no overt GIB; c/w PPI  - start senna and Miralax    #Renal/   Hyponatremia:   - Multifactorial i/s/o brain mass, chronic hyponatremia, decreased PO, and ?N/V/D prior to admission  - s/p 3% hypertonic saline @ 30 cc/hr, currently on hold  - S/p DDAVP and free water bolus; AM Na 127; free water bolus d/c'ed, f/u repeat BMP  - Monitor BMP q4  - Monitor I/O  - Bello in place, replaced upon admission to MICU    #ID  - Recent penile infection (?abscess) & sacral wound on antibiotic with PICC line (cefepime 2gq8 8/20-9/28; doxycycline 8/20---)   - Aspiration event leading up to admission; C/w Zosyn 3.375 for aspiration PNA   - Blood Cx including PICC Cx ngtd (9/30)  - Hx of COVID infection, spike ab positive  - obtain sputum Cx, c/w Zosyn plan end date 10/5    #Endo  - TSH wnl  - F/u cortisol level  - No known hx of DM, possible steroid-induced hyperglycemia; continue to monitor FS with ISS; consider adding basal coverage while on steroids    #Heme   Hgb of 10 on admission  - Holding AC i/s/o intracranial hemorrhage  - Hgb slowly downtrending without overt bleeding source  - (+) occult blood from gastric lavage, c/w PPI  - Trend CBC and keep active T & S    #PPx:  - PPI  - DVT: SCDs i/s/o ICH    #Ethics   - Ongoing extensive GOC with daughter: FULL CODE   - Palliative consulted 80yM PMH of gallstones (30-40 years ago), BPH, HLD, h/o spinal stenosis, CVA (nonverbal, 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 Admitted to MICU for further mgt. CTA head c/f neoplastic mass.     #Neuro  Neoplastic mass:   - Hx of CVA s/p PEG in Feb 21, was talking, eating and walking with assistance up to last admission (Aug 21), Mental status declined significantly to non-verbal and bedbound i/s/o infection prior to this admission. Was still awake and able to tolerate some PO feeding until the event leading up to current admission.   - Repeat CTH 9/30: Previously seen large regions of increased attenuation with surrounding edema involving the right anterior frontal lobe, anterior corpus callosum, and mesial left frontal lobe demonstrate heterogeneous enhancement on the current examination. Findings are suspicious for the presence of neoplasm. Differential diagnosis includes lymphoma, metastatic disease, and glioblastoma multiforme.  - Holding MR per d/w daughter 9/30 given no change in management.   - Neurosx is not offering any restorative interventions, and no anticipated neuro recovery regardless of interventions; Daughter will discuss with family regarding brain biopsy, though no anticipated change in management at this time.   - extensive GOC d/w daughter 10/3 per chart, will c/s palliative and f/u neurosg re interventions and dexamethasone course  - prop dc'd, will add precedex as necessary  - C/w dex 4q6 i/s/o intracranial mass   - Q4 neuro checks   - C/w Keppra 500 BID   - C/w GOC    #Resp  AHRF 2/2 aspiration PNA iso worsening mental status   - Intubated for airway protection   - C/w Zosyn for PNA; d/c'ed AZT for neg legionella    #CV  Hypertension:  - SBPs elevated  - adding hydralazine po 50mg TID    Hypotension  - Resolved   - Off levo/amari now; HD stable   - Continue to monitor     #GI  PEG in place  - Tolerating feeds  - Occult blood (+) in gastric lavage, but no overt GIB; c/w PPI  - start senna and Miralax    #Renal/   Hyponatremia:   - Multifactorial i/s/o brain mass, chronic hyponatremia, decreased PO, and ?N/V/D prior to admission  - s/p 3% hypertonic saline @ 30 cc/hr  - S/p DDAVP and free water bolus; AM Na 127; free water bolus d/c'ed, f/u repeat BMP  - Monitor BMP q4  - Monitor I/O  - figueroa dc'd   - per nephro will start hypertonic saline 3% x 6 hrs today, monitor BMP    #ID  - Recent penile infection (?abscess) & sacral wound on antibiotic with PICC line (cefepime 2gq8 8/20-9/28; doxycycline 8/20---)   - Aspiration event leading up to admission; C/w Zosyn 3.375 for aspiration PNA   - Blood Cx including PICC Cx ngtd (9/30)  - Hx of COVID infection, spike ab positive  - obtain sputum Cx, c/w Zosyn plan end date 10/5    #Endo  - TSH wnl  - F/u cortisol level  - No known hx of DM, possible steroid-induced hyperglycemia; continue to monitor FS with ISS; consider adding basal coverage while on steroids    #Heme   Hgb of 10 on admission  - Holding AC i/s/o intracranial hemorrhage  - Hgb slowly downtrending without overt bleeding source  - (+) occult blood from gastric lavage, c/w PPI  - Trend CBC and keep active T & S    #PPx:  - PPI  - DVT: SCDs i/s/o ICH    #Ethics   - Ongoing extensive GOC with daughter: FULL CODE   - Palliative consulted 80M PMH gallstones (30-40 yrs ago), BPH, HLD, Spinal stenosis, CVA (nonverbal, A&Ox0 bl), PEG, recent PNA, penile infxn (?abscess), sacral wound on abx via PICC (cefepime 2gq8h 8/20-9/28, doxy 8/20 - ?), a/w cough found to have PNA presumed aspiration), c/f intracranial hemorrhage vs malignancy, and hyponatremia, admit to MICU    #Neuro  - CVA s/p PEG 2/2021, previously speaking, eating, walking w/ assistance up until previous admit 8/2021 after which mental status declined to non-verbal, bedbound iso recent infxn prior to admit  - CTH 9/29 R sided intraparenchymal hemorrhage w/ surrounding edema, 2.5x2.8 cm hyperdensity crossing midline 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  - CTH 9/30 persistent mass effect R lat ventricle w/ shift 1cm, Acute hemorrhage L frontal lobe and presence of extra axial hemorrhage, edema R frontal lobe > L  - CTH w/ IV contrast 9/30 findings suspicious for neoplasm ddx includes lymphoma, mets, glioblastoma multiforme  - Neurosg c/s, in discussion w/ daughter re offer of biopsy/partial resection of mass despite belief will likely not tolerate procedure or cognitively improve. Neurosg to update today re further d/w daughter  - extensive GOC conversation w/ daughter in chart 10/3, palliative consulted to assist in conversation  - prop dc'd, add precedex as necessary  - per neurosg c/w dexamethasone 4q6 iso mass  - c/w keppra 500 BID    #Resp  AHRF 2/2 aspiration PNA iso worsening mental status  - Intubated for airway protection  - C/w Zosyn for PNA end date 10/5, obtaining rpt sputum Cx; d/c'ed AZT for neg legionella    #CV  Hypertension:  - SBPs elevated  - adding hydralazine po 50mg TID    #GI  PEG in place  - Tolerating feeds  - Occult blood (+) in gastric lavage, but no overt GIB; c/w PPI  - c/w senna and Miralax    #Renal/   Hyponatremia:  - Multifactorial i/s/o brain mass, chronic hyponatremia, decreased PO, and ?N/V/D prior to admission  - s/p hypertonic saline for Na 110 on admit, c/b overcorrection, s/p DDAVP and free water  - per nephro will restart 3% NS 30cc/hr x 6 hrs  - BMP q4h, monitor I/O, obtain rpt ulytes and uosm  - figueroa dc'd    #ID  - Recent penile infection (?abscess) & sacral wound on antibiotic with PICC line (cefepime 2gq8 8/20-9/28; doxycycline 8/20---)  - Aspiration event leading up to admission; obtain sputum Cx, c/w Zosyn plan end date 10/5  - Blood Cx including PICC Cx ngtd (9/30)  - Hx of COVID infection, spike ab positive    #Endo  - TSH wnl  - F/u cortisol level  - No known hx of DM, possible steroid-induced hyperglycemia; continue to monitor FS with ISS; will uptitrate NPH given neurosg recs to c/w dex    #Heme  Hgb of 10 on admission  - Holding AC i/s/o intracranial hemorrhage  - Hgb slowly downtrending without overt bleeding source  - (+) occult blood from gastric lavage, c/w PPI  - Trend CBC and keep active T & S    #PPx:  - PPI  - DVT: SCDs i/s/o ICH    #Ethics  - Ongoing extensive GOC with daughter: FULL CODE  - Palliative consulted

## 2021-10-04 NOTE — CHART NOTE - NSCHARTNOTEFT_GEN_A_CORE
Palliative consulted for assistance with goals of care. Chart reviewed and case discussed with MICU team. GOC note from yesterday also reviewed. At this time family would like to continue all interventions. No other palliative needs identified.    Please reconsult as needed.    Romi Ziegler MD  Palliative Medicine Attending   [facility] Pager [phone]

## 2021-10-04 NOTE — PROGRESS NOTE ADULT - ATTENDING COMMENTS
81 M with CVA, non verbal and bedbound, multiple ulcers, PEG for oropharyngeal dysphagia here with acute metabolic encephalopathy and cough due to intraparenchymal hemorrhage with midline shift and hyponatremia requiring intubation, acute hypoxemic respiratory failure due to aspiration pna.    Sodium stable, still not at goal for midline shift/mass effect.   Neurosx apparently offered family biopsy, but unclear if this was case.  Also, unsure what a biopsy would change in terms of mgmt given that if it is cancer, he likely would not be a chemo candidate given poor baseline functional status.    - hold sedation today to assess mental status; PS trials as tolerated for acute hypoxemic respiratory failure on vent  - f/u neurosurgery reccs re: biopsy  - c/w dex, may taper depending on convo with neurosx  - start po hydralazine for BP control given bleed  - restart hypertonic saline x 6 hours, goal Na ~137 by this afternoon, recheck BMPs to avoid overcorrection, monitor uop  - recheck urine lytes  - c/w abx for aspiration pna, f/u sputum culture  - c/w peg feeds for oropharyngeal dysphagia  - GOC discussion with palliative

## 2021-10-04 NOTE — PROGRESS NOTE ADULT - ASSESSMENT
80yM PMH of gallstones (30-40 years ago), BPH, HLD, h/o spinal stenosis, subarachnoid and severe spinal stenosis, recent PNA, penile infection (?abscess) &  sacral wound on antibiotic with PICC line presenting with possible aspiration PNA, extensive right frontal intraparenchymal hemorrhage, herniation & hyponatremia. Nephrology was called for hyponatremia.     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 In view of severe hyponatremia and intracranial bleed, received 3%HTS. Repeat SNa was 116 on 9/30/21. U Osm was 486 with Ya of 15 with low uric acid on 9/30/21. SNa is 130-->127 on 10/3. D5W & DDAVP were given due to overcorrection. Na goal in the next 24 hrs was about 137 by AM of 10/4. Today Serum Na is 131. Please resume 3% HTS @ 30cc/hr for 6 hrs & Repeat SNa q4 for now. Please re-check urine lytes, ur osmolality. Goal SNa correction is upto 10 mEq in 24 hr given the bleed/cereberal edema/midline shift. Continue with GOC discussion with family. Ultimate goal Na per neurosurgery is 145-155.       # Anemia - FOBt positive. Blood transfusion as per primary team.             If you have any questions, please feel free to contact me  Roxann Man  Nephrology Fellow  Pager [place]: [phone]/ [facility]: [phone]    (After 5 pm or on weekends please page the on-call fellow, can check [email or web address] for schedule. Login is demond vera, schedule under [facility] medicine, psych, derm)

## 2021-10-04 NOTE — PROGRESS NOTE ADULT - SUBJECTIVE AND OBJECTIVE BOX
INTERVAL HPI/OVERNIGHT EVENTS:    SUBJECTIVE: Patient seen and examined at bedside. Intubated, sedated, ROS cannot be obtained.      VITAL SIGNS:  ICU Vital Signs Last 24 Hrs  T(C): 36 (04 Oct 2021 07:00), Max: 37.5 (03 Oct 2021 20:00)  T(F): 96.8 (04 Oct 2021 07:00), Max: 99.5 (03 Oct 2021 20:00)  HR: 66 (04 Oct 2021 07:00) (53 - 77)  BP: 169/79 (04 Oct 2021 07:00) (137/63 - 186/87)  BP(mean): 113 (04 Oct 2021 07:00) (90 - 125)  ABP: --  ABP(mean): --  RR: 24 (04 Oct 2021 07:00) (15 - 37)  SpO2: 97% (04 Oct 2021 07:00) (97% - 100%)    Mode: AC/ CMV (Assist Control/ Continuous Mandatory Ventilation), RR (machine): 18, TV (machine): 400, FiO2: 21, PEEP: 5, ITime: 1, MAP: 8, PIP: 20  Plateau pressure:   P/F ratio:     10-03 @ 07:01  -  10-04 @ 07:00  --------------------------------------------------------  IN: 2841.9 mL / OUT: 1535 mL / NET: 1306.9 mL    10-04 @ 07:01  -  10-04 @ 07:32  --------------------------------------------------------  IN: 37.4 mL / OUT: 60 mL / NET: -22.6 mL      CAPILLARY BLOOD GLUCOSE      POCT Blood Glucose.: 221 mg/dL (04 Oct 2021 05:08)    ECG:    PHYSICAL EXAM:    General:   HEENT:   Neck:   Respiratory:   Cardiovascular:   Abdomen:   Extremities:  Neurological:    MEDICATIONS:  MEDICATIONS  (STANDING):  chlorhexidine 0.12% Liquid 15 milliLiter(s) Oral Mucosa every 12 hours  chlorhexidine 4% Liquid 1 Application(s) Topical <User Schedule>  dexAMETHasone  Injectable 4 milliGRAM(s) IV Push every 6 hours  insulin lispro (ADMELOG) corrective regimen sliding scale   SubCutaneous every 4 hours  insulin NPH human recombinant 7 Unit(s) SubCutaneous every 6 hours  levETIRAcetam  IVPB 500 milliGRAM(s) IV Intermittent every 12 hours  pantoprazole  Injectable 40 milliGRAM(s) IV Push two times a day  piperacillin/tazobactam IVPB.. 3.375 Gram(s) IV Intermittent every 8 hours  polyethylene glycol 3350 17 Gram(s) Oral every 12 hours  propofol Infusion 10.779 MICROgram(s)/kG/Min (3.35 mL/Hr) IV Continuous <Continuous>  senna Syrup 10 milliLiter(s) Oral at bedtime    MEDICATIONS  (PRN):  hydrALAZINE Injectable 5 milliGRAM(s) IV Push every 6 hours PRN hypertension SBP >180      ALLERGIES:  Allergies    No Known Allergies    Intolerances        LABS:                        7.5    17.43 )-----------( 187      ( 04 Oct 2021 02:42 )             22.9     10-04    131<L>  |  102  |  20  ----------------------------<  198<H>  4.2   |  15<L>  |  0.37<L>    Ca    7.8<L>      04 Oct 2021 05:36  Phos  3.5     10-04  Mg     1.9     10-04    TPro  5.3<L>  /  Alb  2.8<L>  /  TBili  0.2  /  DBili  x   /  AST  15  /  ALT  12  /  AlkPhos  100  10-04          RADIOLOGY & ADDITIONAL TESTS: Reviewed. Candelaria Rocha MD PGY-2  Internal Medicine Resident    INTERVAL HPI/OVERNIGHT EVENTS:    SUBJECTIVE: Patient seen and examined at bedside. Intubated, sedated, ROS cannot be obtained.      VITAL SIGNS:  ICU Vital Signs Last 24 Hrs  T(C): 36 (04 Oct 2021 07:00), Max: 37.5 (03 Oct 2021 20:00)  T(F): 96.8 (04 Oct 2021 07:00), Max: 99.5 (03 Oct 2021 20:00)  HR: 66 (04 Oct 2021 07:00) (53 - 77)  BP: 169/79 (04 Oct 2021 07:00) (137/63 - 186/87)  BP(mean): 113 (04 Oct 2021 07:00) (90 - 125)  ABP: --  ABP(mean): --  RR: 24 (04 Oct 2021 07:00) (15 - 37)  SpO2: 97% (04 Oct 2021 07:00) (97% - 100%)    Mode: AC/ CMV (Assist Control/ Continuous Mandatory Ventilation), RR (machine): 18, TV (machine): 400, FiO2: 21, PEEP: 5    10-03 @ 07:01  -  10-04 @ 07:00  --------------------------------------------------------  IN: 2841.9 mL / OUT: 1535 mL / NET: 1306.9 mL    10-04 @ 07:01  -  10-04 @ 07:32  --------------------------------------------------------  IN: 37.4 mL / OUT: 60 mL / NET: -22.6 mL      CAPILLARY BLOOD GLUCOSE      POCT Blood Glucose.: 221 mg/dL (04 Oct 2021 05:08)    ECG:    PHYSICAL EXAM:  GENERAL: Sedated, intubated, unable to arouse to verbal or tactile stimuli, does not appear to be in distress  HEAD: Atraumatic, Normocephalic  EYES: Pupils small, equal, round, not overtly reactive to light, conjunctiva and sclera clear  ENMT: Moist mucous membranes, otherwise difficult to assess oropharynx 2/2 ET tube  NERVOUS SYSTEM: A&Ox0, sedated as above, pupils as above, otherwise difficult to appreciate neurologic exam iso mental status  CHEST/LUNG: Inspiratory and expiratory rhonchi b/l, intubated on mechanical ventilation  HEART: Regular rate and rhythm; No murmurs, rubs, or gallops  ABDOMEN: Soft, Nontender, Nondistended; Bowel sounds present  EXTREMITIES: 2+ Peripheral Pulses, No edema      MEDICATIONS:  MEDICATIONS  (STANDING):  chlorhexidine 0.12% Liquid 15 milliLiter(s) Oral Mucosa every 12 hours  chlorhexidine 4% Liquid 1 Application(s) Topical <User Schedule>  dexAMETHasone  Injectable 4 milliGRAM(s) IV Push every 6 hours  insulin lispro (ADMELOG) corrective regimen sliding scale   SubCutaneous every 4 hours  insulin NPH human recombinant 7 Unit(s) SubCutaneous every 6 hours  levETIRAcetam  IVPB 500 milliGRAM(s) IV Intermittent every 12 hours  pantoprazole  Injectable 40 milliGRAM(s) IV Push two times a day  piperacillin/tazobactam IVPB.. 3.375 Gram(s) IV Intermittent every 8 hours  polyethylene glycol 3350 17 Gram(s) Oral every 12 hours  propofol Infusion 10.779 MICROgram(s)/kG/Min (3.35 mL/Hr) IV Continuous <Continuous>  senna Syrup 10 milliLiter(s) Oral at bedtime    MEDICATIONS  (PRN):  hydrALAZINE Injectable 5 milliGRAM(s) IV Push every 6 hours PRN hypertension SBP >180      ALLERGIES:  Allergies    No Known Allergies    Intolerances        LABS:                        7.5    17.43 )-----------( 187      ( 04 Oct 2021 02:42 )             22.9     10-04    131<L>  |  102  |  20  ----------------------------<  198<H>  4.2   |  15<L>  |  0.37<L>    Ca    7.8<L>      04 Oct 2021 05:36  Phos  3.5     10-04  Mg     1.9     10-04    TPro  5.3<L>  /  Alb  2.8<L>  /  TBili  0.2  /  DBili  x   /  AST  15  /  ALT  12  /  AlkPhos  100  10-04          RADIOLOGY & ADDITIONAL TESTS: Reviewed.

## 2021-10-04 NOTE — PROGRESS NOTE ADULT - SUBJECTIVE AND OBJECTIVE BOX
[facility] Division of Kidney Diseases & Hypertension  FOLLOW UP NOTE  [phone]--------------------------------------------------------------------------------  Chief Complaint:Non-traumatic intracranial hemorrhage        24 hour events/subjective: Patient seen & examined. Labs & vitals reviewed.  No overnight issues reported. UOP 1.5L in 24 hrs      PAST HISTORY  --------------------------------------------------------------------------------  No significant changes to PMH, PSH, FHx, SHx, unless otherwise noted    ALLERGIES & MEDICATIONS  --------------------------------------------------------------------------------  Allergies    No Known Allergies    Intolerances      Standing Inpatient Medications  chlorhexidine 0.12% Liquid 15 milliLiter(s) Oral Mucosa every 12 hours  chlorhexidine 4% Liquid 1 Application(s) Topical <User Schedule>  dexAMETHasone  Injectable 4 milliGRAM(s) IV Push every 6 hours  insulin lispro (ADMELOG) corrective regimen sliding scale   SubCutaneous every 4 hours  insulin NPH human recombinant 7 Unit(s) SubCutaneous every 6 hours  levETIRAcetam  IVPB 500 milliGRAM(s) IV Intermittent every 12 hours  pantoprazole  Injectable 40 milliGRAM(s) IV Push two times a day  piperacillin/tazobactam IVPB.. 3.375 Gram(s) IV Intermittent every 8 hours  polyethylene glycol 3350 17 Gram(s) Oral every 12 hours  propofol Infusion 10.779 MICROgram(s)/kG/Min IV Continuous <Continuous>  senna Syrup 10 milliLiter(s) Oral at bedtime    PRN Inpatient Medications  hydrALAZINE Injectable 5 milliGRAM(s) IV Push every 6 hours PRN      REVIEW OF SYSTEMS  --------------------------------------------------------------------------------  Unobtainable    VITALS/PHYSICAL EXAM  --------------------------------------------------------------------------------  T(C): 36 (10-04-21 @ 07:00), Max: 37.5 (10-03-21 @ 20:00)  HR: 66 (10-04-21 @ 07:00) (53 - 77)  BP: 169/79 (10-04-21 @ 07:00) (137/63 - 186/87)  RR: 24 (10-04-21 @ 07:00) (15 - 37)  SpO2: 97% (10-04-21 @ 07:00) (97% - 100%)  Wt(kg): --        10-03-21 @ 07:01  -  10-04-21 @ 07:00  --------------------------------------------------------  IN: 2841.9 mL / OUT: 1535 mL / NET: 1306.9 mL    10-04-21 @ 07:01  -  10-04-21 @ 08:29  --------------------------------------------------------  IN: 37.4 mL / OUT: 60 mL / NET: -22.6 mL      Physical Exam:  	Gen: intubated and on mech vent  	HEENT: MMM  	Pulm: CTA B/L  	CV: S1S2  	Abd: Soft, +BS   	Ext: + LE edema B/L  	Neuro: sedated  	Skin: Warm and dry  	Vascular access:      LABS/STUDIES  --------------------------------------------------------------------------------              7.5    17.43 >-----------<  187      [10-04-21 @ 02:42]              22.9     131  |  102  |  20  ----------------------------<  198      [10-04-21 @ 05:36]  4.2   |  15  |  0.37        Ca     7.8     [10-04-21 @ 05:36]      Mg     1.9     [10-04-21 @ 02:42]      Phos  3.5     [10-04-21 @ 02:42]    TPro  5.3  /  Alb  2.8  /  TBili  0.2  /  DBili  x   /  AST  15  /  ALT  12  /  AlkPhos  100  [10-04-21 @ 02:42]          Creatinine Trend:  SCr 0.37 [10-04 @ 05:36]  SCr 0.40 [10-04 @ 02:42]  SCr 0.42 [10-03 @ 21:55]  SCr 0.42 [10-03 @ 16:16]  SCr 0.38 [10-03 @ 11:21]    Urinalysis - [10-01-21 @ 00:18]      Color Yellow / Appearance Slightly Turbid / SG >1.050 / pH 6.5      Gluc Negative / Ketone Negative  / Bili Negative / Urobili Negative       Blood Small / Protein 100 mg/dL / Leuk Est Negative / Nitrite Negative      RBC 3 / WBC 19 / Hyaline 8 / Gran Few / Sq Epi  / Non Sq Epi 7 / Bacteria Negative    Urine Creatinine 32      [10-03-21 @ 01:40]  Urine Sodium 15      [09-30-21 @ 00:02]  Urine Chloride 27      [09-30-21 @ 00:02]  Urine Osmolality 523      [10-03-21 @ 01:40]    TSH 1.18      [10-01-21 @ 05:15]

## 2021-10-04 NOTE — PROGRESS NOTE ADULT - SUBJECTIVE AND OBJECTIVE BOX
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 Hx obtained from daughter. She noticed breathing sounds like he was snoring and called EMS.  No sick contact. No diarrhea.     In the ED, VSS. Labs significant for Na 110, CO2 14, lactate 2.5. CXR w/ b/l patchy opacities R>L c/w PNA. 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 MICU consulted for hyponatremia. (30 Sep 2021 01:07)      Imaging: CTH R sided significant edema with some hyperdensity and shift, c/f mass vs venous infarct. Exam before intubation: GHULAM, pupils 2 sluggish, UE flex, BLE TF, snoring.       --Anticoagulation:    =====================  PAST MEDICAL HISTORY   Spinal stenosis    Diabetes    Benign essential HTN    Gallstones      PAST SURGICAL HISTORY   S/P cataract surgery          MEDICATIONS:  Antibiotics:  piperacillin/tazobactam IVPB.. 3.375 Gram(s) IV Intermittent every 8 hours    Neuro:  fentaNYL   Infusion... 0.5 MICROgram(s)/kG/Hr IV Continuous <Continuous>  levETIRAcetam  IVPB 500 milliGRAM(s) IV Intermittent every 12 hours  propofol Infusion 10 MICROgram(s)/kG/Min IV Continuous <Continuous>    Other:  sodium chloride 3%. 500 milliLiter(s) IV Continuous <Continuous>      SOCIAL HISTORY:   Occupation:   Marital Status:     FAMILY HISTORY:      ROS: Negative except per HPI    Exam: Intubated, GHULAM, UE minimal movement, BLE trace withdrawal

## 2021-10-05 NOTE — PROGRESS NOTE ADULT - ATTENDING COMMENTS
81 M with CVA, non verbal and bedbound, multiple ulcers, PEG for oropharyngeal dysphagia here with acute metabolic encephalopathy and cough due to intraparenchymal hemorrhage with midline shift and hyponatremia requiring intubation, acute hypoxemic respiratory failure due to aspiration pna.    Sodium improving on 3%.  Family's goal is clear - they would like to pursue surgery for this possible brain mass and neurosx is in agreement.  Plan for MRI brain today, neuroonc to follow up after that.  Awaiting on neurosx re: next steps.    - wean sedation today, PS trials as tolerated for acute hypoxemic respiratory failure on vent  - f/u neurosurgery reccs re: biopsy  - c/w dex, started on slow taper  - c/w po hydralazine for BP control given bleed  - c/w hypertonic saline x 6 hours, goal Na >145, recheck BMPs to avoid overcorrection, monitor uop  - c/w abx for aspiration pna, f/u sputum culture, ?7 day course  - c/w peg feeds for oropharyngeal dysphagia

## 2021-10-05 NOTE — PROGRESS NOTE ADULT - ASSESSMENT
80M slow worsening of mental status for past 2mo, in January was Ox3 CHIQUIS strong, now does not speak, does not move at all. Today threw up after being fed by mouth, began gurgling, now p/w pneumonia, hyponatremia 110, and R sided significant edema with some hyperdensity and shift, c/f mass vs venous infarct.   -Q1h neurochecks  -MICU for PNA/Hyponatremia  -Continue dex 4q6  -Continue Keppra 500 BID  -Na Goal 145-155  -C/f sepsis w/ fever, tachycardia, hypotension  -Per d/w daughter, would like to pursue surgery  -Patient to be discussed at tumor board tomorrow  -Recommended neuro-onc consult, which is pending

## 2021-10-05 NOTE — PROGRESS NOTE ADULT - SUBJECTIVE AND OBJECTIVE BOX
[facility] Division of Kidney Diseases & Hypertension  FOLLOW UP NOTE  [phone]--------------------------------------------------------------------------------  Chief Complaint:Non-traumatic intracranial hemorrhage        24 hour events/subjective: Patient seen & examined. Labs & vitals reviewed. No overnight issues reported. UOP 1L in 24 hrs      PAST HISTORY  --------------------------------------------------------------------------------  No significant changes to PMH, PSH, FHx, SHx, unless otherwise noted    ALLERGIES & MEDICATIONS  --------------------------------------------------------------------------------  Allergies    No Known Allergies    Intolerances      Standing Inpatient Medications  chlorhexidine 0.12% Liquid 15 milliLiter(s) Oral Mucosa every 12 hours  chlorhexidine 4% Liquid 1 Application(s) Topical <User Schedule>  dexAMETHasone  Injectable 4 milliGRAM(s) IV Push every 6 hours  dextrose 40% Gel 15 Gram(s) Oral once  dextrose 5%. 1000 milliLiter(s) IV Continuous <Continuous>  dextrose 5%. 1000 milliLiter(s) IV Continuous <Continuous>  dextrose 50% Injectable 25 Gram(s) IV Push once  dextrose 50% Injectable 12.5 Gram(s) IV Push once  dextrose 50% Injectable 25 Gram(s) IV Push once  glucagon  Injectable 1 milliGRAM(s) IntraMuscular once  hydrALAZINE 50 milliGRAM(s) Oral three times a day  insulin lispro (ADMELOG) corrective regimen sliding scale   SubCutaneous every 6 hours  insulin NPH human recombinant 13 Unit(s) SubCutaneous every 6 hours  levETIRAcetam  IVPB 500 milliGRAM(s) IV Intermittent every 12 hours  pantoprazole  Injectable 40 milliGRAM(s) IV Push two times a day  piperacillin/tazobactam IVPB.. 3.375 Gram(s) IV Intermittent every 8 hours  polyethylene glycol 3350 17 Gram(s) Oral every 12 hours  propofol Infusion 10.779 MICROgram(s)/kG/Min IV Continuous <Continuous>  senna Syrup 10 milliLiter(s) Oral at bedtime  sodium chloride 3%. 180 milliLiter(s) IV Continuous <Continuous>  sodium chloride 3%. 360 milliLiter(s) IV Continuous <Continuous>    PRN Inpatient Medications      REVIEW OF SYSTEMS  --------------------------------------------------------------------------------  unobtainable    VITALS/PHYSICAL EXAM  --------------------------------------------------------------------------------  T(C): 36.9 (10-05-21 @ 08:00), Max: 37.1 (10-04-21 @ 20:00)  HR: 78 (10-05-21 @ 08:00) (75 - 105)  BP: 127/74 (10-05-21 @ 08:12) (127/74 - 185/85)  RR: 23 (10-05-21 @ 08:00) (16 - 26)  SpO2: 98% (10-05-21 @ 08:00) (96% - 99%)  Wt(kg): --        10-04-21 @ 07:01  -  10-05-21 @ 07:00  --------------------------------------------------------  IN: 2011.2 mL / OUT: 1055 mL / NET: 956.2 mL      Physical Exam:  	Gen: intubated and on mech vent  	HEENT: MMM  	Pulm: CTA B/L  	CV: S1S2  	Abd: Soft, +BS   	Ext: + LE edema B/L  	Neuro: sedated  	Skin: Warm and dry  	Vascular access:          LABS/STUDIES  --------------------------------------------------------------------------------              7.8    16.86 >-----------<  197      [10-05-21 @ 00:10]              23.5     141  |  109  |  21  ----------------------------<  198      [10-05-21 @ 05:54]  4.1   |  17  |  0.38        Ca     8.0     [10-05-21 @ 05:54]      Mg     2.0     [10-05-21 @ 00:10]      Phos  2.6     [10-05-21 @ 00:10]    TPro  5.0  /  Alb  2.6  /  TBili  0.2  /  DBili  x   /  AST  15  /  ALT  10  /  AlkPhos  106  [10-05-21 @ 00:10]          Creatinine Trend:  SCr 0.38 [10-05 @ 05:54]  SCr 0.39 [10-05 @ 00:10]  SCr 0.39 [10-04 @ 21:46]  SCr 0.37 [10-04 @ 15:35]  SCr 0.38 [10-04 @ 10:11]    Urinalysis - [10-01-21 @ 00:18]      Color Yellow / Appearance Slightly Turbid / SG >1.050 / pH 6.5      Gluc Negative / Ketone Negative  / Bili Negative / Urobili Negative       Blood Small / Protein 100 mg/dL / Leuk Est Negative / Nitrite Negative      RBC 3 / WBC 19 / Hyaline 8 / Gran Few / Sq Epi  / Non Sq Epi 7 / Bacteria Negative    Urine Creatinine 21      [10-04-21 @ 15:28]  Urine Sodium 77      [10-04-21 @ 15:28]  Urine Chloride 27      [09-30-21 @ 00:02]  Urine Osmolality 515      [10-04-21 @ 15:28]    TSH 1.18      [10-01-21 @ 05:15]

## 2021-10-05 NOTE — PROGRESS NOTE ADULT - ASSESSMENT
80yM PMH of gallstones (30-40 years ago), BPH, HLD, h/o spinal stenosis, subarachnoid and severe spinal stenosis, recent PNA, penile infection (?abscess) &  sacral wound on antibiotic with PICC line presenting with possible aspiration PNA, extensive right frontal intraparenchymal hemorrhage, herniation & hyponatremia. Nephrology was called for hyponatremia.     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 In view of severe hyponatremia and intracranial bleed, received 3%HTS. Repeat SNa was 116 on 9/30/21. U Osm was 486 with Ya of 15 with low uric acid on 9/30/21. Today Serum Na is 131--> 141 at goal. May continue 3% HTS @ 30cc/hr for 6 hrs & Repeat SNa q4 for now. Goal SNa correction is upto 10 mEq in 24 hr given the bleed/cereberal edema/midline shift. Ultimate goal Na per neurosurgery is 145-155. Posm is 500 indicating presence of ADH currently. Please re-check urine lytes, ur osmolality. Continue with GOC discussion with family. Neurosurgery recs noted.        # Anemia - FOBt positive. Blood transfusion as per primary team.             If you have any questions, please feel free to contact me  Roxann Man  Nephrology Fellow  Pager [place]: [phone]/ [facility]: [phone]    (After 5 pm or on weekends please page the on-call fellow, can check [email or web address] for schedule. Login is demond vera, schedule under [facility] medicine, psych, derm)

## 2021-10-05 NOTE — CONSULT NOTE ADULT - ASSESSMENT
Assessment: 79 yo male with a PMHx of gallstones (30-40 years ago), BPH, HLD, h/o spinal stenosis, prior stroke (nonverbal, A&Ox0 at baseline), s/p PEG, recent PNA, penile infection (?abscess) and current sacral wound (on antibiotic with PICC line, Cefepime 2Gq8 8/20-9/28; doxycycline 8/20---), presented on 09/30 with cough starting a week prior but worsened on DOA after daughter attempted to give him ensure by mouth. Na 110 on admission. Patient was intubated for airway protection. 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    Recommendations:  [] Neurosx following- patient on Dexamethasone 4MG IV Q6HR, Keppra 500MG IV BID, Na goal 145-155.  [] Patient to be discussed at tumor board tomorrow. Dr. Gray (neuro oncologist) aware of patient, will see tomorrow after tumor board.  [x] MRI Head w/wo: results as above.  [] GOC: Family wish to pursue all interventions at this time.  [] Treatment of PNA and rest of care per ICU team.    Case to be discussed with neurology attending Dr. Castañeda.    Assessment: 79 yo male with a PMHx of gallstones (30-40 years ago), BPH, HLD, h/o spinal stenosis, prior stroke (nonverbal, A&Ox0 at baseline), s/p PEG, recent PNA, penile infection (?abscess) and current sacral wound (on antibiotic with PICC line, Cefepime 2Gq8 8/20-9/28; doxycycline 8/20---), presented on 09/30 with cough starting a week prior but worsened on DOA after daughter attempted to give him ensure by mouth. Na 110 on admission. Patient was intubated for airway protection. 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    Recommendations:  [] Neurosx following- patient on Dexamethasone 4MG IV Q6HR, Keppra 500MG IV BID, Na goal 145-155.  [] Patient to be discussed at tumor board tomorrow. Dr. Gray (neuro oncologist) aware of patient, will see tomorrow after tumor board.  [x] MRI Head w/wo: results as above.  [] GOC: Family wish to pursue all interventions at this time.  [] Treatment of PNA and rest of care per ICU team.    Case to be discussed with neurology attending Dr. Castañeda.

## 2021-10-05 NOTE — CONSULT NOTE ADULT - ATTENDING COMMENTS
HPI outlined above  Patient with multiple medical problems who presented with worsening cough x 1week requiring intubation. He was found to have a large right frontal hemorrhagic mass on brain MRI and hyponatremia that has been corrected since addition from level of 110.     Neuro exam: Eyes closed. Does not respond to commands.  Left gaze deviation. Unable to pass ML on VOR. Pupils sluggish. No w/d to nox stim. ?LLE triple flexion    Imp: Hemorrhagic brain mass     Await decision and plan following tumor board.   cont AED  Dr. Harris to f/u

## 2021-10-05 NOTE — CONSULT NOTE ADULT - SUBJECTIVE AND OBJECTIVE BOX
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 Hx obtained from daughter. She noticed breathing sounds like he was snoring and called EMS.  No sick contact. No diarrhea.     In the ED, VSS. Labs significant for Na 110, CO2 14, lactate 2.5. CXR w/ b/l patchy opacities R>L c/w PNA. 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 MICU consulted for hyponatremia."    Neurology consulted for neuro oncology recs regarding newly found large enhancing right frontal hemorrhagic mass, concerning for neoplasm. Unable to obtain history from patient as he is intubated and sedated, nonverbal at baseline.    ROS: Unable to obtain, intubated and sedated.    PAST MEDICAL & SURGICAL HISTORY:  Spinal stenosis    Diabetes    Benign essential HTN    Gallstones    S/P cataract surgery    MEDICATIONS  (STANDING):  chlorhexidine 0.12% Liquid 15 milliLiter(s) Oral Mucosa every 12 hours  chlorhexidine 4% Liquid 1 Application(s) Topical <User Schedule>  dexAMETHasone  Injectable 4 milliGRAM(s) IV Push every 8 hours  dextrose 40% Gel 15 Gram(s) Oral once  dextrose 5%. 1000 milliLiter(s) (50 mL/Hr) IV Continuous <Continuous>  dextrose 5%. 1000 milliLiter(s) (100 mL/Hr) IV Continuous <Continuous>  dextrose 50% Injectable 25 Gram(s) IV Push once  dextrose 50% Injectable 12.5 Gram(s) IV Push once  dextrose 50% Injectable 25 Gram(s) IV Push once  glucagon  Injectable 1 milliGRAM(s) IntraMuscular once  hydrALAZINE 50 milliGRAM(s) Oral three times a day  insulin lispro (ADMELOG) corrective regimen sliding scale   SubCutaneous every 6 hours  insulin NPH human recombinant 13 Unit(s) SubCutaneous every 6 hours  levETIRAcetam  IVPB 500 milliGRAM(s) IV Intermittent every 12 hours  pantoprazole  Injectable 40 milliGRAM(s) IV Push two times a day  piperacillin/tazobactam IVPB.. 3.375 Gram(s) IV Intermittent every 8 hours  polyethylene glycol 3350 17 Gram(s) Oral every 12 hours  propofol Infusion 10.779 MICROgram(s)/kG/Min (3.35 mL/Hr) IV Continuous <Continuous>  senna Syrup 10 milliLiter(s) Oral at bedtime  sodium chloride 3%. 180 milliLiter(s) (30 mL/Hr) IV Continuous <Continuous>  sodium chloride 3%. 360 milliLiter(s) (30 mL/Hr) IV Continuous <Continuous>    Allergies    No Known Allergies    Vital Signs Last 24 Hrs  T(C): 36.8 (05 Oct 2021 12:00), Max: 37.1 (04 Oct 2021 20:00)  T(F): 98.3 (05 Oct 2021 12:00), Max: 98.8 (04 Oct 2021 20:00)  HR: 107 (05 Oct 2021 15:00) (70 - 107)  BP: 144/74 (05 Oct 2021 15:00) (127/74 - 185/85)  BP(mean): 100 (05 Oct 2021 15:00) (95 - 122)  RR: 21 (05 Oct 2021 15:00) (14 - 23)  SpO2: 96% (05 Oct 2021 15:00) (96% - 100%)    General Exam:  Constitutional: Lying in stretcher, intubated and sedated on Propofol 40.  Head: Normocephalic, atraumatic.  Extremities: No edema.    Neuro Exam:   MS: Eyes closed, do not open to verbal/noxious stimuli. No verbal output. Does not follow commands.  CN: PERRL, sluggish. R gaze deviation, does not cross midline with OCR. Face grossly symmetric.  Motor: No withdrawal to noxious stimuli in RUE/RLE/LUE. Withdrawal (triple flexion) in LLE.   Sensory: No grimace to noxious stimuli x4.  Reflexes: Absent throughout. Toes mute on the right, triple flexion on the left.   Coordination/Gait: Unable to assess.    LABS:               7.8    16.86 )-----------( 197      ( 05 Oct 2021 00:10 )             23.5     10-05    144  |  112<H>  |  19  ----------------------------<  80  3.9   |  18<L>  |  0.38<L>    Ca    7.9<L>      05 Oct 2021 11:45  Phos  2.6     10-05  Mg     2.0     10-05    TPro  5.0<L>  /  Alb  2.6<L>  /  TBili  0.2  /  DBili  x   /  AST  15  /  ALT  10  /  AlkPhos  106  10-05    RADIOLOGY:    -09/30 CTH w/o: Mass effect on the right lateral ventricle remains with shift of the midline to the left roughly 1 cm. Progressive isointense attenuation in the right frontal lobe involving both gray and white matter extending into and involving the genu of the corpus callosum. Areas of acute hemorrhage noted within and extending to the left frontal lobe with presence of extra-axial hemorrhage suggested. Patient did receive contrast in the interval between the 2 studies and possible contrast enhancement of parenchymal pathology in this region with associated hemorrhage is a consideration. MR would be helpful to better define extent of the hemorrhage and underlying parenchymal pathology. There is edema identified in the right frontal lobe greater than left.  -09/30 CTA BRAIN: No vascular aneurysm or flow-limiting stenosis. No AVM.  -09/30 CTA NECK: No flow-limiting stenosis or evidence for arterial dissection. Multiple nonspecific airspace opacities versus pulmonary nodules at the lung apices.  -09/30  POSTCONTRAST CT BRAIN: Previously seen large regions of increased attenuation with surrounding edema involving the right anterior frontal lobe, anterior corpus callosum, and mesial left frontal lobe demonstrate heterogeneous enhancement on the current examination. Findings are suspicious for the presence of neoplasm. Differential diagnosis includes lymphoma, metastatic disease, and glioblastoma multiforme. Correlation with contrast-enhanced MRI brain is recommended for further evaluation. Similar mass effect upon the frontal horns. Similar leftward midline shift of 1 cm. Basal cisterns are still visualized. Similar mild asymmetric dilatation of the left lateral ventricle.  -10/05 MRI Head w/wo: Large enhancing right frontal hemorrhagic mass crosses the genuine of the corpus callosum as well as a small focus in the left paraclinoid region. 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 Differential diagnosis includes metastases, glioblastoma and lymphoma.   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 Hx obtained from daughter. She noticed breathing sounds like he was snoring and called EMS.  No sick contact. No diarrhea.     In the ED, VSS. Labs significant for Na 110, CO2 14, lactate 2.5. CXR w/ b/l patchy opacities R>L c/w PNA. 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 MICU consulted for hyponatremia."    Neurology consulted for neuro oncology recs regarding newly found large enhancing right frontal hemorrhagic mass, concerning for neoplasm. Unable to obtain history from patient as he is intubated and sedated, nonverbal at baseline. Neurology attempted to reach daughter, Brooklynn Melendrez ([phone]), for further collateral but has not yet received a call back.    ROS: Unable to obtain, intubated and sedated.    PAST MEDICAL & SURGICAL HISTORY:  Spinal stenosis    Diabetes    Benign essential HTN    Gallstones    S/P cataract surgery    MEDICATIONS  (STANDING):  chlorhexidine 0.12% Liquid 15 milliLiter(s) Oral Mucosa every 12 hours  chlorhexidine 4% Liquid 1 Application(s) Topical <User Schedule>  dexAMETHasone  Injectable 4 milliGRAM(s) IV Push every 8 hours  dextrose 40% Gel 15 Gram(s) Oral once  dextrose 5%. 1000 milliLiter(s) (50 mL/Hr) IV Continuous <Continuous>  dextrose 5%. 1000 milliLiter(s) (100 mL/Hr) IV Continuous <Continuous>  dextrose 50% Injectable 25 Gram(s) IV Push once  dextrose 50% Injectable 12.5 Gram(s) IV Push once  dextrose 50% Injectable 25 Gram(s) IV Push once  glucagon  Injectable 1 milliGRAM(s) IntraMuscular once  hydrALAZINE 50 milliGRAM(s) Oral three times a day  insulin lispro (ADMELOG) corrective regimen sliding scale   SubCutaneous every 6 hours  insulin NPH human recombinant 13 Unit(s) SubCutaneous every 6 hours  levETIRAcetam  IVPB 500 milliGRAM(s) IV Intermittent every 12 hours  pantoprazole  Injectable 40 milliGRAM(s) IV Push two times a day  piperacillin/tazobactam IVPB.. 3.375 Gram(s) IV Intermittent every 8 hours  polyethylene glycol 3350 17 Gram(s) Oral every 12 hours  propofol Infusion 10.779 MICROgram(s)/kG/Min (3.35 mL/Hr) IV Continuous <Continuous>  senna Syrup 10 milliLiter(s) Oral at bedtime  sodium chloride 3%. 180 milliLiter(s) (30 mL/Hr) IV Continuous <Continuous>  sodium chloride 3%. 360 milliLiter(s) (30 mL/Hr) IV Continuous <Continuous>    Allergies    No Known Allergies    Vital Signs Last 24 Hrs  T(C): 36.8 (05 Oct 2021 12:00), Max: 37.1 (04 Oct 2021 20:00)  T(F): 98.3 (05 Oct 2021 12:00), Max: 98.8 (04 Oct 2021 20:00)  HR: 107 (05 Oct 2021 15:00) (70 - 107)  BP: 144/74 (05 Oct 2021 15:00) (127/74 - 185/85)  BP(mean): 100 (05 Oct 2021 15:00) (95 - 122)  RR: 21 (05 Oct 2021 15:00) (14 - 23)  SpO2: 96% (05 Oct 2021 15:00) (96% - 100%)    General Exam:  Constitutional: Lying in stretcher, intubated and sedated on Propofol 40.  Head: Normocephalic, atraumatic.  Extremities: No edema.    Neuro Exam:   MS: Eyes closed, do not open to verbal/noxious stimuli. No verbal output. Does not follow commands.  CN: PERRL, sluggish. R gaze deviation, does not cross midline with OCR. Face grossly symmetric.  Motor: No withdrawal to noxious stimuli in RUE/RLE/LUE. Withdrawal (triple flexion) in LLE.   Sensory: No grimace to noxious stimuli x4.  Reflexes: Absent throughout. Toes mute on the right, triple flexion on the left.   Coordination/Gait: Unable to assess.    LABS:               7.8    16.86 )-----------( 197      ( 05 Oct 2021 00:10 )             23.5     10-05    144  |  112<H>  |  19  ----------------------------<  80  3.9   |  18<L>  |  0.38<L>    Ca    7.9<L>      05 Oct 2021 11:45  Phos  2.6     10-05  Mg     2.0     10-05    TPro  5.0<L>  /  Alb  2.6<L>  /  TBili  0.2  /  DBili  x   /  AST  15  /  ALT  10  /  AlkPhos  106  10-05    RADIOLOGY:    -09/30 CTH w/o: Mass effect on the right lateral ventricle remains with shift of the midline to the left roughly 1 cm. Progressive isointense attenuation in the right frontal lobe involving both gray and white matter extending into and involving the genu of the corpus callosum. Areas of acute hemorrhage noted within and extending to the left frontal lobe with presence of extra-axial hemorrhage suggested. Patient did receive contrast in the interval between the 2 studies and possible contrast enhancement of parenchymal pathology in this region with associated hemorrhage is a consideration. MR would be helpful to better define extent of the hemorrhage and underlying parenchymal pathology. There is edema identified in the right frontal lobe greater than left.  -09/30 CTA BRAIN: No vascular aneurysm or flow-limiting stenosis. No AVM.  -09/30 CTA NECK: No flow-limiting stenosis or evidence for arterial dissection. Multiple nonspecific airspace opacities versus pulmonary nodules at the lung apices.  -09/30  POSTCONTRAST CT BRAIN: Previously seen large regions of increased attenuation with surrounding edema involving the right anterior frontal lobe, anterior corpus callosum, and mesial left frontal lobe demonstrate heterogeneous enhancement on the current examination. Findings are suspicious for the presence of neoplasm. Differential diagnosis includes lymphoma, metastatic disease, and glioblastoma multiforme. Correlation with contrast-enhanced MRI brain is recommended for further evaluation. Similar mass effect upon the frontal horns. Similar leftward midline shift of 1 cm. Basal cisterns are still visualized. Similar mild asymmetric dilatation of the left lateral ventricle.  -10/05 MRI Head w/wo: Large enhancing right frontal hemorrhagic mass crosses the genuine of the corpus callosum as well as a small focus in the left paraclinoid region. 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 Differential diagnosis includes metastases, glioblastoma and lymphoma.

## 2021-10-05 NOTE — PROGRESS NOTE ADULT - SUBJECTIVE AND OBJECTIVE BOX
Candelaria Rocha MD PGY-2  Internal Medicine Resident      INTERVAL HPI/OVERNIGHT EVENTS:    SUBJECTIVE: Patient seen and examined at bedside. Intubated, sedated, ROS cannot be obtained.       VITAL SIGNS:  ICU Vital Signs Last 24 Hrs  T(C): 37 (05 Oct 2021 04:00), Max: 37.1 (04 Oct 2021 20:00)  T(F): 98.6 (05 Oct 2021 04:00), Max: 98.8 (04 Oct 2021 20:00)  HR: 76 (05 Oct 2021 07:00) (58 - 105)  BP: 175/75 (05 Oct 2021 07:00) (144/65 - 185/85)  BP(mean): 108 (05 Oct 2021 07:00) (93 - 122)  ABP: --  ABP(mean): --  RR: 20 (05 Oct 2021 07:00) (12 - 26)  SpO2: 98% (05 Oct 2021 07:00) (96% - 99%)    Mode: AC/ CMV (Assist Control/ Continuous Mandatory Ventilation), RR (machine): 18, TV (machine): 400, FiO2: 21, PEEP: 5, ITime: 0.89, MAP: 8, PIP: 16  Plateau pressure:   P/F ratio:     10-04 @ 07:01  -  10-05 @ 07:00  --------------------------------------------------------  IN: 2011.2 mL / OUT: 1055 mL / NET: 956.2 mL      CAPILLARY BLOOD GLUCOSE      POCT Blood Glucose.: 227 mg/dL (05 Oct 2021 05:25)    ECG:    PHYSICAL EXAM:    General:   HEENT:   Neck:   Respiratory:   Cardiovascular:   Abdomen:   Extremities:  Neurological:    MEDICATIONS:  MEDICATIONS  (STANDING):  chlorhexidine 0.12% Liquid 15 milliLiter(s) Oral Mucosa every 12 hours  chlorhexidine 4% Liquid 1 Application(s) Topical <User Schedule>  dexAMETHasone  Injectable 4 milliGRAM(s) IV Push every 6 hours  dextrose 40% Gel 15 Gram(s) Oral once  dextrose 5%. 1000 milliLiter(s) (50 mL/Hr) IV Continuous <Continuous>  dextrose 5%. 1000 milliLiter(s) (100 mL/Hr) IV Continuous <Continuous>  dextrose 50% Injectable 25 Gram(s) IV Push once  dextrose 50% Injectable 12.5 Gram(s) IV Push once  dextrose 50% Injectable 25 Gram(s) IV Push once  glucagon  Injectable 1 milliGRAM(s) IntraMuscular once  hydrALAZINE 50 milliGRAM(s) Oral three times a day  insulin lispro (ADMELOG) corrective regimen sliding scale   SubCutaneous every 6 hours  insulin NPH human recombinant 13 Unit(s) SubCutaneous every 6 hours  levETIRAcetam  IVPB 500 milliGRAM(s) IV Intermittent every 12 hours  pantoprazole  Injectable 40 milliGRAM(s) IV Push two times a day  piperacillin/tazobactam IVPB.. 3.375 Gram(s) IV Intermittent every 8 hours  polyethylene glycol 3350 17 Gram(s) Oral every 12 hours  propofol Infusion 10.779 MICROgram(s)/kG/Min (3.35 mL/Hr) IV Continuous <Continuous>  senna Syrup 10 milliLiter(s) Oral at bedtime  sodium chloride 3%. 180 milliLiter(s) (30 mL/Hr) IV Continuous <Continuous>  sodium chloride 3%. 360 milliLiter(s) (30 mL/Hr) IV Continuous <Continuous>    MEDICATIONS  (PRN):      ALLERGIES:  Allergies    No Known Allergies    Intolerances        LABS:                        7.8    16.86 )-----------( 197      ( 05 Oct 2021 00:10 )             23.5     10-05    141  |  109<H>  |  21  ----------------------------<  198<H>  4.1   |  17<L>  |  0.38<L>    Ca    8.0<L>      05 Oct 2021 05:54  Phos  2.6     10-05  Mg     2.0     10-05    TPro  5.0<L>  /  Alb  2.6<L>  /  TBili  0.2  /  DBili  x   /  AST  15  /  ALT  10  /  AlkPhos  106  10-05          RADIOLOGY & ADDITIONAL TESTS: Reviewed. Candelaria Rocha MD PGY-2  Internal Medicine Resident      INTERVAL HPI/OVERNIGHT EVENTS: DAVID ON, hypertensive SBP 140s-180s, otherwise VSS on mechanical ventilation.    SUBJECTIVE: Patient seen and examined at bedside. Intubated, sedated, ROS cannot be obtained.       VITAL SIGNS:  ICU Vital Signs Last 24 Hrs  T(C): 37 (05 Oct 2021 04:00), Max: 37.1 (04 Oct 2021 20:00)  T(F): 98.6 (05 Oct 2021 04:00), Max: 98.8 (04 Oct 2021 20:00)  HR: 76 (05 Oct 2021 07:00) (58 - 105)  BP: 175/75 (05 Oct 2021 07:00) (144/65 - 185/85)  BP(mean): 108 (05 Oct 2021 07:00) (93 - 122)  ABP: --  ABP(mean): --  RR: 20 (05 Oct 2021 07:00) (12 - 26)  SpO2: 98% (05 Oct 2021 07:00) (96% - 99%)    Mode: AC/ CMV (Assist Control/ Continuous Mandatory Ventilation), RR (machine): 18, TV (machine): 400, FiO2: 21, PEEP: 5    10-04 @ 07:01  -  10-05 @ 07:00  --------------------------------------------------------  IN: 2011.2 mL / OUT: 1055 mL / NET: 956.2 mL      CAPILLARY BLOOD GLUCOSE      POCT Blood Glucose.: 227 mg/dL (05 Oct 2021 05:25)    ECG:    PHYSICAL EXAM:  GENERAL: Sedated, intubated, unable to arouse to verbal or tactile stimuli, does not appear to be in distress  HEAD: Atraumatic, Normocephalic  EYES: Pupils pinpoint, equal, round, not overtly reactive to light, conjunctiva and sclera clear  ENMT: Moist mucous membranes, otherwise difficult to assess oropharynx 2/2 ET tube  NERVOUS SYSTEM: A&Ox0, sedated as above, pupils as above, otherwise difficult to appreciate neurologic exam iso mental status  CHEST/LUNG: CTAB, intubated on mechanical ventilation  HEART: Regular rate and rhythm; No murmurs, rubs, or gallops  ABDOMEN: Soft, Nontender, Nondistended; Bowel sounds present  EXTREMITIES: 2+ Peripheral Pulses, No edema      MEDICATIONS:  MEDICATIONS  (STANDING):  chlorhexidine 0.12% Liquid 15 milliLiter(s) Oral Mucosa every 12 hours  chlorhexidine 4% Liquid 1 Application(s) Topical <User Schedule>  dexAMETHasone  Injectable 4 milliGRAM(s) IV Push every 6 hours  dextrose 40% Gel 15 Gram(s) Oral once  dextrose 5%. 1000 milliLiter(s) (50 mL/Hr) IV Continuous <Continuous>  dextrose 5%. 1000 milliLiter(s) (100 mL/Hr) IV Continuous <Continuous>  dextrose 50% Injectable 25 Gram(s) IV Push once  dextrose 50% Injectable 12.5 Gram(s) IV Push once  dextrose 50% Injectable 25 Gram(s) IV Push once  glucagon  Injectable 1 milliGRAM(s) IntraMuscular once  hydrALAZINE 50 milliGRAM(s) Oral three times a day  insulin lispro (ADMELOG) corrective regimen sliding scale   SubCutaneous every 6 hours  insulin NPH human recombinant 13 Unit(s) SubCutaneous every 6 hours  levETIRAcetam  IVPB 500 milliGRAM(s) IV Intermittent every 12 hours  pantoprazole  Injectable 40 milliGRAM(s) IV Push two times a day  piperacillin/tazobactam IVPB.. 3.375 Gram(s) IV Intermittent every 8 hours  polyethylene glycol 3350 17 Gram(s) Oral every 12 hours  propofol Infusion 10.779 MICROgram(s)/kG/Min (3.35 mL/Hr) IV Continuous <Continuous>  senna Syrup 10 milliLiter(s) Oral at bedtime  sodium chloride 3%. 180 milliLiter(s) (30 mL/Hr) IV Continuous <Continuous>  sodium chloride 3%. 360 milliLiter(s) (30 mL/Hr) IV Continuous <Continuous>    MEDICATIONS  (PRN):      ALLERGIES:  Allergies    No Known Allergies    Intolerances        LABS:                        7.8    16.86 )-----------( 197      ( 05 Oct 2021 00:10 )             23.5     10-05    141  |  109<H>  |  21  ----------------------------<  198<H>  4.1   |  17<L>  |  0.38<L>    Ca    8.0<L>      05 Oct 2021 05:54  Phos  2.6     10-05  Mg     2.0     10-05    TPro  5.0<L>  /  Alb  2.6<L>  /  TBili  0.2  /  DBili  x   /  AST  15  /  ALT  10  /  AlkPhos  106  10-05          RADIOLOGY & ADDITIONAL TESTS: Reviewed.

## 2021-10-06 NOTE — CHART NOTE - NSCHARTNOTEFT_GEN_A_CORE
Nutrition Follow Up Note  Patient seen for: Nutrition follow up     Chart reviewed, events noted. 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 S/P PEG 2/2021. Neurosurgery following. Goals of care discussions ongoing; possible pending biopsy/partial resection of mass.     Source: [] Patient       [x] EMR        [x] RN        [] Family at bedside       [x] Other: interdisciplinary team    -If unable to interview patient: [x] Trach/Vent/BiPAP  [x] Disoriented/confused/inappropriate to interview    Diet Order:   Diet, NPO with Tube Feed:   Tube Feeding Modality: Orogastric  Jevity 1.5 Adria (JEVITY1.5RTH)  Total Volume for 24 Hours (mL): 900  Intermittent  Starting Tube Feed Rate {mL per Hour}: 20  Increase Tube Feed Rate by (mL): 10    Every 4 hours  Until Goal Tube Feed Rate (mL per Hour): 50  Tube Feeding Hours ON: 18  Tube Feeding OFF (Hours): 6  Tube Feed Start Time: 11:00 (10-01-21)    EN Order Provides: 900ml, 1350kcal and 54g protein.     EN Provision (per nursing flow sheet):   (10/6): EN feeds infusing at 50ml/hr.   (10/5): 78% of goal  (10/4): 100% of goal  (10/3): 100% of goal  (10/2): 76% of goal - feeds titrating up towards goal rate    Nutrition-related concerns:  -Pt prescribed propofol; infusing at 12.4ml/hr. If to continue x 24 hrs, will provide an additional 327kcal daily.   -Last BM (10/6): x 2; (10/5): x 3. On bowel regimen (senna, Miralax).   -Continues on NaCl 3% (hypertonic saline) to aid in management of hyponatremia.   -Continues on insulin lispro and NPH to aid in management of BG. On steroid; at risk for elevated glucose levels.   -Antibiotics now discontinued.     Weights:   Daily     MEDICATIONS  (STANDING):  dexAMETHasone  Injectable  dextrose 40% Gel  dextrose 5%.  dextrose 5%.  dextrose 50% Injectable  dextrose 50% Injectable  dextrose 50% Injectable  glucagon  Injectable  hydrALAZINE  insulin lispro (ADMELOG) corrective regimen sliding scale  insulin NPH human recombinant  pantoprazole  Injectable  polyethylene glycol 3350  senna Syrup  sodium chloride 3%.    Pertinent Labs: 10-06 @ 05:58: Na 147<H>, BUN 22, Cr 0.38<L>, <H>, K+ 3.8, Phos --, Mg --, Alk Phos --, ALT/SGPT --, AST/SGOT --, HbA1c --  10-06 @ 00:20: Na 145, BUN 21, Cr 0.38<L>, <H>, K+ 4.0, Phos 3.0, Mg 2.1, Alk Phos 97, ALT/SGPT 14, AST/SGOT 17, HbA1c --  10-05 @ 18:03: Na 143, BUN 21, Cr 0.37<L>, <H>, K+ 3.9, Phos --, Mg --, Alk Phos --, ALT/SGPT --, AST/SGOT --, HbA1c --  10-05 @ 11:45: Na 144, BUN 19, Cr 0.38<L>, BG 80, K+ 3.9, Phos --, Mg --, Alk Phos --, ALT/SGPT --, AST/SGOT --, HbA1c --      Finger Sticks:  POCT Blood Glucose.: 168 mg/dL (10-06 @ 05:17)  POCT Blood Glucose.: 170 mg/dL (10-05 @ 23:42)  POCT Blood Glucose.: 106 mg/dL (10-05 @ 17:52)  POCT Blood Glucose.: 83 mg/dL (10-05 @ 11:25)    Skin per nursing documentation: stage II right rib cage; left rib cage; left hip. stage IV right hip. unstageable sacrum, left fifth toe, left fourth toe  Edema: 1+ generalized    (based on dosing wt 114.1 lbs/51.8kg):   Estimated Energy Needs: (30-35kcal/kg): 1554-1813kcal  Estimated Protein Needs: (1.4-1.6g protein/kg): 73-83g protein     Previous Nutrition Diagnosis: acute on chronic protein calorie malnutrition   Nutrition Diagnosis is: [x] ongoing with provision of EN feeds    Nutrition Care Plan:  [x] In Progress       Recommendations:      1. Recommend continue Jevity 1.5 at 50ml/hr x 18 hrs, however add No Carb Prosource TF 2x daily. To provide: 900ml, 1430kcal and 79g protein (1.5g protein/kg). Provision of propofol with add +327kcal (while infusing at 12.4ml/hr; 1757kcal (33.9kcal/kg).  2. Recommend daily multivitamin, vitamin C and zinc (x 14 days) if no medication contraindications noted.   3. Consider Ibrahima packets 2x daily to aid in wound healing.   4. Monitor GI tolerance. RD to remain available to adjust EN formulary, volume/rate PRN.   5. Monitor wt trends/labs/skin integrity/hydration status/bowel regularity.     Monitoring and Evaluation:   Continue to monitor nutritional intake, tolerance to diet prescription, weights, labs, skin integrity      RD remains available upon request and will follow up per protocol

## 2021-10-06 NOTE — PROGRESS NOTE ADULT - ASSESSMENT
80M slow worsening of mental status for past 2mo, in January was Ox3 SIM strong, now does not speak, does not move at all. Today threw up after being fed by mouth, began gurgling, now p/w pneumonia, hyponatremia 110, and R sided significant edema with some hyperdensity and shift, c/f mass vs venous infarct.   -Q1h neurochecks  -MICU for PNA/Hyponatremia  -Continue dex  -Continue Keppra 500 BID  -Na Goal 145-155  -C/f sepsis w/ fever, tachycardia, hypotension  -Per d/w daughter, would like to pursue surgery  -Patient to be discussed at tumor board today  -Dr. Gray aware of patient and will see after tumor board

## 2021-10-06 NOTE — AIRWAY REMOVAL NOTE  ADULT & PEDS - ARTIFICAL AIRWAY REMOVAL COMMENTS
Written order for extubation verified.  Patient identified by full name and birth date as per identification band.  Present at the procedure was Traci Estes RN

## 2021-10-06 NOTE — PROGRESS NOTE ADULT - ASSESSMENT
-- IN PROGRESS --     80M PMH gallstones (30-40 yrs ago), BPH, HLD, Spinal stenosis, CVA (nonverbal, A&Ox0 bl), PEG, recent PNA, penile infxn (?abscess), sacral wound on abx via PICC (cefepime 2gq8h 8/20-9/28, doxy 8/20 - ?), a/w cough found to have PNA presumed aspiration), c/f intracranial hemorrhage vs malignancy, and hyponatremia, admit to MICU    #Neuro  - CVA s/p PEG 2/2021, previously speaking, eating, walking w/ assistance up until previous admit 8/2021 after which mental status declined to non-verbal, bedbound iso recent infxn prior to admit  - CTH 9/29 R sided intraparenchymal hemorrhage w/ surrounding edema, 2.5x2.8 cm hyperdensity crossing midline 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  - CTH 9/30 persistent mass effect R lat ventricle w/ shift 1cm, Acute hemorrhage L frontal lobe and presence of extra axial hemorrhage, edema R frontal lobe > L  - CTH w/ IV contrast 9/30 findings suspicious for neoplasm ddx includes lymphoma, mets, glioblastoma multiforme  - Neurosg c/s, in discussion w/ daughter re offer of biopsy/partial resection of mass despite belief will likely not tolerate procedure or cognitively improve  - extensive GOC conversation w/ daughter in chart 10/3, palliative on board  - neurosurg recommending neuro-onc c/s, team to see pt after MRI brain w/ and w/o contrast obtained  - dexamethasone 4 IV frequency decreased from q6h to q8h, neurosg aware  - c/w keppra 500 BID    #Resp  AHRF 2/2 aspiration PNA iso worsening mental status  - Intubated for airway protection  - C/w Zosyn for PNA end date today, obtaining rpt sputum Cx; d/c'ed AZT for neg legionella    #CV  Hypertension:  - SBPs elevated  - c/w hydralazine po 50mg TID    #GI  PEG in place  - Tolerating feeds  - Occult blood (+) in gastric lavage, but no overt GIB; c/w PPI  - c/w senna and Miralax    #Renal/   Hyponatremia:  - Multifactorial i/s/o brain mass, chronic hyponatremia, decreased PO, and ?N/V/D prior to admission  - s/p hypertonic saline for Na 110 on admit, c/b overcorrection, s/p DDAVP and free water  - c/w 3% NS, Na improved to 141 will monitor BMP and titrate fluids  - BMP q6h, monitor I/O  - appreciate nephro recs   - figueroa dc'd    #ID  - Recent penile infection (?abscess) & sacral wound on antibiotic with PICC line (cefepime 2gq8 8/20-9/28; doxycycline 8/20---)  - Aspiration event leading up to admission; obtain sputum Cx, c/w Zosyn plan end date today  - Blood Cx including PICC Cx ngtd (9/30)  - Hx of COVID infection, spike ab positive    #Endo  - TSH wnl  - F/u cortisol level  - No known hx of DM, possible steroid-induced hyperglycemia; continue to monitor FS with ISS; will continue to titrate NPH to FS while on steroids, currently NPH 13u q6h w/ mod ISS will monitor while decreased frequency of dexamethasone as above     #Heme  Hgb of 10 on admission  - Holding AC i/s/o intracranial hemorrhage  - Hgb slowly downtrending without overt bleeding source, now stable   - (+) occult blood from gastric lavage, c/w PPI  - Trend CBC and keep active T & S    #PPx:  - PPI  - DVT: SCDs i/s/o ICH    #Ethics  - Ongoing extensive GOC with daughter: FULL CODE  - Palliative consulted  - Updated family at approx 11:25 10/5 80M PMH gallstones (30-40 yrs ago), BPH, HLD, Spinal stenosis, CVA (nonverbal, A&Ox0 bl), PEG, recent PNA, penile infxn (?abscess), sacral wound on abx via PICC (cefepime 2gq8h 8/20-9/28, doxy 8/20 - ?), a/w cough found to have PNA presumed aspiration), c/f intracranial hemorrhage vs malignancy, and hyponatremia, admit to MICU    #Neuro  - CVA s/p PEG 2/2021, previously speaking, eating, walking w/ assistance up until previous admit 8/2021 after which mental status declined to non-verbal, bedbound iso recent infxn prior to admit  - CTH 9/29 R sided intraparenchymal hemorrhage w/ surrounding edema, 2.5x2.8 cm hyperdensity crossing midline 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  - CTH 9/30 persistent mass effect R lat ventricle w/ shift 1cm, Acute hemorrhage L frontal lobe and presence of extra axial hemorrhage, edema R frontal lobe > L  - CTH w/ IV contrast 9/30 findings suspicious for neoplasm ddx includes lymphoma, mets, glioblastoma multiforme  - Neurosg offering biopsy/resection, daughter elects to move ahead, MRI brain w/ and w/o contrast revealing hemorrhagic mass c/f neoplasm, neuronc on board to be discussed at tumor board, will appreciate recs   - extensive GOC conversation w/ daughter in chart 10/3, palliative on board  - dexamethasone 4 IV frequency decreased from q6h to q8h, neurosg aware  - c/w keppra 500 BID    #Resp  AHRF 2/2 aspiration PNA iso worsening mental status  - Intubated for airway protection  - Zosyn course completed 10/5, f/u sputum Cx   - d/c'ed AZT for neg legionella    #CV  Hypertension:  - SBPs elevated  - c/w hydralazine po 50mg TID    #GI  PEG in place  - Tolerating feeds  - Occult blood (+) in gastric lavage, but no overt GIB; c/w PPI  - c/w senna and Miralax    #Renal/   Hyponatremia:  - Multifactorial i/s/o brain mass, chronic hyponatremia, decreased PO, and ?N/V/D prior to admission  - s/p hypertonic saline for Na 110 on admit, c/b overcorrection, s/p DDAVP and free water  - c/w 3% NS, Na improved to 14ill monitor BMP and titrate fluids  - BMP q6h, monitor I/O  - appreciate nephro recs   - figueroa dc'd    #ID  - Recent penile infection (?abscess) & sacral wound on antibiotic with PICC line (cefepime 2gq8 8/20-9/28; doxycycline 8/20---)  - Aspiration event leading up to admission; obtain sputum Cx, c/w Zosyn plan end date today  - Blood Cx including PICC Cx ngtd (9/30)  - Hx of COVID infection, spike ab positive    #Endo  - TSH wnl  - F/u cortisol level  - No known hx of DM, possible steroid-induced hyperglycemia; continue to monitor FS with ISS; will continue to titrate NPH to FS while on steroids, currently NPH 13u q6h w/ mod ISS will monitor while decreased frequency of dexamethasone as above     #Heme  Hgb of 10 on admission  - Holding AC i/s/o intracranial hemorrhage  - Hgb slowly downtrending without overt bleeding source, now stable   - (+) occult blood from gastric lavage, c/w PPI  - Trend CBC and keep active T & S    #PPx:  - PPI  - DVT: SCDs i/s/o ICH    #Ethics  - Ongoing extensive GOC with daughter: FULL CODE  - Palliative consulted  - Updated family at approx 11:25 10/5 80M PMH gallstones (30-40 yrs ago), BPH, HLD, Spinal stenosis, CVA (nonverbal, A&Ox0 bl), PEG, recent PNA, penile infxn (?abscess), sacral wound on abx via PICC (cefepime 2gq8h 8/20-9/28, doxy 8/20 - ?), a/w cough found to have PNA presumed aspiration), c/f intracranial hemorrhage vs malignancy, and hyponatremia, admit to MICU    #Neuro  - CVA s/p PEG 2/2021, previously speaking, eating, walking w/ assistance up until previous admit 8/2021 after which mental status declined to non-verbal, bedbound iso recent infxn prior to admit  - CTH 9/29 R sided intraparenchymal hemorrhage w/ surrounding edema, 2.5x2.8 cm hyperdensity crossing midline 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  - CTH 9/30 persistent mass effect R lat ventricle w/ shift 1cm, Acute hemorrhage L frontal lobe and presence of extra axial hemorrhage, edema R frontal lobe > L  - CTH w/ IV contrast 9/30 findings suspicious for neoplasm ddx includes lymphoma, mets, glioblastoma multiforme  - Neurosg offering biopsy/resection, daughter elects to move ahead, MRI brain w/ and w/o contrast revealing hemorrhagic mass c/f neoplasm, neuronc on board to be discussed at tumor board, will appreciate recs   - extensive GOC conversation w/ daughter in chart 10/3, palliative on board  - dexamethasone 4 IV frequency decreased from q6h to q8h, neurosg aware  - c/w keppra 500 BID    #Resp  AHRF 2/2 aspiration PNA iso worsening mental status  - Intubated for airway protection  - Zosyn course completed 10/5, f/u sputum Cx   - d/c'ed AZT for neg legionella    #CV  Hypertension:  - SBPs elevated  - c/w hydralazine po 50mg TID    #GI  PEG in place  - Tolerating feeds  - Occult blood (+) in gastric lavage, but no overt GIB; c/w PPI  - c/w senna and Miralax    #Renal/   Hyponatremia:  - Multifactorial i/s/o brain mass, chronic hyponatremia, decreased PO, and ?N/V/D prior to admission  - s/p hypertonic saline for Na 110 on admit, c/b overcorrection, s/p DDAVP and free water  - Na 147 on 3% NS, neurosg goal 145-155  - nephro recs to dc 3% NS   - monitor BMP, monitor I/O    #ID  - Recent penile infection (?abscess) & sacral wound on antibiotic with PICC line (cefepime 2gq8 8/20-9/28; doxycycline 8/20---)  - Aspiration event leading up to admission; Zosyn course completed 10/5, azithro dc'd as above, f/u sputum Cx  - Blood Cx including PICC Cx ngtd (9/30)  - Hx of COVID infection, spike ab positive    #Endo  - TSH wnl  - F/u cortisol level  - No known hx of DM, possible steroid-induced hyperglycemia; continue to monitor FS with mod ISS; NPH 13 -> 6 q6h ON iso decreased steroid frequency, ctm and titrate accordingly     #Heme  Hgb of 10 on admission  - Holding AC i/s/o intracranial hemorrhage  - Hgb slowly downtrending without overt bleeding source, now stable   - (+) occult blood from gastric lavage, c/w PPI  - Trend CBC and keep active T & S    #PPx:  - PPI  - DVT: SCDs i/s/o ICH    #Ethics  - Ongoing extensive GOC with daughter: FULL CODE  - Palliative consulted 80M PMH gallstones (30-40 yrs ago), BPH, HLD, Spinal stenosis, CVA (nonverbal, A&Ox0 bl), PEG, recent PNA, penile infxn (?abscess), sacral wound on abx via PICC (cefepime 2gq8h 8/20-9/28, doxy 8/20 - ?), a/w cough found to have PNA presumed aspiration), c/f intracranial hemorrhage vs malignancy, and hyponatremia, admit to MICU    #Neuro  - CVA s/p PEG 2/2021, previously speaking, eating, walking w/ assistance up until previous admit 8/2021 after which mental status declined to non-verbal, bedbound iso recent infxn prior to admit  - CTH 9/29 R sided intraparenchymal hemorrhage w/ surrounding edema, 2.5x2.8 cm hyperdensity crossing midline 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  - CTH 9/30 persistent mass effect R lat ventricle w/ shift 1cm, Acute hemorrhage L frontal lobe and presence of extra axial hemorrhage, edema R frontal lobe > L  - CTH w/ IV contrast 9/30 findings suspicious for neoplasm ddx includes lymphoma, mets, glioblastoma multiforme  - Neurosg offering biopsy/resection, daughter elects to move ahead, MRI brain w/ and w/o contrast revealing hemorrhagic mass c/f neoplasm, neuronc on board to be discussed at tumor board, will appreciate recs   - extensive GOC conversation w/ daughter in chart 10/3, palliative on board  - dexamethasone 4 IV frequency decreased from q6h to q8h, neurosg aware  - c/w keppra 500 BID    #Resp  AHRF 2/2 aspiration PNA iso worsening mental status  - Intubated for airway protection  - Zosyn course completed 10/5, f/u sputum Cx   - d/c'ed AZT for neg legionella    #CV  Hypertension:  - SBPs elevated  - c/w hydralazine po 50mg TID    #GI  PEG in place  - Tolerating feeds  - Occult blood (+) in gastric lavage, but no overt GIB; c/w PPI  - c/w senna and Miralax    #Renal/   Hyponatremia:  - Multifactorial i/s/o brain mass, chronic hyponatremia, decreased PO, and ?N/V/D prior to admission  - s/p hypertonic saline for Na 110 on admit, c/b overcorrection, s/p DDAVP and free water  - Na 147 on 3% NS, neurosg goal 145-155  - nephro recs to dc 3% NS, will consider restart pending BMP  - monitor BMP, monitor I/O    #ID  - Recent penile infection (?abscess) & sacral wound on antibiotic with PICC line (cefepime 2gq8 8/20-9/28; doxycycline 8/20---)  - Aspiration event leading up to admission; Zosyn course completed 10/5, azithro dc'd as above, f/u sputum Cx  - Blood Cx including PICC Cx ngtd (9/30)  - Hx of COVID infection, spike ab positive    #Endo  - TSH wnl  - F/u cortisol level  - No known hx of DM, possible steroid-induced hyperglycemia; continue to monitor FS with mod ISS; NPH 13 -> 6 q6h ON iso decreased steroid frequency, ctm and titrate accordingly     #Heme  Hgb of 10 on admission  - Holding AC i/s/o intracranial hemorrhage  - Hgb slowly downtrending without overt bleeding source, now stable   - (+) occult blood from gastric lavage, c/w PPI  - Trend CBC and keep active T & S    #PPx:  - PPI  - DVT: SCDs i/s/o ICH    #Ethics  - Ongoing extensive GOC with daughter: FULL CODE  - Palliative consulted 80M PMH gallstones (30-40 yrs ago), BPH, HLD, Spinal stenosis, CVA (nonverbal, A&Ox0 bl), PEG, recent PNA, penile infxn (?abscess), sacral wound on abx via PICC (cefepime 2gq8h 8/20-9/28, doxy 8/20 - ?), a/w cough found to have PNA presumed aspiration), c/f intracranial hemorrhage vs malignancy, and hyponatremia, admit to MICU    #Neuro  - CVA s/p PEG 2/2021, previously speaking, eating, walking w/ assistance up until previous admit 8/2021 after which mental status declined to non-verbal, bedbound iso recent infxn prior to admit  - CTH 9/29 R sided intraparenchymal hemorrhage w/ surrounding edema, 2.5x2.8 cm hyperdensity crossing midline 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  - CTH 9/30 persistent mass effect R lat ventricle w/ shift 1cm, Acute hemorrhage L frontal lobe and presence of extra axial hemorrhage, edema R frontal lobe > L  - CTH w/ IV contrast 9/30 findings suspicious for neoplasm ddx includes lymphoma, mets, glioblastoma multiforme  - Neurosg offering biopsy/resection, daughter elects to move ahead, MRI brain w/ and w/o contrast revealing hemorrhagic mass c/f neoplasm, neuronc on board to be discussed at tumor board, will appreciate recs   - extensive GOC conversation w/ daughter in chart 10/3, palliative on board  - dexamethasone 4 IV frequency decreased from q6h to q8h, neurosg aware  - c/w keppra 500 BID    #Resp  AHRF 2/2 aspiration PNA iso worsening mental status  - Intubated for airway protection  - Zosyn course completed 10/5, f/u sputum Cx   - d/c'ed AZT for neg legionella    #CV  Hypertension:  - SBPs elevated  - c/w hydralazine po 50mg TID    #GI  PEG in place  - Tolerating feeds  - Occult blood (+) in gastric lavage, but no overt GIB; c/w PPI  - c/w senna and Miralax    #Renal/   Hyponatremia:  - Multifactorial i/s/o brain mass, chronic hyponatremia, decreased PO, and ?N/V/D prior to admission  - s/p hypertonic saline for Na 110 on admit, c/b overcorrection, s/p DDAVP and free water  - Na 147 on 3% NS, neurosg goal 145-155  - nephro recs to dc 3% NS, will consider restart pending BMP  - monitor BMP, monitor I/O    #ID  - Recent penile infection (?abscess) & sacral wound on antibiotic with PICC line (cefepime 2gq8 8/20-9/28; doxycycline 8/20---)  - Aspiration event leading up to admission; Zosyn course completed 10/5, azithro dc'd as above, f/u sputum Cx  - Blood Cx including PICC Cx ngtd (9/30)  - Hx of COVID infection, spike ab positive    #Endo  - TSH wnl  - F/u cortisol level  - No known hx of DM, possible steroid-induced hyperglycemia; continue to monitor FS with mod ISS; NPH 13 -> 6 q6h ON iso decreased steroid frequency, ctm and titrate accordingly     #Heme  Hgb of 10 on admission  - Holding AC i/s/o intracranial hemorrhage  - Hgb slowly downtrending without overt bleeding source, now stable   - (+) occult blood from gastric lavage, c/w PPI  - Trend CBC and keep active T & S    #PPx:  - PPI  - DVT: SCDs i/s/o ICH    #Ethics  - Ongoing extensive GOC with daughter: FULL CODE  - Palliative consulted  - Provided update to daughter Brooklynn at approx 13:35 10/6, discussed plan to extubate today, daughter amenable to reintubation if necessary however specifically states that she does not want incision to throat to insert tube

## 2021-10-06 NOTE — PROGRESS NOTE ADULT - SUBJECTIVE AND OBJECTIVE BOX
MADHURI with 's msg below. Also asking pt to call us and let us know what dose he is taking Mon-sun.   Candelaria Rocha MD PGY-2  Internal Medicine Resident      INTERVAL HPI/OVERNIGHT EVENTS:    SUBJECTIVE: Patient seen and examined at bedside. Intubated, sedated, ROS cannot be obtained.       VITAL SIGNS:  ICU Vital Signs Last 24 Hrs  T(C): 36.8 (06 Oct 2021 04:00), Max: 37 (05 Oct 2021 16:00)  T(F): 98.2 (06 Oct 2021 04:00), Max: 98.6 (05 Oct 2021 16:00)  HR: 100 (06 Oct 2021 06:00) (70 - 107)  BP: 164/86 (06 Oct 2021 06:00) (126/60 - 178/83)  BP(mean): 118 (06 Oct 2021 06:00) (83 - 119)  ABP: --  ABP(mean): --  RR: 20 (06 Oct 2021 06:00) (14 - 23)  SpO2: 97% (06 Oct 2021 06:00) (96% - 100%)    Mode: AC/ CMV (Assist Control/ Continuous Mandatory Ventilation), RR (machine): 18, TV (machine): 400, FiO2: 21, PEEP: 5, ITime: 0.1, MAP: 10, PIP: 27  Plateau pressure:   P/F ratio:     10-05 @ 07:01  -  10-06 @ 07:00  --------------------------------------------------------  IN: 1957.6 mL / OUT: 100 mL / NET: 1857.6 mL      CAPILLARY BLOOD GLUCOSE      POCT Blood Glucose.: 168 mg/dL (06 Oct 2021 05:17)    ECG:    PHYSICAL EXAM:    General:   HEENT:   Neck:   Respiratory:   Cardiovascular:   Abdomen:   Extremities:  Neurological:    MEDICATIONS:  MEDICATIONS  (STANDING):  chlorhexidine 0.12% Liquid 15 milliLiter(s) Oral Mucosa every 12 hours  chlorhexidine 4% Liquid 1 Application(s) Topical <User Schedule>  dexAMETHasone  Injectable 4 milliGRAM(s) IV Push every 8 hours  dextrose 40% Gel 15 Gram(s) Oral once  dextrose 5%. 1000 milliLiter(s) (50 mL/Hr) IV Continuous <Continuous>  dextrose 5%. 1000 milliLiter(s) (100 mL/Hr) IV Continuous <Continuous>  dextrose 50% Injectable 25 Gram(s) IV Push once  dextrose 50% Injectable 12.5 Gram(s) IV Push once  dextrose 50% Injectable 25 Gram(s) IV Push once  glucagon  Injectable 1 milliGRAM(s) IntraMuscular once  hydrALAZINE 50 milliGRAM(s) Oral three times a day  insulin lispro (ADMELOG) corrective regimen sliding scale   SubCutaneous every 6 hours  insulin NPH human recombinant 6 Unit(s) SubCutaneous every 6 hours  levETIRAcetam  IVPB 500 milliGRAM(s) IV Intermittent every 12 hours  pantoprazole  Injectable 40 milliGRAM(s) IV Push two times a day  polyethylene glycol 3350 17 Gram(s) Oral every 12 hours  propofol Infusion 10.779 MICROgram(s)/kG/Min (3.35 mL/Hr) IV Continuous <Continuous>  senna Syrup 10 milliLiter(s) Oral at bedtime  sodium chloride 3%. 360 milliLiter(s) (30 mL/Hr) IV Continuous <Continuous>    MEDICATIONS  (PRN):      ALLERGIES:  Allergies    No Known Allergies    Intolerances        LABS:                        8.1    13.41 )-----------( 226      ( 06 Oct 2021 00:20 )             26.2     10-06    147<H>  |  116<H>  |  22  ----------------------------<  153<H>  3.8   |  18<L>  |  0.38<L>    Ca    7.8<L>      06 Oct 2021 05:58  Phos  3.0     10-06  Mg     2.1     10-06    TPro  5.2<L>  /  Alb  2.7<L>  /  TBili  0.2  /  DBili  x   /  AST  17  /  ALT  14  /  AlkPhos  97  10-06          RADIOLOGY & ADDITIONAL TESTS: Reviewed. Candelaria Rocha MD PGY-2  Internal Medicine Resident      INTERVAL HPI/OVERNIGHT EVENTS: DAVID ON, borderline tachycardic HR , hypertensive SBP 120s-160s, otherwise VSS on mechanical ventilation.     SUBJECTIVE: Patient seen and examined at bedside. Intubated, sedated, ROS cannot be obtained.       VITAL SIGNS:  ICU Vital Signs Last 24 Hrs  T(C): 36.8 (06 Oct 2021 04:00), Max: 37 (05 Oct 2021 16:00)  T(F): 98.2 (06 Oct 2021 04:00), Max: 98.6 (05 Oct 2021 16:00)  HR: 100 (06 Oct 2021 06:00) (70 - 107)  BP: 164/86 (06 Oct 2021 06:00) (126/60 - 178/83)  BP(mean): 118 (06 Oct 2021 06:00) (83 - 119)  ABP: --  ABP(mean): --  RR: 20 (06 Oct 2021 06:00) (14 - 23)  SpO2: 97% (06 Oct 2021 06:00) (96% - 100%)    Mode: AC/ CMV (Assist Control/ Continuous Mandatory Ventilation), RR (machine): 18, TV (machine): 400, FiO2: 21, PEEP: 5, ITime: 0.1, MAP: 10, PIP: 27  Plateau pressure:   P/F ratio:     10-05 @ 07:01  -  10-06 @ 07:00  --------------------------------------------------------  IN: 1957.6 mL / OUT: 100 mL / NET: 1857.6 mL      CAPILLARY BLOOD GLUCOSE      POCT Blood Glucose.: 168 mg/dL (06 Oct 2021 05:17)    ECG:    PHYSICAL EXAM:  GENERAL: Sedated, intubated, opening eyes this am however not tracking movements and unresponsive to stimuli, does not appear to be in distress  HEAD: Atraumatic, Normocephalic  EYES: Pupils small, equal, round, not overtly reactive to light, conjunctiva and sclera clear  ENMT: Moist mucous membranes, otherwise difficult to assess oropharynx 2/2 ET tube  NERVOUS SYSTEM: A&Ox0, sedated as above, pupils as above, opening eyes as above otherwise difficult to appreciate neurologic exam iso mental status  CHEST/LUNG: CTAB, intubated on mechanical ventilation  HEART: Regular rate and rhythm; No murmurs, rubs, or gallops  ABDOMEN: Soft, Nontender, Nondistended; Bowel sounds present  EXTREMITIES: 2+ Peripheral Pulses, b/l hands w/ nonpitting edema, b/l LE w/o edema    MEDICATIONS:  MEDICATIONS  (STANDING):  chlorhexidine 0.12% Liquid 15 milliLiter(s) Oral Mucosa every 12 hours  chlorhexidine 4% Liquid 1 Application(s) Topical <User Schedule>  dexAMETHasone  Injectable 4 milliGRAM(s) IV Push every 8 hours  dextrose 40% Gel 15 Gram(s) Oral once  dextrose 5%. 1000 milliLiter(s) (50 mL/Hr) IV Continuous <Continuous>  dextrose 5%. 1000 milliLiter(s) (100 mL/Hr) IV Continuous <Continuous>  dextrose 50% Injectable 25 Gram(s) IV Push once  dextrose 50% Injectable 12.5 Gram(s) IV Push once  dextrose 50% Injectable 25 Gram(s) IV Push once  glucagon  Injectable 1 milliGRAM(s) IntraMuscular once  hydrALAZINE 50 milliGRAM(s) Oral three times a day  insulin lispro (ADMELOG) corrective regimen sliding scale   SubCutaneous every 6 hours  insulin NPH human recombinant 6 Unit(s) SubCutaneous every 6 hours  levETIRAcetam  IVPB 500 milliGRAM(s) IV Intermittent every 12 hours  pantoprazole  Injectable 40 milliGRAM(s) IV Push two times a day  polyethylene glycol 3350 17 Gram(s) Oral every 12 hours  propofol Infusion 10.779 MICROgram(s)/kG/Min (3.35 mL/Hr) IV Continuous <Continuous>  senna Syrup 10 milliLiter(s) Oral at bedtime  sodium chloride 3%. 360 milliLiter(s) (30 mL/Hr) IV Continuous <Continuous>    MEDICATIONS  (PRN):      ALLERGIES:  Allergies    No Known Allergies    Intolerances        LABS:                        8.1    13.41 )-----------( 226      ( 06 Oct 2021 00:20 )             26.2     10-06    147<H>  |  116<H>  |  22  ----------------------------<  153<H>  3.8   |  18<L>  |  0.38<L>    Ca    7.8<L>      06 Oct 2021 05:58  Phos  3.0     10-06  Mg     2.1     10-06    TPro  5.2<L>  /  Alb  2.7<L>  /  TBili  0.2  /  DBili  x   /  AST  17  /  ALT  14  /  AlkPhos  97  10-06          RADIOLOGY & ADDITIONAL TESTS:     < from: MR Head w/wo IV Cont (10.05.21 @ 14:44) >    EXAM:  MR BRAIN WAW IC                            PROCEDURE DATE:  10/05/2021            INTERPRETATION:  CLINICAL INDICATION: CVA, nonverbal, right frontal mass and hemorrhage comment pneumonia and hyponatremia.      Magnetic resonance imaging of the brain was carried out with transaxial SPGR, FLAIR, fast spin echo T2 weighted images, axial susceptibility weighted series, diffusion weighted series and sagittal T1 weighted series on a 1.5 Victorina magnet. Post contrast axial, coronal and sagittal T1 weighted images were obtained. 5 cc of Gadavist were intravenously injected,  2.5 cc were discarded.    Comparison is made with the prior brain CT of 1/21/2021 and 9/30/2021.    There is a large hemorrhagic mass in the right frontal lobe which enhances with contrast and appears to be intra-axial and extends across the genu of the corpus callosum into the left frontal lobe. The mass is predominantly solid and measures 6 cm in AP diameter by 5.8 cm transversely by 5.1 cm in craniocaudal diameter.Abnormal signal also extends into the hypothalamus and thalamus without contrast enhancement.      There is mass effect on the right lateral ventricle as well as moderate vasogenic edema. Additionally, a left inferior frontal enhancing mass which maybe intra or extra-axial is identified adjacent to the anterior clinoid process which measures 1.9 cm in AP diameter by 2.1 cm transversely by 1.7 cm in craniocaudal diameter.    In view of the fact that these masses were not present on 1/21/2021 theylikely represent neoplasm. Metastatic disease, glioblastoma and lymphoma be considered in view of the intra and extra-axial location. As well the mass is minimally hyperintense on diffusion imaging.      Ventricular dilatation appears to be predominantly from atrophy.      IMPRESSION: Large enhancing right frontal hemorrhagic mass crosses the genuine of the corpus callosum as well as a small focus in the left paraclinoid region. 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 Differential diagnosis includes metastases, glioblastoma and lymphoma.    --- End of Report ---                MAXINE MCCOY MD; Attending Radiologist  This document has been electronically signed. Oct  5 2021  3:38PM    < end of copied text >   Candelaria Rocha MD PGY-2  Internal Medicine Resident      INTERVAL HPI/OVERNIGHT EVENTS: DAVID ON, borderline tachycardic HR , hypertensive SBP 120s-160s, otherwise VSS on mechanical ventilation.     SUBJECTIVE: Patient seen and examined at bedside. Intubated, sedated, ROS cannot be obtained. Opening eyes this am however not tracking movements, not responsive to verbal or tactile stimuli       VITAL SIGNS:  ICU Vital Signs Last 24 Hrs  T(C): 36.8 (06 Oct 2021 04:00), Max: 37 (05 Oct 2021 16:00)  T(F): 98.2 (06 Oct 2021 04:00), Max: 98.6 (05 Oct 2021 16:00)  HR: 100 (06 Oct 2021 06:00) (70 - 107)  BP: 164/86 (06 Oct 2021 06:00) (126/60 - 178/83)  BP(mean): 118 (06 Oct 2021 06:00) (83 - 119)  ABP: --  ABP(mean): --  RR: 20 (06 Oct 2021 06:00) (14 - 23)  SpO2: 97% (06 Oct 2021 06:00) (96% - 100%)    Mode: AC/ CMV (Assist Control/ Continuous Mandatory Ventilation), RR (machine): 18, TV (machine): 400, FiO2: 21, PEEP: 5, ITime: 0.1, MAP: 10, PIP: 27  Plateau pressure:   P/F ratio:     10-05 @ 07:01  -  10-06 @ 07:00  --------------------------------------------------------  IN: 1957.6 mL / OUT: 100 mL / NET: 1857.6 mL      CAPILLARY BLOOD GLUCOSE      POCT Blood Glucose.: 168 mg/dL (06 Oct 2021 05:17)    ECG:    PHYSICAL EXAM:  GENERAL: Sedated, intubated, opening eyes this am however not tracking movements and unresponsive to stimuli, does not appear to be in distress  HEAD: Atraumatic, Normocephalic  EYES: Pupils small, equal, round, not overtly reactive to light, conjunctiva and sclera clear  ENMT: Moist mucous membranes, otherwise difficult to assess oropharynx 2/2 ET tube  NERVOUS SYSTEM: A&Ox0, sedated as above, pupils as above, opening eyes as above otherwise difficult to appreciate neurologic exam iso mental status  CHEST/LUNG: CTAB, intubated on mechanical ventilation  HEART: Regular rate and rhythm; No murmurs, rubs, or gallops  ABDOMEN: Soft, Nontender, Nondistended; Bowel sounds present  EXTREMITIES: 2+ Peripheral Pulses, b/l hands w/ nonpitting edema, b/l LE w/o edema    MEDICATIONS:  MEDICATIONS  (STANDING):  chlorhexidine 0.12% Liquid 15 milliLiter(s) Oral Mucosa every 12 hours  chlorhexidine 4% Liquid 1 Application(s) Topical <User Schedule>  dexAMETHasone  Injectable 4 milliGRAM(s) IV Push every 8 hours  dextrose 40% Gel 15 Gram(s) Oral once  dextrose 5%. 1000 milliLiter(s) (50 mL/Hr) IV Continuous <Continuous>  dextrose 5%. 1000 milliLiter(s) (100 mL/Hr) IV Continuous <Continuous>  dextrose 50% Injectable 25 Gram(s) IV Push once  dextrose 50% Injectable 12.5 Gram(s) IV Push once  dextrose 50% Injectable 25 Gram(s) IV Push once  glucagon  Injectable 1 milliGRAM(s) IntraMuscular once  hydrALAZINE 50 milliGRAM(s) Oral three times a day  insulin lispro (ADMELOG) corrective regimen sliding scale   SubCutaneous every 6 hours  insulin NPH human recombinant 6 Unit(s) SubCutaneous every 6 hours  levETIRAcetam  IVPB 500 milliGRAM(s) IV Intermittent every 12 hours  pantoprazole  Injectable 40 milliGRAM(s) IV Push two times a day  polyethylene glycol 3350 17 Gram(s) Oral every 12 hours  propofol Infusion 10.779 MICROgram(s)/kG/Min (3.35 mL/Hr) IV Continuous <Continuous>  senna Syrup 10 milliLiter(s) Oral at bedtime  sodium chloride 3%. 360 milliLiter(s) (30 mL/Hr) IV Continuous <Continuous>    MEDICATIONS  (PRN):      ALLERGIES:  Allergies    No Known Allergies    Intolerances        LABS:                        8.1    13.41 )-----------( 226      ( 06 Oct 2021 00:20 )             26.2     10-06    147<H>  |  116<H>  |  22  ----------------------------<  153<H>  3.8   |  18<L>  |  0.38<L>    Ca    7.8<L>      06 Oct 2021 05:58  Phos  3.0     10-06  Mg     2.1     10-06    TPro  5.2<L>  /  Alb  2.7<L>  /  TBili  0.2  /  DBili  x   /  AST  17  /  ALT  14  /  AlkPhos  97  10-06          RADIOLOGY & ADDITIONAL TESTS:     < from: MR Head w/wo IV Cont (10.05.21 @ 14:44) >    EXAM:  MR BRAIN WAW IC                            PROCEDURE DATE:  10/05/2021            INTERPRETATION:  CLINICAL INDICATION: CVA, nonverbal, right frontal mass and hemorrhage comment pneumonia and hyponatremia.      Magnetic resonance imaging of the brain was carried out with transaxial SPGR, FLAIR, fast spin echo T2 weighted images, axial susceptibility weighted series, diffusion weighted series and sagittal T1 weighted series on a 1.5 Victorina magnet. Post contrast axial, coronal and sagittal T1 weighted images were obtained. 5 cc of Gadavist were intravenously injected,  2.5 cc were discarded.    Comparison is made with the prior brain CT of 1/21/2021 and 9/30/2021.    There is a large hemorrhagic mass in the right frontal lobe which enhances with contrast and appears to be intra-axial and extends across the genu of the corpus callosum into the left frontal lobe. The mass is predominantly solid and measures 6 cm in AP diameter by 5.8 cm transversely by 5.1 cm in craniocaudal diameter.Abnormal signal also extends into the hypothalamus and thalamus without contrast enhancement.      There is mass effect on the right lateral ventricle as well as moderate vasogenic edema. Additionally, a left inferior frontal enhancing mass which maybe intra or extra-axial is identified adjacent to the anterior clinoid process which measures 1.9 cm in AP diameter by 2.1 cm transversely by 1.7 cm in craniocaudal diameter.    In view of the fact that these masses were not present on 1/21/2021 theylikely represent neoplasm. Metastatic disease, glioblastoma and lymphoma be considered in view of the intra and extra-axial location. As well the mass is minimally hyperintense on diffusion imaging.      Ventricular dilatation appears to be predominantly from atrophy.      IMPRESSION: Large enhancing right frontal hemorrhagic mass crosses the genuine of the corpus callosum as well as a small focus in the left paraclinoid region. 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 Differential diagnosis includes metastases, glioblastoma and lymphoma.    --- End of Report ---                MAXINE MCCOY MD; Attending Radiologist  This document has been electronically signed. Oct  5 2021  3:38PM    < end of copied text >

## 2021-10-06 NOTE — PROGRESS NOTE ADULT - SUBJECTIVE AND OBJECTIVE BOX
[facility] Division of Kidney Diseases & Hypertension  FOLLOW UP NOTE  [phone]--------------------------------------------------------------------------------  Chief Complaint:Non-traumatic intracranial hemorrhage        24 hour events/subjective: Patient seen & examined. Labs & vitals reviewed.  Intubated, sedated, ROS cannot be obtained. Labs & vitals reviewed. No overnight issues reported. Eyes open but pt not responsive.  UOP ?100cc    PAST HISTORY  --------------------------------------------------------------------------------  No significant changes to PMH, PSH, FHx, SHx, unless otherwise noted    ALLERGIES & MEDICATIONS  --------------------------------------------------------------------------------  Allergies    No Known Allergies    Intolerances      Standing Inpatient Medications  chlorhexidine 0.12% Liquid 15 milliLiter(s) Oral Mucosa every 12 hours  chlorhexidine 4% Liquid 1 Application(s) Topical <User Schedule>  dexAMETHasone  Injectable 4 milliGRAM(s) IV Push every 8 hours  dextrose 40% Gel 15 Gram(s) Oral once  dextrose 5%. 1000 milliLiter(s) IV Continuous <Continuous>  dextrose 5%. 1000 milliLiter(s) IV Continuous <Continuous>  dextrose 50% Injectable 25 Gram(s) IV Push once  dextrose 50% Injectable 12.5 Gram(s) IV Push once  dextrose 50% Injectable 25 Gram(s) IV Push once  glucagon  Injectable 1 milliGRAM(s) IntraMuscular once  hydrALAZINE 50 milliGRAM(s) Oral three times a day  insulin lispro (ADMELOG) corrective regimen sliding scale   SubCutaneous every 6 hours  insulin NPH human recombinant 6 Unit(s) SubCutaneous every 6 hours  levETIRAcetam  IVPB 500 milliGRAM(s) IV Intermittent every 12 hours  pantoprazole  Injectable 40 milliGRAM(s) IV Push two times a day  polyethylene glycol 3350 17 Gram(s) Oral every 12 hours  propofol Infusion 10.779 MICROgram(s)/kG/Min IV Continuous <Continuous>  senna Syrup 10 milliLiter(s) Oral at bedtime  sodium chloride 3%. 360 milliLiter(s) IV Continuous <Continuous>    PRN Inpatient Medications      REVIEW OF SYSTEMS  --------------------------------------------------------------------------------  unable to obtain    VITALS/PHYSICAL EXAM  --------------------------------------------------------------------------------  T(C): 36.7 (10-06-21 @ 08:00), Max: 37 (10-05-21 @ 16:00)  HR: 87 (10-06-21 @ 08:53) (70 - 107)  BP: 155/83 (10-06-21 @ 08:00) (126/60 - 164/86)  RR: 19 (10-06-21 @ 08:00) (14 - 21)  SpO2: 98% (10-06-21 @ 08:53) (96% - 100%)  Wt(kg): --        10-05-21 @ 07:01  -  10-06-21 @ 07:00  --------------------------------------------------------  IN: 1957.6 mL / OUT: 100 mL / NET: 1857.6 mL    10-06-21 @ 07:01  -  10-06-21 @ 09:08  --------------------------------------------------------  IN: 114.8 mL / OUT: 0 mL / NET: 114.8 mL      Physical Exam:  	Gen: intubated and on mech vent  	HEENT: MMM  	Pulm: CTA B/L  	CV: S1S2  	Abd: Soft, +BS   	Ext: + LE edema B/L  	Neuro: sedated  	Skin: Warm and dry  	Vascular access:      LABS/STUDIES  --------------------------------------------------------------------------------              8.1    13.41 >-----------<  226      [10-06-21 @ 00:20]              26.2     147  |  116  |  22  ----------------------------<  153      [10-06-21 @ 05:58]  3.8   |  18  |  0.38        Ca     7.8     [10-06-21 @ 05:58]      Mg     2.1     [10-06-21 @ 00:20]      Phos  3.0     [10-06-21 @ 00:20]    TPro  5.2  /  Alb  2.7  /  TBili  0.2  /  DBili  x   /  AST  17  /  ALT  14  /  AlkPhos  97  [10-06-21 @ 00:20]          Creatinine Trend:  SCr 0.38 [10-06 @ 05:58]  SCr 0.38 [10-06 @ 00:20]  SCr 0.37 [10-05 @ 18:03]  SCr 0.38 [10-05 @ 11:45]  SCr 0.38 [10-05 @ 05:54]    Urinalysis - [10-01-21 @ 00:18]      Color Yellow / Appearance Slightly Turbid / SG >1.050 / pH 6.5      Gluc Negative / Ketone Negative  / Bili Negative / Urobili Negative       Blood Small / Protein 100 mg/dL / Leuk Est Negative / Nitrite Negative      RBC 3 / WBC 19 / Hyaline 8 / Gran Few / Sq Epi  / Non Sq Epi 7 / Bacteria Negative    Urine Creatinine 21      [10-04-21 @ 15:28]  Urine Sodium 77      [10-04-21 @ 15:28]  Urine Chloride 27      [09-30-21 @ 00:02]  Urine Osmolality 515      [10-04-21 @ 15:28]    TSH 1.18      [10-01-21 @ 05:15]

## 2021-10-06 NOTE — CHART NOTE - NSCHARTNOTEFT_GEN_A_CORE
NEUROLOGY ATTENDING:  [phone]  	  CC: BRAIN TUMOR, CONFUSION    SUNRISE RECORDS REVIEWED   ALLSCRIPTS RECORDS REVIEWED  LABS REVIEWED  IMAGING REVIEWED  SPOKE WITH NEUROSURGERY  DISCUSSED WITH DAUGHTER, BROOKLYNN MELENDREZ, .[phone] for 33 MINUTES.  João Farnsworth  grandson [phone]; Brooklynn daughter [phone]  pcp: Elizabet Murguia [phone] (cell) [phone] (office)    80 yo RH man with PMHx “STROKE” with baseline impaired mental status, requiring special care, including PEG, and complicated by pneumonias and multiple ulcers including on the sacrum (? Pressure sores), now admitted for hyponatremia and found to have an enhancing, hemorrhagic mass lesion suspicious for glioma.  Sunrise notes from 2021 indicate that he had care at [facility] for fever and urinary incontinence. He was AOx4 at that time, but his legs were weak bilaterally and he was unable to roll over in bed. An ambulance took him to [facility], where subarachnoid hemorrhage and severe spinal stenosis were appreciated so he was transferred to [facility]. He had nausea and an episode of vomiting as well. He was evaluated by GI and was afebrile in the ED, with VB=799/75, RR=14, HR=87. His Albumin was 3.5 at that admission, while AST/ALT=231/214 with TBili=2.7. GI noted “CT A/P showing biliary duct dilation and choledocolithiasis, elevated wbc, concerning for cholangitis.” He underwent ERCP with stent placement on 2021. Although encephalopathic on admission, this was attributed to liver dysfunction and it resolved following the ERCP. A medicine note dated 2021 documents that he is AOx3. He also had a lactate of 3.5 upon admission. Curiously, his platelet count was 113 and no explanation for this thrombocytopenia was appreciated. His sodium was 140 during that admission. He was discharged home with services on 2021.  He had outpatient follow-up on 3/23/2021. Since hospitalization, he was noted to chew slowly and swallow slowly. [employer] records state he was hospitalized at [facility] in 2021 with a small stroke. He has been COVID-19 positive with lung infiltrates and recent treatment. He had memory loss and urinary incontinence. Dr Ed Schmidt recommended neurological clearance prior to repeat ERCP.  He was re-admitted to [facility] on 2021 for a cough. He is not COVID vaccinated. He has multiple abscesses, including penile infection and sacral wound for which a PICC line was present and he was on antibiotics (CEFEPIME 2g q8 21-21 + doxycycline 21 – present). He had started aspirin “two days ago.” He was intubated in ED and levophed started for hypotension. MICU consult obtained from ED. He had profound hyponatremia (to 116) on 2021.    I spoke with his daughter, who told me that on 2021, he was again feeling cold, “trembling,” like he had a bad fever, and they went to [facility]. He was found to have COVID. He was also found to have a stroke. His leg did not feel right. One side of his body (she is unsure which side) did not feel normal. He also had incontinence. He had trouble talking at that time. They could not enter the hospital with him, but over the phone video chat (with someone else doing the phone), he showed no reaction to phone. He was unresponsive. He recovered from this confusion after getting home from the hospital. He reacted to people talking to him. He could talk while watching TV, but didn’t make much sense. His wife also  around that time. His memory was poor and he could not remember that she was dead. When they saw Dr Schmidt on 3/23/21, he was able to remember some things, but not everything. He was not able to do that visit himself, but rather his daughter did the internet connection for this Telehealth visit. She had to provide most of the details. He could only say his name and age, he did not know the date. His long term memory was intact.  She tells me that the PEG tube was placed in 2021. He improved between April and July.  He was somewhat interactive, lethargic, and not perfect in August. He was in [facility]. 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    EXAMINATION  Intubated and sedated    IMAGING  I personally and independently reviewed available MR and CT imaging, for the following interpretation: The NEUROimaging reviewed is dated  10/5/2021 (MRI)			  In reviewing these images, I find DIFFUSE WHITE MATTER HYPERINTENSITY on the T2 weighted images, with signal change INFILTRATING THE CALLOSUM AND OTHER DEEP WHITE MATTER STRUCTURES, likely representing an admixture cerebral edema AND neoplasm.   I am concerned about ENLARGED VENTRICLES, NOTABLY VERY LARGE TEMPORAL HORNS.  On the contrast enhanced images, there is enhancement IN MULTIPLE PLACES, INCLUDING THE RIGHT FRONTAL LOBE WHERE THERE IS A LARGE MASS LESION AS WELL AS LEFT DEEP WHITE MATTER INVOLVED just anterior and inferior to the left frontal horn. There is ALSO a focal enhancing lesion in the left cerebellar hemisphere inferiorly.  DWI imaging shows no acute CVA and the T2 FLAIR is unimpressive for an older CVA.    LABS  10/6/2021  13.4\8.1/226        /26.2\  147|116|22/153  3.8|22|0.38 \  AST/ALT=17/14  ALBUMIN=2.7    IMPRESSION/PLAN  CEREBRAL EDEMA – continue decadron at 10mg q6, given the mass effect.    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 Moreover, if this is a GBM, which is (equally or more) likely, then family will understand available treatment options and risks/benefits of such therapy in the elderly patient.     COORDINATION OF CARE – I explained that we are waiting for final pathology, which should be available soon. She understands that the pathology we find has a strong chance of meaning there is no treatment to improve his condition.    DISPO – As this condition continues to pose significant immediate risk for morbidity and mortality, I will coordinate further, continued outpatient care with me.    TOTAL TIME SPENT WAS 90 MINUTES, OF WHICH HALF IN COUNSELLING AND COORDINATION OF CARE. NEUROLOGY ATTENDING:  [phone]  	  CC: BRAIN TUMOR, CONFUSION    PATIENT SEEN AND EXAMINED AT 4:45PM 10/6/2021  SUNRISE RECORDS REVIEWED   ALLSCRIPTS RECORDS REVIEWED  LABS REVIEWED  IMAGING REVIEWED  SPOKE WITH NEUROSURGERY  DISCUSSED WITH DAUGHTER, BROOKLYNN MELENDREZ, .[phone] for 33 MINUTES.  SPOKE WITH MICU RN AT BEDSIDE.    João Farnsworth  grandson [phone]; Brooklynn daughter [phone]  pcp: Elizabet Murguia [phone] (cell) [phone] (office)    82 yo RH man with PMHx “STROKE” with baseline impaired mental status, requiring special care, including PEG, and complicated by pneumonias and multiple ulcers including on the sacrum (? Pressure sores), now admitted for hyponatremia and found to have an enhancing, hemorrhagic mass lesion suspicious for glioma.    Sunrise notes from 2021 indicate that he had care at [facility] for fever and urinary incontinence. He was AOx4 at that time, but his legs were weak bilaterally and he was unable to roll over in bed. An ambulance took him to [facility], where subarachnoid hemorrhage and severe spinal stenosis were appreciated so he was transferred to [facility]. He had nausea and an episode of vomiting as well. He was evaluated by GI and was afebrile in the ED, with TY=053/75, RR=14, HR=87. His Albumin was 3.5 at that admission, while AST/ALT=231/214 with TBili=2.7. GI noted “CT A/P showing biliary duct dilation and choledocolithiasis, elevated wbc, concerning for cholangitis.” He underwent ERCP with stent placement on 2021. Although encephalopathic on admission, this was attributed to liver dysfunction and it resolved following the ERCP. A medicine note dated 2021 documents that he is AOx3. He also had a lactate of 3.5 upon admission. Curiously, his platelet count was 113 and no explanation for this thrombocytopenia was appreciated. His sodium was 140 during that admission. He was discharged home with services on 2021.    He had outpatient follow-up on 3/23/2021. Since hospitalization, he was noted to chew slowly and swallow slowly. [employer] records state he was hospitalized at [facility] in 2021 with a small stroke. He has been COVID-19 positive with lung infiltrates and recent treatment. He had memory loss and urinary incontinence. Dr Ed Schmidt recommended neurological clearance prior to repeat ERCP.  He was re-admitted to [facility] on 2021 for a cough. He is not COVID vaccinated. He has multiple abscesses, including penile infection and sacral wound for which a PICC line was present and he was on antibiotics (CEFEPIME 2g q8 21-21 + doxycycline 21 – present). He had started aspirin “two days ago.” He was intubated in ED and levophed started for hypotension. MICU consult obtained from ED. He had profound hyponatremia (to 116) on 2021.    I spoke with his daughter, who told me that on 2021, he was again feeling cold, “trembling,” like he had a bad fever, and they went to [facility]. He was found to have COVID. He was also found to have a stroke. His leg did not feel right. One side of his body (she is unsure which side) did not feel normal. He also had incontinence. He had trouble talking at that time. They could not enter the hospital with him, but over the phone video chat (with someone else doing the phone), he showed no reaction to phone. He was unresponsive. He recovered from this confusion after getting home from the hospital. He reacted to people talking to him. He could talk while watching TV, but didn’t make much sense. His wife also  around that time. His memory was poor and he could not remember that she was dead. When they saw Dr Schmidt on 3/23/21, he was able to remember some things, but not everything. He was not able to do that visit himself, but rather his daughter did the internet connection for this Telehealth visit. She had to provide most of the details. He could only say his name and age, he did not know the date. His long term memory was intact.    She tells me that the PEG tube was placed in 2021. He improved between April and July. He was somewhat interactive, lethargic, and not perfect in August. He was in [facility]. 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    EXAMINATION  No longer Intubated nor sedated (sedation stopped at 8am and extubated ~ 45minutes ago)  follows no commands.  eyes open upon arrival  no verbal output  (+) volitional eye movements, which appear minimally purposeful  flaccid quadriparesis  absent DTRs  no grasp response     IMAGING  I personally and independently reviewed available MR and CT imaging, for the following interpretation: The NEUROimaging reviewed is dated  10/5/2021 (MRI)			  In reviewing these images, I find DIFFUSE WHITE MATTER HYPERINTENSITY on the T2 weighted images, with signal change INFILTRATING THE CALLOSUM AND OTHER DEEP WHITE MATTER STRUCTURES, likely representing an admixture cerebral edema AND neoplasm.   I am concerned about ENLARGED VENTRICLES, NOTABLY VERY LARGE TEMPORAL HORNS.  On the contrast enhanced images, there is enhancement IN MULTIPLE PLACES, INCLUDING THE RIGHT FRONTAL LOBE WHERE THERE IS A LARGE MASS LESION AS WELL AS LEFT DEEP WHITE MATTER INVOLVED just anterior and inferior to the left frontal horn. There is ALSO a focal enhancing lesion in the left cerebellar hemisphere inferiorly.  DWI imaging shows no acute CVA and the T2 FLAIR is unimpressive for an older CVA, NO EVIDENCE for large Left embolic MCA CVA to cause such depressed mental status.    LABS  10/6/2021  13.4\8.1/226        /26.2\  147|116|22/153  3.8|22|0.38 \  AST/ALT=17/14  ALBUMIN=2.7    IMPRESSION/PLAN  BRAIN TUMOR -- I think an excisional biopsy is reasonable as this may be a PCNSL, which is treatable and sometimes patients improve with treatment. Moreover, if this is a GBM, which is (equally or more) likely, then family will understand available treatment options and risks/benefits of such therapy in the elderly patient.     CEREBRAL EDEMA – While hyperglycemia is an issue, consider increasing decadron to 8mg q8, given the mass effect and possible PCNSL.    COORDINATION OF CARE – I explained that we are waiting for final pathology, which should be available soon. She understands that the pathology we find has a strong chance of meaning there is no treatment to improve his condition.    DISPO – As this condition continues to pose significant immediate risk for morbidity and mortality, I will coordinate further, continued care with me.    TOTAL TIME SPENT WAS 90 MINUTES, OF WHICH HALF IN COUNSELLING AND COORDINATION OF CARE.

## 2021-10-06 NOTE — PROGRESS NOTE ADULT - ASSESSMENT
80yM PMH of gallstones (30-40 years ago), BPH, HLD, h/o spinal stenosis, subarachnoid and severe spinal stenosis, recent PNA, penile infection (?abscess) &  sacral wound on antibiotic with PICC line presenting with possible aspiration PNA, extensive right frontal intraparenchymal hemorrhage, herniation & hyponatremia. Nephrology was called for hyponatremia.     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 In view of severe hyponatremia and intracranial bleed, received 3%HTS. Repeat SNa was 116 on 9/30/21. U Osm was 486 with Ya of 15 with low uric acid on 9/30/21. Today Serum Na is 141-->147 at goal. May d/c 3% HTS if okay with neurosurgery. Repeat SNa q4 for now. Goal Na per neurosurgery is 145-155. Uosm is 500 indicating presence of ADH currently. Please re-check urine lytes, ur osmolality. Continue with GOC discussion with family. Neurosurgery recs noted.              If you have any questions, please feel free to contact me  Roxann Man  Nephrology Fellow  Pager NS: [phone]/ [facility]: [phone]    (After 5 pm or on weekends please page the on-call fellow, can check [email or web address] for schedule. Login is demond vera, schedule under [facility] medicine, psych, derm)         80yM PMH of gallstones (30-40 years ago), BPH, HLD, h/o spinal stenosis, subarachnoid and severe spinal stenosis, recent PNA, penile infection (?abscess) &  sacral wound on antibiotic with PICC line presenting with possible aspiration PNA, extensive right frontal intraparenchymal hemorrhage, herniation & hyponatremia. Nephrology was called for hyponatremia.     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 In view of severe hyponatremia and intracranial bleed, received 3%HTS. Repeat SNa was 116 on 9/30/21. U Osm was 486 with Ya of 15 with low uric acid on 9/30/21. Today Serum Na is 141-->147. Can lower the 3% HTS rate to 20cc/hr. Repeat SNa q4 for now. Goal Na per neurosurgery is 145-155. Uosm is 500 indicating presence of ADH currently. Please re-check urine lytes, ur osmolality. Continue with GOC discussion with family. Neurosurgery recs noted.              If you have any questions, please feel free to contact me  Roxann Man  Nephrology Fellow  Pager NS: [phone]/ [facility]: [phone]    (After 5 pm or on weekends please page the on-call fellow, can check [email or web address] for schedule. Login is demond vera, schedule under [facility] medicine, psych, derm)         80yM PMH of gallstones (30-40 years ago), BPH, HLD, h/o spinal stenosis, subarachnoid and severe spinal stenosis, recent PNA, penile infection (?abscess) &  sacral wound on antibiotic with PICC line presenting with possible aspiration PNA, extensive right frontal intraparenchymal hemorrhage, herniation & hyponatremia. Nephrology was called for hyponatremia.       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 In view of severe hyponatremia and intracranial bleed, received 3%HTS. Repeat SNa was 116 on 9/30/21. U Osm was 486 with Ya of 15 with low uric acid on 9/30/21. Today Serum Na is 141-->147. CONTINUE HYPERTONIC SALINE 3%- DECREASE RATE TO 20 CC/HR. Repeat SNa q4 for now. Goal Na per neurosurgery is 145-155. Uosm is 500 indicating presence of ADH currently. Please re-check urine lytes, ur osmolality. Continue with GOC discussion with family. Neurosurgery recs noted.          If you have any questions, please feel free to contact me  Roxann Man  Nephrology Fellow  Pager NS: [phone]/ [facility]: [phone]    (After 5 pm or on weekends please page the on-call fellow, can check [email or web address] for schedule. Login is demond vera, schedule under [facility] medicine, psych, derm)

## 2021-10-06 NOTE — PROGRESS NOTE ADULT - ATTENDING COMMENTS
81 M with CVA, non verbal and bedbound, multiple ulcers, PEG for oropharyngeal dysphagia here with acute metabolic encephalopathy and cough due to intraparenchymal hemorrhage with midline shift and hyponatremia requiring intubation, acute hypoxemic respiratory failure due to aspiration pna.    MRI done - concerning for malignancy like glioblastoma.  Awaiting on neurosx reccs after their tumor board conference that was held this AM    - off sedation, tolerating PS trials, will likely extubate today for acute hypoxemic respiratory failure   - f/u neurosurgery reccs re: biopsy  - c/w dex, started on slow taper  - c/w po hydralazine for BP control given bleed  - monitor off hypertonic saline for now  - monitor off abx for aspiration pneumonia   - c/w peg feeds for oropharyngeal dysphagia.      Critical care services provided.     I have personally and independently provided 35 minutes of critical care services.  This excludes any time spent on separate procedures or teaching.

## 2021-10-06 NOTE — PROGRESS NOTE ADULT - ATTENDING COMMENTS
81 M with CVA, non verbal and bedbound, multiple ulcers, PEG for oropharyngeal dysphagia here with acute metabolic encephalopathy and cough due to intraparenchymal hemorrhage with midline shift and hyponatremia requiring intubation, acute hypoxemic respiratory failure due to aspiration pna.    MRI done - concerning for malignancy like glioblastoma.  Awaiting on neurosx reccs after their tumor board conference that was held this AM    - off sedation, tolerating PS trials, will likely extubate today for acute hypoxemic respiratory failure   - f/u neurosurgery reccs re: biopsy  - c/w dex, started on slow taper  - c/w po hydralazine for BP control given bleed  - monitor off hypertonic saline for now  - monitor off abx for aspiration pneumonia   - c/w peg feeds for oropharyngeal dysphagia. Spoke with neurooncology who is recommending excisional biopsy of right frontal lesion as there is a concern for lymphoma, which can be treated with potential for improvement.  Spoke with MICU attending who feels pt is medically ready for a surgical procedure at this point but expressed some concern that pt may not benefit from intervention in the long-term.  I also discussed pt's history and condition with daughter who states that pt had a stroke in Jan/Feb but was able to walk some and talk.  In August, he began to deteriorate.  A CT in Jan did not show the mass lesion.  We discussed his prognosis without treatment and potential prognosis with treatment depending upon the pathology results.  I have arranged to meet with her tomorrow in PM to discuss the imaging and surgical perspective in more detail so that she can make an informed decision regarding intervention.

## 2021-10-06 NOTE — CHART NOTE - NSCHARTNOTEFT_GEN_A_CORE
Patient's case was discussed during Tumor Board. Per the discussion, there is minimal benefit with biopsy or intervention of the R frontal brain mass.

## 2021-10-07 NOTE — CHART NOTE - NSCHARTNOTEFT_GEN_A_CORE
MICU Transfer Note  ---------------------------    Transfer from: MICU  Transfer to:  ( x ) Medicine    (  ) Telemetry    (  ) RCU    (  ) Palliative    (  ) Stroke Unit    (  ) _______________  Accepting Physician:    HPI: 80M PMH gallstones (30-40 yrs ago), BPH, HLD, spinal stenosis, CVA, s/p PEG, recent PNA, penile infxn (?abscess) and sacral wound on abx w/ PICC (cefepime 2gq8h 8/20-9/28; doxy 8/20-?), presented w/ worsening cough x 1 wk after daughter attempted to give ensure by mouth. Daughter noticed "snoring" breath sounds and called EMS. Denies sick contact, diarrhea. VSS in ED, Labs significant for Na 110, CO2 14, lactate 2.5. CXR w/ b/l patchy opacities R>L c/w PNA. 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 CTAP w/ multifocal PNA. Iso worsening mental status pt intubated in ED and transferred to MICU.     MICU Course: Empiric abx for multifocal PNA presumed 2/2 aspiration, completed Zosyn course. Placed on levophed iso hypotension, eventually weaned 10/2, sedation weaned as well. 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 Neurosg on board, neuroonc c/s, results of tumor board and discussion w/ family were to proceed w/ biopsy to further evaluate lymphoma vs glioblastoma multiforme vs mets. Pt extubated 10/6 weaned to RA w/ adequate SpO2%. Iso HTN pt initiated on hydralazine po 50mg TID. Tolerating PEG feeds. Iso steroid use pt requiring NPH insulin q6h titrated to 6 however pt intermittently w/ NPH held iso borderline FS. C/f occult blood from gastric lavage, placed on PPI, Hgb slowly downtrended however stabilized.     Pt now stable for transfer to medical floor.    OBJECTIVE --  Vital Signs Last 24 Hrs  T(C): 36.9 (07 Oct 2021 12:00), Max: 37.4 (07 Oct 2021 04:00)  T(F): 98.4 (07 Oct 2021 12:00), Max: 99.3 (07 Oct 2021 04:00)  HR: 75 (07 Oct 2021 13:00) (75 - 111)  BP: 129/62 (07 Oct 2021 13:00) (129/62 - 173/84)  BP(mean): 89 (07 Oct 2021 13:00) (89 - 135)  RR: 14 (07 Oct 2021 13:00) (13 - 20)  SpO2: 95% (07 Oct 2021 13:00) (94% - 100%)    I&O's Summary    06 Oct 2021 07:01  -  07 Oct 2021 07:00  --------------------------------------------------------  IN: 1374.8 mL / OUT: 1600 mL / NET: -225.2 mL    07 Oct 2021 07:01  -  07 Oct 2021 15:02  --------------------------------------------------------  IN: 275 mL / OUT: 0 mL / NET: 275 mL        MEDICATIONS  (STANDING):  ascorbic acid 500 milliGRAM(s) Oral daily  chlorhexidine 4% Liquid 1 Application(s) Topical <User Schedule>  dexAMETHasone  Injectable 4 milliGRAM(s) IV Push every 8 hours  dextrose 40% Gel 15 Gram(s) Oral once  dextrose 5%. 1000 milliLiter(s) (50 mL/Hr) IV Continuous <Continuous>  dextrose 5%. 1000 milliLiter(s) (100 mL/Hr) IV Continuous <Continuous>  dextrose 50% Injectable 25 Gram(s) IV Push once  dextrose 50% Injectable 12.5 Gram(s) IV Push once  dextrose 50% Injectable 25 Gram(s) IV Push once  glucagon  Injectable 1 milliGRAM(s) IntraMuscular once  hydrALAZINE 50 milliGRAM(s) Oral three times a day  insulin lispro (ADMELOG) corrective regimen sliding scale   SubCutaneous every 6 hours  insulin NPH human recombinant 6 Unit(s) SubCutaneous every 6 hours  levETIRAcetam  IVPB 500 milliGRAM(s) IV Intermittent every 12 hours  multivitamin/minerals/iron Oral Solution (CENTRUM) 15 milliLiter(s) Oral daily  pantoprazole  Injectable 40 milliGRAM(s) IV Push two times a day  polyethylene glycol 3350 17 Gram(s) Oral every 12 hours  senna Syrup 10 milliLiter(s) Oral at bedtime    MEDICATIONS  (PRN):        LABS                                            8.1                   Neurophils% (auto):   x      (10-07 @ 00:27):    15.47)-----------(238          Lymphocytes% (auto):  x                                             25.7                   Eosinphils% (auto):   x        Manual%: Neutrophils x    ; Lymphocytes x    ; Eosinophils x    ; Bands%: x    ; Blasts x                                    149    |  115    |  23                  Calcium: 8.4   / iCa: x      (10-07 @ 11:41)    ----------------------------<  104       Magnesium: 2.2                              3.7     |  22     |  0.34             Phosphorous: 3.1                ASSESSMENT & PLAN:         For Follow-Up:  [ ]   [ ] MICU Transfer Note  ---------------------------    Transfer from: MICU  Transfer to:  ( x ) Medicine    (  ) Telemetry    (  ) RCU    (  ) Palliative    (  ) Stroke Unit    (  ) _______________  Accepting Physician:    HPI: 80M PMH gallstones (30-40 yrs ago), BPH, HLD, spinal stenosis, CVA, s/p PEG, recent PNA, penile infxn (?abscess) and sacral wound on abx w/ PICC (cefepime 2gq8h 8/20-9/28; doxy 8/20-?), presented w/ worsening cough x 1 wk after daughter attempted to give ensure by mouth. Daughter noticed "snoring" breath sounds and called EMS. Denies sick contact, diarrhea. VSS in ED, Labs significant for Na 110, CO2 14, lactate 2.5. CXR w/ b/l patchy opacities R>L c/w PNA. 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 CTAP w/ multifocal PNA. Iso worsening mental status pt intubated in ED and transferred to MICU.     MICU Course: Empiric abx for multifocal PNA presumed 2/2 aspiration, completed Zosyn course. Placed on levophed iso hypotension, eventually weaned 10/2, sedation weaned as well. 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 Neurosg on board, neuroonc c/s, results of tumor board and discussion w/ family were to proceed w/ biopsy to further evaluate lymphoma vs glioblastoma multiforme vs mets. Pt extubated 10/6 weaned to RA w/ adequate SpO2%. Iso HTN pt initiated on hydralazine po 50mg TID. Tolerating PEG feeds. Iso steroid use pt requiring NPH insulin q6h titrated to 6 however pt intermittently w/ NPH held iso borderline FS. C/f occult blood from gastric lavage, placed on PPI, Hgb slowly downtrended however stabilized.     Pt now stable for transfer to medical floor.    OBJECTIVE --  Vital Signs Last 24 Hrs  T(C): 36.9 (07 Oct 2021 12:00), Max: 37.4 (07 Oct 2021 04:00)  T(F): 98.4 (07 Oct 2021 12:00), Max: 99.3 (07 Oct 2021 04:00)  HR: 75 (07 Oct 2021 13:00) (75 - 111)  BP: 129/62 (07 Oct 2021 13:00) (129/62 - 173/84)  BP(mean): 89 (07 Oct 2021 13:00) (89 - 135)  RR: 14 (07 Oct 2021 13:00) (13 - 20)  SpO2: 95% (07 Oct 2021 13:00) (94% - 100%)    I&O's Summary    06 Oct 2021 07:01  -  07 Oct 2021 07:00  --------------------------------------------------------  IN: 1374.8 mL / OUT: 1600 mL / NET: -225.2 mL    07 Oct 2021 07:01  -  07 Oct 2021 15:02  --------------------------------------------------------  IN: 275 mL / OUT: 0 mL / NET: 275 mL        MEDICATIONS  (STANDING):  ascorbic acid 500 milliGRAM(s) Oral daily  chlorhexidine 4% Liquid 1 Application(s) Topical <User Schedule>  dexAMETHasone  Injectable 4 milliGRAM(s) IV Push every 8 hours  dextrose 40% Gel 15 Gram(s) Oral once  dextrose 5%. 1000 milliLiter(s) (50 mL/Hr) IV Continuous <Continuous>  dextrose 5%. 1000 milliLiter(s) (100 mL/Hr) IV Continuous <Continuous>  dextrose 50% Injectable 25 Gram(s) IV Push once  dextrose 50% Injectable 12.5 Gram(s) IV Push once  dextrose 50% Injectable 25 Gram(s) IV Push once  glucagon  Injectable 1 milliGRAM(s) IntraMuscular once  hydrALAZINE 50 milliGRAM(s) Oral three times a day  insulin lispro (ADMELOG) corrective regimen sliding scale   SubCutaneous every 6 hours  insulin NPH human recombinant 6 Unit(s) SubCutaneous every 6 hours  levETIRAcetam  IVPB 500 milliGRAM(s) IV Intermittent every 12 hours  multivitamin/minerals/iron Oral Solution (CENTRUM) 15 milliLiter(s) Oral daily  pantoprazole  Injectable 40 milliGRAM(s) IV Push two times a day  polyethylene glycol 3350 17 Gram(s) Oral every 12 hours  senna Syrup 10 milliLiter(s) Oral at bedtime    MEDICATIONS  (PRN):        LABS                                            8.1                   Neurophils% (auto):   x      (10-07 @ 00:27):    15.47)-----------(238          Lymphocytes% (auto):  x                                             25.7                   Eosinphils% (auto):   x        Manual%: Neutrophils x    ; Lymphocytes x    ; Eosinophils x    ; Bands%: x    ; Blasts x                                    149    |  115    |  23                  Calcium: 8.4   / iCa: x      (10-07 @ 11:41)    ----------------------------<  104       Magnesium: 2.2                              3.7     |  22     |  0.34             Phosphorous: 3.1            ASSESSMENT & PLAN:     80M PMH gallstones (30-40 yrs ago), BPH, HLD, Spinal stenosis, CVA (nonverbal, A&Ox0 bl), PEG, recent PNA, penile infxn (?abscess), sacral wound on abx via PICC (cefepime 2gq8h 8/20-9/28, doxy 8/20 - ?), a/w cough found to have PNA presumed aspiration), c/f intracranial hemorrhage vs malignancy, and hyponatremia, admit to MICU initially intubated now extubated 10/6, MRI brain revealing mass c/f neoplasm, plan for potential neurosg biopsy pending family discussion    #Neuro  - CVA s/p PEG 2/2021, previously speaking, eating, walking w/ assistance up until previous admit 8/2021 after which mental status declined to non-verbal, bedbound iso recent infxn prior to admit  - CTH 9/29 R sided intraparenchymal hemorrhage w/ surrounding edema, 2.5x2.8 cm hyperdensity crossing midline 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 Neurosg to have further d/w daughter this afternoon re details of procedure  - extensive GOC conversation w/ daughter in chart 10/3, palliative on board  - dexamethasone 4 IV frequency decreased from q6h to q8h, neurosg aware  - c/w keppra 500 BID    #Resp  AHRF 2/2 aspiration PNA iso worsening mental status  - Intubated for airway protection, extubated 10/6 now on RA, SpO2% adequate   - Zosyn course completed 10/5, f/u sputum Cx   - d/c'ed AZT for neg legionella    #CV  Hypertension:  - SBPs elevated  - c/w hydralazine po 50mg TID  Ectopy  - pt w/ irregular rhythm on exam, EKG revealing NSR w/ PACs and PVCs, will monitor BMP and replete prn    #GI  PEG in place  - Tolerating feeds  - Occult blood (+) in gastric lavage, but no overt GIB; c/w PPI  - c/w senna and Miralax    #Renal/   Hyponatremia:  - Multifactorial i/s/o brain mass, chronic hyponatremia, decreased PO, and ?N/V/D prior to admission  - s/p hypertonic saline for Na 110 on admit, c/b overcorrection, s/p DDAVP and free water  - s/p 3% NS, dc'd yesterday iso Na in range (145-155 per neurosg recs), will monitor BMP q12h and consider restart as needed  - monitor BMP, monitor I/O    #ID  - Recent penile infection (?abscess) & sacral wound on antibiotic with PICC line (cefepime 2gq8 8/20-9/28; doxycycline 8/20---)  - Aspiration event leading up to admission; Zosyn course completed 10/5, azithro dc'd as above, f/u sputum Cx  - Blood Cx including PICC Cx ngtd (9/30)  - Hx of COVID infection, spike ab positive    #Endo  - TSH wnl  - F/u cortisol level  - No known hx of DM, possible steroid-induced hyperglycemia; continue to monitor FS with mod ISS; NPH 13 -> 6 q6h ON iso decreased steroid frequency, ctm and titrate accordingly     #Heme  Hgb of 10 on admission  - Holding AC i/s/o intracranial hemorrhage  - Hgb slowly downtrending without overt bleeding source, now stable   - (+) occult blood from gastric lavage, c/w PPI  - Trend CBC and keep active T & S    #PPx:  - PPI  - DVT: SCDs i/s/o ICH    #Ethics  - Ongoing extensive GOC with daughter: FULL CODE  - Palliative on board    For Follow-Up:  [ ]   [ ] MICU Transfer Note  ---------------------------    Transfer from: MICU  Transfer to:  ( x ) Medicine    (  ) Telemetry    (  ) RCU    (  ) Palliative    (  ) Stroke Unit    (  ) _______________  Accepting Physician:    HPI: 80M PMH gallstones (30-40 yrs ago), BPH, HLD, spinal stenosis, CVA, s/p PEG, recent PNA, penile infxn (?abscess) and sacral wound on abx w/ PICC (cefepime 2gq8h 8/20-9/28; doxy 8/20-?), presented w/ worsening cough x 1 wk after daughter attempted to give ensure by mouth. Daughter noticed "snoring" breath sounds and called EMS. Denies sick contact, diarrhea. VSS in ED, Labs significant for Na 110, CO2 14, lactate 2.5. CXR w/ b/l patchy opacities R>L c/w PNA. 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 CTAP w/ multifocal PNA. Iso worsening mental status pt intubated in ED and transferred to MICU.     MICU Course: Empiric abx for multifocal PNA presumed 2/2 aspiration, completed Zosyn course. Placed on levophed iso hypotension, eventually weaned 10/2, sedation weaned as well. 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 Neurosg on board, neuroonc c/s, results of tumor board and discussion w/ family were to proceed w/ biopsy to further evaluate lymphoma vs glioblastoma multiforme vs mets. Pt extubated 10/6 weaned to RA w/ adequate SpO2%. Iso HTN pt initiated on hydralazine po 50mg TID. Tolerating PEG feeds. Iso steroid use pt requiring NPH insulin q6h titrated to 6 however pt intermittently w/ NPH held iso borderline FS. C/f occult blood from gastric lavage, placed on PPI, Hgb slowly downtrended however stabilized.     Pt now stable for transfer to medical floor.    OBJECTIVE --  Vital Signs Last 24 Hrs  T(C): 36.9 (07 Oct 2021 12:00), Max: 37.4 (07 Oct 2021 04:00)  T(F): 98.4 (07 Oct 2021 12:00), Max: 99.3 (07 Oct 2021 04:00)  HR: 75 (07 Oct 2021 13:00) (75 - 111)  BP: 129/62 (07 Oct 2021 13:00) (129/62 - 173/84)  BP(mean): 89 (07 Oct 2021 13:00) (89 - 135)  RR: 14 (07 Oct 2021 13:00) (13 - 20)  SpO2: 95% (07 Oct 2021 13:00) (94% - 100%)    I&O's Summary    06 Oct 2021 07:01  -  07 Oct 2021 07:00  --------------------------------------------------------  IN: 1374.8 mL / OUT: 1600 mL / NET: -225.2 mL    07 Oct 2021 07:01  -  07 Oct 2021 15:02  --------------------------------------------------------  IN: 275 mL / OUT: 0 mL / NET: 275 mL        MEDICATIONS  (STANDING):  ascorbic acid 500 milliGRAM(s) Oral daily  chlorhexidine 4% Liquid 1 Application(s) Topical <User Schedule>  dexAMETHasone  Injectable 4 milliGRAM(s) IV Push every 8 hours  dextrose 40% Gel 15 Gram(s) Oral once  dextrose 5%. 1000 milliLiter(s) (50 mL/Hr) IV Continuous <Continuous>  dextrose 5%. 1000 milliLiter(s) (100 mL/Hr) IV Continuous <Continuous>  dextrose 50% Injectable 25 Gram(s) IV Push once  dextrose 50% Injectable 12.5 Gram(s) IV Push once  dextrose 50% Injectable 25 Gram(s) IV Push once  glucagon  Injectable 1 milliGRAM(s) IntraMuscular once  hydrALAZINE 50 milliGRAM(s) Oral three times a day  insulin lispro (ADMELOG) corrective regimen sliding scale   SubCutaneous every 6 hours  insulin NPH human recombinant 6 Unit(s) SubCutaneous every 6 hours  levETIRAcetam  IVPB 500 milliGRAM(s) IV Intermittent every 12 hours  multivitamin/minerals/iron Oral Solution (CENTRUM) 15 milliLiter(s) Oral daily  pantoprazole  Injectable 40 milliGRAM(s) IV Push two times a day  polyethylene glycol 3350 17 Gram(s) Oral every 12 hours  senna Syrup 10 milliLiter(s) Oral at bedtime    MEDICATIONS  (PRN):        LABS                                            8.1                   Neurophils% (auto):   x      (10-07 @ 00:27):    15.47)-----------(238          Lymphocytes% (auto):  x                                             25.7                   Eosinphils% (auto):   x        Manual%: Neutrophils x    ; Lymphocytes x    ; Eosinophils x    ; Bands%: x    ; Blasts x                                    149    |  115    |  23                  Calcium: 8.4   / iCa: x      (10-07 @ 11:41)    ----------------------------<  104       Magnesium: 2.2                              3.7     |  22     |  0.34             Phosphorous: 3.1            ASSESSMENT & PLAN:     80M PMH gallstones (30-40 yrs ago), BPH, HLD, Spinal stenosis, CVA (nonverbal, A&Ox0 bl), PEG, recent PNA, penile infxn (?abscess), sacral wound on abx via PICC (cefepime 2gq8h 8/20-9/28, doxy 8/20 - ?), a/w cough found to have PNA presumed aspiration), c/f intracranial hemorrhage vs malignancy, and hyponatremia, admit to MICU initially intubated now extubated 10/6, MRI brain revealing mass c/f neoplasm, plan for potential neurosg biopsy pending family discussion    #Neuro  - CVA s/p PEG 2/2021, previously speaking, eating, walking w/ assistance up until previous admit 8/2021 after which mental status declined to non-verbal, bedbound iso recent infxn prior to admit  - CTH 9/29 R sided intraparenchymal hemorrhage w/ surrounding edema, 2.5x2.8 cm hyperdensity crossing midline 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 Neurosg to have further d/w daughter this afternoon re details of procedure  - extensive GOC conversation w/ daughter in chart 10/3, palliative on board  - dexamethasone 4 IV frequency decreased from q6h to q8h, neurosg aware  - c/w keppra 500 BID    #Resp  AHRF 2/2 aspiration PNA iso worsening mental status  - Intubated for airway protection, extubated 10/6 now on RA, SpO2% adequate   - Zosyn course completed 10/5, f/u sputum Cx   - d/c'ed AZT for neg legionella    #CV  Hypertension:  - SBPs elevated  - c/w hydralazine po 50mg TID  Ectopy  - pt w/ irregular rhythm on exam, EKG revealing NSR w/ PACs and PVCs, will monitor BMP and replete prn    #GI  PEG in place  - Tolerating feeds  - Occult blood (+) in gastric lavage, but no overt GIB; c/w PPI  - c/w senna and Miralax    #Renal/   Hyponatremia:  - Multifactorial i/s/o brain mass, chronic hyponatremia, decreased PO, and ?N/V/D prior to admission  - s/p hypertonic saline for Na 110 on admit, c/b overcorrection, s/p DDAVP and free water  - s/p 3% NS, dc'd yesterday iso Na in range (145-155 per neurosg recs), will monitor BMP q12h and consider restart as needed  - monitor BMP, monitor I/O    #ID  - Recent penile infection (?abscess) & sacral wound on antibiotic with PICC line (cefepime 2gq8 8/20-9/28; doxycycline 8/20---)  - Aspiration event leading up to admission; Zosyn course completed 10/5, azithro dc'd as above, f/u sputum Cx  - Blood Cx including PICC Cx ngtd (9/30)  - Hx of COVID infection, spike ab positive    #Endo  - TSH wnl  - F/u cortisol level  - No known hx of DM, possible steroid-induced hyperglycemia; continue to monitor FS with mod ISS; NPH 13 -> 6 q6h ON iso decreased steroid frequency, ctm and titrate accordingly     #Heme  Hgb of 10 on admission  - Holding AC i/s/o intracranial hemorrhage  - Hgb slowly downtrending without overt bleeding source, now stable   - (+) occult blood from gastric lavage, c/w PPI  - Trend CBC and keep active T & S    #PPx:  - PPI  - DVT: SCDs i/s/o ICH    #Ethics  - Ongoing extensive GOC with daughter: FULL CODE  - Palliative on board    For Follow-Up:  [ ] f/u neurosg recs and ongoing d/w family  [ ] f/u neuroonc recs  [ ] f/u BMP w/ regard to Na goal 145-155 per neurosg due to intracranial mass, consider hypertonic saline as needed  [ ] monitor FS and titrate insulin accordingly on steroids MICU Transfer Note  ---------------------------    Transfer from: MICU  Transfer to:  ( x ) Medicine    (  ) Telemetry    (  ) RCU    (  ) Palliative    (  ) Stroke Unit    (  ) _______________  Accepting Physician: Dr. NIESHA Gibbons    HPI: 80M PMH gallstones (30-40 yrs ago), BPH, HLD, spinal stenosis, CVA, s/p PEG, recent PNA, penile infxn (?abscess) and sacral wound on abx w/ PICC (cefepime 2gq8h 8/20-9/28; doxy 8/20-?), presented w/ worsening cough x 1 wk after daughter attempted to give ensure by mouth. Daughter noticed "snoring" breath sounds and called EMS. Denies sick contact, diarrhea. VSS in ED, Labs significant for Na 110, CO2 14, lactate 2.5. CXR w/ b/l patchy opacities R>L c/w PNA. 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 CTAP w/ multifocal PNA. Iso worsening mental status pt intubated in ED and transferred to MICU.     MICU Course: Empiric abx for multifocal PNA presumed 2/2 aspiration, completed Zosyn course. Placed on levophed iso hypotension, eventually weaned 10/2, sedation weaned as well. 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 Neurosg on board, neuroonc c/s, results of tumor board and discussion w/ family were to proceed w/ biopsy to further evaluate lymphoma vs glioblastoma multiforme vs mets. Pt extubated 10/6 weaned to RA w/ adequate SpO2%. Iso HTN pt initiated on hydralazine po 50mg TID. Tolerating PEG feeds. Iso steroid use pt requiring NPH insulin q6h titrated to 6 however pt intermittently w/ NPH held iso borderline FS. C/f occult blood from gastric lavage, placed on PPI, Hgb slowly downtrended however stabilized.     Pt now stable for transfer to medical floor.    OBJECTIVE --  Vital Signs Last 24 Hrs  T(C): 36.9 (07 Oct 2021 12:00), Max: 37.4 (07 Oct 2021 04:00)  T(F): 98.4 (07 Oct 2021 12:00), Max: 99.3 (07 Oct 2021 04:00)  HR: 75 (07 Oct 2021 13:00) (75 - 111)  BP: 129/62 (07 Oct 2021 13:00) (129/62 - 173/84)  BP(mean): 89 (07 Oct 2021 13:00) (89 - 135)  RR: 14 (07 Oct 2021 13:00) (13 - 20)  SpO2: 95% (07 Oct 2021 13:00) (94% - 100%)    I&O's Summary    06 Oct 2021 07:01  -  07 Oct 2021 07:00  --------------------------------------------------------  IN: 1374.8 mL / OUT: 1600 mL / NET: -225.2 mL    07 Oct 2021 07:01  -  07 Oct 2021 15:02  --------------------------------------------------------  IN: 275 mL / OUT: 0 mL / NET: 275 mL        MEDICATIONS  (STANDING):  ascorbic acid 500 milliGRAM(s) Oral daily  chlorhexidine 4% Liquid 1 Application(s) Topical <User Schedule>  dexAMETHasone  Injectable 4 milliGRAM(s) IV Push every 8 hours  dextrose 40% Gel 15 Gram(s) Oral once  dextrose 5%. 1000 milliLiter(s) (50 mL/Hr) IV Continuous <Continuous>  dextrose 5%. 1000 milliLiter(s) (100 mL/Hr) IV Continuous <Continuous>  dextrose 50% Injectable 25 Gram(s) IV Push once  dextrose 50% Injectable 12.5 Gram(s) IV Push once  dextrose 50% Injectable 25 Gram(s) IV Push once  glucagon  Injectable 1 milliGRAM(s) IntraMuscular once  hydrALAZINE 50 milliGRAM(s) Oral three times a day  insulin lispro (ADMELOG) corrective regimen sliding scale   SubCutaneous every 6 hours  insulin NPH human recombinant 6 Unit(s) SubCutaneous every 6 hours  levETIRAcetam  IVPB 500 milliGRAM(s) IV Intermittent every 12 hours  multivitamin/minerals/iron Oral Solution (CENTRUM) 15 milliLiter(s) Oral daily  pantoprazole  Injectable 40 milliGRAM(s) IV Push two times a day  polyethylene glycol 3350 17 Gram(s) Oral every 12 hours  senna Syrup 10 milliLiter(s) Oral at bedtime    MEDICATIONS  (PRN):        LABS                                            8.1                   Neurophils% (auto):   x      (10-07 @ 00:27):    15.47)-----------(238          Lymphocytes% (auto):  x                                             25.7                   Eosinphils% (auto):   x        Manual%: Neutrophils x    ; Lymphocytes x    ; Eosinophils x    ; Bands%: x    ; Blasts x                                    149    |  115    |  23                  Calcium: 8.4   / iCa: x      (10-07 @ 11:41)    ----------------------------<  104       Magnesium: 2.2                              3.7     |  22     |  0.34             Phosphorous: 3.1            ASSESSMENT & PLAN:     80M PMH gallstones (30-40 yrs ago), BPH, HLD, Spinal stenosis, CVA (nonverbal, A&Ox0 bl), PEG, recent PNA, penile infxn (?abscess), sacral wound on abx via PICC (cefepime 2gq8h 8/20-9/28, doxy 8/20 - ?), a/w cough found to have PNA presumed aspiration), c/f intracranial hemorrhage vs malignancy, and hyponatremia, admit to MICU initially intubated now extubated 10/6, MRI brain revealing mass c/f neoplasm, plan for potential neurosg biopsy pending family discussion    #Neuro  - CVA s/p PEG 2/2021, previously speaking, eating, walking w/ assistance up until previous admit 8/2021 after which mental status declined to non-verbal, bedbound iso recent infxn prior to admit  - CTH 9/29 R sided intraparenchymal hemorrhage w/ surrounding edema, 2.5x2.8 cm hyperdensity crossing midline 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 Neurosg to have further d/w daughter this afternoon re details of procedure  - extensive GOC conversation w/ daughter in chart 10/3, palliative on board  - dexamethasone 4 IV frequency decreased from q6h to q8h, neurosg aware  - c/w keppra 500 BID    #Resp  AHRF 2/2 aspiration PNA iso worsening mental status  - Intubated for airway protection, extubated 10/6 now on RA, SpO2% adequate   - Zosyn course completed 10/5, f/u sputum Cx   - d/c'ed AZT for neg legionella    #CV  Hypertension:  - SBPs elevated  - c/w hydralazine po 50mg TID  Ectopy  - pt w/ irregular rhythm on exam, EKG revealing NSR w/ PACs and PVCs, will monitor BMP and replete prn    #GI  PEG in place  - Tolerating feeds  - Occult blood (+) in gastric lavage, but no overt GIB; c/w PPI  - c/w senna and Miralax    #Renal/   Hyponatremia:  - Multifactorial i/s/o brain mass, chronic hyponatremia, decreased PO, and ?N/V/D prior to admission  - s/p hypertonic saline for Na 110 on admit, c/b overcorrection, s/p DDAVP and free water  - s/p 3% NS, dc'd yesterday iso Na in range (145-155 per neurosg recs), will monitor BMP q12h and consider restart as needed  - monitor BMP, monitor I/O    #ID  - Recent penile infection (?abscess) & sacral wound on antibiotic with PICC line (cefepime 2gq8 8/20-9/28; doxycycline 8/20---)  - Aspiration event leading up to admission; Zosyn course completed 10/5, azithro dc'd as above, f/u sputum Cx  - Blood Cx including PICC Cx ngtd (9/30)  - Hx of COVID infection, spike ab positive    #Endo  - TSH wnl  - F/u cortisol level  - No known hx of DM, possible steroid-induced hyperglycemia; continue to monitor FS with mod ISS; NPH 13 -> 6 q6h ON iso decreased steroid frequency, ctm and titrate accordingly     #Heme  Hgb of 10 on admission  - Holding AC i/s/o intracranial hemorrhage  - Hgb slowly downtrending without overt bleeding source, now stable   - (+) occult blood from gastric lavage, c/w PPI  - Trend CBC and keep active T & S    #PPx:  - PPI  - DVT: SCDs i/s/o ICH    #Ethics  - Ongoing extensive GOC with daughter: FULL CODE  - Palliative on board    For Follow-Up:  [ ] f/u neurosg recs and ongoing d/w family  [ ] f/u neuroonc recs  [ ] f/u BMP w/ regard to Na goal 145-155 per neurosg due to intracranial mass, consider hypertonic saline as needed  [ ] monitor FS and titrate insulin accordingly on steroids

## 2021-10-07 NOTE — PROGRESS NOTE ADULT - ASSESSMENT
80M PMH gallstones (30-40 yrs ago), BPH, HLD, Spinal stenosis, CVA (nonverbal, A&Ox0 bl), PEG, recent PNA, penile infxn (?abscess), sacral wound on abx via PICC (cefepime 2gq8h 8/20-9/28, doxy 8/20 - ?), a/w cough found to have PNA presumed aspiration), c/f intracranial hemorrhage vs malignancy, and hyponatremia, admit to MICU initially intubated now extubated 10/6, MRI brain revealing mass c/f neoplasm, plan for potential neurosg biopsy pending family discussion    #Neuro  - CVA s/p PEG 2/2021, previously speaking, eating, walking w/ assistance up until previous admit 8/2021 after which mental status declined to non-verbal, bedbound iso recent infxn prior to admit  - CTH 9/29 R sided intraparenchymal hemorrhage w/ surrounding edema, 2.5x2.8 cm hyperdensity crossing midline 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 Neurosg to have further d/w daughter this afternoon re details of procedure  - extensive GOC conversation w/ daughter in chart 10/3, palliative on board  - dexamethasone 4 IV frequency decreased from q6h to q8h, neurosg aware  - c/w keppra 500 BID    #Resp  AHRF 2/2 aspiration PNA iso worsening mental status  - Intubated for airway protection, extubated 10/6 now on 5L NRB O2Sat% adequate  - Zosyn course completed 10/5, f/u sputum Cx   - d/c'ed AZT for neg legionella    #CV  Hypertension:  - SBPs elevated  - c/w hydralazine po 50mg TID    #GI  PEG in place  - Tolerating feeds  - Occult blood (+) in gastric lavage, but no overt GIB; c/w PPI  - c/w senna and Miralax    #Renal/   Hyponatremia:  - Multifactorial i/s/o brain mass, chronic hyponatremia, decreased PO, and ?N/V/D prior to admission  - s/p hypertonic saline for Na 110 on admit, c/b overcorrection, s/p DDAVP and free water  - s/p 3% NS, dc'd yesterday iso Na in range (145-155 per neurosg recs), will monitor BMP and consider restart as needed  - monitor BMP, monitor I/O    #ID  - Recent penile infection (?abscess) & sacral wound on antibiotic with PICC line (cefepime 2gq8 8/20-9/28; doxycycline 8/20---)  - Aspiration event leading up to admission; Zosyn course completed 10/5, azithro dc'd as above, f/u sputum Cx  - Blood Cx including PICC Cx ngtd (9/30)  - Hx of COVID infection, spike ab positive    #Endo  - TSH wnl  - F/u cortisol level  - No known hx of DM, possible steroid-induced hyperglycemia; continue to monitor FS with mod ISS; NPH 13 -> 6 q6h ON iso decreased steroid frequency, ctm and titrate accordingly     #Heme  Hgb of 10 on admission  - Holding AC i/s/o intracranial hemorrhage  - Hgb slowly downtrending without overt bleeding source, now stable   - (+) occult blood from gastric lavage, c/w PPI  - Trend CBC and keep active T & S    #PPx:  - PPI  - DVT: SCDs i/s/o ICH    #Ethics  - Ongoing extensive GOC with daughter: FULL CODE  - Palliative consulted 80M PMH gallstones (30-40 yrs ago), BPH, HLD, Spinal stenosis, CVA (nonverbal, A&Ox0 bl), PEG, recent PNA, penile infxn (?abscess), sacral wound on abx via PICC (cefepime 2gq8h 8/20-9/28, doxy 8/20 - ?), a/w cough found to have PNA presumed aspiration), c/f intracranial hemorrhage vs malignancy, and hyponatremia, admit to MICU initially intubated now extubated 10/6, MRI brain revealing mass c/f neoplasm, plan for potential neurosg biopsy pending family discussion    #Neuro  - CVA s/p PEG 2/2021, previously speaking, eating, walking w/ assistance up until previous admit 8/2021 after which mental status declined to non-verbal, bedbound iso recent infxn prior to admit  - CTH 9/29 R sided intraparenchymal hemorrhage w/ surrounding edema, 2.5x2.8 cm hyperdensity crossing midline 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 Neurosg to have further d/w daughter this afternoon re details of procedure  - extensive GOC conversation w/ daughter in chart 10/3, palliative on board  - dexamethasone 4 IV frequency decreased from q6h to q8h, neurosg aware  - c/w keppra 500 BID    #Resp  AHRF 2/2 aspiration PNA iso worsening mental status  - Intubated for airway protection, extubated 10/6 now on 5L NRB O2Sat% adequate  - Zosyn course completed 10/5, f/u sputum Cx   - d/c'ed AZT for neg legionella    #CV  Hypertension:  - SBPs elevated  - c/w hydralazine po 50mg TID  Ectopy  - pt w/ irregular rhythm on exam, EKG revealing NSR w/ PACs and PVCs, will monitor BMP and replete prn    #GI  PEG in place  - Tolerating feeds  - Occult blood (+) in gastric lavage, but no overt GIB; c/w PPI  - c/w senna and Miralax    #Renal/   Hyponatremia:  - Multifactorial i/s/o brain mass, chronic hyponatremia, decreased PO, and ?N/V/D prior to admission  - s/p hypertonic saline for Na 110 on admit, c/b overcorrection, s/p DDAVP and free water  - s/p 3% NS, dc'd yesterday iso Na in range (145-155 per neurosg recs), will monitor BMP and consider restart as needed  - monitor BMP, monitor I/O    #ID  - Recent penile infection (?abscess) & sacral wound on antibiotic with PICC line (cefepime 2gq8 8/20-9/28; doxycycline 8/20---)  - Aspiration event leading up to admission; Zosyn course completed 10/5, azithro dc'd as above, f/u sputum Cx  - Blood Cx including PICC Cx ngtd (9/30)  - Hx of COVID infection, spike ab positive    #Endo  - TSH wnl  - F/u cortisol level  - No known hx of DM, possible steroid-induced hyperglycemia; continue to monitor FS with mod ISS; NPH 13 -> 6 q6h ON iso decreased steroid frequency, ctm and titrate accordingly     #Heme  Hgb of 10 on admission  - Holding AC i/s/o intracranial hemorrhage  - Hgb slowly downtrending without overt bleeding source, now stable   - (+) occult blood from gastric lavage, c/w PPI  - Trend CBC and keep active T & S    #PPx:  - PPI  - DVT: SCDs i/s/o ICH    #Ethics  - Ongoing extensive GOC with daughter: FULL CODE  - Palliative consulted 80M PMH gallstones (30-40 yrs ago), BPH, HLD, Spinal stenosis, CVA (nonverbal, A&Ox0 bl), PEG, recent PNA, penile infxn (?abscess), sacral wound on abx via PICC (cefepime 2gq8h 8/20-9/28, doxy 8/20 - ?), a/w cough found to have PNA presumed aspiration), c/f intracranial hemorrhage vs malignancy, and hyponatremia, admit to MICU initially intubated now extubated 10/6, MRI brain revealing mass c/f neoplasm, plan for potential neurosg biopsy pending family discussion    #Neuro  - CVA s/p PEG 2/2021, previously speaking, eating, walking w/ assistance up until previous admit 8/2021 after which mental status declined to non-verbal, bedbound iso recent infxn prior to admit  - CTH 9/29 R sided intraparenchymal hemorrhage w/ surrounding edema, 2.5x2.8 cm hyperdensity crossing midline 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 Neurosg to have further d/w daughter this afternoon re details of procedure  - extensive GOC conversation w/ daughter in chart 10/3, palliative on board  - dexamethasone 4 IV frequency decreased from q6h to q8h, neurosg aware  - c/w keppra 500 BID    #Resp  AHRF 2/2 aspiration PNA iso worsening mental status  - Intubated for airway protection, extubated 10/6 now on RA, SpO2% adequate   - Zosyn course completed 10/5, f/u sputum Cx   - d/c'ed AZT for neg legionella    #CV  Hypertension:  - SBPs elevated  - c/w hydralazine po 50mg TID  Ectopy  - pt w/ irregular rhythm on exam, EKG revealing NSR w/ PACs and PVCs, will monitor BMP and replete prn    #GI  PEG in place  - Tolerating feeds  - Occult blood (+) in gastric lavage, but no overt GIB; c/w PPI  - c/w senna and Miralax    #Renal/   Hyponatremia:  - Multifactorial i/s/o brain mass, chronic hyponatremia, decreased PO, and ?N/V/D prior to admission  - s/p hypertonic saline for Na 110 on admit, c/b overcorrection, s/p DDAVP and free water  - s/p 3% NS, dc'd yesterday iso Na in range (145-155 per neurosg recs), will monitor BMP and consider restart as needed  - monitor BMP, monitor I/O    #ID  - Recent penile infection (?abscess) & sacral wound on antibiotic with PICC line (cefepime 2gq8 8/20-9/28; doxycycline 8/20---)  - Aspiration event leading up to admission; Zosyn course completed 10/5, azithro dc'd as above, f/u sputum Cx  - Blood Cx including PICC Cx ngtd (9/30)  - Hx of COVID infection, spike ab positive    #Endo  - TSH wnl  - F/u cortisol level  - No known hx of DM, possible steroid-induced hyperglycemia; continue to monitor FS with mod ISS; NPH 13 -> 6 q6h ON iso decreased steroid frequency, ctm and titrate accordingly     #Heme  Hgb of 10 on admission  - Holding AC i/s/o intracranial hemorrhage  - Hgb slowly downtrending without overt bleeding source, now stable   - (+) occult blood from gastric lavage, c/w PPI  - Trend CBC and keep active T & S    #PPx:  - PPI  - DVT: SCDs i/s/o ICH    #Ethics  - Ongoing extensive GOC with daughter: FULL CODE  - Palliative consulted

## 2021-10-07 NOTE — PROGRESS NOTE ADULT - ATTENDING COMMENTS
Pt seen and examined. 81 M with extensive medical hx as noted above, now with acute hypoxic resp failure, aspiration PNA, brain mass, and  intraparenchymal hemorrhage with midline shift. Resp status remains stable post extubation, completed a course of Zosyn for aspiration PNA. Cont pulm toilet/ chest PT. Titrated off Precedex gtt this am, cont neuro checks. Remains on Decadron, keep serum sodium > 145,  neurosurgery discussing possible brain mass biopsy with family. Cont hydralazine to optimize BPO control with hydralazine. Tolerating PEG feeds for oropharyngeal dysphagia. Overall prognosis guarded. Remains full code.

## 2021-10-07 NOTE — CHART NOTE - NSCHARTNOTEFT_GEN_A_CORE
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 During this admission, imaging disclosed multiple brain lesions concerning for glioblastoma vs lymphoma. He also had profound hyponatremia (to 116) on 9/30/2021.    Daughter informs me the PEG tube was placed in APRIL 2021. He improved between April and July. He was somewhat interactive, lethargic, and not perfect in August. He was in [facility]. 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    EXAMINATION  No longer Intubated nor sedated (sedation stopped at 8am and extubated ~ 45minutes ago)  follows no commands.  eyes open upon arrival  no verbal output  (+) volitional eye movements, which appear minimally purposeful  (+) blink to visual threat.  flaccid quadriparesis bilaterally  absent DTRs  no grasp response     IMPRESSION/PLAN  BRAIN TUMOR -- Appreciate neurosurgery thoughtful input. Challenging situation, as PNCSL treatment often leads to improvement, whereas there is NO POSSIBILITY for improvement if GBM. Family understands pathological diagnosis may lead to untreatable condition or worse, may harm patient, including death. 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 Neurosurgery will re-address in detail with daughter, providing additional information regarding potential procedures.    CEREBRAL EDEMA – While hyperglycemia is an issue, consider increasing decadron to 8mg q8, given the mass effect and possible PCNSL.    COORDINATION OF CARE – I explained that we are waiting for final pathology, which would determine prognosis and available therapeutics.    DISPO – As this condition continues to pose significant immediate risk for morbidity and mortality, I will coordinate further, continued care with me.    TOTAL TIME SPENT WAS 30 MINUTES, OF WHICH HALF IN COUNSELLING AND COORDINATION OF CARE.

## 2021-10-07 NOTE — PROGRESS NOTE ADULT - ASSESSMENT
80M slow worsening of mental status for past 2mo, in January was Ox3 CHIQUIS strong, now does not speak, does not move at all. Today threw up after being fed by mouth, began gurgling, now p/w pneumonia, hyponatremia 110, and R sided significant edema with some hyperdensity and shift, c/f mass vs venous infarct.   -Q1h neurochecks  -MICU for PNA/Hyponatremia  -Continue dex  -Continue Keppra 500 BID  -Na Goal 145-155  -C/f sepsis w/ fever, tachycardia, hypotension  -Per d/w daughter, would like to pursue surgery  -Dr. Blank to meet with daughter today or tonight at bedside to discuss possibility of doing biopsy

## 2021-10-07 NOTE — PROGRESS NOTE ADULT - SUBJECTIVE AND OBJECTIVE BOX
Candelaria Rocha MD PGY-2  Internal Medicine Resident    INTERVAL HPI/OVERNIGHT EVENTS: DAVID ON, tachycardic HR 80s-100s, hypertensive SBP 140s-170s, otherwise VSS wnl on supplemental O2. Pt awake, opens eyes to verbal stimulus but does not track movement or otherwise meaningfully participate in interview or exam.       VITAL SIGNS:  ICU Vital Signs Last 24 Hrs  T(C): 37 (07 Oct 2021 08:00), Max: 37.4 (07 Oct 2021 04:00)  T(F): 98.6 (07 Oct 2021 08:00), Max: 99.3 (07 Oct 2021 04:00)  HR: 88 (07 Oct 2021 09:00) (85 - 111)  BP: 162/74 (07 Oct 2021 09:00) (136/66 - 173/84)  BP(mean): 106 (07 Oct 2021 09:00) (95 - 135)  ABP: --  ABP(mean): --  RR: 14 (07 Oct 2021 09:00) (12 - 20)  SpO2: 100% (07 Oct 2021 09:00) (96% - 100%)    Mode: turned off  Plateau pressure:   P/F ratio:     10-06 @ 07:01  -  10-07 @ 07:00  --------------------------------------------------------  IN: 1374.8 mL / OUT: 1600 mL / NET: -225.2 mL    10-07 @ 07:01  -  10-07 @ 10:12  --------------------------------------------------------  IN: 30 mL / OUT: 0 mL / NET: 30 mL      CAPILLARY BLOOD GLUCOSE      POCT Blood Glucose.: 186 mg/dL (07 Oct 2021 05:20)    ECG:      MEDICATIONS:  MEDICATIONS  (STANDING):  ascorbic acid 500 milliGRAM(s) Oral daily  chlorhexidine 4% Liquid 1 Application(s) Topical <User Schedule>  dexAMETHasone  Injectable 4 milliGRAM(s) IV Push every 8 hours  dextrose 40% Gel 15 Gram(s) Oral once  dextrose 5%. 1000 milliLiter(s) (50 mL/Hr) IV Continuous <Continuous>  dextrose 5%. 1000 milliLiter(s) (100 mL/Hr) IV Continuous <Continuous>  dextrose 50% Injectable 25 Gram(s) IV Push once  dextrose 50% Injectable 12.5 Gram(s) IV Push once  dextrose 50% Injectable 25 Gram(s) IV Push once  glucagon  Injectable 1 milliGRAM(s) IntraMuscular once  hydrALAZINE 50 milliGRAM(s) Oral three times a day  insulin lispro (ADMELOG) corrective regimen sliding scale   SubCutaneous every 6 hours  insulin NPH human recombinant 6 Unit(s) SubCutaneous every 6 hours  levETIRAcetam  IVPB 500 milliGRAM(s) IV Intermittent every 12 hours  multivitamin/minerals/iron Oral Solution (CENTRUM) 15 milliLiter(s) Oral daily  pantoprazole  Injectable 40 milliGRAM(s) IV Push two times a day  polyethylene glycol 3350 17 Gram(s) Oral every 12 hours  senna Syrup 10 milliLiter(s) Oral at bedtime    MEDICATIONS  (PRN):      ALLERGIES:  Allergies    No Known Allergies    Intolerances        LABS:                        8.1    15.47 )-----------( 238      ( 07 Oct 2021 00:27 )             25.7     10-07    149<H>  |  117<H>  |  24<H>  ----------------------------<  204<H>  4.0   |  17<L>  |  0.38<L>    Ca    8.5      07 Oct 2021 00:27  Phos  3.3     10-07  Mg     2.2     10-07    TPro  5.2<L>  /  Alb  2.7<L>  /  TBili  0.2  /  DBili  x   /  AST  17  /  ALT  14  /  AlkPhos  97  10-06          RADIOLOGY & ADDITIONAL TESTS: Reviewed. Candelaria Rocha MD PGY-2  Internal Medicine Resident    INTERVAL HPI/OVERNIGHT EVENTS: DAVID ON, tachycardic HR 80s-100s, hypertensive SBP 140s-170s, otherwise VSS wnl on supplemental O2. Pt awake, opens eyes to verbal stimulus but does not track movement or otherwise meaningfully participate in interview or exam.       VITAL SIGNS:  ICU Vital Signs Last 24 Hrs  T(C): 37 (07 Oct 2021 08:00), Max: 37.4 (07 Oct 2021 04:00)  T(F): 98.6 (07 Oct 2021 08:00), Max: 99.3 (07 Oct 2021 04:00)  HR: 88 (07 Oct 2021 09:00) (85 - 111)  BP: 162/74 (07 Oct 2021 09:00) (136/66 - 173/84)  BP(mean): 106 (07 Oct 2021 09:00) (95 - 135)  ABP: --  ABP(mean): --  RR: 14 (07 Oct 2021 09:00) (12 - 20)  SpO2: 100% (07 Oct 2021 09:00) (96% - 100%)    Mode: turned off  Plateau pressure:   P/F ratio:     10-06 @ 07:01  -  10-07 @ 07:00  --------------------------------------------------------  IN: 1374.8 mL / OUT: 1600 mL / NET: -225.2 mL    10-07 @ 07:01  -  10-07 @ 10:12  --------------------------------------------------------  IN: 30 mL / OUT: 0 mL / NET: 30 mL      CAPILLARY BLOOD GLUCOSE      POCT Blood Glucose.: 186 mg/dL (07 Oct 2021 05:20)    ECG:    PHYSICAL EXAM:  GENERAL: Elderly appearing man, laying in bed, opens eyes to verbal stimulus but ohterwise not tracking or meaningfully participant in exam. Does not appear to be in acute distress however groaning at times during auscultation  HEAD: Atraumatic, Normocephalic  EYES: PERRL, conjunctiva and sclera clear  ENMT: Moist mucous membranes, NRB mask  NERVOUS SYSTEM: A&Ox0 opens eyes but otherwise unable to participate in exam as above  CHEST/LUNG: CTAB, groaning at times during auscultation, w/ NRB mask  HEART: Regular rate but irregular rhythm; No murmurs, rubs, or gallops  ABDOMEN: Soft, Nontender, Nondistended; Bowel sounds present  EXTREMITIES: 2+ Peripheral Pulses, b/l hands and feet w/ nonpitting edema      MEDICATIONS:  MEDICATIONS  (STANDING):  ascorbic acid 500 milliGRAM(s) Oral daily  chlorhexidine 4% Liquid 1 Application(s) Topical <User Schedule>  dexAMETHasone  Injectable 4 milliGRAM(s) IV Push every 8 hours  dextrose 40% Gel 15 Gram(s) Oral once  dextrose 5%. 1000 milliLiter(s) (50 mL/Hr) IV Continuous <Continuous>  dextrose 5%. 1000 milliLiter(s) (100 mL/Hr) IV Continuous <Continuous>  dextrose 50% Injectable 25 Gram(s) IV Push once  dextrose 50% Injectable 12.5 Gram(s) IV Push once  dextrose 50% Injectable 25 Gram(s) IV Push once  glucagon  Injectable 1 milliGRAM(s) IntraMuscular once  hydrALAZINE 50 milliGRAM(s) Oral three times a day  insulin lispro (ADMELOG) corrective regimen sliding scale   SubCutaneous every 6 hours  insulin NPH human recombinant 6 Unit(s) SubCutaneous every 6 hours  levETIRAcetam  IVPB 500 milliGRAM(s) IV Intermittent every 12 hours  multivitamin/minerals/iron Oral Solution (CENTRUM) 15 milliLiter(s) Oral daily  pantoprazole  Injectable 40 milliGRAM(s) IV Push two times a day  polyethylene glycol 3350 17 Gram(s) Oral every 12 hours  senna Syrup 10 milliLiter(s) Oral at bedtime    MEDICATIONS  (PRN):      ALLERGIES:  Allergies    No Known Allergies    Intolerances        LABS:                        8.1    15.47 )-----------( 238      ( 07 Oct 2021 00:27 )             25.7     10-07    149<H>  |  117<H>  |  24<H>  ----------------------------<  204<H>  4.0   |  17<L>  |  0.38<L>    Ca    8.5      07 Oct 2021 00:27  Phos  3.3     10-07  Mg     2.2     10-07    TPro  5.2<L>  /  Alb  2.7<L>  /  TBili  0.2  /  DBili  x   /  AST  17  /  ALT  14  /  AlkPhos  97  10-06          RADIOLOGY & ADDITIONAL TESTS: Reviewed.

## 2021-10-07 NOTE — PROGRESS NOTE ADULT - ATTENDING COMMENTS
Spoke with neurooncology who is recommending excisional biopsy of right frontal lesion as there is a concern for lymphoma, which can be treated with potential for improvement.  Spoke with MICU attending who feels pt is medically ready for a surgical procedure at this point but expressed some concern that pt may not benefit from intervention in the long-term.  I also discussed pt's history and condition with daughter who states that pt had a stroke in Jan/Feb but was able to walk some and talk.  In August, he began to deteriorate.  A CT in Jan did not show the mass lesion.  We discussed his prognosis without treatment and potential prognosis with treatment depending upon the pathology results.  I have arranged to meet with her tomorrow in PM to discuss the imaging and surgical perspective in more detail so that she can make an informed decision regarding intervention. Pt seen earlier this evening.  His eyes are open and responsive to threat.  There is mild withdrawal of left LE.  Pt is extubated with no vocalizations.  Spoke with daughter today at bedside and showed her MRI and CT films.  Discussed options of no surgery, biopsy only to follow with surgery at a later time if lymphoma, Biopsy with resection if frozen shows lymphoma as opposed to high-grade glioma.  WE discussed risks of surgery, which include bleeding, infection, medical issues, cardiac issues.  We also had a brief discussion regarding goals of care.  Daughter stated that she wants to try, since he was improving after his stroke before his most recent deterioration.  She does not want to be the one to decide whether he lives or dies, as no intervention is likely to end pt's death within a relative short period of time.  We talked about the role of steroids and potential side effects.  Neuro-oncology can discuss with her the risk of chemotherapy and potential life-expectancy with treatment for lymphoma.  Daughter wants to speak with her family in [place]. Surgery is tentatively set for next Thursday, when OR time was available.  She will let me know her decision on Monday.

## 2021-10-07 NOTE — PROGRESS NOTE ADULT - ASSESSMENT
80yM PMH of gallstones (30-40 years ago), BPH, HLD, h/o spinal stenosis, subarachnoid and severe spinal stenosis, recent PNA, penile infection (?abscess) &  sacral wound on antibiotic with PICC line presenting with possible aspiration PNA, extensive right frontal intraparenchymal hemorrhage, herniation & hyponatremia. Nephrology was called for hyponatremia.       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 In view of severe hyponatremia and intracranial bleed, received 3%HTS. Repeat SNa was 116 on 9/30/21. U Osm was 486 with Ya of 15 with low uric acid on 9/30/21. Today Serum Na is 141-->147-->149. CONTINUE HYPERTONIC SALINE 3%- AT RATE 20 CC/HR. Repeat SNa q4 for now. Goal Na per neurosurgery is 145-155. Uosm is 500 indicating presence of ADH currently. Please re-check urine lytes, ur osmolality. Continue with GOC discussion with family. Excisional brain bx planned. Neurosurgery recs noted.          If you have any questions, please feel free to contact me  Roxann Man  Nephrology Fellow  Pager NS: [phone]/ [facility]: [phone]    (After 5 pm or on weekends please page the on-call fellow, can check [email or web address] for schedule. Login is demond vera, schedule under [facility] medicine, psych, derm)   80yM PMH of gallstones (30-40 years ago), BPH, HLD, h/o spinal stenosis, subarachnoid and severe spinal stenosis, recent PNA, penile infection (?abscess) &  sacral wound on antibiotic with PICC line presenting with possible aspiration PNA, extensive right frontal intraparenchymal hemorrhage, herniation & hyponatremia. Nephrology was called for hyponatremia.       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 In view of severe hyponatremia and intracranial bleed, received 3%HTS. Repeat SNa was 116 on 9/30/21. U Osm was 486 with Ya of 15 with low uric acid on 9/30/21. Today Serum Na is 141-->147-->149. CONTINUE HYPERTONIC SALINE 3%- AT RATE 20 CC/HR. Repeat SNa q4 for now. Goal Na per neurosurgery is 145-155. Neurosurgery recs noted regarding excisional biopsy.         If you have any questions, please feel free to contact me  Roxann Man  Nephrology Fellow  Pager NS: [phone]/ [facility]: [phone]    (After 5 pm or on weekends please page the on-call fellow, can check [email or web address] for schedule. Login is demond vera, schedule under [facility] medicine, psych, derm)

## 2021-10-07 NOTE — PROGRESS NOTE ADULT - SUBJECTIVE AND OBJECTIVE BOX
[facility] Division of Kidney Diseases & Hypertension  FOLLOW UP NOTE  [phone]--------------------------------------------------------------------------------  Chief Complaint:Non-traumatic intracranial hemorrhage        24 hour events/subjective: Patient seen & examined. Labs & vitals reviewed. Pt is s/p extubation. Excisional biopsy planned. UO in the last 24 hours 1.6L        PAST HISTORY  --------------------------------------------------------------------------------  No significant changes to PMH, PSH, FHx, SHx, unless otherwise noted    ALLERGIES & MEDICATIONS  --------------------------------------------------------------------------------  Allergies    No Known Allergies    Intolerances      Standing Inpatient Medications  ascorbic acid 500 milliGRAM(s) Oral daily  chlorhexidine 4% Liquid 1 Application(s) Topical <User Schedule>  dexAMETHasone  Injectable 4 milliGRAM(s) IV Push every 8 hours  dextrose 40% Gel 15 Gram(s) Oral once  dextrose 5%. 1000 milliLiter(s) IV Continuous <Continuous>  dextrose 5%. 1000 milliLiter(s) IV Continuous <Continuous>  dextrose 50% Injectable 25 Gram(s) IV Push once  dextrose 50% Injectable 12.5 Gram(s) IV Push once  dextrose 50% Injectable 25 Gram(s) IV Push once  glucagon  Injectable 1 milliGRAM(s) IntraMuscular once  hydrALAZINE 50 milliGRAM(s) Oral three times a day  insulin lispro (ADMELOG) corrective regimen sliding scale   SubCutaneous every 6 hours  insulin NPH human recombinant 6 Unit(s) SubCutaneous every 6 hours  levETIRAcetam  IVPB 500 milliGRAM(s) IV Intermittent every 12 hours  multivitamin/minerals/iron Oral Solution (CENTRUM) 15 milliLiter(s) Oral daily  pantoprazole  Injectable 40 milliGRAM(s) IV Push two times a day  polyethylene glycol 3350 17 Gram(s) Oral every 12 hours  senna Syrup 10 milliLiter(s) Oral at bedtime    PRN Inpatient Medications      REVIEW OF SYSTEMS  --------------------------------------------------------------------------------  Pt yet unable to participate in ROS    VITALS/PHYSICAL EXAM  --------------------------------------------------------------------------------  T(C): 37 (10-07-21 @ 08:00), Max: 37.4 (10-07-21 @ 04:00)  HR: 99 (10-07-21 @ 08:00) (85 - 111)  BP: 171/75 (10-07-21 @ 08:00) (136/66 - 174/88)  RR: 16 (10-07-21 @ 08:00) (12 - 20)  SpO2: 98% (10-07-21 @ 08:00) (96% - 100%)  Wt(kg): --        10-06-21 @ 07:01  -  10-07-21 @ 07:00  --------------------------------------------------------  IN: 1374.8 mL / OUT: 1600 mL / NET: -225.2 mL    10-07-21 @ 07:01  -  10-07-21 @ 08:57  --------------------------------------------------------  IN: 30 mL / OUT: 0 mL / NET: 30 mL      Physical Exam:  	Gen: NAD, on VM  	HEENT: MMM  	Pulm: CTA B/L  	CV: S1S2  	Abd: Soft, +BS   	Ext: + LE edema B/L  	Neuro: awake  	Skin: Warm and dry  	Vascular access:          LABS/STUDIES  --------------------------------------------------------------------------------              8.1    15.47 >-----------<  238      [10-07-21 @ 00:27]              25.7     149  |  117  |  24  ----------------------------<  204      [10-07-21 @ 00:27]  4.0   |  17  |  0.38        Ca     8.5     [10-07-21 @ 00:27]      Mg     2.2     [10-07-21 @ 00:27]      Phos  3.3     [10-07-21 @ 00:27]    TPro  5.2  /  Alb  2.7  /  TBili  0.2  /  DBili  x   /  AST  17  /  ALT  14  /  AlkPhos  97  [10-06-21 @ 00:20]          Creatinine Trend:  SCr 0.38 [10-07 @ 00:27]  SCr 0.39 [10-06 @ 12:27]  SCr 0.38 [10-06 @ 05:58]  SCr 0.38 [10-06 @ 00:20]  SCr 0.37 [10-05 @ 18:03]    Urinalysis - [10-01-21 @ 00:18]      Color Yellow / Appearance Slightly Turbid / SG >1.050 / pH 6.5      Gluc Negative / Ketone Negative  / Bili Negative / Urobili Negative       Blood Small / Protein 100 mg/dL / Leuk Est Negative / Nitrite Negative      RBC 3 / WBC 19 / Hyaline 8 / Gran Few / Sq Epi  / Non Sq Epi 7 / Bacteria Negative    Urine Creatinine 21      [10-04-21 @ 15:28]  Urine Sodium 77      [10-04-21 @ 15:28]  Urine Osmolality 515      [10-04-21 @ 15:28]    TSH 1.18      [10-01-21 @ 05:15]

## 2021-10-08 NOTE — CONSULT NOTE ADULT - ATTENDING COMMENTS
81 year old with baseline cognitive impairment with decent sacral decubitus (s/p 6 weeks of abx for sacral decubitus)  Known dysphagia dependent on peg feeds but has had aspiration after pleasure feeds    1) Leukocytosis  Likely multifactorial  On steroids/ has CNS mass- ? neoplasia  Could have leukamoid reaction  Could have superimposed infection    Consider CT chest    2) Fever  Check Ua and Cultures  Check blood culture  Empiric zosyn    3) Decubitus ulcers  Right and left hip- chronic- not acutely infected  Midline decubitus- with eschar  Consider wound care eval  May benefit from enzymatic or surgical debridement    4) Edema;   check UE doppler    Overall, very debilitated gentleman at baseline ( recurrent aspiration events/ decubitus ulcers) now with new CNS mass.  I would consider a palliative care evaluation.  He is high risk for complication from invasive procedures and would be very high risk for any immunusuppressive treatment given his poor baseline functional status. 81 year old with baseline cognitive impairment with decent sacral decubitus (s/p 6 weeks of abx for sacral decubitus)  Known dysphagia dependent on peg feeds but has had aspiration after pleasure feeds    1) Leukocytosis  Likely multifactorial  On steroids/ has CNS mass- ? neoplasia  Could have leukemoid reaction  Could have superimposed infection- risk for aspiration pneumonia    Consider CT chest    2) Fever  Check Ua and Cultures  Check blood culture  Empiric zosyn    3) Decubitus ulcers  Right and left hip- chronic- not acutely infected  Midline decubitus- with eschar  Consider wound care eval  May benefit from enzymatic or surgical debridement    4) Edema;   check UE doppler    Overall, very debilitated gentleman at baseline ( recurrent aspiration events/ decubitus ulcers) now with new CNS mass.  I would consider a palliative care evaluation.  He is high risk for complication from invasive procedures and would be very high risk for any immunosuppressive treatment given his poor baseline functional status.    Case and plan discussed with attending 81 year old with baseline cognitive impairment with sacral decubitus (s/p 6 weeks of abx for sacral decubitus)  Dsyphagia with aspiration pneumonia-has peg in place    1) Leukocytosis  Likely multifactorial  On steroids/ has CNS mass- ? neoplasia  has decubitus ulcers -sacral wound with eschar/unstageable  Could have leukemoid reaction  Could have superimposed infection- risk for aspiration pneumonia    Consider CT chest    2) Fever  Check Ua and Cultures  Check blood culture  Empiric zosyn    3) Decubitus ulcers  Right and left hip- chronic- not acutely infected  Midline decubitus- with eschar  Consider wound care eval  May benefit from enzymatic or surgical debridement    4) Edema;   check UE doppler    Overall, very debilitated gentleman at baseline ( recurrent aspiration events/ decubitus ulcers) now with new CNS mass.  I would consider a palliative care evaluation.  He is high risk for infectious complications from invasive procedures and would be very high risk for any immunosuppressive treatment given his poor baseline functional status.    Case and plan discussed with attending

## 2021-10-08 NOTE — PROGRESS NOTE ADULT - PROBLEM SELECTOR PLAN 1
-MRI brain from 10/5:  Large enhancing right frontal hemorrhagic mass crosses the genuine of the corpus callosum as well as a small focus in the left paraclinoid region. 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 Differential diagnosis includes metastases, glioblastoma and lymphoma. Poor mental status.   -Family at the moment wants to pursue mgmt. -LAITH to do biopsy vs debulking tentatively next Thursday 10/14.   -Ongoing C discussions with family needed for final decisions.   -C/w dexamethasone; will increase to 8mg IV q8h for brain edema per neuro onco.   -Na goal 145-155 per LAITH.   -Neuro checks q4h.   -F/u neuro onco and LAITH recs.  -C/w keppra 500mg BID for seizure ppx.  -PREOP RISK STRATIFICATION: -Patient at least intermediate to high risk given comorbid conditions. Elevated BNP, edema, h/o HTN/DM, and crackles on exam raises concern for CHF, which would increase his risk further. -Will get an echo preop to further assess prior to any clearance.

## 2021-10-08 NOTE — PROGRESS NOTE ADULT - SUBJECTIVE AND OBJECTIVE BOX
Patient is a 81y old  Male who presents with a chief complaint of Hyponatremia (08 Oct 2021 09:42)        SUBJECTIVE / OVERNIGHT EVENTS: Patient unable to give ROS due to mental status.       MEDICATIONS  (STANDING):  ascorbic acid 500 milliGRAM(s) Oral daily  chlorhexidine 4% Liquid 1 Application(s) Topical <User Schedule>  dexAMETHasone  Injectable 4 milliGRAM(s) IV Push every 8 hours  dextrose 40% Gel 15 Gram(s) Oral once  dextrose 5%. 1000 milliLiter(s) (100 mL/Hr) IV Continuous <Continuous>  dextrose 5%. 1000 milliLiter(s) (50 mL/Hr) IV Continuous <Continuous>  dextrose 50% Injectable 25 Gram(s) IV Push once  dextrose 50% Injectable 12.5 Gram(s) IV Push once  dextrose 50% Injectable 25 Gram(s) IV Push once  glucagon  Injectable 1 milliGRAM(s) IntraMuscular once  hydrALAZINE 50 milliGRAM(s) Oral three times a day  insulin lispro (ADMELOG) corrective regimen sliding scale   SubCutaneous every 6 hours  insulin NPH human recombinant 6 Unit(s) SubCutaneous every 6 hours  levETIRAcetam  IVPB 500 milliGRAM(s) IV Intermittent every 12 hours  multivitamin/minerals/iron Oral Solution (CENTRUM) 15 milliLiter(s) Oral daily  pantoprazole  Injectable 40 milliGRAM(s) IV Push two times a day  polyethylene glycol 3350 17 Gram(s) Oral every 12 hours  potassium chloride    Tablet ER 40 milliEquivalent(s) Oral once  senna Syrup 10 milliLiter(s) Oral at bedtime    MEDICATIONS  (PRN):      Vital Signs Last 24 Hrs  T(C): 37.2 (08 Oct 2021 04:21), Max: 37.7 (08 Oct 2021 00:00)  T(F): 99 (08 Oct 2021 04:21), Max: 99.9 (08 Oct 2021 00:00)  HR: 98 (08 Oct 2021 06:00) (75 - 118)  BP: 121/71 (08 Oct 2021 06:00) (121/71 - 172/85)  BP(mean): 88 (08 Oct 2021 03:48) (88 - 116)  RR: 19 (08 Oct 2021 04:21) (13 - 19)  SpO2: 97% (08 Oct 2021 04:21) (94% - 99%)  CAPILLARY BLOOD GLUCOSE      POCT Blood Glucose.: 104 mg/dL (08 Oct 2021 06:02)  POCT Blood Glucose.: 181 mg/dL (07 Oct 2021 17:14)  POCT Blood Glucose.: 103 mg/dL (07 Oct 2021 11:36)    I&O's Summary    07 Oct 2021 07:01  -  08 Oct 2021 07:00  --------------------------------------------------------  IN: 1125 mL / OUT: 1050 mL / NET: 75 mL          PHYSICAL EXAM:   GENERAL: NAD,    HEAD:  Atraumatic, Normocephalic  EYES:   conjunctiva and sclera clear  NECK: Supple,   CHEST/LUNG: Coarse bs with some crackles  HEART: S1S2 normal. Regular rate and rhythm; No murmurs, rubs, or gallops  ABDOMEN: Soft, Nontender, Nondistended; Bowel sounds present; peg in place  EXTREMITIES:  1+ distal peripheral edema  PSYCH/Neuro: not interactive. not responding to commands.       LABS:                        7.3    21.23 )-----------( 188      ( 08 Oct 2021 00:15 )             23.9     10-08    148<H>  |  115<H>  |  26<H>  ----------------------------<  171<H>  3.6   |  22  |  0.34<L>    Ca    8.2<L>      08 Oct 2021 00:15  Phos  3.2     10-08  Mg     2.2     10-08          < from: MR Head w/wo IV Cont (10.05.21 @ 14:44) >    IMPRESSION: Large enhancing right frontal hemorrhagic mass crosses the genuine of the corpus callosum as well as a small focus in the left paraclinoid region. 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 Differential diagnosis includes metastases, glioblastoma and lymphoma.    < end of copied text >    RADIOLOGY & ADDITIONAL TESTS:    Imaging Personally Reviewed: MRI report  Consultant(s) Notes Reviewed:  renal, neurosurg, neuro onco  Care Discussed with Consultants/Other Providers:

## 2021-10-08 NOTE — PROGRESS NOTE ADULT - PROBLEM SELECTOR PLAN 6
-S/p 6 days of abx for multifocal pna.  -No recent fever. Repeat cultures if spikes.   -Leukocytosis noted. Worsened likely due to steroids.  -Will get CXR. Consider CT chest.

## 2021-10-08 NOTE — PROGRESS NOTE ADULT - ASSESSMENT
80yM PMH of gallstones (30-40 years ago), BPH, HLD, h/o spinal stenosis, subarachnoid and severe spinal stenosis, recent PNA, penile infection (?abscess) &  sacral wound on antibiotic with PICC line presenting with possible aspiration PNA, extensive right frontal intraparenchymal hemorrhage, herniation & hyponatremia. Nephrology was called for hyponatremia.       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 In view of severe hyponatremia and intracranial bleed, received 3%HTS. Repeat SNa was 116 on 9/30/21. U Osm was 486 with Ya of 15 with low uric acid on 9/30/21. Today Serum Na is 141-->147-->149-->148. Goal Na per neurosurgery is 145-155. Neurosurgery recs noted regarding excisional biopsy on Thursday. f/u BMP        If you have any questions, please feel free to contact me  Roxann Man  Nephrology Fellow  Pager NS: [phone]/ [facility]: [phone]    (After 5 pm or on weekends please page the on-call fellow, can check [email or web address] for schedule. Login is demond vera, schedule under [facility] medicine, psych, derm)

## 2021-10-08 NOTE — CHART NOTE - NSCHARTNOTEFT_GEN_A_CORE
-Discussed with NP Munira. -WBC up to 67, Hgb down to 6.8. Plt relatively stable.   -Will repeat CBC with differential. If any blasts, will need to call heme consult.   -Will transfuse 1 prbc. -C/w PPI. If any signs of bleeding, may need GI eval. -F/u post transfusion CBC.   -Temp of 100.7 and worsening leukocytosis. -F/u CXR. -Send cultures. -Will get CT chest to further eval. -Restart on Zosyn given possible aspiration. -MRSA swab, if positive consider vanco. -ID eval. -F/u duplexes. -If any diarrhea, send stool studies. -Legionella was negative on admission.   -Will repeat CT head to follow up on hemorrhagic brain mass. -Neuro checks q4h. -Seizure and aspiration precautions.

## 2021-10-08 NOTE — PROGRESS NOTE ADULT - ASSESSMENT
80M PMH gallstones (30-40 yrs ago), BPH, HLD, spinal stenosis, CVA, s/p PEG, recent PNA, penile infxn (?abscess) and sacral wound on abx w/ PICC (cefepime 2gq8h 8/20-9/28; doxy 8/20-?), presented w/ worsening cough x 1 wk after daughter attempted to give ensure by mouth. Daughter noticed "snoring" breath sounds and called EMS. Denies sick contact, diarrhea. VSS in ED, Labs significant for Na 110, CO2 14, lactate 2.5. CXR w/ b/l patchy opacities R>L c/w PNA. 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 CTAP w/ multifocal PNA. Iso worsening mental status pt intubated in ED and transferred to MICU.     MICU Course: Empiric abx for multifocal PNA presumed 2/2 aspiration, completed Zosyn course. Placed on levophed iso hypotension, eventually weaned 10/2, sedation weaned as well. 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 Neurosg on board, neuroonc c/s, results of tumor board and discussion w/ family were to proceed w/ biopsy to further evaluate lymphoma vs glioblastoma multiforme vs mets. Pt extubated 10/6 weaned to RA w/ adequate SpO2%. Iso HTN pt initiated on hydralazine po 50mg TID. Tolerating PEG feeds. Iso steroid use pt requiring NPH insulin q6h titrated to 6 however pt intermittently w/ NPH held iso borderline FS. C/f occult blood from gastric lavage, placed on PPI, Hgb slowly downtrended however stabilized.

## 2021-10-08 NOTE — CONSULT NOTE ADULT - SUBJECTIVE AND OBJECTIVE BOX
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 Hx was obtained from daughter on admission who denied fevers, diarrhea. In the ED, Vital signs stable with no leukocytosis or fevers and with labs significant for severe hyponatremia 110.  CT head w/ multiple foci of right frontal IPH with extensive vasogenic edema and effacement of the right lateral ventricle frontal horn. Pt intubated for airway protection and transferred to MICU for continued management fhyponatremia and Right IPH with edema. (30 Sep 2021 01:07 )CXR w/ b/l patchy opacities R>L suspicious for PNA and CT a/p showed possible infiltrated ijn B/L Lower lobe. Pt was treated with a course of Zosyn 9/30 -10/5. In MICU pt needed levophed for hypotension and pt was started on decadron, Keppra and hypertonic saline for cerebral lesion and hyponatremia.  MRI brain w/ and w/o contrast revealing hemorrhagic mass suspicious for neoplasm. Neurosg on board, neuroonc c/s, results of tumor board and discussion w/ family were to proceed w/ biopsy to further evaluate lymphoma vs glioblastoma multiforme vs mets. Pt extubated 10/6 weaned to RA and currently tolerating PEG feeds. Pt has leukocytosis between 15-20 k during hospital course on decadron, however leukocytosis bumped to 67 k today and febrile to 100.7. Blood cx drawn and pt started on zosyn and ID consulted.      REVIEW OF SYSTEMS  [  ] ROS unobtainable because:    [ x ] All other systems negative except as noted below    Constitutional:  [ ] fever [ ] chills  [ ] weight loss  [ ]night sweat  [ ]poor appetite/PO intake [ ]fatigue   Skin:  [ ] rash [ ] phlebitis	  Eyes: [ ] icterus [ ] pain  [ ] discharge	  ENMT: [ ] sore throat  [ ] thrush [ ] ulcers [ ] exudates [ ]anosmia  Respiratory: [ ] dyspnea [ ] hemoptysis [ ] cough [ ] sputum	  Cardiovascular:  [ ] chest pain [ ] palpitations [ ] edema	  Gastrointestinal:  [ ] nausea [ ] vomiting [ ] diarrhea [ ] constipation [ ] pain	  Genitourinary:  [ ] dysuria [ ] frequency [ ] hematuria [ ] discharge [ ] flank pain  [ ] incontinence  Musculoskeletal:  [ ] myalgias [ ] arthralgias [ ] arthritis  [ ] back pain  Neurological:  [ ] headache [ ] weakness [ ] seizures  [ ] confusion/altered mental status    prior hospital charts reviewed [V]  primary team notes reviewed [V]  other consultant notes reviewed [V]    PAST MEDICAL & SURGICAL HISTORY:  Spinal stenosis    Diabetes    Benign essential HTN    Gallstones    S/P cataract surgery        SOCIAL HISTORY:  - Denied smoking/vaping/alcohol/recreational drug use    FAMILY HISTORY:      Allergies  No Known Allergies        ANTIMICROBIALS:  piperacillin/tazobactam IVPB.. 3.375 every 8 hours      ANTIMICROBIALS (past 90 days):  MEDICATIONS  (STANDING):    azithromycin  IVPB   250 mL/Hr IV Intermittent (09-30-21 @ 01:44)    piperacillin/tazobactam IVPB.   200 mL/Hr IV Intermittent (09-30-21 @ 01:34)    piperacillin/tazobactam IVPB.   200 mL/Hr IV Intermittent (10-08-21 @ 12:15)    piperacillin/tazobactam IVPB..   25 mL/Hr IV Intermittent (10-05-21 @ 22:02)   25 mL/Hr IV Intermittent (10-05-21 @ 13:27)   25 mL/Hr IV Intermittent (10-05-21 @ 05:02)   25 mL/Hr IV Intermittent (10-04-21 @ 21:16)   25 mL/Hr IV Intermittent (10-04-21 @ 13:33)   25 mL/Hr IV Intermittent (10-04-21 @ 05:03)   25 mL/Hr IV Intermittent (10-03-21 @ 21:09)   25 mL/Hr IV Intermittent (10-03-21 @ 15:06)   25 mL/Hr IV Intermittent (10-03-21 @ 05:25)   25 mL/Hr IV Intermittent (10-02-21 @ 21:02)   25 mL/Hr IV Intermittent (10-02-21 @ 13:22)   25 mL/Hr IV Intermittent (10-02-21 @ 05:01)   25 mL/Hr IV Intermittent (10-01-21 @ 21:20)   25 mL/Hr IV Intermittent (10-01-21 @ 13:05)   25 mL/Hr IV Intermittent (10-01-21 @ 05:44)   25 mL/Hr IV Intermittent (09-30-21 @ 22:41)   25 mL/Hr IV Intermittent (09-30-21 @ 14:57)   25 mL/Hr IV Intermittent (09-30-21 @ 06:34)        OTHER MEDS:   MEDICATIONS  (STANDING):  dexAMETHasone  IVPB 8 every 8 hours  dextrose 40% Gel 15 once  dextrose 50% Injectable 25 once  dextrose 50% Injectable 12.5 once  dextrose 50% Injectable 25 once  glucagon  Injectable 1 once  hydrALAZINE 50 three times a day  insulin lispro (ADMELOG) corrective regimen sliding scale  every 6 hours  insulin NPH human recombinant 6 every 6 hours  levETIRAcetam  IVPB 500 every 12 hours  pantoprazole  Injectable 40 two times a day  polyethylene glycol 3350 17 every 12 hours  senna Syrup 10 at bedtime      VITALS:  Vital Signs Last 24 Hrs  T(F): 100.7 (10-08-21 @ 10:00), Max: 100.7 (10-08-21 @ 10:00)    Vital Signs Last 24 Hrs  HR: 98 (10-08-21 @ 10:00) (75 - 118)  BP: 104/64 (10-08-21 @ 10:00) (104/64 - 172/85)  RR: 18 (10-08-21 @ 10:00)  SpO2: 94% (10-08-21 @ 10:00) (94% - 97%)  Wt(kg): --    EXAM:    GA: NAD, AOx3  HEENT: oral cavity no lesion  CV: nl S1/S2, no RMG  Lungs: CTAB, No distress  Abd: BS+, soft, nontender, no rebounding pain  Ext: no edema  Neuro: No focal deficits  Skin: Intact  IV: no phlebitis    Labs:                        6.8    67.18 )-----------( 156      ( 08 Oct 2021 10:27 )             22.6     10-08    148<H>  |  115<H>  |  26<H>  ----------------------------<  171<H>  3.6   |  22  |  0.34<L>    Ca    8.2<L>      08 Oct 2021 00:15  Phos  3.2     10-08  Mg     2.2     10-08        WBC Trend:  WBC Count: 67.18 (10-08-21 @ 10:27)  WBC Count: 21.23 (10-08-21 @ 00:15)  WBC Count: 15.47 (10-07-21 @ 00:27)  WBC Count: 13.41 (10-06-21 @ 00:20)      Auto Neutrophil #: 21.39 K/uL (10-01-21 @ 11:24)  Band Neutrophils %: 12.5 % (10-01-21 @ 11:24)  Auto Neutrophil #: 8.09 K/uL (09-29-21 @ 22:08)  Band Neutrophils %: 6.0 % (09-29-21 @ 22:08)  Auto Neutrophil #: 12.61 K/uL (01-22-21 @ 07:30)      Creatine Trend:  Creatinine, Serum: 0.34 (10-08)  Creatinine, Serum: 0.34 (10-07)  Creatinine, Serum: 0.38 (10-07)  Creatinine, Serum: 0.39 (10-06)      Liver Biochemical Testing Trend:  Alanine Aminotransferase (ALT/SGPT): 14 (10-06)  Alanine Aminotransferase (ALT/SGPT): 10 (10-05)  Alanine Aminotransferase (ALT/SGPT): 12 (10-04)  Alanine Aminotransferase (ALT/SGPT): 12 (10-03)  Alanine Aminotransferase (ALT/SGPT): 11 (10-01)  Aspartate Aminotransferase (AST/SGOT): 17 (10-06-21 @ 00:20)  Aspartate Aminotransferase (AST/SGOT): 15 (10-05-21 @ 00:10)  Aspartate Aminotransferase (AST/SGOT): 15 (10-04-21 @ 02:42)  Aspartate Aminotransferase (AST/SGOT): 12 (10-03-21 @ 00:50)  Aspartate Aminotransferase (AST/SGOT): 19 (10-01-21 @ 23:41)  Bilirubin Total, Serum: 0.2 (10-06)  Bilirubin Total, Serum: 0.2 (10-05)  Bilirubin Total, Serum: 0.2 (10-04)  Bilirubin Total, Serum: 0.3 (10-03)  Bilirubin Total, Serum: 0.4 (10-01)      Trend LDH              MICROBIOLOGY:        Culture - Blood (collected 30 Sep 2021 03:07)  Source: .Blood Blood-Peripheral  Final Report:    No Growth Final    Culture - Blood (collected 30 Sep 2021 03:07)  Source: .Blood PICC/PERC Double Lumen BROWN  Final Report:    No Growth Final    Culture - Blood (collected 30 Sep 2021 03:07)  Source: .Blood PICC/PERC Double Lumen BLUE  Final Report:    No Growth Final    Culture - Blood (collected 30 Sep 2021 01:30)  Source: .Blood Blood-Peripheral  Final Report:    No Growth Final    COVID-19 Jasiel Domain AB Interp: Positive (10-01-21 @ 08:38)    Blood Gas Venous - Lactate: 2.8 (10-06 @ 00:14)    CSF:    RADIOLOGY:  imaging below personally reviewed      < from: MR Head w/wo IV Cont (10.05.21 @ 14:44) >   Large enhancing right frontal hemorrhagic mass crosses the genuine of the corpus callosum as well as a small focus in the left paraclinoid region.  The mass is predominantly solid and measures 6 cm in AP diameter by 5.8 cm transversely by 5.1 cm in craniocaudal diameter.This likely represents neoplasm. Differential diagnosis includes metastases, glioblastoma and lymphoma.  < end of copied text >    < from: CT Head w/ IV Cont (09.30.21 @ 15:33) >  POSTCONTRAST CT BRAIN:  Previously seen large regions of increased attenuation with surrounding edema involving the right anterior frontal lobe, anterior corpus callosum, and mesial left frontal lobe demonstrate heterogeneous enhancement on the current examination.  Findings are suspicious for the presence of neoplasm. Differential diagnosis includes lymphoma, metastatic disease, and glioblastoma multiforme. Correlation with contrast-enhanced MRI brain is recommended for further evaluation.  Similar mass effect upon the frontal horns. Similar leftward midline shift of 1 cm. Basal cisterns are still visualized.  Similar mild asymmetric dilatation of the left lateral ventricle.  CTA BRAIN: No vascular aneurysm or flow-limiting stenosis. No AVM.  CTA NECK: No flow-limiting stenosis or evidence for arterial dissection.  Multiple nonspecific airspace opacities versus pulmonary nodules at the lung apices. Correlation with CT of the chest is recommended for further evaluation.    < end of copied text >      < from: CT Head No Cont (09.30.21 @ 04:41) >  Mass effect on the right lateral ventricle remains with shift of the midline to the left roughly 1 cm. Progressive isointense attenuation in the right frontal lobe involving both gray and white matter extending into and involving the genu of the corpus callosum.Areas of acute hemorrhage noted within and extending to the left frontal lobe with presence of extra-axial hemorrhage suggested. Patient did receive contrast in the interval between the 2 studies and possible contrast enhancement of parenchymal pathology in this region with associated hemorrhage is a consideration. MR would be helpful to better define extent of the hemorrhage and underlying parenchymal pathology. There is edema identified in the right frontal lobe greater than left      < end of copied text >    < from: Xray Chest 1 View AP/PA (09.29.21 @ 22:05) >  Right upper and bilateral lower lung patchy opacities, may represent infection.      < end of copied text >    < from: US TTE 2D F/U, Limited w/o Contrast (ED) (09.29.21 @ 21:46) >  Hyperdynamic left ventricular contractility  IVC was flat    < end of copied text >  < from: Xray Chest 1 View AP/PA (09.29.21 @ 22:05) >  Right upper and bilateral lower lung patchy opacities, may represent infection.      < end of copied text >      < from: CT Abdomen and Pelvis w/ IV Cont (09.29.21 @ 23:38) >  Bilateral lower lung consolidation, possibly representing infection and/or aspiration.  There appears to be a small caliber stent in the 2nd portion of the duodenum, which may be a retained prior CBD stentor other retained foreign body; please correlate with surgical/procedural history.  No acute CT findings in the abdomen or pelvis.    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 Hx was obtained from daughter on admission who denied fevers, diarrhea. In the ED, Vital signs stable with no leukocytosis or fevers and with labs significant for severe hyponatremia 110.  CT head w/ multiple foci of right frontal IPH with extensive vasogenic edema and effacement of the right lateral ventricle frontal horn. Pt intubated for airway protection and transferred to MICU for continued management fhyponatremia and Right IPH with edema. (30 Sep 2021 01:07 )CXR w/ b/l patchy opacities R>L suspicious for PNA and CT a/p showed possible infiltrated ijn B/L Lower lobe. Pt was treated with a course of Zosyn 9/30 -10/5. In MICU pt needed levophed for hypotension and pt was started on decadron, Keppra and hypertonic saline for cerebral lesion and hyponatremia.  MRI brain w/ and w/o contrast revealing hemorrhagic mass suspicious for neoplasm. Neurosg on board, neuroonc c/s, results of tumor board and discussion w/ family were to proceed w/ biopsy to further evaluate lymphoma vs glioblastoma multiforme vs mets. Pt extubated 10/6 weaned to RA and currently tolerating PEG feeds. Pt has leukocytosis between 15-20 k during hospital course on decadron, however leukocytosis bumped to 67 k today and febrile to 100.7. Blood cx drawn and pt started on zosyn and ID consulted. Pt is AOx0 and non verbal and unable to give hx.      REVIEW OF SYSTEMS  [ x ] ROS unobtainable because pt is AOx0    [ ] All other systems negative except as noted below    Constitutional:  [ ] fever [ ] chills  [ ] weight loss  [ ]night sweat  [ ]poor appetite/PO intake [ ]fatigue   Skin:  [ ] rash [ ] phlebitis	  Eyes: [ ] icterus [ ] pain  [ ] discharge	  ENMT: [ ] sore throat  [ ] thrush [ ] ulcers [ ] exudates [ ]anosmia  Respiratory: [ ] dyspnea [ ] hemoptysis [ ] cough [ ] sputum	  Cardiovascular:  [ ] chest pain [ ] palpitations [ ] edema	  Gastrointestinal:  [ ] nausea [ ] vomiting [ ] diarrhea [ ] constipation [ ] pain	  Genitourinary:  [ ] dysuria [ ] frequency [ ] hematuria [ ] discharge [ ] flank pain  [ ] incontinence  Musculoskeletal:  [ ] myalgias [ ] arthralgias [ ] arthritis  [ ] back pain  Neurological:  [ ] headache [ ] weakness [ ] seizures  [ ] confusion/altered mental status    prior hospital charts reviewed [V]  primary team notes reviewed [V]  other consultant notes reviewed [V]    PAST MEDICAL & SURGICAL HISTORY:  Spinal stenosis    Diabetes    Benign essential HTN    Gallstones    S/P cataract surgery        SOCIAL HISTORY:  - Denied smoking/vaping/alcohol/recreational drug use    FAMILY HISTORY:      Allergies  No Known Allergies        ANTIMICROBIALS:  piperacillin/tazobactam IVPB.. 3.375 every 8 hours      ANTIMICROBIALS (past 90 days):  MEDICATIONS  (STANDING):    azithromycin  IVPB   250 mL/Hr IV Intermittent (09-30-21 @ 01:44)    piperacillin/tazobactam IVPB.   200 mL/Hr IV Intermittent (09-30-21 @ 01:34)    piperacillin/tazobactam IVPB.   200 mL/Hr IV Intermittent (10-08-21 @ 12:15)    piperacillin/tazobactam IVPB..   25 mL/Hr IV Intermittent (10-05-21 @ 22:02)   25 mL/Hr IV Intermittent (10-05-21 @ 13:27)   25 mL/Hr IV Intermittent (10-05-21 @ 05:02)   25 mL/Hr IV Intermittent (10-04-21 @ 21:16)   25 mL/Hr IV Intermittent (10-04-21 @ 13:33)   25 mL/Hr IV Intermittent (10-04-21 @ 05:03)   25 mL/Hr IV Intermittent (10-03-21 @ 21:09)   25 mL/Hr IV Intermittent (10-03-21 @ 15:06)   25 mL/Hr IV Intermittent (10-03-21 @ 05:25)   25 mL/Hr IV Intermittent (10-02-21 @ 21:02)   25 mL/Hr IV Intermittent (10-02-21 @ 13:22)   25 mL/Hr IV Intermittent (10-02-21 @ 05:01)   25 mL/Hr IV Intermittent (10-01-21 @ 21:20)   25 mL/Hr IV Intermittent (10-01-21 @ 13:05)   25 mL/Hr IV Intermittent (10-01-21 @ 05:44)   25 mL/Hr IV Intermittent (09-30-21 @ 22:41)   25 mL/Hr IV Intermittent (09-30-21 @ 14:57)   25 mL/Hr IV Intermittent (09-30-21 @ 06:34)        OTHER MEDS:   MEDICATIONS  (STANDING):  dexAMETHasone  IVPB 8 every 8 hours  dextrose 40% Gel 15 once  dextrose 50% Injectable 25 once  dextrose 50% Injectable 12.5 once  dextrose 50% Injectable 25 once  glucagon  Injectable 1 once  hydrALAZINE 50 three times a day  insulin lispro (ADMELOG) corrective regimen sliding scale  every 6 hours  insulin NPH human recombinant 6 every 6 hours  levETIRAcetam  IVPB 500 every 12 hours  pantoprazole  Injectable 40 two times a day  polyethylene glycol 3350 17 every 12 hours  senna Syrup 10 at bedtime      VITALS:  Vital Signs Last 24 Hrs  T(F): 100.7 (10-08-21 @ 10:00), Max: 100.7 (10-08-21 @ 10:00)    Vital Signs Last 24 Hrs  HR: 98 (10-08-21 @ 10:00) (75 - 118)  BP: 104/64 (10-08-21 @ 10:00) (104/64 - 172/85)  RR: 18 (10-08-21 @ 10:00)  SpO2: 94% (10-08-21 @ 10:00) (94% - 97%)  Wt(kg): --    EXAM:    PHYSICAL EXAM:  General:  non-toxic, AOx0  HEAD/EYES: PERRL, white sclera   ENT:  normal, No thrush and no pharyngeal exudate  Neck: Supple  Cardiovascular:   No murmur,  normal S1 and S2  Respiratory: clear to ausculation bilaterally  GI:  soft, non-tender, normal bowel sounds  :  figueroa, no CVA tenderness   Musculoskeletal: Swollen b/L arms  Neurologic:  Unable to assess  Skin: Stage 3 large 4 cm x 6 cm ulcer over right hip, Smaller stage 2 2x2 cm ulcer over left hip, small 3 x 4 cm unstageable ulcer with echar. All ulcer dont look infected  Lymph:  no lymphadenopathy  Psychiatric: Unable to assess  Lines: left arm PICC      Labs:                        6.8    67.18 )-----------( 156      ( 08 Oct 2021 10:27 )             22.6     10-08    148<H>  |  115<H>  |  26<H>  ----------------------------<  171<H>  3.6   |  22  |  0.34<L>    Ca    8.2<L>      08 Oct 2021 00:15  Phos  3.2     10-08  Mg     2.2     10-08        WBC Trend:  WBC Count: 67.18 (10-08-21 @ 10:27)  WBC Count: 21.23 (10-08-21 @ 00:15)  WBC Count: 15.47 (10-07-21 @ 00:27)  WBC Count: 13.41 (10-06-21 @ 00:20)      Auto Neutrophil #: 21.39 K/uL (10-01-21 @ 11:24)  Band Neutrophils %: 12.5 % (10-01-21 @ 11:24)  Auto Neutrophil #: 8.09 K/uL (09-29-21 @ 22:08)  Band Neutrophils %: 6.0 % (09-29-21 @ 22:08)  Auto Neutrophil #: 12.61 K/uL (01-22-21 @ 07:30)      Creatine Trend:  Creatinine, Serum: 0.34 (10-08)  Creatinine, Serum: 0.34 (10-07)  Creatinine, Serum: 0.38 (10-07)  Creatinine, Serum: 0.39 (10-06)      Liver Biochemical Testing Trend:  Alanine Aminotransferase (ALT/SGPT): 14 (10-06)  Alanine Aminotransferase (ALT/SGPT): 10 (10-05)  Alanine Aminotransferase (ALT/SGPT): 12 (10-04)  Alanine Aminotransferase (ALT/SGPT): 12 (10-03)  Alanine Aminotransferase (ALT/SGPT): 11 (10-01)  Aspartate Aminotransferase (AST/SGOT): 17 (10-06-21 @ 00:20)  Aspartate Aminotransferase (AST/SGOT): 15 (10-05-21 @ 00:10)  Aspartate Aminotransferase (AST/SGOT): 15 (10-04-21 @ 02:42)  Aspartate Aminotransferase (AST/SGOT): 12 (10-03-21 @ 00:50)  Aspartate Aminotransferase (AST/SGOT): 19 (10-01-21 @ 23:41)  Bilirubin Total, Serum: 0.2 (10-06)  Bilirubin Total, Serum: 0.2 (10-05)  Bilirubin Total, Serum: 0.2 (10-04)  Bilirubin Total, Serum: 0.3 (10-03)  Bilirubin Total, Serum: 0.4 (10-01)      Trend LDH              MICROBIOLOGY:        Culture - Blood (collected 30 Sep 2021 03:07)  Source: .Blood Blood-Peripheral  Final Report:    No Growth Final    Culture - Blood (collected 30 Sep 2021 03:07)  Source: .Blood PICC/PERC Double Lumen BROWN  Final Report:    No Growth Final    Culture - Blood (collected 30 Sep 2021 03:07)  Source: .Blood PICC/PERC Double Lumen BLUE  Final Report:    No Growth Final    Culture - Blood (collected 30 Sep 2021 01:30)  Source: .Blood Blood-Peripheral  Final Report:    No Growth Final    COVID-19 Jasiel Domain AB Interp: Positive (10-01-21 @ 08:38)    Blood Gas Venous - Lactate: 2.8 (10-06 @ 00:14)    CSF:    RADIOLOGY:  imaging below personally reviewed      < from: MR Head w/wo IV Cont (10.05.21 @ 14:44) >   Large enhancing right frontal hemorrhagic mass crosses the genuine of the corpus callosum as well as a small focus in the left paraclinoid region.  The mass is predominantly solid and measures 6 cm in AP diameter by 5.8 cm transversely by 5.1 cm in craniocaudal diameter.This likely represents neoplasm. Differential diagnosis includes metastases, glioblastoma and lymphoma.  < end of copied text >    < from: CT Head w/ IV Cont (09.30.21 @ 15:33) >  POSTCONTRAST CT BRAIN:  Previously seen large regions of increased attenuation with surrounding edema involving the right anterior frontal lobe, anterior corpus callosum, and mesial left frontal lobe demonstrate heterogeneous enhancement on the current examination.  Findings are suspicious for the presence of neoplasm. Differential diagnosis includes lymphoma, metastatic disease, and glioblastoma multiforme. Correlation with contrast-enhanced MRI brain is recommended for further evaluation.  Similar mass effect upon the frontal horns. Similar leftward midline shift of 1 cm. Basal cisterns are still visualized.  Similar mild asymmetric dilatation of the left lateral ventricle.  CTA BRAIN: No vascular aneurysm or flow-limiting stenosis. No AVM.  CTA NECK: No flow-limiting stenosis or evidence for arterial dissection.  Multiple nonspecific airspace opacities versus pulmonary nodules at the lung apices. Correlation with CT of the chest is recommended for further evaluation.    < end of copied text >      < from: CT Head No Cont (09.30.21 @ 04:41) >  Mass effect on the right lateral ventricle remains with shift of the midline to the left roughly 1 cm. Progressive isointense attenuation in the right frontal lobe involving both gray and white matter extending into and involving the genu of the corpus callosum.Areas of acute hemorrhage noted within and extending to the left frontal lobe with presence of extra-axial hemorrhage suggested. Patient did receive contrast in the interval between the 2 studies and possible contrast enhancement of parenchymal pathology in this region with associated hemorrhage is a consideration. MR would be helpful to better define extent of the hemorrhage and underlying parenchymal pathology. There is edema identified in the right frontal lobe greater than left      < end of copied text >    < from: Xray Chest 1 View AP/PA (09.29.21 @ 22:05) >  Right upper and bilateral lower lung patchy opacities, may represent infection.      < end of copied text >    < from: US TTE 2D F/U, Limited w/o Contrast (ED) (09.29.21 @ 21:46) >  Hyperdynamic left ventricular contractility  IVC was flat    < end of copied text >  < from: Xray Chest 1 View AP/PA (09.29.21 @ 22:05) >  Right upper and bilateral lower lung patchy opacities, may represent infection.      < end of copied text >      < from: CT Abdomen and Pelvis w/ IV Cont (09.29.21 @ 23:38) >  Bilateral lower lung consolidation, possibly representing infection and/or aspiration.  There appears to be a small caliber stent in the 2nd portion of the duodenum, which may be a retained prior CBD stentor other retained foreign body; please correlate with surgical/procedural history.  No acute CT findings in the abdomen or pelvis.    < end of copied text >

## 2021-10-08 NOTE — CHART NOTE - NSCHARTNOTEFT_GEN_A_CORE
MAR Accept Note  Transfer to:  Medicine  Accepting Attending Physician:  Dr. Higginbotham  Assigned Room:  [address] [place] [address]    Patient seen and examined.   Labs and data reviewed.   No findings precluding transfer of service.       HPI/MICU COURSE:   Please refer to MICU transfer note for full details. Briefly, this is a  HPI: 80M PMH gallstones (30-40 yrs ago), BPH, HLD, spinal stenosis, CVA, s/p PEG, recent PNA, penile infxn (?abscess) and sacral wound on abx w/ PICC (cefepime 2gq8h 8/20-9/28; doxy 8/20-?), presented w/ worsening cough x 1 wk after daughter attempted to give ensure by mouth. Daughter noticed "snoring" breath sounds and called EMS. Denies sick contact, diarrhea. VSS in ED, Labs significant for Na 110, CO2 14, lactate 2.5. CXR w/ b/l patchy opacities R>L c/w PNA. 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 CTAP w/ multifocal PNA. Iso worsening mental status pt intubated in ED and transferred to MICU.     MICU Course: Empiric abx for multifocal PNA presumed 2/2 aspiration, completed Zosyn course. Placed on levophed iso hypotension, eventually weaned 10/2, sedation weaned as well. 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 Neurosg on board, neuroonc c/s, results of tumor board and discussion w/ family were to proceed w/ biopsy to further evaluate lymphoma vs glioblastoma multiforme vs mets. Pt extubated 10/6 weaned to RA w/ adequate SpO2%. Iso HTN pt initiated on hydralazine po 50mg TID. Tolerating PEG feeds. Iso steroid use pt requiring NPH insulin q6h titrated to 6 however pt intermittently w/ NPH held iso borderline FS. C/f occult blood from gastric lavage, placed on PPI, Hgb slowly downtrended however stabilized.       FOR FOLLOW-UP:  [ ] f/u neurosg recs and ongoing d/w family  [ ] f/u neuroonc recs  [ ] f/u BMP w/ regard to Na goal 145-155 per neurosg due to intracranial mass, consider hypertonic saline as needed  [ ] monitor FS and titrate insulin accordingly on steroids.    MAR [id number]

## 2021-10-08 NOTE — CONSULT NOTE ADULT - ASSESSMENT
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 Pt intubated for airway protection and transferred to MICU for continued management of  right frontal IPH with extensive vasogenic edema, hyponatremia and Right IPH with edema. CXR w/ b/l patchy opacities R>L suspicious for PNA and CT a/p showed possible infiltrated ijn B/L Lower lobe. Pt was treated with a course of Zosyn 9/30 -10/5. In MICU pt needed levophed for hypotension and pt was started on decadron, Keppra and hypertonic saline for cerebral lesion and hyponatremia.  MRI brain w/ and w/o contrast revealing hemorrhagic mass suspicious for neoplasm. Neurosg on board, neuroonc c/s, results of tumor board and discussion w/ family were to proceed w/ biopsy to further evaluate lymphoma vs glioblastoma multiforme vs mets. Pt extubated 10/6 weaned to RA and currently tolerating PEG feeds. Pt has leukocytosis between 15-20 k during hospital course on decadron, however leukocytosis bumped to 67 k today and febrile to 100.7. Blood cx drawn and pt started on zosyn and ID consulted.    Blood cx 4/4 (9/30): Negative  UA (9/30), (10/1): Negative  Ur.Legionella (9/29): negative  RVP (9/29): negative  CT head (9/30): Multiple foci of right frontal IPH with extensive vasogenic edema and effacement of the right lateral ventricle frontal horn.   CXR w/ b/l patchy opacities R>L suspicious for PNA and CT a/p showed possible infiltrated ijn B/L Lower lobe.  MR Head (10/5):  Large enhancing right frontal hemorrhagic mass, predominantly solid and measures 6 cm in AP diameter by 5.8 cm transversely by 5.1 cm in craniocaudal diameter. 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 Differential diagnosis includes metastases, glioblastoma and lymphoma.    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,  ·	Recent Aspiration PNA  ·	Anemia  ·	Sacral WOund    RECOMMENDATIONs  Will f/u blood cx  Monitor leukocytosis and fevers  Currently on zosyn    Pt to be seen    Mykel Romeo MD, PGY4   ID fellow  Pager: [phone]  [facility] pager ID: [phone] (would prefer to text page for any new consult or question, please include name/location and best call back number)  After 5pm/weekends call [phone] Mr Maxwell is a 80year old gentleman with PMH of gallstones (30-40 years ago), BPH, HLD, h/o spinal stenosis, CVA (nonverbal, 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 Pt intubated for airway protection and transferred to MICU for continued management of  right frontal IPH with extensive vasogenic edema, hyponatremia and Right IPH with edema. CXR w/ b/l patchy opacities R>L suspicious for PNA and CT a/p showed possible infiltrated ijn B/L Lower lobe. Pt was treated with a course of Zosyn 9/30 -10/5. In MICU pt needed levophed for hypotension and pt was started on decadron, Keppra and hypertonic saline for cerebral lesion and hyponatremia.  MRI brain w/ and w/o contrast revealing hemorrhagic mass suspicious for neoplasm. Neurosg on board, neuroonc c/s, results of tumor board and discussion w/ family were to proceed w/ biopsy to further evaluate lymphoma vs glioblastoma multiforme vs mets. Pt extubated 10/6 weaned to RA and currently tolerating PEG feeds. Pt has leukocytosis between 15-20 k during hospital course on decadron, however leukocytosis bumped to 67 k today and febrile to 100.7. Blood cx drawn and pt started on zosyn and ID consulted.    Blood cx 4/4 (9/30): Negative  UA (9/30), (10/1): Negative  Ur.Legionella (9/29): negative  RVP (9/29): negative  CT head (9/30): Multiple foci of right frontal IPH with extensive vasogenic edema and effacement of the right lateral ventricle frontal horn.   CXR w/ b/l patchy opacities R>L suspicious for PNA and CT a/p showed possible infiltrated ijn B/L Lower lobe.  MR Head (10/5):  Large enhancing right frontal hemorrhagic mass, predominantly solid and measures 6 cm in AP diameter by 5.8 cm transversely by 5.1 cm in craniocaudal diameter. 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 Differential diagnosis includes metastases, glioblastoma and lymphoma.    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 Case d.w attending and primary team    Mykel Romeo MD, PGY4   ID fellow  Pager: [phone]  [facility] pager ID: [phone] (would prefer to text page for any new consult or question, please include name/location and best call back number)  After 5pm/weekends call [phone]

## 2021-10-08 NOTE — PROGRESS NOTE ADULT - SUBJECTIVE AND OBJECTIVE BOX
[facility] Division of Kidney Diseases & Hypertension  FOLLOW UP NOTE  [phone]--------------------------------------------------------------------------------  Chief Complaint:Non-traumatic intracranial hemorrhage        24 hour events/subjective: Patient seen & examined. Labs & vitals reviewed.  Pt is s/p extubation, now on floor. Excisional biopsy planned Thursday. UO in the last 24 hours 1L. Pt appears to be moaning      PAST HISTORY  --------------------------------------------------------------------------------  No significant changes to PMH, PSH, FHx, SHx, unless otherwise noted    ALLERGIES & MEDICATIONS  --------------------------------------------------------------------------------  Allergies    No Known Allergies    Intolerances      Standing Inpatient Medications  ascorbic acid 500 milliGRAM(s) Oral daily  chlorhexidine 4% Liquid 1 Application(s) Topical <User Schedule>  dexAMETHasone  Injectable 4 milliGRAM(s) IV Push every 8 hours  dextrose 40% Gel 15 Gram(s) Oral once  dextrose 5%. 1000 milliLiter(s) IV Continuous <Continuous>  dextrose 5%. 1000 milliLiter(s) IV Continuous <Continuous>  dextrose 50% Injectable 25 Gram(s) IV Push once  dextrose 50% Injectable 12.5 Gram(s) IV Push once  dextrose 50% Injectable 25 Gram(s) IV Push once  glucagon  Injectable 1 milliGRAM(s) IntraMuscular once  hydrALAZINE 50 milliGRAM(s) Oral three times a day  insulin lispro (ADMELOG) corrective regimen sliding scale   SubCutaneous every 6 hours  insulin NPH human recombinant 6 Unit(s) SubCutaneous every 6 hours  levETIRAcetam  IVPB 500 milliGRAM(s) IV Intermittent every 12 hours  multivitamin/minerals/iron Oral Solution (CENTRUM) 15 milliLiter(s) Oral daily  pantoprazole  Injectable 40 milliGRAM(s) IV Push two times a day  polyethylene glycol 3350 17 Gram(s) Oral every 12 hours  senna Syrup 10 milliLiter(s) Oral at bedtime    PRN Inpatient Medications      REVIEW OF SYSTEMS  --------------------------------------------------------------------------------  unable to obtain due to mental status    VITALS/PHYSICAL EXAM  --------------------------------------------------------------------------------  T(C): 37.2 (10-08-21 @ 04:21), Max: 37.7 (10-08-21 @ 00:00)  HR: 98 (10-08-21 @ 06:00) (75 - 118)  BP: 121/71 (10-08-21 @ 06:00) (121/71 - 172/85)  RR: 19 (10-08-21 @ 04:21) (13 - 19)  SpO2: 97% (10-08-21 @ 04:21) (94% - 99%)  Wt(kg): --        10-07-21 @ 07:01  -  10-08-21 @ 07:00  --------------------------------------------------------  IN: 1125 mL / OUT: 1050 mL / NET: 75 mL      Physical Exam:  Gen: NAD, on RA, moaning  	HEENT: MMM  	Pulm: CTA B/L  	CV: S1S2  	Abd: Soft, +BS   	Ext: no LE edema B/L, contracted  	Neuro: awake  	Skin: Warm and dry  	Vascular access:      LABS/STUDIES  --------------------------------------------------------------------------------              7.3    21.23 >-----------<  188      [10-08-21 @ 00:15]              23.9     148  |  115  |  26  ----------------------------<  171      [10-08-21 @ 00:15]  3.6   |  22  |  0.34        Ca     8.2     [10-08-21 @ 00:15]      Mg     2.2     [10-08-21 @ 00:15]      Phos  3.2     [10-08-21 @ 00:15]            Creatinine Trend:  SCr 0.34 [10-08 @ 00:15]  SCr 0.34 [10-07 @ 11:41]  SCr 0.38 [10-07 @ 00:27]  SCr 0.39 [10-06 @ 12:27]  SCr 0.38 [10-06 @ 05:58]      Urine Creatinine 21      [10-04-21 @ 15:28]  Urine Sodium 77      [10-04-21 @ 15:28]  Urine Osmolality 515      [10-04-21 @ 15:28]

## 2021-10-08 NOTE — PROGRESS NOTE ADULT - ASSESSMENT
80M slow worsening of mental status for past 2mo, in January was Ox3 CHIQUIS strong, now does not speak, does not move at all. Today threw up after being fed by mouth, began gurgling, now p/w pneumonia, hyponatremia 110, and R sided significant edema with some hyperdensity and shift, c/f mass-    -Q4h neurochecks  -MICU for PNA/Hyponatremia  -Continue dex  -Continue Keppra 500 BID  -Na Goal 145-155  -Preop for surgery on Thursday, will f/u with daughter after she talks with family regarding biopsy vs debulking  -Please obtain bilateral LE Dopplers and document medical clearance in preparation for surgery

## 2021-10-09 NOTE — CHART NOTE - NSCHARTNOTEFT_GEN_A_CORE
Notified by RN that blood cultures collected on 10/8/21 are showing growth of GNR in anaerobic bottle.  Patient is currently being empirically treated with IV Zosyn until 10/15/2021.  Continue with IV Zosyn and continue to follow ID's recommendations regarding antibiotic regimen.  Will follow up with final growth and culture sensitivities when available.  Will endorse to primary team in AM.      Christie Rueda PA-C  Department of Medicine  Spectra [phone]

## 2021-10-09 NOTE — PROGRESS NOTE ADULT - SUBJECTIVE AND OBJECTIVE BOX
Patient seen and examined at bedside.    --Anticoagulation--    T(C): 36.6 (10-09-21 @ 08:00), Max: 38.2 (10-08-21 @ 10:00)  HR: 72 (10-09-21 @ 08:00) (66 - 98)  BP: 149/66 (10-09-21 @ 08:00) (104/64 - 159/83)  RR: 18 (10-09-21 @ 08:00) (18 - 19)  SpO2: 94% (10-09-21 @ 08:00) (94% - 98%)  Wt(kg): --    Exam: Ext, LOGAN, pupils 2 sluggish, very trace mvmt in AE    Imaging: no new brain/head imaging, CT chest pending                          8.6    32.62 )-----------( 123      ( 09 Oct 2021 08:12 )             28.0     10-09    151<H>  |  115<H>  |  31<H>  ----------------------------<  252<H>  3.8   |  25  |  0.40<L>    Ca    8.3<L>      09 Oct 2021 08:12  Phos  2.9     10-09  Mg     2.3     10-09    TPro  5.3<L>  /  Alb  2.7<L>  /  TBili  0.3  /  DBili  x   /  AST  25  /  ALT  25  /  AlkPhos  158<H>  10-09

## 2021-10-09 NOTE — PROGRESS NOTE ADULT - SUBJECTIVE AND OBJECTIVE BOX
Patient is a 81y old  Male who presents with a chief complaint of Hyponatremia (09 Oct 2021 11:28)        SUBJECTIVE / OVERNIGHT EVENTS: Unable to give ROS due to poor mental status.       MEDICATIONS  (STANDING):  acetaminophen    Suspension .. 650 milliGRAM(s) Oral once  ascorbic acid 500 milliGRAM(s) Oral daily  ceftazidime/avibactam IVPB 2.5 Gram(s) IV Intermittent every 8 hours  chlorhexidine 4% Liquid 1 Application(s) Topical <User Schedule>  dexAMETHasone  IVPB 8 milliGRAM(s) IV Intermittent every 8 hours  dextrose 40% Gel 15 Gram(s) Oral once  dextrose 5%. 1000 milliLiter(s) (50 mL/Hr) IV Continuous <Continuous>  dextrose 5%. 1000 milliLiter(s) (100 mL/Hr) IV Continuous <Continuous>  dextrose 50% Injectable 25 Gram(s) IV Push once  dextrose 50% Injectable 12.5 Gram(s) IV Push once  dextrose 50% Injectable 25 Gram(s) IV Push once  folic acid 1 milliGRAM(s) Oral daily  glucagon  Injectable 1 milliGRAM(s) IntraMuscular once  hydrALAZINE 50 milliGRAM(s) Oral three times a day  insulin lispro (ADMELOG) corrective regimen sliding scale   SubCutaneous every 6 hours  insulin NPH human recombinant 6 Unit(s) SubCutaneous every 6 hours  levETIRAcetam  IVPB 500 milliGRAM(s) IV Intermittent every 12 hours  multivitamin/minerals/iron Oral Solution (CENTRUM) 15 milliLiter(s) Oral daily  pantoprazole  Injectable 40 milliGRAM(s) IV Push two times a day  polyethylene glycol 3350 17 Gram(s) Oral every 12 hours  senna Syrup 10 milliLiter(s) Oral at bedtime    MEDICATIONS  (PRN):      Vital Signs Last 24 Hrs  T(C): 36.6 (09 Oct 2021 12:00), Max: 36.9 (09 Oct 2021 05:12)  T(F): 97.8 (09 Oct 2021 12:00), Max: 98.4 (09 Oct 2021 05:12)  HR: 70 (09 Oct 2021 12:00) (66 - 90)  BP: 135/63 (09 Oct 2021 12:00) (123/65 - 159/83)  BP(mean): --  RR: 18 (09 Oct 2021 12:00) (18 - 19)  SpO2: 96% (09 Oct 2021 12:00) (94% - 98%)  CAPILLARY BLOOD GLUCOSE      POCT Blood Glucose.: 206 mg/dL (09 Oct 2021 12:03)  POCT Blood Glucose.: 267 mg/dL (09 Oct 2021 08:08)  POCT Blood Glucose.: 242 mg/dL (09 Oct 2021 05:58)  POCT Blood Glucose.: 226 mg/dL (08 Oct 2021 23:53)  POCT Blood Glucose.: 241 mg/dL (08 Oct 2021 17:55)    I&O's Summary    08 Oct 2021 07:01  -  09 Oct 2021 07:00  --------------------------------------------------------  IN: 0 mL / OUT: 650 mL / NET: -650 mL          PHYSICAL EXAM:   GENERAL: NAD, well-developed  HEAD:  Atraumatic, Normocephalic  EYES:  conjunctiva and sclera clear  NECK: Supple,   CHEST/LUNG: Crackles; No wheeze  HEART: S1S2 normal. Regular rate and rhythm; No murmurs, rubs, or gallops  ABDOMEN: Soft, Nontender, Nondistended; Bowel sounds present; peg in place.  EXTREMITIES: trace distal le edema.   PSYCH/Neuro: eyes open but not interactive. does not follow commands.       LABS:                        8.6    32.62 )-----------( 123      ( 09 Oct 2021 08:12 )             28.0     10    151<H>  |  115<H>  |  31<H>  ----------------------------<  252<H>  3.8   |  25  |  0.40<L>    Ca    8.3<L>      09 Oct 2021 08:12  Phos  2.9     10-09  Mg     2.3     10    TPro  5.3<L>  /  Alb  2.7<L>  /  TBili  0.3  /  DBili  x   /  AST  25  /  ALT  25  /  AlkPhos  158<H>  10    PT/INR - ( 09 Oct 2021 08:12 )   PT: 13.3 sec;   INR: 1.11 ratio         PTT - ( 09 Oct 2021 08:12 )  PTT:26.0 sec      Urinalysis Basic - ( 08 Oct 2021 18:52 )    Color: Yellow / Appearance: Clear / S.024 / pH: x  Gluc: x / Ketone: Negative  / Bili: Negative / Urobili: Negative   Blood: x / Protein: 30 mg/dL / Nitrite: Negative   Leuk Esterase: Negative / RBC: 54 /hpf / WBC 8 /HPF   Sq Epi: x / Non Sq Epi: 1 /hpf / Bacteria: Negative      ee< from: Transthoracic Echocardiogram (10.08.21 @ 13:26) >  Conclusions:  Hyperdynamic left ventricle.    < end of copied text >        RADIOLOGY & ADDITIONAL TESTS:    Imaging Personally Reviewed: Echo report.  Consultant(s) Notes Reviewed:  ID, LAITH, renal  Care Discussed with Consultants/Other Providers: laith

## 2021-10-09 NOTE — PROGRESS NOTE ADULT - ATTENDING COMMENTS
maryana harvey  nephrology attending   Cell# [phone]   [facility] [phone]

## 2021-10-09 NOTE — CHART NOTE - NSCHARTNOTEFT_GEN_A_CORE
Neurosurgery team spoke with Daughter via phone call. Her and the family are not interested in surgical intervention at this time due to elevated risks 2/2 bacteremia, cancer, and general weakness. She will reassess the family's desire for intervention in 2 weeks. Hospitalist aware. Please page neurosurgery with any further questions [phone].

## 2021-10-09 NOTE — PROGRESS NOTE ADULT - SUBJECTIVE AND OBJECTIVE BOX
[facility] DIVISION OF KIDNEY DISEASES AND HYPERTENSION -- FOLLOW UP NOTE  --------------------------------------------------------------------------------  Chief Complaint:    24 hour events/subjective:  seen and examined this morning   unable to express concerns   no events overnight       PAST HISTORY  --------------------------------------------------------------------------------  No significant changes to PMH, PSH, FHx, SHx, unless otherwise noted    ALLERGIES & MEDICATIONS  --------------------------------------------------------------------------------  Allergies    No Known Allergies    Intolerances      Standing Inpatient Medications  ascorbic acid 500 milliGRAM(s) Oral daily  chlorhexidine 4% Liquid 1 Application(s) Topical <User Schedule>  dexAMETHasone  IVPB 8 milliGRAM(s) IV Intermittent every 8 hours  dextrose 40% Gel 15 Gram(s) Oral once  dextrose 5%. 1000 milliLiter(s) IV Continuous <Continuous>  dextrose 5%. 1000 milliLiter(s) IV Continuous <Continuous>  dextrose 50% Injectable 25 Gram(s) IV Push once  dextrose 50% Injectable 12.5 Gram(s) IV Push once  dextrose 50% Injectable 25 Gram(s) IV Push once  glucagon  Injectable 1 milliGRAM(s) IntraMuscular once  hydrALAZINE 50 milliGRAM(s) Oral three times a day  insulin lispro (ADMELOG) corrective regimen sliding scale   SubCutaneous every 6 hours  insulin NPH human recombinant 6 Unit(s) SubCutaneous every 6 hours  levETIRAcetam  IVPB 500 milliGRAM(s) IV Intermittent every 12 hours  multivitamin/minerals/iron Oral Solution (CENTRUM) 15 milliLiter(s) Oral daily  pantoprazole  Injectable 40 milliGRAM(s) IV Push two times a day  piperacillin/tazobactam IVPB.. 3.375 Gram(s) IV Intermittent every 8 hours  polyethylene glycol 3350 17 Gram(s) Oral every 12 hours  senna Syrup 10 milliLiter(s) Oral at bedtime    PRN Inpatient Medications      REVIEW OF SYSTEMS  unable to obtain     VITALS/PHYSICAL EXAM  --------------------------------------------------------------------------------  T(C): 36.6 (10-09-21 @ 08:00), Max: 36.9 (10-09-21 @ 05:12)  HR: 72 (10-09-21 @ 08:00) (66 - 90)  BP: 149/66 (10-09-21 @ 08:00) (123/65 - 159/83)  RR: 18 (10-09-21 @ 08:00) (18 - 19)  SpO2: 94% (10-09-21 @ 08:00) (94% - 98%)  Wt(kg): --        10-08-21 @ 07:01  -  10-09-21 @ 07:00  --------------------------------------------------------  IN: 0 mL / OUT: 650 mL / NET: -650 mL        Physical Exam:  	Gen: NAD  	HEENT: MMM  	Pulm: CTA B/L  	CV: S1S2  	Abd: Soft, +BS; feeding tube in place  	Ext: No LE edema B/L  	Neuro: Awake not alert  	Skin: Warm and dry  	Vascular access: N/A      LABS/STUDIES  --------------------------------------------------------------------------------              8.6    32.62 >-----------<  123      [10-09-21 @ 08:12]              28.0     151  |  115  |  31  ----------------------------<  252      [10-09-21 @ 08:12]  3.8   |  25  |  0.40        Ca     8.3     [10-09-21 @ 08:12]      Mg     2.3     [10-09-21 @ 08:12]      Phos  2.9     [10-09-21 @ 08:12]    TPro  5.3  /  Alb  2.7  /  TBili  0.3  /  DBili  x   /  AST  25  /  ALT  25  /  AlkPhos  158  [10-09-21 @ 08:12]    PT/INR: PT 13.3 , INR 1.11       [10-09-21 @ 08:12]  PTT: 26.0       [10-09-21 @ 08:12]    Uric acid 2.4      [10-09-21 @ 08:12]        [10-09-21 @ 08:12]    Creatinine Trend:  SCr 0.40 [10-09 @ 08:12]  SCr 0.34 [10-08 @ 00:15]  SCr 0.34 [10-07 @ 11:41]  SCr 0.38 [10-07 @ 00:27]  SCr 0.39 [10-06 @ 12:27]    Urinalysis - [10-08-21 @ 18:52]      Color Yellow / Appearance Clear / SG 1.024 / pH 5.5      Gluc Trace / Ketone Negative  / Bili Negative / Urobili Negative       Blood Negative / Protein 30 mg/dL / Leuk Est Negative / Nitrite Negative      RBC 54 / WBC 8 / Hyaline 8 / Gran  / Sq Epi  / Non Sq Epi 1 / Bacteria Negative    Urine Creatinine 21      [10-04-21 @ 15:28]  Urine Sodium 77      [10-04-21 @ 15:28]  Urine Osmolality 515      [10-04-21 @ 15:28]    Iron 122, TIBC 246, %sat 50      [01-24-21 @ 10:03]  Ferritin 292      [01-24-21 @ 09:57]  TSH 1.18      [10-01-21 @ 05:15]

## 2021-10-09 NOTE — PROGRESS NOTE ADULT - PROBLEM SELECTOR PLAN 6
-S/p 6 days of abx for multifocal pna.  -Spiked temp on 10/8, blood cultures growing MDRO Klebsiella.   -Leukocytosis noted. Worsened likely due to steroids.  -F/u CXR. -Pending CT chest.  -ID recs appreciated; zosyn changed to Avycaz.  -F/u repeat blood cultures.  -Of note patient has a CBD stent in place.  -Likely needs lines changed.   -Patient has history of pressure wounds and a penile ?abscess. Has a chronic Bello.

## 2021-10-09 NOTE — PROGRESS NOTE ADULT - ASSESSMENT
80M slow worsening of mental status for past 2mo, in January was Ox3 SIM strong, now does not speak, does not move at all. Today threw up after being fed by mouth, began gurgling, now p/w pneumonia, hyponatremia 110, and R sided significant edema with some hyperdensity and shift, c/f mass-  -Q4h neurochecks  -Medicine for PNA/Hyponatremia  -Continue dex  -Continue Keppra 500 BID  -Na Goal 145-155  -Preop for surgery on Thursday, will f/u with daughter after she talks with family regarding biopsy vs debulking  -Per ID, leukocytosis improving, was likely due to steroids/bacteremia, will f/u CT chest to r/o pna  -Echo pending prior to medicine clearance   -Please obtain bilateral LE Dopplers and document medical clearance, ID clearance in preparation for surgery

## 2021-10-09 NOTE — PROGRESS NOTE ADULT - SUBJECTIVE AND OBJECTIVE BOX
[facility]  Nephrology  Progress Note    Patient Name: Lv Crocker  MRN: [id number]  Admission Date: 3/11/2018  Hospital Length of Stay: 16 days  Attending Provider: Jose A Lloyd MD   Primary Care Physician: Philippe Mohr MD  Principal Problem:Acute respiratory failure with hypoxia    Subjective:     HPI: Mr. Crocker is a 41 yo WM with T1DM, Hashimoto thyroiditis, h/o OHTx 11/2014, and CKD stage 4 who was admitted on 3/11/18 for persistent diarrhea. He reported a 3 week history of diarrhea up to 15x/day. Associated symptoms including URI symptoms, coughing fits, and coughing syncope. Prograf level was 14.3. His post-heart transplant course has been complicated by AMR 4/2015, CMV, post-transplant restrictive cardiomyopathy, recurrent pleural effusions/ascites requiring monthly thoracenteses/paracenteses, VATS 1/11/18 with Pleur-X catheter (now removed), and CKD stage 4 with baseline sCr 2.6-3.0. Baseline -120s and baseline BP 90s-110s/60-70s. Upon admission he was started on IVF and diarrhea workup was initiated. On 3/12 he was noted to have decreased UOP despite fluids; NS was increased to 100cc/hr. He was scheduled for a colonoscopy on 3/13 however procedure was cancelled due to hypoxia and fever. He was transferred to the ICU for closer monitoring. He continued to have oliguria overnight. Bladder scan revealed 200cc of urine but patient refused osullivan catheter placement. Wife reports that patient always has a hard time urinating again after osullivan catheters are placed/removed so he refuses them. He remained hypoxic despite FiO2 70% and ABG revealed combined respiratory and metabolic acidosis with pH 7.17. He was started on BiPAP as well as a bicarb infusion at 50cc/hr. ABG with mild improvement. AM labs revealed K 6.2 and he was shifted and given kayexalate.Trialysis catheter was placed this morning and he subsequently developed hypotension and was started on levophed. He was  "intubated later this morning. Nephrology consulted for CACHORRO. All history obtained by primary team and chart review as patient was intubated/sedated on exam. Consent for dialysis obtained by patient's wife and placed in chart. Of note, both of patient's parents were on dialysis.     Interval History:   No events overnight. Net negative 1L yesterday. PEG postponed; planning for bronch and trach at 10:30am this morning. Hourly intake 7cc/hr with insulin gtt. Minimal vent settings. CXR still with "moderate edema".     Review of patient's allergies indicates:   Allergen Reactions    No known drug allergies      Current Facility-Administered Medications   Medication Frequency    0.9%  NaCl infusion (CRRT USE ONLY) Continuous    0.9%  NaCl infusion (CRRT USE ONLY) Continuous    acetaminophen oral solution 650 mg Q4H PRN    chlorhexidine 0.12 % solution 15 mL BID    dexmedetomidine (PRECEDEX) 400mcg/100mL 0.9% NaCL infusion Continuous    dextrose 50% injection 12.5 g PRN    dextrose 50% injection 12.5 g PRN    dextrose 50% injection 25 g PRN    dextrose 50% injection 25 g PRN    docusate 50 mg/5 mL liquid 100 mg BID    escitalopram oxalate tablet 20 mg Daily    famotidine (PF) injection 20 mg BID    glucagon (human recombinant) injection 1 mg PRN    glucose chewable tablet 16 g PRN    glucose chewable tablet 24 g PRN    heparin (porcine) injection 5,000 Units Q12H    hydrocortisone sodium succinate injection 50 mg Q8H    insulin aspart U-100 pen 0-5 Units PRN    insulin regular (Humulin R) 100 Units in sodium chloride 0.9% 100 mL infusion Continuous    k phos di & mono-sod phos mono 250 mg tablet 2 tablet Q4H PRN    levothyroxine tablet 125 mcg Before breakfast    linezolid 600mg/300ml IVPB 600 mg Q12H    meropenem (MERREM) 2 g in sodium chloride 0.9% 100 mL IVPB Q12H    metoclopramide HCl injection 5 mg Q6H    norepinephrine 32 mg in sodium chloride 0.9% 250 mL infusion Continuous    polyethylene " glycol packet 17 g Daily    posaconazole 200 mg/5ml (40 mg/ml) suspension 200 mg TID WM    sodium chloride 0.9% flush 3 mL Q8H    tacrolimus capsule 1 mg BID       Objective:     Vital Signs (Most Recent):  Temp: 98.2 °F (36.8 °C) (03/28/18 0800)  Pulse: (!) 122 (03/28/18 0927)  Resp: (!) 21 (03/28/18 0815)  BP: 112/74 (03/28/18 0800)  SpO2: 97 % (03/28/18 0927)  O2 Device (Oxygen Therapy): ventilator (03/28/18 0927) Vital Signs (24h Range):  Temp:  [97.7 °F (36.5 °C)-98.2 °F (36.8 °C)] 98.2 °F (36.8 °C)  Pulse:  [115-124] 122  Resp:  [17-28] 21  SpO2:  [97 %-100 %] 97 %  BP: ()/(54-74) 112/74  Arterial Line BP: ()/(43-74) 130/70     Weight: 73.2 kg (161 lb 6 oz) (03/28/18 0400)  Body mass index is 25.28 kg/m².  Body surface area is 1.86 meters squared.    I/O last 3 completed shifts:  In: 8422.1 [I.V.:5432.1; NG/GT:1890; IV Piggyback:1100]  Out: 9384 [Other:9383; Stool:1]    Physical Exam   Constitutional: He appears well-developed and well-nourished.   HENT:   Head: Normocephalic and atraumatic.   Eyes: Conjunctivae are normal. No scleral icterus.   Cardiovascular: Regular rhythm.  Tachycardia present.    Pulmonary/Chest: He has no wheezes. He has no rales.   Abdominal: Soft. He exhibits no distension.   Musculoskeletal: He exhibits edema (dependent). He exhibits no deformity.   Skin: Skin is warm and dry. He is not diaphoretic.       Significant Labs:  ABGs:   Recent Labs  Lab 03/28/18 0815   PH 7.430   PCO2 34.9*   HCO3 23.2*   POCSATURATED 98   BE -1     CBC:   Recent Labs  Lab 03/28/18  0813   WBC 4.02   RBC 2.24*   HGB 6.7*   HCT 21.6*      MCV 96   MCH 29.9   MCHC 31.0*     CMP:   Recent Labs  Lab 03/28/18  0356 03/28/18  0813   *  118*  118*  --    CALCIUM 8.3*  8.3*  8.3*  --    ALBUMIN 2.4*  2.4*  2.4*  --    PROT 6.2  --      143  143  --    K 4.8  4.8  4.8 4.7   CO2 24  24  24  --      108  108  --    BUN 7  7  7  --    CREATININE 0.6  0.6  0.6   --    ALKPHOS 231*  --    ALT 29  --    AST 42*  --    BILITOT 1.2*  --      All labs within the past 24 hours have been reviewed.     Significant Imaging:  Labs: Reviewed  X-Ray: Reviewed    Assessment/Plan:     Acute renal failure superimposed on stage 4 chronic kidney disease    - baseline sCr 2.6-3.0 consistent with CKD stage IV  - anuric CACHORRO; likely ischemic ATN from prolonged pre-renal state (diarrhea) in setting of prograf renal vasoconstriction; cannot r/o septic ATN or Prograf toxicity  - SLED started 3/14  - remains anuric  - net negative 1L yesterday; no improvement in CXR. Going for bronch/trach this morning  - will plan to restart RRT after procedure; SCUF with UF goal 350-450cc/hr. Low threshold to switch to SLED for any electrolyte abnormalities.             Amairani Alegria, PGY-5  Nephrology Fellow  [facility]  Pager: [phone]       Patient is a 81y old  Male who presents with a chief complaint of Hyponatremia (08 Oct 2021 09:42)    f/u fever    Interval History/ROS:  no fever/chills.  no n/v/d.  no abdominal pain.  no dysuria.  Remainder of ROS otherwise negative.    PAST MEDICAL & SURGICAL HISTORY:  Spinal stenosis  Diabetes  Benign essential HTN  Gallstones  S/P cataract surgery    Allergies  No Known Allergies    ANTIMICROBIALS:  piperacillin/tazobactam IVPB.. 3.375 every 8 hours (9/30-)    MEDICATIONS  (STANDING):  dexAMETHasone  IVPB 8 every 8 hours  hydrALAZINE 50 three times a day  insulin lispro (ADMELOG) corrective regimen sliding scale  every 6 hours  insulin NPH human recombinant 6 every 6 hours  levETIRAcetam  IVPB 500 every 12 hours  pantoprazole  Injectable 40 two times a day  polyethylene glycol 3350 17 every 12 hours  senna Syrup 10 at bedtime    Vital Signs Last 24 Hrs  T(F): 97.9 (10-09-21 @ 08:00), Max: 100.7 (10-08-21 @ 10:00)  HR: 72 (10-09-21 @ 08:00)  BP: 149/66 (10-09-21 @ 08:00)  RR: 18 (10-09-21 @ 08:00)  SpO2: 94% (10-09-21 @ 08:00) (94% - 98%)    PHYSICAL EXAM:  Constitutional: chronically ill appearing  HEAD/EYES: anicteric  ENT:  supple  Cardiovascular:   normal S1, S2, anasarca  Respiratory:  clear BS bilaterally  GI:  soft, non-tender, normal bowel sounds  :  figueroa  Musculoskeletal:  no synovitis  Neurologic: awake, week generally  Skin:  no rash  Psychiatric:  awake, difficult to assess                        8.6    32.62 )-----------( 123      ( 09 Oct 2021 08:12 )             28.0 10-09    151  |  115  |  31  ----------------------------<  252  3.8   |  25  |  0.40  Ca    8.3      09 Oct 2021 08:12Phos  2.9     10-09Mg     2.3     10-09  TPro  5.3  /  Alb  2.7  /  TBili  0.3  /  DBili  x   /  AST  25  /  ALT  25  /  AlkPhos  158  10-09    WBC Count: 32.62 (10-09-21 @ 08:12)  WBC Count: 67.18 (10-08-21 @ 10:27)  WBC Count: 21.23 (10-08-21 @ 00:15)  WBC Count: 15.47 (10-07-21 @ 00:27)  WBC Count: 13.41 (10-06-21 @ 00:20)  WBC Count: 16.86 (10-05-21 @ 00:10)  WBC Count: 17.43 (10-04-21 @ 02:42)  WBC Count: 15.32 (10-03-21 @ 00:50)    MICROBIOLOGY:  Culture - Blood (collected 10-08-21 @ 17:01)  Source: .Blood PICC/PERC Single Lumen  Gram Stain (10-09-21 @ 05:36):    Growth in anaerobic bottle: Gram Negative Rods  Preliminary Report (10-09-21 @ 05:37):    Growth in anaerobic bottle: Gram Negative Rods    Culture - Blood (collected 10-08-21 @ 17:01)  Source: .Blood Blood  Gram Stain (10-09-21 @ 03:07):    Growth in aerobic bottle: Gram Negative Rods    Growth in anaerobic bottle: Gram Negative Rods  Preliminary Report (10-09-21 @ 03:07):    Growth in aerobic bottle: Gram Negative Rods    MDRO detected in BCID PCR, resistance marker = KPC    Growth in anaerobic bottle: Gram Negative Rods    at [email or web address] for details.  Organism: Blood Culture PCR (10-09-21 @ 02:09)  Organism: Blood Culture PCR (10-09-21 @ 02:09)      -  Klebsiella pneumoniae: Detec      Method Type: PCR    RADIOLOGY:  imaging below personally reviewed    MR Head w/wo IV Cont (10.05.21 @ 14:44) >  Large enhancing right frontal hemorrhagic mass crosses the genuine of the corpus callosum as well as a small focus in the left paraclinoid region.  The mass is predominantly solid and measures 6 cm in AP diameter by 5.8 cm transversely by 5.1 cm in craniocaudal diameter.  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 Differential diagnosis includes metastases, glioblastoma and lymphoma.    CT Head w/ IV Cont (09.30.21 @ 15:33) >  POSTCONTRAST CT BRAIN:  Previously seen large regions of increased attenuation with surrounding edema involving the right anterior frontal lobe, anterior corpus callosum, and mesial left frontal lobe demonstrate heterogeneous enhancement on the current examination.  Findings are suspicious for the presence of neoplasm. Differential diagnosis includes lymphoma, metastatic disease, and glioblastoma multiforme. Correlation with contrast-enhanced MRI brain is recommended for further evaluation.  Similar mass effect upon the frontal horns. Similar leftward midline shift of 1 cm. Basal cisterns are still visualized.  Similar mild asymmetric dilatation of the left lateral ventricle.  CTA BRAIN: No vascular aneurysm or flow-limiting stenosis. No AVM.  CTA NECK: No flow-limiting stenosis or evidence for arterial dissection.  Multiple nonspecific airspace opacities versus pulmonary nodules at the lung apices. Correlation with CT of the chest is recommended for further evaluation.    CT Head No Cont (09.30.21 @ 04:41) >  Mass effect on the right lateral ventricle remains with shift of the midline to the left roughly 1 cm. Progressive isointense attenuation in the right frontal lobe involving both gray and white matter extending into and involving the genu of the corpus callosum.Areas of acute hemorrhage noted within and extending to the left frontal lobe with presence of extra-axial hemorrhage suggested. Patient did receive contrast in the interval between the 2 studies and possible contrast enhancement of parenchymal pathology in this region with associated hemorrhage is a consideration. MR would be helpful to better define extent of the hemorrhage and underlying parenchymal pathology. There is edema identified in the right frontal lobe greater than left    Xray Chest 1 View AP/PA (09.29.21 @ 22:05) >  Right upper and bilateral lower lung patchy opacities, may represent infection.    US TTE 2D F/U, Limited w/o Contrast (ED) (09.29.21 @ 21:46) >  Hyperdynamic left ventricular contractility.  IVC was flat    Xray Chest 1 View AP/PA (09.29.21 @ 22:05) >  Right upper and bilateral lower lung patchy opacities, may represent infection.    CT Abdomen and Pelvis w/ IV Cont (09.29.21 @ 23:38) >  Bilateral lower lung consolidation, possibly representing infection and/or aspiration.  There appears to be a small caliber stent in the 2nd portion of the duodenum, which may be a retained prior CBD stentor other retained foreign body; please correlate with surgical/procedural history.  No acute CT findings in the abdomen or pelvis.

## 2021-10-09 NOTE — PROGRESS NOTE ADULT - ASSESSMENT
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  Intubated for airway protection and transferred to MICU for continued management of right frontal IPH with extensive vasogenic edema, hyponatremia and Right IPH with edema. CXR w/ b/l patchy opacities R>L suspicious for PNA and CT a/p showed possible infiltrated in B/L Lower lobe. Pt was treated with a course of Zosyn 9/30 -10/5. In MICU pt needed levophed for hypotension and pt was started on decadron, Keppra and hypertonic saline for cerebral lesion and hyponatremia.  MRI brain w/ and w/o contrast revealing hemorrhagic mass suspicious for neoplasm. Neurosurgery and neuro-oncology following - results of tumor board and discussion w/ family were to proceed w/ biopsy to further evaluate lymphoma vs glioblastoma multiforme vs mets. Pt extubated 10/6 weaned to RA and currently tolerating PEG feeds.  Had worsening leukocytosi on high dose dexamethasone.  Today BC with KPC Kpna.  WBC better.      Leukocytosis  - multifactorial  - high dose steroids and bacteremia  - improving    bacteremia  - would change zosyn to avycaz  - repeat BC  - f/u CT chest  - source, pneumonia?    CNS mass  - on steroids  - may need PJP prophylaxis if plan is for prolonged course of steroids    Please call the ID service [phone] with questions or concerns over the weekend

## 2021-10-09 NOTE — PROGRESS NOTE ADULT - ASSESSMENT
80yM PMH of gallstones (30-40 years ago), BPH, HLD, h/o spinal stenosis, subarachnoid and severe spinal stenosis, recent PNA, penile infection (?abscess) &  sacral wound on antibiotic with PICC line presenting with possible aspiration PNA, extensive right frontal intraparenchymal hemorrhage, herniation & hyponatremia. Nephrology was called for hyponatremia.       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 In view of severe hyponatremia and intracranial bleed, received 3%HTS.   Repeat SNa was 116 on 9/30/21. U Osm was 486 with Ya of 15 with low uric acid on 9/30/21.   Today Serum Na is 141-->147-->149-->148-->151.   Goal Na per neurosurgery is 145-155.   Neurosurgery recs noted regarding excisional biopsy on Thursday. f/u     If any questions, please feel free to contact me     Jordy Melvin  Nephrology Fellow  [facility] Pager: [phone]

## 2021-10-09 NOTE — PROGRESS NOTE ADULT - PROBLEM SELECTOR PLAN 1
-MRI brain from 10/5:  Large enhancing right frontal hemorrhagic mass crosses the genuine of the corpus callosum as well as a small focus in the left paraclinoid region. 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 Differential diagnosis includes metastases, glioblastoma and lymphoma. Poor mental status.   -Family had wanted to pursue more aggressive mgmt. -LAITH was tentatively planning to do biopsy vs debulking next Thursday 10/14.   -Discussed with daughter Brooklynn 10/9 and she would now like to pursue more conservative/medical management and no go with any surgery/biopsy.   -Ongoing GOC discussions with family; will follow up this week to discuss possible home hospice. For now they want steroids and IV abx.    -C/w dexamethasone; will c/w increased to 8mg IV q8h for brain edema per neuro onco.   -May need PCP ppx if will be on chronic steroids.   -Na goal 145-155 per LAITH.   -Neuro checks q4h.   -F/u neuro onco and LAITH recs.  -C/w keppra 500mg BID for seizure ppx.  -PREOP RISK STRATIFICATION: -Patient at least intermediate to high risk given comorbid conditions.   -Now with bacteremia and poor baseline mental/functional status, would not clear him for surgery. Would recommend more palliative approach.

## 2021-10-10 NOTE — PROGRESS NOTE ADULT - SUBJECTIVE AND OBJECTIVE BOX
Patient is a 81y old  Male who presents with a chief complaint of Hyponatremia (10 Oct 2021 12:32)        SUBJECTIVE / OVERNIGHT EVENTS: Patient unable to give ros due to poor mental status.       MEDICATIONS  (STANDING):  ascorbic acid 500 milliGRAM(s) Oral daily  ceftazidime/avibactam IVPB 2.5 Gram(s) IV Intermittent every 8 hours  chlorhexidine 4% Liquid 1 Application(s) Topical <User Schedule>  dexAMETHasone  IVPB 8 milliGRAM(s) IV Intermittent every 8 hours  dextrose 40% Gel 15 Gram(s) Oral once  dextrose 5%. 1000 milliLiter(s) (50 mL/Hr) IV Continuous <Continuous>  dextrose 5%. 1000 milliLiter(s) (100 mL/Hr) IV Continuous <Continuous>  dextrose 50% Injectable 25 Gram(s) IV Push once  dextrose 50% Injectable 12.5 Gram(s) IV Push once  dextrose 50% Injectable 25 Gram(s) IV Push once  folic acid 1 milliGRAM(s) Oral daily  glucagon  Injectable 1 milliGRAM(s) IntraMuscular once  hydrALAZINE 50 milliGRAM(s) Oral three times a day  insulin lispro (ADMELOG) corrective regimen sliding scale   SubCutaneous every 6 hours  insulin NPH human recombinant 6 Unit(s) SubCutaneous every 6 hours  levETIRAcetam  IVPB 500 milliGRAM(s) IV Intermittent every 12 hours  multivitamin/minerals/iron Oral Solution (CENTRUM) 15 milliLiter(s) Oral daily  nystatin Powder 1 Application(s) Topical two times a day  pantoprazole  Injectable 40 milliGRAM(s) IV Push two times a day  polyethylene glycol 3350 17 Gram(s) Oral every 12 hours  senna Syrup 10 milliLiter(s) Oral at bedtime    MEDICATIONS  (PRN):      Vital Signs Last 24 Hrs  T(C): 37.1 (10 Oct 2021 17:00), Max: 37.1 (10 Oct 2021 17:00)  T(F): 98.7 (10 Oct 2021 17:00), Max: 98.7 (10 Oct 2021 17:00)  HR: 71 (10 Oct 2021 17:00) (67 - 103)  BP: 146/64 (10 Oct 2021 17:00) (116/65 - 159/84)  BP(mean): --  RR: 18 (10 Oct 2021 17:00) (18 - 18)  SpO2: 95% (10 Oct 2021 17:00) (95% - 97%)  CAPILLARY BLOOD GLUCOSE      POCT Blood Glucose.: 209 mg/dL (10 Oct 2021 17:46)  POCT Blood Glucose.: 174 mg/dL (10 Oct 2021 12:11)  POCT Blood Glucose.: 206 mg/dL (10 Oct 2021 08:07)  POCT Blood Glucose.: 247 mg/dL (10 Oct 2021 05:57)  POCT Blood Glucose.: 206 mg/dL (10 Oct 2021 00:48)  POCT Blood Glucose.: 189 mg/dL (09 Oct 2021 23:42)    I&O's Summary    09 Oct 2021 07:01  -  10 Oct 2021 07:00  --------------------------------------------------------  IN: 0 mL / OUT: 200 mL / NET: -200 mL    10 Oct 2021 07:01  -  10 Oct 2021 18:33  --------------------------------------------------------  IN: 0 mL / OUT: 700 mL / NET: -700 mL          PHYSICAL EXAM:   GENERAL: NAD,    HEAD:  Atraumatic, Normocephalic  EYES:  conjunctiva and sclera clear  NECK: Supple,    CHEST/LUNG: Crackles  HEART: S1S2 normal. Regular rate and rhythm; No murmurs, rubs, or gallops  ABDOMEN: Soft, Nontender, Nondistended; Bowel sounds present; peg in place.   EXTREMITIES:  distal pitting edema in extremities. left arm picc.   PSYCH/Neuro: not responsive or interactive.       LABS:                        8.5    22.91 )-----------( 97       ( 10 Oct 2021 02:49 )             28.7     10-10    150<H>  |  114<H>  |  31<H>  ----------------------------<  210<H>  3.9   |  23  |  0.37<L>    Ca    8.1<L>      10 Oct 2021 02:49  Phos  2.9     10-09  Mg     2.3     10-09    TPro  5.3<L>  /  Alb  2.7<L>  /  TBili  0.3  /  DBili  x   /  AST  25  /  ALT  25  /  AlkPhos  158<H>  10-09    PT/INR - ( 09 Oct 2021 08:12 )   PT: 13.3 sec;   INR: 1.11 ratio         PTT - ( 09 Oct 2021 08:12 )  PTT:26.0 sec      Urinalysis Basic - ( 08 Oct 2021 18:52 )    Color: Yellow / Appearance: Clear / S.024 / pH: x  Gluc: x / Ketone: Negative  / Bili: Negative / Urobili: Negative   Blood: x / Protein: 30 mg/dL / Nitrite: Negative   Leuk Esterase: Negative / RBC: 54 /hpf / WBC 8 /HPF   Sq Epi: x / Non Sq Epi: 1 /hpf / Bacteria: Negative      c< from: CT Head No Cont (10.09.21 @ 18:27) >  IMPRESSION: Isodense right frontal mass extending into the genu of the corpus callosum with some calcification and vasogenic edema unchanged since 2021..    < end of copied text >  < from: CT Chest No Cont (10.09.21 @ 18:27) >  IMPRESSION:  Mosaic attenuation may be related to air trapping, small airways, versus small vessel disease. Question superimposed infectious/inflammatory process.    Small pericardial effusion. Small bilateral pleural effusions.    < end of copied text >      RADIOLOGY & ADDITIONAL TESTS:    Imaging Personally Reviewed: ct head and ct chest  Consultant(s) Notes Reviewed:  renal, syd, ID  Care Discussed with Consultants/Other Providers:

## 2021-10-10 NOTE — PROGRESS NOTE ADULT - ASSESSMENT
80yM PMH of gallstones (30-40 years ago), BPH, HLD, h/o spinal stenosis, subarachnoid and severe spinal stenosis, recent PNA, penile infection (?abscess) &  sacral wound on antibiotic with PICC line presenting with possible aspiration PNA, extensive right frontal intraparenchymal hemorrhage, herniation & hyponatremia. Nephrology was called for hyponatremia.       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 In view of severe hyponatremia and intracranial bleed, received 3%HTS.   Today Serum Na is 150.   Goal Na per neurosurgery is 145-155.   Neurosurgery recs noted regarding excisional biopsy  --- family not interested in any surgican intervention at this time as per discussion with neurosurgery   80yM PMH of gallstones (30-40 years ago), BPH, HLD, h/o spinal stenosis, subarachnoid and severe spinal stenosis, recent PNA, penile infection (?abscess) &  sacral wound on antibiotic with PICC line presenting with possible aspiration PNA, extensive right frontal intraparenchymal hemorrhage, herniation & hyponatremia. Nephrology was called for hyponatremia.       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 In view of severe hyponatremia and intracranial bleed, received 3%HTS.   Today Serum Na is 150.   Goal Na per neurosurgery is 145-155.   Neurosurgery recs noted regarding excisional biopsy  --- family not interested in any surgican intervention at this time as per discussion with neurosurgery    If any questions, please feel free to contact me     Jordy Melvin  Nephrology Fellow  [facility] Pager: [phone]

## 2021-10-10 NOTE — PROGRESS NOTE ADULT - ATTENDING COMMENTS
maryana harvey  nephrology attending   Cell# [phone]   [facility] [phone]

## 2021-10-10 NOTE — PROGRESS NOTE ADULT - PROBLEM SELECTOR PLAN 6
-S/p 6 days of abx for multifocal pna.  -Spiked temp on 10/8, blood cultures growing MDRO Klebsiella. -Repeat cultures 10/10 growing GNR.   -Leukocytosis noted. Worsened likely due to steroids and bacteremia.  -CT chest showed Mosaic attenuation may be related to air trapping, small airways, versus small vessel disease. Question superimposed infectious/inflammatory process.  -ID recs appreciated; zosyn changed to Avycaz.  -F/u repeat blood cultures for 10/11.  -Of note patient has a CBD stent in place.   -Likely needs left arm PICC line changed/removed.    -Patient has history of pressure wounds and a penile ?abscess. Has a chronic Bello. -?source.

## 2021-10-10 NOTE — CHART NOTE - NSCHARTNOTEFT_GEN_A_CORE
Informed by RN that blood cx collected 10/8 with Klebsiella pneumoniae - Carbapenem resistant. Pt currently on avycaz which is sensitive. d/W ID, - will continue to monitor and repeat blood cx in am.   Juan F POLLARD

## 2021-10-10 NOTE — PROGRESS NOTE ADULT - PROBLEM SELECTOR PLAN 1
-MRI brain from 10/5:  Large enhancing right frontal hemorrhagic mass crosses the genuine of the corpus callosum as well as a small focus in the left paraclinoid region. 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 Differential diagnosis includes metastases, glioblastoma and lymphoma. Poor mental status.   -Family had wanted to pursue more aggressive mgmt intially. -LAITH was tentatively planning to do biopsy vs debulking next Thursday 10/14.   -Discussed with daughter Brooklynn 10/9 and she would now like to pursue more conservative/medical management and not go with any surgery/biopsy.   -Ongoing GOC discussions with family; will follow up this week to discuss possible home hospice. For now they want steroids and IV abx.    -C/w dexamethasone; will c/w increased to 8mg IV q8h for brain edema per neuro onco.   -May need PCP ppx if will be on chronic steroids.   -Na goal 145-155 per LAITH.   -Neuro checks q4h.   -F/u neuro onco and LAITH recs.  -C/w keppra 500mg BID for seizure ppx.  -PREOP RISK STRATIFICATION: -Patient at least intermediate to high risk given comorbid conditions.   -Now with bacteremia and poor baseline mental/functional status, would not clear him for surgery. Would recommend more palliative approach.

## 2021-10-10 NOTE — PROGRESS NOTE ADULT - SUBJECTIVE AND OBJECTIVE BOX
[facility] DIVISION OF KIDNEY DISEASES AND HYPERTENSION -- FOLLOW UP NOTE  --------------------------------------------------------------------------------  Chief Complaint:    24 hour events/subjective:    seen and examined this morning   unable to express concerns   no acute issues overnight      PAST HISTORY  --------------------------------------------------------------------------------  No significant changes to PMH, PSH, FHx, SHx, unless otherwise noted    ALLERGIES & MEDICATIONS  --------------------------------------------------------------------------------  Allergies    No Known Allergies    Intolerances      Standing Inpatient Medications  ascorbic acid 500 milliGRAM(s) Oral daily  ceftazidime/avibactam IVPB 2.5 Gram(s) IV Intermittent every 8 hours  chlorhexidine 4% Liquid 1 Application(s) Topical <User Schedule>  dexAMETHasone  IVPB 8 milliGRAM(s) IV Intermittent every 8 hours  dextrose 40% Gel 15 Gram(s) Oral once  dextrose 5%. 1000 milliLiter(s) IV Continuous <Continuous>  dextrose 5%. 1000 milliLiter(s) IV Continuous <Continuous>  dextrose 50% Injectable 25 Gram(s) IV Push once  dextrose 50% Injectable 12.5 Gram(s) IV Push once  dextrose 50% Injectable 25 Gram(s) IV Push once  folic acid 1 milliGRAM(s) Oral daily  glucagon  Injectable 1 milliGRAM(s) IntraMuscular once  hydrALAZINE 50 milliGRAM(s) Oral three times a day  insulin lispro (ADMELOG) corrective regimen sliding scale   SubCutaneous every 6 hours  insulin NPH human recombinant 6 Unit(s) SubCutaneous every 6 hours  levETIRAcetam  IVPB 500 milliGRAM(s) IV Intermittent every 12 hours  multivitamin/minerals/iron Oral Solution (CENTRUM) 15 milliLiter(s) Oral daily  nystatin Powder 1 Application(s) Topical two times a day  pantoprazole  Injectable 40 milliGRAM(s) IV Push two times a day  polyethylene glycol 3350 17 Gram(s) Oral every 12 hours  senna Syrup 10 milliLiter(s) Oral at bedtime    PRN Inpatient Medications      REVIEW OF SYSTEM  unable to obtain     VITALS/PHYSICAL EXAM  --------------------------------------------------------------------------------  T(C): 36.7 (10-10-21 @ 02:53), Max: 36.7 (10-09-21 @ 22:17)  HR: 80 (10-10-21 @ 06:51) (67 - 103)  BP: 159/84 (10-10-21 @ 06:51) (116/65 - 159/84)  RR: 18 (10-10-21 @ 02:53) (18 - 18)  SpO2: 96% (10-10-21 @ 02:53) (94% - 98%)  Wt(kg): --        10-09-21 @ 07:01  -  10-10-21 @ 07:00  --------------------------------------------------------  IN: 0 mL / OUT: 200 mL / NET: -200 mL        Physical Exam:  	Gen: NAD  	HEENT: MMM  	Pulm: CTA B/L  	CV: S1S2  	Abd: Soft, +BS , feeding tube  	Ext: No LE edema B/L  	Neuro: not Awake to alert   	Skin: Warm and dry  	Vascular access: N/A      LABS/STUDIES  --------------------------------------------------------------------------------              8.5    22.91 >-----------<  97       [10-10-21 @ 02:49]              28.7     150  |  114  |  31  ----------------------------<  210      [10-10-21 @ 02:49]  3.9   |  23  |  0.37        Ca     8.1     [10-10-21 @ 02:49]      Mg     2.3     [10-09-21 @ 08:12]      Phos  2.9     [10-09-21 @ 08:12]    TPro  5.3  /  Alb  2.7  /  TBili  0.3  /  DBili  x   /  AST  25  /  ALT  25  /  AlkPhos  158  [10-09-21 @ 08:12]    PT/INR: PT 13.3 , INR 1.11       [10-09-21 @ 08:12]  PTT: 26.0       [10-09-21 @ 08:12]    Uric acid 2.4      [10-09-21 @ 08:12]        [10-09-21 @ 08:12]    Creatinine Trend:  SCr 0.37 [10-10 @ 02:49]  SCr 0.40 [10-09 @ 08:12]  SCr 0.34 [10-08 @ 00:15]  SCr 0.34 [10-07 @ 11:41]  SCr 0.38 [10-07 @ 00:27]    Urinalysis - [10-08-21 @ 18:52]      Color Yellow / Appearance Clear / SG 1.024 / pH 5.5      Gluc Trace / Ketone Negative  / Bili Negative / Urobili Negative       Blood Negative / Protein 30 mg/dL / Leuk Est Negative / Nitrite Negative      RBC 54 / WBC 8 / Hyaline 8 / Gran  / Sq Epi  / Non Sq Epi 1 / Bacteria Negative    Urine Creatinine 21      [10-04-21 @ 15:28]  Urine Sodium 77      [10-04-21 @ 15:28]  Urine Osmolality 515      [10-04-21 @ 15:28]    Iron 15, TIBC 184, %sat 8      [10-09-21 @ 12:15]  Ferritin 666      [10-09-21 @ 12:16]  TSH 1.18      [10-01-21 @ 05:15]

## 2021-10-11 NOTE — PROVIDER CONTACT NOTE (OTHER) - ASSESSMENT
pt with +4/weeping Upper extremity; very poor peripheral access  floor staff & phlebotomy team unable to draw blood

## 2021-10-11 NOTE — CHART NOTE - NSCHARTNOTEFT_GEN_A_CORE
Called by the nurse  report that right arm US noted with phlebitis. Patient with b/l arm swelling and third spacing. Arm is of normal skin tone and no erythema noted. Arrow in place and flush well. Left upper arm PICC line removed without difficulty as requested by ID. [place] d/w Dr Hopkins.

## 2021-10-11 NOTE — PROGRESS NOTE ADULT - SUBJECTIVE AND OBJECTIVE BOX
Follow Up:      Inverval History/ROS:Patient is a 81y old  Male who presents with a chief complaint of Hyponatremia (11 Oct 2021 08:16)    Lethargic  No fever    Allergies    No Known Allergies    Intolerances        ANTIMICROBIALS:  ceftazidime/avibactam IVPB 2.5 every 8 hours      OTHER MEDS:  ascorbic acid 500 milliGRAM(s) Oral daily  chlorhexidine 4% Liquid 1 Application(s) Topical <User Schedule>  dexAMETHasone  IVPB 8 milliGRAM(s) IV Intermittent every 8 hours  dextrose 40% Gel 15 Gram(s) Oral once  dextrose 5%. 1000 milliLiter(s) IV Continuous <Continuous>  dextrose 5%. 1000 milliLiter(s) IV Continuous <Continuous>  dextrose 50% Injectable 25 Gram(s) IV Push once  dextrose 50% Injectable 12.5 Gram(s) IV Push once  dextrose 50% Injectable 25 Gram(s) IV Push once  folic acid 1 milliGRAM(s) Oral daily  glucagon  Injectable 1 milliGRAM(s) IntraMuscular once  hydrALAZINE 50 milliGRAM(s) Oral three times a day  insulin lispro (ADMELOG) corrective regimen sliding scale   SubCutaneous every 6 hours  insulin NPH human recombinant 6 Unit(s) SubCutaneous every 6 hours  levETIRAcetam  IVPB 500 milliGRAM(s) IV Intermittent every 12 hours  multivitamin/minerals/iron Oral Solution (CENTRUM) 15 milliLiter(s) Oral daily  nystatin Powder 1 Application(s) Topical two times a day  pantoprazole  Injectable 40 milliGRAM(s) IV Push two times a day  polyethylene glycol 3350 17 Gram(s) Oral every 12 hours  senna Syrup 10 milliLiter(s) Oral at bedtime      Vital Signs Last 24 Hrs  T(C): 37 (11 Oct 2021 07:51), Max: 37.2 (10 Oct 2021 23:39)  T(F): 98.6 (11 Oct 2021 07:51), Max: 98.9 (10 Oct 2021 23:39)  HR: 71 (11 Oct 2021 10:29) (71 - 90)  BP: 165/81 (11 Oct 2021 10:29) (146/64 - 174/72)  BP(mean): --  RR: 17 (11 Oct 2021 10:29) (17 - 18)  SpO2: 96% (11 Oct 2021 10:29) (95% - 97%)    PHYSICAL EXAM:  General: [x ] lethargic  HEAD/EYES: [ ] PERRL [ x] white sclera [ ] icterus  ENT:  [ ] normal [x ] supple [ ] thrush [ ] pharyngeal exudate  Cardiovascular:   [ ] murmur [ x] normal [ ] PPM/AICD  Respiratory:  [x ] course BS  GI:  [x ] soft, non-tender, normal bowel sounds  :  [ ] figueroa [ ] no CVA tenderness   Musculoskeletal:  [ ] no synovitis  Neurologic:  [ ] non-focal exam   Skin:  [x ] decubitus ulcers - unchanged  Lymph: [x ] no lymphadenopathy  Psychiatric:  [ ] appropriate affect [ ] alert & oriented  Lines:  [x ] no phlebitis [ ] central line                                8.5    22.91 )-----------( 97       ( 10 Oct 2021 02:49 )             28.7       10-10    150<H>  |  114<H>  |  31<H>  ----------------------------<  210<H>  3.9   |  23  |  0.37<L>    Ca    8.1<L>      10 Oct 2021 02:49            MICROBIOLOGY:Culture Results:   No growth to date. (10-10-21 @ 05:54)  Culture Results:   Growth in aerobic bottle: Gram Negative Rods  Growth in anaerobic bottle: Gram Negative Rods (10-10-21 @ 00:42)  Culture Results:   Growth in aerobic and anaerobic bottles: Klebsiella pneumoniae  (Carbapenem Resistant)  MDRO detected in BCID PCR, resistance marker = KPC  ***Blood Panel PCR results on this specimen are available  approximately 3 hours after the Gram stain result.***  Gram stain, PCR, and/or culture results may not always  correspond due to difference in methodologies.  ************************************************************  This PCR assay was performed by multiplex PCR. This  Assay tests for 66 bacterial and resistance gene targets.  Please refer to the [facility] test directory  at [email or web address] for details. (10-08-21 @ 17:01)  Culture Results:   Growth in aerobic and anaerobic bottles: Klebsiella pneumoniae  (Carbapenem Resistant)  See previous culture [id number] (10-08-21 @ 17:01)      RADIOLOGY:    < from: CT Chest No Cont (10.09.21 @ 18:27) >  IMPRESSION:  Mosaic attenuation may be related to air trapping, small airways, versus small vessel disease. Question superimposed infectious/inflammatory process.    Small pericardial effusion. Small bilateral pleural effusions.    < end of copied text >

## 2021-10-11 NOTE — PROGRESS NOTE ADULT - SUBJECTIVE AND OBJECTIVE BOX
Pt seen earlier this evening.  He opens eyes to stim, some grunting sounds.  No movement.  Discussed with daughter who confirmed that she would like to see how he does in next 2 weeks as she feels his current condition is weak to withstand treatment.  She is concerned about potential risks of surgery and chemotherapy.  I supported her decision and she has my number to call for any further questions.

## 2021-10-11 NOTE — PROVIDER CONTACT NOTE (CRITICAL VALUE NOTIFICATION) - TEST AND RESULT REPORTED:
bcx 10/10 - gross aerobic bottle yeast; bcx 10/9 - gross aerobic & anaerobic bottle klebsiella pneumonae, carbapenem resistant.

## 2021-10-11 NOTE — PROGRESS NOTE ADULT - PROBLEM SELECTOR PLAN 6
-S/p abx for multifocal pna.  -Spiked temp on 10/8, blood cultures growing MDRO Klebsiella. -Repeat cultures 10/10 growing GNR.   -Leukocytosis noted. Worsened likely due to steroids and bacteremia.  -CT chest showed Mosaic attenuation may be related to air trapping, small airways, versus small vessel disease. Question superimposed infectious/inflammatory process.  -ID recs appreciated; zosyn changed to Avycaz.  -F/u repeat blood cultures for 10/11.  -Of note patient has a CBD stent in place.   -needs left arm PICC line removed per ID  -Per ID sacral wounds also likely a source, would get wound care to debride if possible

## 2021-10-11 NOTE — PROGRESS NOTE ADULT - PROBLEM SELECTOR PLAN 1
-MRI brain from 10/5:  Large enhancing right frontal hemorrhagic mass crosses the genuine of the corpus callosum as well as a small focus in the left paraclinoid region. 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 Differential diagnosis includes metastases, glioblastoma and lymphoma. Poor mental status.   -Family had wanted to pursue more aggressive mgmt intially. -LAITH was tentatively planning to do biopsy vs debulking next Thursday 10/14.   -Prior physician discussed with daughter Brooklynn 10/9 and she would now like to pursue more conservative/medical management and not go with any surgery/biopsy.   -Ongoing GOC discussions with family; will follow up this week to discuss possible home hospice. For now they want steroids and IV abx.    -C/w dexamethasone; will c/w increased to 8mg IV q8h for brain edema per neuro onco.   -May need PCP ppx if will be on chronic steroids.   -Na goal 145-155 per LAITH.   -Neuro checks q4h.   -F/u neuro onco and LAITH recs.  -C/w keppra 500mg BID for seizure ppx.  -Now with bacteremia and poor baseline mental/functional status, Would recommend more palliative approach.

## 2021-10-11 NOTE — PROGRESS NOTE ADULT - ATTENDING SUPERVISION STATEMENT
Fellow
Resident
Resident/Student
Fellow
Resident
Fellow
Resident
Resident/Student
Resident/Student
Resident
Resident/Student

## 2021-10-11 NOTE — PROGRESS NOTE ADULT - ASSESSMENT
81 year old with baseline cognitive impairment with sacral decubitus (s/p 6 weeks of abx for sacral decubitus)  Dsyphagia with aspiration pneumonia-has peg in place  CNS mass    1) Leukocytosis  Likely multifactorial  On steroids/ has CNS mass- ? neoplasia/ decubitus ulcers/ bacteremia    Trending down    2) CRE Klebsiella bacteremia  Continue avycaz  Follow up repeat cultures on 10/11    CT chest not specific- doubt pna would cause such a high grade bacteremia    ? related to picc- remove picc  ? related to decubitus ulcer    3) Decubitus ulcers  Right and left hip- chronic- not acutely infected  Midline decubitus- with eschar  Consider wound care eval/ surgery eval   May benefit from enzymatic or surgical debridement    Prognosis poor

## 2021-10-11 NOTE — PROGRESS NOTE ADULT - SUBJECTIVE AND OBJECTIVE BOX
[facility] Division of Hospital Medicine  Lamine Hopkins MD  Pager (M-F, 0J-8S): [phone]  Other Times:  [phone]    Patient is a 81y old  Male who presents with a chief complaint of Hyponatremia (11 Oct 2021 10:42)      SUBJECTIVE / OVERNIGHT EVENTS: pt unable to participate with interview  ADDITIONAL REVIEW OF SYSTEMS:    MEDICATIONS  (STANDING):  ascorbic acid 500 milliGRAM(s) Oral daily  ceftazidime/avibactam IVPB 2.5 Gram(s) IV Intermittent every 8 hours  chlorhexidine 4% Liquid 1 Application(s) Topical <User Schedule>  dexAMETHasone  IVPB 8 milliGRAM(s) IV Intermittent every 8 hours  dextrose 40% Gel 15 Gram(s) Oral once  dextrose 5%. 1000 milliLiter(s) (50 mL/Hr) IV Continuous <Continuous>  dextrose 5%. 1000 milliLiter(s) (100 mL/Hr) IV Continuous <Continuous>  dextrose 50% Injectable 25 Gram(s) IV Push once  dextrose 50% Injectable 12.5 Gram(s) IV Push once  dextrose 50% Injectable 25 Gram(s) IV Push once  folic acid 1 milliGRAM(s) Oral daily  glucagon  Injectable 1 milliGRAM(s) IntraMuscular once  hydrALAZINE 50 milliGRAM(s) Oral three times a day  insulin lispro (ADMELOG) corrective regimen sliding scale   SubCutaneous every 6 hours  insulin NPH human recombinant 6 Unit(s) SubCutaneous every 6 hours  levETIRAcetam  IVPB 500 milliGRAM(s) IV Intermittent every 12 hours  multivitamin/minerals/iron Oral Solution (CENTRUM) 15 milliLiter(s) Oral daily  nystatin Powder 1 Application(s) Topical two times a day  pantoprazole  Injectable 40 milliGRAM(s) IV Push two times a day  polyethylene glycol 3350 17 Gram(s) Oral every 12 hours  senna Syrup 10 milliLiter(s) Oral at bedtime    MEDICATIONS  (PRN):      CAPILLARY BLOOD GLUCOSE      POCT Blood Glucose.: 153 mg/dL (11 Oct 2021 11:38)  POCT Blood Glucose.: 193 mg/dL (11 Oct 2021 09:25)  POCT Blood Glucose.: 230 mg/dL (11 Oct 2021 06:07)  POCT Blood Glucose.: 209 mg/dL (10 Oct 2021 23:44)  POCT Blood Glucose.: 209 mg/dL (10 Oct 2021 17:46)    I&O's Summary    10 Oct 2021 07:01  -  11 Oct 2021 07:00  --------------------------------------------------------  IN: 0 mL / OUT: 700 mL / NET: -700 mL    11 Oct 2021 07:01  -  11 Oct 2021 16:38  --------------------------------------------------------  IN: 0 mL / OUT: 1100 mL / NET: -1100 mL        PHYSICAL EXAM:  Vital Signs Last 24 Hrs  T(C): 37 (11 Oct 2021 15:56), Max: 37.2 (10 Oct 2021 23:39)  T(F): 98.6 (11 Oct 2021 15:56), Max: 98.9 (10 Oct 2021 23:39)  HR: 97 (11 Oct 2021 15:56) (71 - 97)  BP: 144/85 (11 Oct 2021 15:56) (144/85 - 174/72)  BP(mean): --  RR: 17 (11 Oct 2021 15:56) (17 - 18)  SpO2: 98% (11 Oct 2021 15:56) (95% - 98%)  GENERAL: NAD, comatose    EYES:  conjunctiva and sclera clear   NECK: Supple,    CHEST/LUNG: Crackles  HEART: S1S2 normal. Regular rate and rhythm; No murmurs, rubs, or gallops  ABDOMEN: Soft, Nontender, Nondistended; Bowel sounds present; peg in place clean  EXTREMITIES:  distal pitting edema in extremities. left arm picc.   PSYCH/Neuro: not responsive or interactive.       LABS:                        8.5    22.91 )-----------( 97       ( 10 Oct 2021 02:49 )             28.7     10-10    150<H>  |  114<H>  |  31<H>  ----------------------------<  210<H>  3.9   |  23  |  0.37<L>    Ca    8.1<L>      10 Oct 2021 02:49    Culture - Blood (collected 10 Oct 2021 13:55)  Source: .Blood Blood-Peripheral  Preliminary Report (11 Oct 2021 14:01):    No growth to date.    Culture - Blood (collected 10 Oct 2021 05:54)  Source: .Blood PICC/PERC Single Lumen  Preliminary Report (11 Oct 2021 06:00):    No growth to date.    Culture - Blood (collected 10 Oct 2021 00:42)  Source: .Blood Blood-Peripheral  Gram Stain (10 Oct 2021 20:45):    Growth in aerobic bottle: Gram Negative Rods    Growth in anaerobic bottle: Gram Negative Rods  Preliminary Report (10 Oct 2021 20:46):    Growth in aerobic bottle: Gram Negative Rods    Growth in anaerobic bottle: Gram Negative Rods    Culture - Blood (collected 08 Oct 2021 17:01)  Source: .Blood PICC/PERC Single Lumen  Gram Stain (09 Oct 2021 10:43):    Growth in anaerobic bottle: Gram Negative Rods    Growth in aerobic bottle: Gram Negative Rods  Final Report (10 Oct 2021 17:30):    Growth in aerobic and anaerobic bottles: Klebsiella pneumoniae    (Carbapenem Resistant)    See previous culture [id number]    Culture - Blood (collected 08 Oct 2021 17:01)  Source: .Blood Blood  Gram Stain (09 Oct 2021 03:07):    Growth in aerobic bottle: Gram Negative Rods    Growth in anaerobic bottle: Gram Negative Rods  Final Report (10 Oct 2021 17:29):    Growth in aerobic and anaerobic bottles: Klebsiella pneumoniae    (Carbapenem Resistant)    MDRO detected in BCID PCR, resistance marker = KPC    ***Blood Panel PCR results on this specimen are available    approximately 3 hours after the Gram stain result.***    Gram stain, PCR, and/or culture results may not always    correspond due to difference in methodologies.    ************************************************************    This PCR assay was performed by multiplex PCR. This    Assay tests for 66 bacterial and resistance gene targets.    Please refer to the [facility] test directory    at [email or web address] for details.  Organism: Blood Culture PCR  Klepne MDRO (10 Oct 2021 17:29)  Organism: Klepne MDRO (10 Oct 2021 17:29)  Organism: Blood Culture PCR (10 Oct 2021 17:29)    RADIOLOGY & ADDITIONAL TESTS:  Results Reviewed:   Imaging Personally Reviewed:  Electrocardiogram Personally Reviewed:    COORDINATION OF CARE:  Care Discussed with Consultants/Other Providers [Y/N]: ID Dr Nadeem maher bacteremia mgmt  Prior or Outpatient Records Reviewed [Y/N]:   pain, arm

## 2021-10-11 NOTE — CHART NOTE - NSCHARTNOTEFT_GEN_A_CORE
Critical results of blood cultures collected on 10/10/21 with growth in aerobic and anaerobic  bottle Klebsiella pneumonia, sensitive to ceftazidime/avibactam, which pt is receiving. Additionally growth in aerobic bottle  Yeast,  Candida Auris detected. D/w Dr. Elkins and micafungin IV was ordered. Contact isolation. Pt has external urinary cath ([place] cath), which will be changed too. Pt had a PICC line, which was d/cd earlier during the day. ID is following. Will continue to monitor and will follow up with day team.    Jessica Woodson NP, #[id number]

## 2021-10-11 NOTE — PROGRESS NOTE ADULT - ASSESSMENT
80yM PMH of gallstones (30-40 years ago), BPH, HLD, h/o spinal stenosis, subarachnoid and severe spinal stenosis, recent PNA, penile infection (?abscess) &  sacral wound on antibiotic with PICC line presenting with possible aspiration PNA, extensive right frontal intraparenchymal hemorrhage, herniation & hyponatremia. Nephrology was called for hyponatremia.       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 In view of severe hyponatremia and intracranial bleed, received 3%HTS.   Today Serum Na is 150.   Goal Na per neurosurgery is 145-155.   Neurosurgery recs noted regarding excisional biopsy  Family not interested in any surgical intervention at this time as per discussion with neurosurgery  Klebsiella bacteremia management per primary team  Monitor volume status          If you have any questions, please feel free to contact me  Roxann Man  Nephrology Fellow  Pager [place]: [phone]/ [facility]: [phone]    (After 5 pm or on weekends please page the on-call fellow, can check [email or web address] for schedule. Login is demond vera, schedule under [facility] medicine, psych, derm)

## 2021-10-11 NOTE — PROGRESS NOTE ADULT - SUBJECTIVE AND OBJECTIVE BOX
[facility] Division of Kidney Diseases & Hypertension  FOLLOW UP NOTE  [phone]--------------------------------------------------------------------------------  Chief Complaint:Non-traumatic intracranial hemorrhage        24 hour events/subjective: Patient seen & examined. Labs & vitals reviewed. Unable to obtain ROS  due to mental status. UO in the last 24 hours 700cc          PAST HISTORY  --------------------------------------------------------------------------------  No significant changes to PMH, PSH, FHx, SHx, unless otherwise noted    ALLERGIES & MEDICATIONS  --------------------------------------------------------------------------------  Allergies    No Known Allergies    Intolerances      Standing Inpatient Medications  ascorbic acid 500 milliGRAM(s) Oral daily  ceftazidime/avibactam IVPB 2.5 Gram(s) IV Intermittent every 8 hours  chlorhexidine 4% Liquid 1 Application(s) Topical <User Schedule>  dexAMETHasone  IVPB 8 milliGRAM(s) IV Intermittent every 8 hours  dextrose 40% Gel 15 Gram(s) Oral once  dextrose 5%. 1000 milliLiter(s) IV Continuous <Continuous>  dextrose 5%. 1000 milliLiter(s) IV Continuous <Continuous>  dextrose 50% Injectable 25 Gram(s) IV Push once  dextrose 50% Injectable 12.5 Gram(s) IV Push once  dextrose 50% Injectable 25 Gram(s) IV Push once  folic acid 1 milliGRAM(s) Oral daily  glucagon  Injectable 1 milliGRAM(s) IntraMuscular once  hydrALAZINE 50 milliGRAM(s) Oral three times a day  insulin lispro (ADMELOG) corrective regimen sliding scale   SubCutaneous every 6 hours  insulin NPH human recombinant 6 Unit(s) SubCutaneous every 6 hours  levETIRAcetam  IVPB 500 milliGRAM(s) IV Intermittent every 12 hours  multivitamin/minerals/iron Oral Solution (CENTRUM) 15 milliLiter(s) Oral daily  nystatin Powder 1 Application(s) Topical two times a day  pantoprazole  Injectable 40 milliGRAM(s) IV Push two times a day  polyethylene glycol 3350 17 Gram(s) Oral every 12 hours  senna Syrup 10 milliLiter(s) Oral at bedtime    PRN Inpatient Medications      REVIEW OF SYSTEMS  --------------------------------------------------------------------------------  Unable to obtain ROS  due to mental status.       VITALS/PHYSICAL EXAM  --------------------------------------------------------------------------------  T(C): 37 (10-11-21 @ 07:51), Max: 37.2 (10-10-21 @ 23:39)  HR: 83 (10-11-21 @ 07:51) (71 - 90)  BP: 174/72 (10-11-21 @ 07:51) (146/64 - 174/72)  RR: 18 (10-11-21 @ 07:51) (18 - 18)  SpO2: 97% (10-11-21 @ 07:51) (95% - 97%)  Wt(kg): --        10-10-21 @ 07:01  -  10-11-21 @ 07:00  --------------------------------------------------------  IN: 0 mL / OUT: 700 mL / NET: -700 mL      Physical Exam:  	Gen: NAD, on RA  	HEENT: MMM  	Pulm: CTA B/L  	CV: S1S2  	Abd: Soft, +BS   	Ext: + LE edema B/L, contracted UE with edema b/l  	Neuro: awake, non verbal  	Skin: Warm and dry  	Vascular access:      LABS/STUDIES  --------------------------------------------------------------------------------              8.5    22.91 >-----------<  97       [10-10-21 @ 02:49]              28.7     150  |  114  |  31  ----------------------------<  210      [10-10-21 @ 02:49]  3.9   |  23  |  0.37        Ca     8.1     [10-10-21 @ 02:49]            Creatinine Trend:  SCr 0.37 [10-10 @ 02:49]  SCr 0.40 [10-09 @ 08:12]  SCr 0.34 [10-08 @ 00:15]  SCr 0.34 [10-07 @ 11:41]  SCr 0.38 [10-07 @ 00:27]    Urinalysis - [10-08-21 @ 18:52]      Color Yellow / Appearance Clear / SG 1.024 / pH 5.5      Gluc Trace / Ketone Negative  / Bili Negative / Urobili Negative       Blood Negative / Protein 30 mg/dL / Leuk Est Negative / Nitrite Negative      RBC 54 / WBC 8 / Hyaline 8 / Gran  / Sq Epi  / Non Sq Epi 1 / Bacteria Negative    Urine Creatinine 21      [10-04-21 @ 15:28]  Urine Sodium 77      [10-04-21 @ 15:28]  Urine Osmolality 515      [10-04-21 @ 15:28]    Iron 15, TIBC 184, %sat 8      [10-09-21 @ 12:15]  Ferritin 666      [10-09-21 @ 12:16]

## 2021-10-11 NOTE — PROGRESS NOTE ADULT - ASSESSMENT
80M PMH gallstones (30-40 yrs ago), BPH, HLD, spinal stenosis, CVA, s/p PEG, recent PNA, penile infxn (?abscess) and sacral wound on abx w/ PICC (cefepime 2gq8h 8/20-9/28; doxy 8/20-?), presented w/ worsening cough x 1 wk after daughter attempted to give ensure by mouth. Daughter noticed "snoring" breath sounds and called EMS. Denies sick contact, diarrhea. VSS in ED, Labs significant at that time for Na 110, CO2 14, lactate 2.5. CXR w/ b/l patchy opacities R>L c/w PNA. 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 CTAP w/ multifocal PNA. Iso worsening mental status pt intubated in ED and transferred to MICU.     MICU Course: Empiric abx for multifocal PNA presumed 2/2 aspiration, completed Zosyn course. Placed on levophed iso hypotension, eventually weaned 10/2, sedation weaned as well. 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 Neurosg on board, neuroonc c/s, results of tumor board and discussion w/ family were to proceed w/ biopsy to further evaluate lymphoma vs glioblastoma multiforme vs mets - however now family prefers more conservative approach. Pt extubated 10/6 weaned to RA w/ adequate SpO2%. Iso HTN pt initiated on hydralazine po 50mg TID. Tolerating PEG feeds. Iso steroid use pt requiring NPH insulin q6h titrated to 6 however pt intermittently w/ NPH held iso borderline FS. C/f occult blood from gastric lavage, placed on PPI, Hgb slowly downtrended however stabilized.

## 2021-10-12 NOTE — PROGRESS NOTE ADULT - SUBJECTIVE AND OBJECTIVE BOX
Follow Up:      Inverval History/ROS:Patient is a 81y old  Male who presents with a chief complaint of Hyponatremia (12 Oct 2021 13:49)    No fever  Lethargic    Allergies    No Known Allergies    Intolerances        ANTIMICROBIALS:  caspofungin IVPB 70 once  ceftazidime/avibactam IVPB 2.5 every 8 hours      OTHER MEDS:  ascorbic acid 500 milliGRAM(s) Oral daily  chlorhexidine 2% Cloths 1 Application(s) Topical <User Schedule>  chlorhexidine 4% Liquid 1 Application(s) Topical <User Schedule>  dexAMETHasone  IVPB 8 milliGRAM(s) IV Intermittent every 8 hours  dextrose 40% Gel 15 Gram(s) Oral once  dextrose 5%. 1000 milliLiter(s) IV Continuous <Continuous>  dextrose 5%. 1000 milliLiter(s) IV Continuous <Continuous>  dextrose 50% Injectable 25 Gram(s) IV Push once  dextrose 50% Injectable 12.5 Gram(s) IV Push once  dextrose 50% Injectable 25 Gram(s) IV Push once  folic acid 1 milliGRAM(s) Oral daily  glucagon  Injectable 1 milliGRAM(s) IntraMuscular once  hydrALAZINE 50 milliGRAM(s) Oral three times a day  insulin lispro (ADMELOG) corrective regimen sliding scale   SubCutaneous every 6 hours  insulin NPH human recombinant 6 Unit(s) SubCutaneous every 6 hours  levETIRAcetam  IVPB 500 milliGRAM(s) IV Intermittent every 12 hours  multivitamin/minerals/iron Oral Solution (CENTRUM) 15 milliLiter(s) Oral daily  nystatin Powder 1 Application(s) Topical two times a day  pantoprazole  Injectable 40 milliGRAM(s) IV Push two times a day  polyethylene glycol 3350 17 Gram(s) Oral every 12 hours  senna Syrup 10 milliLiter(s) Oral at bedtime      Vital Signs Last 24 Hrs  T(C): 37.3 (12 Oct 2021 11:40), Max: 37.3 (12 Oct 2021 11:40)  T(F): 99.1 (12 Oct 2021 11:40), Max: 99.1 (12 Oct 2021 11:40)  HR: 85 (12 Oct 2021 12:39) (78 - 97)  BP: 177/70 (12 Oct 2021 12:39) (144/85 - 177/70)  BP(mean): --  RR: 18 (12 Oct 2021 11:40) (17 - 18)  SpO2: 97% (12 Oct 2021 12:39) (97% - 100%)    PHYSICAL EXAM:  General: [ ] non-toxic  HEAD/EYES: [ ] PERRL [x ] white sclera [ ] icterus  ENT:  [ ] normal x[ ] supple [ ] thrush [ ] pharyngeal exudate  Cardiovascular:   [ ] murmur [x ] normal [ ] PPM/AICD  Respiratory:  [x ] clear to ausculation bilaterally  GI:  x[ ] soft, non-tender, normal bowel sounds  :  [ ] figueroa [ ] no CVA tenderness   Musculoskeletal:  [ ] no synovitis  Neurologic:  [ ] non-focal exam   Skin:  [x ] decubitus ulcers  Lymph: [ x] no lymphadenopathy  Psychiatric:  [ ] appropriate affect [ ] alert & oriented  Lines:  [x ] no phlebitis [ ] central line                                9.1    9.17  )-----------( 74       ( 12 Oct 2021 10:21 )             30.5       10-12    148<H>  |  110<H>  |  33<H>  ----------------------------<  174<H>  3.9   |  24  |  0.34<L>    Ca    8.1<L>      12 Oct 2021 10:21    TPro  5.2<L>  /  Alb  2.7<L>  /  TBili  0.4  /  DBili  x   /  AST  53<H>  /  ALT  56<H>  /  AlkPhos  140<H>  10-11          MICROBIOLOGY:Culture Results:   No growth to date. (10-10-21 @ 13:55)  Culture Results:   Growth in aerobic bottle: Yeast  ***Blood Panel PCR results on this specimen are available  approximately 3 hours after the Gram stain result.***  Gram stain, PCR, and/or culture results may not always  correspond due to difference in methodologies.  ************************************************************  This PCR assay was performed by multiplex PCR. This  Assay tests for 66 bacterial and resistance gene targets.  Please refer to the [facility] test directory  at [email or web address] for details. (10-10-21 @ 05:54)  Culture Results:   Growth in aerobic and anaerobic bottles: Klebsiella pneumoniae  (Carbapenem Resistant)  See previous culture [id number] (10-10-21 @ 00:42)  Culture Results:   Growth in aerobic and anaerobic bottles: Klebsiella pneumoniae  (Carbapenem Resistant)  MDRO detected in BCID PCR, resistance marker = KPC  ***Blood Panel PCR results on this specimen are available  approximately 3 hours after the Gram stain result.***  Gram stain, PCR, and/or culture results may not always  correspond due to difference in methodologies.  ************************************************************  This PCR assay was performed by multiplex PCR. This  Assay tests for 66 bacterial and resistance gene targets.  Please refer to the [facility] [employer] test directory  at [email or web address] for details. (10-08-21 @ 17:01)  Culture Results:   Growth in aerobic and anaerobic bottles: Klebsiella pneumoniae  (Carbapenem Resistant)  See previous culture [id number] (10-08-21 @ 17:01)      RADIOLOGY:

## 2021-10-12 NOTE — PROGRESS NOTE ADULT - PROBLEM SELECTOR PLAN 5
-Hgb down to 6.8 on 10/8 and gave patient 1 prbc.   -occult positive in gastric lavage in MICU. Will c/w empiric PPI IV BID for now.   -Also hemorrhagic brain mass. -Repeat CT head appears stable without any new hemorrhage.  -Trend CBC.   -Iron studies, B12, folate, LDH, hapto, retics. -showed low folate. repleted

## 2021-10-12 NOTE — PHYSICAL THERAPY INITIAL EVALUATION ADULT - IMPAIRMENTS CONTRIBUTING IMPAIRED BED MOBILITY, REHAB EVAL
impaired balance/cognition/abnormal muscle tone/impaired postural control/decreased ROM/decreased strength

## 2021-10-12 NOTE — PHYSICAL THERAPY INITIAL EVALUATION ADULT - DIAGNOSIS, PT EVAL
Pt presents dependent at baseline requiring assist for ALL aspects of functional mobility and self care Pt presents dependent at baseline requiring assist for ALL aspects of functional mobility and self care/integumentary impairments

## 2021-10-12 NOTE — PHYSICAL THERAPY INITIAL EVALUATION ADULT - GENERAL OBSERVATIONS, REHAB EVAL
Pt received semi-supine in bed in NAD, +IVL, +bilateral CAIR boots, +condom cath, VSS, cleared to participate in therapy by CISCO Bartholomew

## 2021-10-12 NOTE — PROGRESS NOTE ADULT - PROBLEM SELECTOR PLAN 1
-MRI brain from 10/5:  Large enhancing right frontal hemorrhagic mass crosses the genuine of the corpus callosum as well as a small focus in the left paraclinoid region. 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 Differential diagnosis includes metastases, glioblastoma and lymphoma. Poor mental status.   -Family had wanted to pursue more aggressive mgmt intially. -LAITH was tentatively planning to do biopsy vs debulking  -Prior physician discussed with daughter Brooklynn 10/9 and she would now like to pursue more conservative/medical management and not go with any surgery/biopsy - Dr Blank recs apprec and I confirmed this plan with daughter  -Ongoing GOC discussions with family; will follow up this week to discuss possible home hospice. For now they want steroids and IV abx.    -C/w dexamethasone; will c/w increased to 8mg IV q8h for brain edema per neuro onco.   -May need PCP ppx if will be on chronic steroids.   -Na goal 145-155 per LAITH.   -Neuro checks q4h.   -F/u neuro onco and LAITH recs.  -C/w keppra 500mg BID for seizure ppx.

## 2021-10-12 NOTE — PROGRESS NOTE ADULT - SUBJECTIVE AND OBJECTIVE BOX
[facility] Division of Kidney Diseases & Hypertension  FOLLOW UP NOTE  --------------------------------------------------------------------------------  Chief Complaint:    24 hour events/subjective:    notes reviewed         PAST HISTORY  --------------------------------------------------------------------------------  No significant changes to PMH, PSH, FHx, SHx, unless otherwise noted    ALLERGIES & MEDICATIONS  --------------------------------------------------------------------------------  Allergies    No Known Allergies    Intolerances      Standing Inpatient Medications  ascorbic acid 500 milliGRAM(s) Oral daily  caspofungin IVPB 70 milliGRAM(s) IV Intermittent once  ceftazidime/avibactam IVPB 2.5 Gram(s) IV Intermittent every 8 hours  chlorhexidine 2% Cloths 1 Application(s) Topical <User Schedule>  chlorhexidine 4% Liquid 1 Application(s) Topical <User Schedule>  dexAMETHasone  IVPB 8 milliGRAM(s) IV Intermittent every 8 hours  dextrose 40% Gel 15 Gram(s) Oral once  dextrose 5%. 1000 milliLiter(s) IV Continuous <Continuous>  dextrose 5%. 1000 milliLiter(s) IV Continuous <Continuous>  dextrose 50% Injectable 25 Gram(s) IV Push once  dextrose 50% Injectable 12.5 Gram(s) IV Push once  dextrose 50% Injectable 25 Gram(s) IV Push once  folic acid 1 milliGRAM(s) Oral daily  glucagon  Injectable 1 milliGRAM(s) IntraMuscular once  hydrALAZINE 50 milliGRAM(s) Oral three times a day  insulin lispro (ADMELOG) corrective regimen sliding scale   SubCutaneous every 6 hours  insulin NPH human recombinant 6 Unit(s) SubCutaneous every 6 hours  levETIRAcetam  IVPB 500 milliGRAM(s) IV Intermittent every 12 hours  multivitamin/minerals/iron Oral Solution (CENTRUM) 15 milliLiter(s) Oral daily  nystatin Powder 1 Application(s) Topical two times a day  pantoprazole  Injectable 40 milliGRAM(s) IV Push two times a day  polyethylene glycol 3350 17 Gram(s) Oral every 12 hours  senna Syrup 10 milliLiter(s) Oral at bedtime    PRN Inpatient Medications      REVIEW OF SYSTEMS- unable to obtain     VITALS/PHYSICAL EXAM  --------------------------------------------------------------------------------  T(C): 37.1 (10-12-21 @ 08:53), Max: 37.1 (10-11-21 @ 21:00)  HR: 89 (10-12-21 @ 08:53) (85 - 97)  BP: 158/77 (10-12-21 @ 08:53) (144/85 - 158/85)  RR: 18 (10-12-21 @ 08:53) (17 - 18)  SpO2: 97% (10-12-21 @ 08:53) (97% - 100%)  Wt(kg): --        10-11-21 @ 07:01  -  10-12-21 @ 07:00  --------------------------------------------------------  IN: 1100 mL / OUT: 1600 mL / NET: -500 mL    10-12-21 @ 07:01  -  10-12-21 @ 12:30  --------------------------------------------------------  IN: 100 mL / OUT: 0 mL / NET: 100 mL      Physical Exam:  	Gen: altered  	HEENT:  supple neck, clear oropharynx  	Pulm: CTA B/L  	CV: RRR, S1S2; no rub  	Back: no sacral edema  	: No suprapubic tenderness  	LE: Warm,no edema  	Skin: Warm, without rashes  	  LABS/STUDIES  --------------------------------------------------------------------------------              9.1    9.17  >-----------<  74       [10-12-21 @ 10:21]              30.5     148  |  110  |  33  ----------------------------<  174      [10-12-21 @ 10:21]  3.9   |  24  |  0.34        Ca     8.1     [10-12-21 @ 10:21]    TPro  5.2  /  Alb  2.7  /  TBili  0.4  /  DBili  x   /  AST  53  /  ALT  56  /  AlkPhos  140  [10-11-21 @ 19:10]          Creatinine Trend:  SCr 0.34 [10-12 @ 10:21]  SCr 0.34 [10-11 @ 19:10]  SCr 0.37 [10-10 @ 02:49]  SCr 0.40 [10-09 @ 08:12]  SCr 0.34 [10-08 @ 00:15]    Urinalysis - [10-08-21 @ 18:52]      Color Yellow / Appearance Clear / SG 1.024 / pH 5.5      Gluc Trace / Ketone Negative  / Bili Negative / Urobili Negative       Blood Negative / Protein 30 mg/dL / Leuk Est Negative / Nitrite Negative      RBC 54 / WBC 8 / Hyaline 8 / Gran  / Sq Epi  / Non Sq Epi 1 / Bacteria Negative      Iron 15, TIBC 184, %sat 8      [10-09-21 @ 12:15]  Ferritin 666      [10-09-21 @ 12:16]

## 2021-10-12 NOTE — PROGRESS NOTE ADULT - SUBJECTIVE AND OBJECTIVE BOX
[facility] Division of Hospital Medicine  Lamine Hopkins MD  Pager (M-F, 6F-8B): [phone]  Other Times:  [phone]    Patient is a 81y old  Male who presents with a chief complaint of Hyponatremia (12 Oct 2021 12:30)    SUBJECTIVE / OVERNIGHT EVENTS: pt unable to cooperate with interview or exam. now in c. auris isolation after bcx results  ADDITIONAL REVIEW OF SYSTEMS:    MEDICATIONS  (STANDING):  ascorbic acid 500 milliGRAM(s) Oral daily  caspofungin IVPB 70 milliGRAM(s) IV Intermittent once  ceftazidime/avibactam IVPB 2.5 Gram(s) IV Intermittent every 8 hours  chlorhexidine 2% Cloths 1 Application(s) Topical <User Schedule>  chlorhexidine 4% Liquid 1 Application(s) Topical <User Schedule>  dexAMETHasone  IVPB 8 milliGRAM(s) IV Intermittent every 8 hours  dextrose 40% Gel 15 Gram(s) Oral once  dextrose 5%. 1000 milliLiter(s) (50 mL/Hr) IV Continuous <Continuous>  dextrose 5%. 1000 milliLiter(s) (100 mL/Hr) IV Continuous <Continuous>  dextrose 50% Injectable 25 Gram(s) IV Push once  dextrose 50% Injectable 12.5 Gram(s) IV Push once  dextrose 50% Injectable 25 Gram(s) IV Push once  folic acid 1 milliGRAM(s) Oral daily  glucagon  Injectable 1 milliGRAM(s) IntraMuscular once  hydrALAZINE 50 milliGRAM(s) Oral three times a day  insulin lispro (ADMELOG) corrective regimen sliding scale   SubCutaneous every 6 hours  insulin NPH human recombinant 6 Unit(s) SubCutaneous every 6 hours  levETIRAcetam  IVPB 500 milliGRAM(s) IV Intermittent every 12 hours  multivitamin/minerals/iron Oral Solution (CENTRUM) 15 milliLiter(s) Oral daily  nystatin Powder 1 Application(s) Topical two times a day  pantoprazole  Injectable 40 milliGRAM(s) IV Push two times a day  polyethylene glycol 3350 17 Gram(s) Oral every 12 hours  senna Syrup 10 milliLiter(s) Oral at bedtime    MEDICATIONS  (PRN):      CAPILLARY BLOOD GLUCOSE      POCT Blood Glucose.: 124 mg/dL (12 Oct 2021 12:26)  POCT Blood Glucose.: 273 mg/dL (12 Oct 2021 06:30)  POCT Blood Glucose.: 215 mg/dL (12 Oct 2021 00:06)  POCT Blood Glucose.: 144 mg/dL (11 Oct 2021 17:27)    I&O's Summary    11 Oct 2021 07:01  -  12 Oct 2021 07:00  --------------------------------------------------------  IN: 1100 mL / OUT: 1600 mL / NET: -500 mL    12 Oct 2021 07:01  -  12 Oct 2021 13:50  --------------------------------------------------------  IN: 200 mL / OUT: 0 mL / NET: 200 mL        PHYSICAL EXAM:  Vital Signs Last 24 Hrs  T(C): 37.3 (12 Oct 2021 11:40), Max: 37.3 (12 Oct 2021 11:40)  T(F): 99.1 (12 Oct 2021 11:40), Max: 99.1 (12 Oct 2021 11:40)  HR: 85 (12 Oct 2021 12:39) (78 - 97)  BP: 177/70 (12 Oct 2021 12:39) (144/85 - 177/70)  BP(mean): --  RR: 18 (12 Oct 2021 11:40) (17 - 18)  SpO2: 97% (12 Oct 2021 12:39) (97% - 100%)  GENERAL: NAD, comatose    EYES:  conjunctiva and sclera clear, pupils normal dilation   NECK: Supple, soft  ABDOMEN: Soft, Nontender, Nondistended; Bowel sounds present; peg in place clean  EXTREMITIES:  upper and lower pitting edema in extremities. left arm picc interval removal  PSYCH/Neuro: not responsive or interactive.   LABS:                        9.1    9.17  )-----------( 74       ( 12 Oct 2021 10:21 )             30.5     10-12    148<H>  |  110<H>  |  33<H>  ----------------------------<  174<H>  3.9   |  24  |  0.34<L>    Ca    8.1<L>      12 Oct 2021 10:21    TPro  5.2<L>  /  Alb  2.7<L>  /  TBili  0.4  /  DBili  x   /  AST  53<H>  /  ALT  56<H>  /  AlkPhos  140<H>  10-11              Culture - Blood (collected 10 Oct 2021 13:55)  Source: .Blood Blood-Peripheral  Preliminary Report (11 Oct 2021 14:01):    No growth to date.    Culture - Blood (collected 10 Oct 2021 05:54)  Source: .Blood PICC/PERC Single Lumen  Gram Stain (11 Oct 2021 19:25):    Growth in aerobic bottle: Yeast  Preliminary Report (11 Oct 2021 19:25):    Growth in aerobic bottle: Yeast    ***Blood Panel PCR results on this specimen are available    approximately 3 hours after the Gram stain result.***    Gram stain, PCR, and/or culture results may not always    correspond due to difference in methodologies.    ************************************************************    This PCR assay was performed by multiplex PCR. This    Assay tests for 66 bacterial and resistance gene targets.    Please refer to the [facility] test directory    at [email or web address] for details.  Organism: Blood Culture PCR (11 Oct 2021 22:29)  Organism: Blood Culture PCR (11 Oct 2021 22:29)    Culture - Blood (collected 10 Oct 2021 00:42)  Source: .Blood Blood-Peripheral  Gram Stain (10 Oct 2021 20:45):    Growth in aerobic bottle: Gram Negative Rods    Growth in anaerobic bottle: Gram Negative Rods  Final Report (11 Oct 2021 18:32):    Growth in aerobic and anaerobic bottles: Klebsiella pneumoniae    (Carbapenem Resistant)    See previous culture [id number]        RADIOLOGY & ADDITIONAL TESTS:  Results Reviewed:   Imaging Personally Reviewed:  Electrocardiogram Personally Reviewed:    COORDINATION OF CARE:  Care Discussed with Consultants/Other Providers [Y/N]:  Prior or Outpatient Records Reviewed [Y/N]:

## 2021-10-12 NOTE — PROGRESS NOTE ADULT - ASSESSMENT
80M PMH gallstones (30-40 yrs ago), BPH, HLD, spinal stenosis, CVA, s/p PEG, recent PNA, penile infxn (?abscess) and sacral wound on abx w/ PICC (cefepime 2gq8h 8/20-9/28; doxy 8/20-?), presented w/ worsening cough x 1 wk after daughter attempted to give ensure by mouth. Daughter noticed "snoring" breath sounds and called EMS. Denies sick contact, diarrhea. VSS in ED, Labs significant at that time for Na 110, CO2 14, lactate 2.5. CXR w/ b/l patchy opacities R>L c/w PNA. 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 CTAP w/ multifocal PNA. Iso worsening mental status pt intubated in ED and transferred to MICU.     MICU Course: Empiric abx for multifocal PNA presumed 2/2 aspiration, completed Zosyn course. Placed on levophed iso hypotension, eventually weaned 10/2, sedation weaned as well. 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 Neurosg on board, neuroonc c/s, results of tumor board and discussion w/ family were to proceed w/ biopsy to further evaluate lymphoma vs glioblastoma multiforme vs mets - however now family prefers more conservative approach. Pt extubated 10/6 weaned to RA w/ adequate SpO2%. Iso HTN pt initiated on hydralazine po 50mg TID. Tolerating PEG feeds. Iso steroid use pt requiring NPH insulin q6h titrated to 6 however pt intermittently w/ NPH held iso borderline FS. C/f occult blood from gastric lavage, placed on PPI, Hgb slowly downtrended however stabilized.     Also with CRE Klebsiella Bacteremia and C Auris Bacteremia in addition to above

## 2021-10-12 NOTE — PHYSICAL THERAPY INITIAL EVALUATION ADULT - PERTINENT HX OF CURRENT PROBLEM, REHAB EVAL
79yo M with PMHx of gallstones, BPH, HLD, h/o spinal stenosis, CVA (nonverbal, A&Ox0 at baseline) s/p PEG, recent PNA, penile infection (?abscess) & sacral wound on antibiotic with PICC line (cefepime 2gq8 8/20-9/28; doxycycline 8/20), presenting NS with cough for the last week towards the end of September which became worse after daughter attempted to give him ensure by mouth. Hx obtained from daughter. She noticed breathing sounds like he was snoring and called EMS. CONTINUED:

## 2021-10-12 NOTE — PHYSICAL THERAPY INITIAL EVALUATION ADULT - MUSCLE TONE ASSESSMENT, REHAB EVAL
Except R UE significant increase in tone decerebrate posturing/bilateral upper extremities/bilateral lower extremities/moderately increased tone

## 2021-10-12 NOTE — PHYSICAL THERAPY INITIAL EVALUATION ADULT - MANUAL MUSCLE TESTING RESULTS, REHAB EVAL
Unable to formally assess due to patients' command follow and cognitive status/grossly assessed due to

## 2021-10-12 NOTE — PROGRESS NOTE ADULT - ASSESSMENT
81 year old with baseline cognitive impairment with sacral decubitus (s/p 6 weeks of abx for sacral decubitus)  Dsyphagia with aspiration pneumonia-has peg in place  CNS mass    Now with polymicrobial bacteremia  CT a/p without acute findings  ? due to wounds    1) Leukocytosis  Likely multifactorial  On steroids/ has CNS mass- ? neoplasia/ decubitus ulcers/ bacteremia    Trending down    2) CRE Klebsiella bacteremia  Continue avycaz  repeat cultures on 10/11, 10/12    CT chest not specific- doubt pna would cause such a high grade bacteremia    ? related to picc- remove picc  ? related to decubitus ulcer    3) C auris bacteremia  ? due to sacral wound  Continue micafungin  Check blood culture every 48-72 hours until negative    Check echo    4) Decubitus ulcers  Right and left hip- chronic- not acutely infected  Midline decubitus- with eschar  Consider wound care eval/ surgery eval   May benefit from enzymatic or surgical debridement    Prognosis poor due to underlying comorbidities  Goals of care discussions    Case D/w attending   81 year old with baseline cognitive impairment with sacral decubitus (s/p 6 weeks of abx for sacral decubitus)  Dsyphagia with aspiration pneumonia-has peg in place  CNS mass    Now with polymicrobial bacteremia  CT a/p without acute findings  ? due to wounds    1) Leukocytosis  Likely multifactorial  On steroids/ has CNS mass- ? neoplasia/ decubitus ulcers/ bacteremia    Trending down    2) CRE Klebsiella bacteremia  Continue avycaz  repeat cultures on 10/11, 10/12    CT chest not specific- doubt pna would cause such a high grade bacteremia    ? related to picc- remove picc  ? related to decubitus ulcer    3) C auris bacteremia  ? due to sacral wound  Continue caspofungin  Check blood culture every 48-72 hours until negative    Check echo    4) Decubitus ulcers  Right and left hip- chronic- not acutely infected  Midline decubitus- with eschar  Consider wound care eval/ surgery eval   May benefit from enzymatic or surgical debridement    Prognosis poor due to underlying comorbidities  Goals of care discussions    Case D/w attending

## 2021-10-12 NOTE — PHYSICAL THERAPY INITIAL EVALUATION ADULT - MODALITIES TREATMENT COMMENTS
PT Wound Care Team consulted for VAC Application of Stage IV Pressure Ulcer of R hip. Pt received semi-supine in bed in NAD, +PEG tube, +bilateral CAIR boots, +IVF. Patient on contact precatuions, appropriate PPE donned prior. Right hip dressing removed, received C/D/I with no erythema, purulence, or malodor, moderate amount of sero-sanguionous drainage. Wound mainly granular w/ mild areas of slough within wound bed. No palpable bone exposed however, +exposed ligaments. Wound measured: 7.5cm x 5.5cm x 1.5cm w/ +undermining from 12:00-3:00PM w/ greatest depth 1.5cm @ 2:00PM. Wound cleansed with NS, pat dry w/ gauze, cavilon to periwound followed by adaptic contact layer to wound bed to protect ligaments, medihoney, and black foam ontop w/ good seal noted 125mmHg continuous, tracked to anterior R thigh, secured with tape. CISCO Santos aware of VAC application.

## 2021-10-12 NOTE — PHYSICAL THERAPY INITIAL EVALUATION ADULT - ADDITIONAL COMMENTS
Patient resides in an apartment located in [place] with his daughter and his grandchildren. Patient utilizes the elevator to get to the apartment. Per care coordination note, patient's daughter confirmed that the patient was nonverbal and A&Ox0 at baseline. PT spoke with patients' grandson via phone who reports patient has been bedbound since February, requiring dependent x2 person assists bed to/from wheelchair. Pt uses depends and requires sponge bathing, patient has not ambulated since CVA in February.

## 2021-10-12 NOTE — PHYSICAL THERAPY INITIAL EVALUATION ADULT - PRECAUTIONS/LIMITATIONS, REHAB EVAL
No sick contact. No diarrhea. In the ED, VSS. Labs significant for Na 110, CO2 14, lactate 2.5. CXR w/ b/l patchy opacities R>L c/w PNA. 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 MICU consulted for hyponatremia, pt transferred to [address] [place] [address] for further management and monitoring./fall precautions

## 2021-10-12 NOTE — PROGRESS NOTE ADULT - ASSESSMENT
80yM PMH of gallstones (30-40 years ago), BPH, HLD, h/o spinal stenosis, subarachnoid and severe spinal stenosis, recent PNA, penile infection (?abscess) &  sacral wound on antibiotic with PICC line presenting with possible aspiration PNA, extensive right frontal intraparenchymal hemorrhage, herniation & hyponatremia. Nephrology was called for hyponatremia.       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 In view of severe hyponatremia and intracranial bleed, received 3%HTS.   Today Serum Na is 150.   Goal Na per neurosurgery is 145-155.   Neurosurgery recs noted regarding excisional biopsy  Family not interested in any surgical intervention at this time as per discussion with neurosurgery  Klebsiella bacteremia management per primary team    Nephrology will sign off. Please call us if you have any further questions    maryana harvey  nephrology attending   Cell# [phone]   Office- [phone]

## 2021-10-13 NOTE — PROGRESS NOTE ADULT - SUBJECTIVE AND OBJECTIVE BOX
Follow Up:      Inverval History/ROS:Patient is a 81y old  Male who presents with a chief complaint of Hyponatremia (13 Oct 2021 10:41)    No fever  lethargic/ not verbal    Allergies    No Known Allergies    Intolerances        ANTIMICROBIALS:  caspofungin IVPB 50 every 24 hours  ceftazidime/avibactam IVPB 2.5 every 8 hours      OTHER MEDS:  ascorbic acid 500 milliGRAM(s) Oral daily  chlorhexidine 2% Cloths 1 Application(s) Topical <User Schedule>  chlorhexidine 4% Liquid 1 Application(s) Topical <User Schedule>  dexAMETHasone  IVPB 8 milliGRAM(s) IV Intermittent every 8 hours  dextrose 40% Gel 15 Gram(s) Oral once  dextrose 5%. 1000 milliLiter(s) IV Continuous <Continuous>  dextrose 5%. 1000 milliLiter(s) IV Continuous <Continuous>  dextrose 50% Injectable 25 Gram(s) IV Push once  dextrose 50% Injectable 12.5 Gram(s) IV Push once  dextrose 50% Injectable 25 Gram(s) IV Push once  folic acid 1 milliGRAM(s) Oral daily  glucagon  Injectable 1 milliGRAM(s) IntraMuscular once  hydrALAZINE 50 milliGRAM(s) Oral three times a day  insulin lispro (ADMELOG) corrective regimen sliding scale   SubCutaneous every 6 hours  insulin NPH human recombinant 6 Unit(s) SubCutaneous every 6 hours  levETIRAcetam  IVPB 500 milliGRAM(s) IV Intermittent every 12 hours  multivitamin/minerals/iron Oral Solution (CENTRUM) 15 milliLiter(s) Oral daily  nystatin Powder 1 Application(s) Topical two times a day  pantoprazole  Injectable 40 milliGRAM(s) IV Push two times a day  polyethylene glycol 3350 17 Gram(s) Oral every 12 hours  senna Syrup 10 milliLiter(s) Oral at bedtime      Vital Signs Last 24 Hrs  T(C): 37.1 (13 Oct 2021 05:45), Max: 37.1 (13 Oct 2021 05:45)  T(F): 98.7 (13 Oct 2021 05:45), Max: 98.7 (13 Oct 2021 05:45)  HR: 87 (13 Oct 2021 05:45) (75 - 87)  BP: 158/68 (13 Oct 2021 05:45) (155/75 - 177/70)  BP(mean): --  RR: 20 (13 Oct 2021 05:45) (18 - 20)  SpO2: 94% (13 Oct 2021 05:45) (94% - 98%)    PHYSICAL EXAM:  General: [x ]lethargic  HEAD/EYES: [ ] PERRL [ ] white sclera [ ] icterus  ENT:  [ ] normal [ ] supple [ ] thrush [ ] pharyngeal exudate  Cardiovascular:   [ ] murmur [x ] normal [ ] PPM/AICD  Respiratory:  x[ ] clear to ausculation bilaterally  GI:  [x ] soft, non-tender, normal bowel sounds  :  [ ] figueroa [ ] no CVA tenderness   Musculoskeletal:  [ ] no synovitis  Neurologic:  [ ] non-focal exam   Skin:  [ x] decubitus ulcers  Lymph: x ] no lymphadenopathy  Psychiatric:  [ ] appropriate affect [ ] alert & oriented  Lines:  [x ] no phlebitis [ ] central line                                9.3    8.83  )-----------( 78       ( 13 Oct 2021 09:53 )             31.1       10-13    148<H>  |  109<H>  |  29<H>  ----------------------------<  146<H>  3.5   |  25  |  0.33<L>    Ca    7.9<L>      13 Oct 2021 09:52    TPro  5.2<L>  /  Alb  2.7<L>  /  TBili  0.4  /  DBili  x   /  AST  53<H>  /  ALT  56<H>  /  AlkPhos  140<H>  10-11          MICROBIOLOGY:Culture Results:   No growth to date. (10-11-21 @ 22:57)  Culture Results:   No growth to date. (10-10-21 @ 13:55)  Culture Results:   Growth in aerobic bottle: Candida auris  Growth in aerobic bottle: Candida auris Referred to Mycology  Growth in anaerobic bottle: Gram Negative Rods  ***Blood Panel PCR results on this specimen are available  approximately 3 hours after the Gram stain result.***  Gram stain, PCR, and/or culture results may not always  correspond due to difference in methodologies.  ************************************************************  This PCR assay was performed by multiplex PCR. This  Assay tests for66 bacterial and resistance gene targets.  Please refer to the [facility] [employer] test directory  at [email or web address] for details. (10-10-21 @ 05:54)  Culture Results:   Growth in aerobic and anaerobic bottles: Klebsiella pneumoniae  (Carbapenem Resistant)  See previous culture [id number] (10-10-21 @ 00:42)  Culture Results:   Growth in aerobic and anaerobic bottles: Klebsiella pneumoniae  (Carbapenem Resistant)  MDRO detected in BCID PCR, resistance marker = KPC  ***Blood Panel PCR results on this specimen are available  approximately 3 hours after the Gram stain result.***  Gram stain, PCR, and/or culture results may not always  correspond due to difference in methodologies.  ************************************************************  This PCR assay was performed by multiplex PCR. This  Assay tests for 66 bacterial and resistance gene targets.  Please refer to the [facility] [employer] test directory  at [email or web address] for details. (10-08-21 @ 17:01)  Culture Results:   Growth in aerobic and anaerobic bottles: Klebsiella pneumoniae  (Carbapenem Resistant)  See previous culture [id number] (10-08-21 @ 17:01)      RADIOLOGY:

## 2021-10-13 NOTE — PROGRESS NOTE ADULT - PROBLEM SELECTOR PLAN 10
yesterday spoke on phone with daughter Brooklynn discussing prognosis and plan of care including new c auris fungemia  she asks we continue trying our best to get him stabilized  counselled overall poor prognosis in setting of brain mass with plan in place to avoid biopsy and definitive diagnosis  sacral decub with multiple resistant infections portending dismal prognosis  eventually hospice will need to be discussed in more detail - rapport building  f/u palliative spoke on phone with daughter Brooklynn discussing prognosis and plan of care including new c auris fungemia, persistent comatose state  she asks we continue trying our best to get him stabilized  counselled overall poor prognosis in setting of brain mass with plan in place to avoid biopsy and definitive diagnosis  sacral decub with multiple resistant infections portending dismal prognosis  eventually hospice will need to be discussed in more detail - rapport building  she expresses understanding - she is concerned about ability to care for him at home even with hospice given his significant needs  palliative apprec

## 2021-10-13 NOTE — CONSULT NOTE ADULT - PROBLEM SELECTOR RECOMMENDATION 5
Left voicemail with daughter Brooklynn.  Await call back to discuss hospice options See goals of care document

## 2021-10-13 NOTE — PROGRESS NOTE ADULT - ASSESSMENT
81 year old with baseline cognitive impairment with sacral decubitus (s/p 6 weeks of abx for sacral decubitus)  Dsyphagia with aspiration pneumonia-has peg in place  CNS mass    Now with polymicrobial bacteremia  CT a/p without acute findings  ? due to wounds    1) Leukocytosis  Likely multifactorial  On steroids/ has CNS mass- ? neoplasia/ decubitus ulcers/ bacteremia    Trending down    2) CRE Klebsiella bacteremia  Continue avycaz    CT chest not specific- doubt pna would cause such a high grade bacteremia    ? related to picc- remove picc  ? related to decubitus ulcer    3) C auris bacteremia  ? due to sacral wound  Continue micafungin  Check blood culture every 48-72 hours until negative    Check echo    4) Decubitus ulcers  Right and left hip- chronic- not acutely infected  Midline decubitus- with eschar  Consider wound care eval/ surgery eval   May benefit from enzymatic or surgical debridement    5) CNS mass  ? planned duration of steroids  May need to add PCP prophylaxis            Prognosis poor due to underlying comorbidities  Goals of care discussions    Case D/w attending   81 year old with baseline cognitive impairment with sacral decubitus (s/p 6 weeks of abx for sacral decubitus)  Dsyphagia with aspiration pneumonia-has peg in place  CNS mass    Now with polymicrobial bacteremia  CT a/p without acute findings  ? due to wounds    1) Leukocytosis  Likely multifactorial  On steroids/ has CNS mass- ? neoplasia/ decubitus ulcers/ bacteremia    Trending down    2) CRE Klebsiella bacteremia  Continue avycaz    CT chest not specific- doubt pna would cause such a high grade bacteremia    ? related to picc- remove picc  ? related to decubitus ulcer    3) C auris bacteremia  ? due to sacral wound  Continue caspofungin  Check blood culture every 48-72 hours until negative    Check echo    4) Decubitus ulcers  Right and left hip- chronic- not acutely infected  Midline decubitus- with eschar  Consider wound care eval/ surgery eval   May benefit from enzymatic or surgical debridement    5) CNS mass  ? planned duration of steroids  May need to add PCP prophylaxis    Prognosis poor due to underlying comorbidities  Goals of care discussions    Case D/w attending

## 2021-10-13 NOTE — CONSULT NOTE ADULT - ASSESSMENT
79 Y/O male with extensive pmh as described above admitted with worsening cough , noted to have   multiple electrolyte abnormalities , CXR with b/l patchy opacities c/w PNA , CTH with noted IPH, MRI revealed hemorrhagic  mass, s/p MICU stay.   Palliative called for advance care planning  .

## 2021-10-13 NOTE — PROGRESS NOTE ADULT - PROBLEM SELECTOR PLAN 1
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 DDX of imaging metastases, glioblastoma and lymphoma. Comatose  -Family had wanted to pursue more aggressive mgmt intially. -LAITH was tentatively planning to do biopsy vs debulking but prior physician discussed with daughter Brooklynn 10/9 and she would now like to pursue more conservative/medical management and not go with any surgery/biopsy - Dr Blank recs apprec and I confirmed this plan with daughter  -Ongoing GOC discussions with family;   -C/w dexamethasone; will c/w increased to 8mg IV q8h for brain edema per neuro onco.   -May need PCP ppx if will be on chronic steroids.   -Na goal 145-155 per LAITH.   -Neuro checks q4h.   -F/u neuro onco and LAITH recs.  -C/w keppra 500mg BID for seizure ppx.

## 2021-10-13 NOTE — CONSULT NOTE ADULT - PROBLEM SELECTOR RECOMMENDATION 9
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 Poor mental status at baseline.   Biopsy vs debulking,  family opting for conservative medical management

## 2021-10-13 NOTE — CONSULT NOTE ADULT - SUBJECTIVE AND OBJECTIVE BOX
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 Hx obtained from daughter. She noticed breathing sounds like he was snoring and called EMS.  No sick contact. No diarrhea.     In the ED, VSS. Labs significant for Na 110, CO2 14, lactate 2.5. CXR w/ b/l patchy opacities R>L c/w PNA. 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 MICU consulted for hyponatremia. (30 Sep 2021 01:07)    PERTINENT PM/SXH:   Spinal stenosis    Diabetes    Benign essential HTN    Gallstones      S/P cataract surgery      FAMILY HISTORY:    ITEMS NOT CHECKED ARE NOT PRESENT    SOCIAL HISTORY:   Significant other/partner[ ]  Children[ X]  Sabianism/Spirituality:UNK  Substance hx:  [ ]   Tobacco hx:  [ ]   Alcohol hx: [ ]   Home Opioid hx:  [ ] I-Stop Reference No:  Living Situation: [X ]Home  [ ]Long term care  [ ]Rehab [ ]Other    ADVANCE DIRECTIVES:    DNR  MOLST  [ ]  Living Will  [ ]   DECISION MAKER(s):  [ ] Health Care Proxy(s)  [X ] Surrogate(s)  [ ] Guardian           Name(s): Phone Number(s):  ARCADIO MELENDREZ   BASELINE (I)ADL(s) (prior to admission):  [place]: [ ]Total  [ ] Moderate [X ]Dependent    Allergies    No Known Allergies    Intolerances    MEDICATIONS  (STANDING):  ascorbic acid 500 milliGRAM(s) Oral daily  caspofungin IVPB 50 milliGRAM(s) IV Intermittent every 24 hours  ceftazidime/avibactam IVPB 2.5 Gram(s) IV Intermittent every 8 hours  chlorhexidine 2% Cloths 1 Application(s) Topical <User Schedule>  chlorhexidine 4% Liquid 1 Application(s) Topical <User Schedule>  dexAMETHasone  IVPB 8 milliGRAM(s) IV Intermittent every 8 hours  dextrose 40% Gel 15 Gram(s) Oral once  dextrose 5%. 1000 milliLiter(s) (50 mL/Hr) IV Continuous <Continuous>  dextrose 5%. 1000 milliLiter(s) (100 mL/Hr) IV Continuous <Continuous>  dextrose 50% Injectable 25 Gram(s) IV Push once  dextrose 50% Injectable 12.5 Gram(s) IV Push once  dextrose 50% Injectable 25 Gram(s) IV Push once  folic acid 1 milliGRAM(s) Oral daily  glucagon  Injectable 1 milliGRAM(s) IntraMuscular once  hydrALAZINE 50 milliGRAM(s) Oral three times a day  insulin lispro (ADMELOG) corrective regimen sliding scale   SubCutaneous every 6 hours  insulin NPH human recombinant 6 Unit(s) SubCutaneous every 6 hours  levETIRAcetam  IVPB 500 milliGRAM(s) IV Intermittent every 12 hours  multivitamin/minerals/iron Oral Solution (CENTRUM) 15 milliLiter(s) Oral daily  nystatin Powder 1 Application(s) Topical two times a day  pantoprazole  Injectable 40 milliGRAM(s) IV Push two times a day  polyethylene glycol 3350 17 Gram(s) Oral every 12 hours  senna Syrup 10 milliLiter(s) Oral at bedtime    MEDICATIONS  (PRN):    PRESENT SYMPTOMS: [ X]Unable to obtain due to poor mentation   Source if other than patient:  [ ]Family   [ ]Team     Pain: [ ]yes [ ]no  QOL impact -   Location -                    Aggravating factors -  Quality -  Radiation -  Timing-  Severity (0-10 scale):  Minimal acceptable level (0-10 scale):     PAIN AD Score: 0    [email or web address] (press ctrl +  left click to view)    Dyspnea:                           [ X]Mild [ ]Moderate [ ]Severe  Anxiety:                             [X ]Mild [ ]Moderate [ ]Severe  Fatigue:                             [ ]Mild [X ]Moderate [ ]Severe  Nausea:                             [ ]Mild [ ]Moderate [ ]Severe  Loss of appetite:              [ ]Mild [ ]Moderate [ ]Severe  Constipation:                    [ ]Mild [ ]Moderate [ ]Severe    Other Symptoms:  [ ]All other review of systems negative     Palliative Performance Status Version 2:     30    %    http://npcrc.org/files/news/palliative_performance_scale_[email or web address].pdf  PHYSICAL EXAM:  Vital Signs Last 24 Hrs  T(C): 37.1 (13 Oct 2021 05:45), Max: 37.3 (12 Oct 2021 11:40)  T(F): 98.7 (13 Oct 2021 05:45), Max: 99.1 (12 Oct 2021 11:40)  HR: 87 (13 Oct 2021 05:45) (75 - 87)  BP: 158/68 (13 Oct 2021 05:45) (155/75 - 177/70)  BP(mean): --  RR: 20 (13 Oct 2021 05:45) (18 - 20)  SpO2: 94% (13 Oct 2021 05:45) (94% - 98%) I&O's Summary    12 Oct 2021 07:01  -  13 Oct 2021 07:00  --------------------------------------------------------  IN: 660 mL / OUT: 950 mL / NET: -290 mL    13 Oct 2021 07:01  -  13 Oct 2021 10:42  --------------------------------------------------------  IN: 200 mL / OUT: 0 mL / NET: 200 mL      GENERAL:  [ ]Alert  [ ]Oriented x   [X ]Lethargic  [ ]Cachexia  [ ]Unarousable  [ ]Verbal  [ ]XNon-Verbal  Behavioral:   [ ] Anxiety  [ ] Delirium [ ] Agitation [ ] Other  HEENT:  [ ]Normal   [ X]Dry mouth   [ ]ET Tube/Trach  [ ]Oral lesions  PULMONARY:   [ ]Clear [ ]Tachypnea  [ ]Audible excessive secretions   [ ]Rhonchi        [ ]Right [ ]Left [ ]Bilateral  [ ]Crackles        [ ]Right [ ]Left [ ]Bilateral  [ ]Wheezing     [ ]Right [ ]Left [ ]Bilatera  [X ]Diminished breath sounds [ ]right [ ]left [ ]bilateral  CARDIOVASCULAR:    [ ]Regular [X ]Irregular [ ]Tachy  [ ]Stephen [ ]Murmur [ ]Other  GASTROINTESTINAL:  [ X]Soft  [ ]Distended   [ ]+BS  [ ]Non tender [ ]Tender  [X ]PEG [ ]OGT/ NGT  Last BM:   GENITOURINARY:  [ ]Normal [ X] Incontinent   [ ]Oliguria/Anuria   [ ]Bello  MUSCULOSKELETAL:   [ ]Normal   [ ]Weakness  [ X]Bed/Wheelchair bound [ ]Edema  NEUROLOGIC:   [ ]No focal deficits  [ X]Cognitive impairment  [X ]Dysphagia [ ]Dysarthria [ ]Paresis [ ]Other   SKIN:   [ ]Normal    [ ]Rash  [ X]Pressure ulcer(s)       Present on admission [ X]y [ ]n    CRITICAL CARE:  [ ] Shock Present  [ ]Septic [ ]Cardiogenic [ ]Neurologic [ ]Hypovolemic  [ ]  Vasopressors [ ]  Inotropes   [ ]Respiratory failure present [ ]Mechanical ventilation [ ]Non-invasive ventilatory support [ ]High flow  [ ]Acute  [ ]Chronic [ ]Hypoxic  [ ]Hypercarbic [ ]Other  [ ]Other organ failure     LABS:                        9.3    8.83  )-----------( 78       ( 13 Oct 2021 09:53 )             31.1   10-13    148<H>  |  109<H>  |  29<H>  ----------------------------<  146<H>  3.5   |  25  |  0.33<L>    Ca    7.9<L>      13 Oct 2021 09:52    TPro  5.2<L>  /  Alb  2.7<L>  /  TBili  0.4  /  DBili  x   /  AST  53<H>  /  ALT  56<H>  /  AlkPhos  140<H>  10-11        RADIOLOGY & ADDITIONAL STUDIES:    < from: CT Head No Cont (10.09.21 @ 18:27) >  EXAM:  CT BRAIN                            PROCEDURE DATE:  10/09/2021            INTERPRETATION:  CLINICAL INDICATION: Brain mass, pneumonia    5mm axial sections of the brain were obtained from base to vertex, without the intravenous administration of contrast material. Coronal and sagittal computer generated reconstructed views are available.    Comparison is made with the prior postcontrast CT of 9/30/2021.    The large isodense mass in the right frontal lobe extending into the genu of the corpus callosum is identified measuring 6 cm in AP diameter by 4.6 cm transversely. There is some extension into the left frontal lobe. There is splaying of the frontal horns. Vasogenic edema is unchanged extending superiorly into the right centrum semiovale. Moderate atrophy is identified.    There has been previous bilateral lens replacement surgery.      IMPRESSION: Isodense right frontal mass extending into the genu of the corpus callosum with some calcification and vasogenic edema unchanged since 9/30/2021..    --- End of Report ---            < end of copied text >      PROTEIN CALORIE MALNUTRITION PRESENT: [ ]mild [ ]moderate [ ]severe [ ]underweight [ ]morbid obesity  https://www.andeal.org/vault/2440/web/files/ONC/Table_Clinical%20Characteristics%20to%20Document%20Malnutrition-White%20JV%20et%20al%202012.pdf    Height (cm): 152.4 (09-30-21 @ 02:46), 170.2 (01-22-21 @ 12:34)  Weight (kg): 51.8 (09-30-21 @ 02:46), 63.5 (01-22-21 @ 12:34)  BMI (kg/m2): 22.3 (09-30-21 @ 02:46), 21.9 (01-22-21 @ 12:34)    [ ]PPSV2 < or = to 30% [ ]significant weight loss  [ ]poor nutritional intake  [ ]anasarca      [ ]Artificial Nutrition      REFERRALS:   [ ]Chaplaincy  [ ]Hospice  [ ]Child Life  [ ]Social Work  [X ]Case management [ ]Holistic Therapy     Goals of Care Document:

## 2021-10-13 NOTE — PROGRESS NOTE ADULT - SUBJECTIVE AND OBJECTIVE BOX
[facility] Division of Hospital Medicine  Lamine Hopkins MD  Pager (M-F, 2J-7N): [phone]  Other Times:  [phone]    Patient is a 81y old  Male who presents with a chief complaint of Hyponatremia (13 Oct 2021 11:48)    SUBJECTIVE / OVERNIGHT EVENTS: pt cannot participate with interview or exam. no events  ADDITIONAL REVIEW OF SYSTEMS:    MEDICATIONS  (STANDING):  ascorbic acid 500 milliGRAM(s) Oral daily  caspofungin IVPB 50 milliGRAM(s) IV Intermittent every 24 hours  ceftazidime/avibactam IVPB 2.5 Gram(s) IV Intermittent every 8 hours  chlorhexidine 2% Cloths 1 Application(s) Topical <User Schedule>  chlorhexidine 4% Liquid 1 Application(s) Topical <User Schedule>  dexAMETHasone  IVPB 8 milliGRAM(s) IV Intermittent every 8 hours  dextrose 40% Gel 15 Gram(s) Oral once  dextrose 5%. 1000 milliLiter(s) (50 mL/Hr) IV Continuous <Continuous>  dextrose 5%. 1000 milliLiter(s) (100 mL/Hr) IV Continuous <Continuous>  dextrose 50% Injectable 25 Gram(s) IV Push once  dextrose 50% Injectable 12.5 Gram(s) IV Push once  dextrose 50% Injectable 25 Gram(s) IV Push once  folic acid 1 milliGRAM(s) Oral daily  glucagon  Injectable 1 milliGRAM(s) IntraMuscular once  hydrALAZINE 50 milliGRAM(s) Oral three times a day  insulin lispro (ADMELOG) corrective regimen sliding scale   SubCutaneous every 6 hours  insulin NPH human recombinant 6 Unit(s) SubCutaneous every 6 hours  levETIRAcetam  IVPB 500 milliGRAM(s) IV Intermittent every 12 hours  multivitamin/minerals/iron Oral Solution (CENTRUM) 15 milliLiter(s) Oral daily  nystatin Powder 1 Application(s) Topical two times a day  pantoprazole  Injectable 40 milliGRAM(s) IV Push two times a day  polyethylene glycol 3350 17 Gram(s) Oral every 12 hours  senna Syrup 10 milliLiter(s) Oral at bedtime    MEDICATIONS  (PRN):      CAPILLARY BLOOD GLUCOSE      POCT Blood Glucose.: 133 mg/dL (13 Oct 2021 12:51)  POCT Blood Glucose.: 179 mg/dL (13 Oct 2021 08:22)  POCT Blood Glucose.: 267 mg/dL (13 Oct 2021 05:45)  POCT Blood Glucose.: 214 mg/dL (12 Oct 2021 23:53)  POCT Blood Glucose.: 191 mg/dL (12 Oct 2021 17:15)    I&O's Summary    12 Oct 2021 07:01  -  13 Oct 2021 07:00  --------------------------------------------------------  IN: 660 mL / OUT: 950 mL / NET: -290 mL    13 Oct 2021 07:01  -  13 Oct 2021 14:02  --------------------------------------------------------  IN: 350 mL / OUT: 0 mL / NET: 350 mL        PHYSICAL EXAM:  Vital Signs Last 24 Hrs  T(C): 36.6 (13 Oct 2021 13:33), Max: 37.1 (13 Oct 2021 05:45)  T(F): 97.9 (13 Oct 2021 13:33), Max: 98.7 (13 Oct 2021 05:45)  HR: 88 (13 Oct 2021 13:33) (75 - 88)  BP: 157/72 (13 Oct 2021 13:33) (155/75 - 168/92)  BP(mean): --  RR: 18 (13 Oct 2021 13:33) (18 - 20)  SpO2: 95% (13 Oct 2021 13:33) (94% - 98%)  GENERAL: NAD, comatose    EYES:  conjunctiva and sclera clear, pupils normal dilation   NECK: Supple, soft  ABDOMEN: Soft, Nontender, Nondistended; Bowel sounds present; peg in place clean  EXTREMITIES:  upper and lower pitting edema in extremities. left arm picc interval removal  PSYCH/Neuro: not responsive or interactive.   SKIN: denuded skin/ulcer R wrist w dressing. Sacrum not examined    LABS:                        9.3    8.83  )-----------( 78       ( 13 Oct 2021 09:53 )             31.1     10-13    148<H>  |  109<H>  |  29<H>  ----------------------------<  146<H>  3.5   |  25  |  0.33<L>    Ca    7.9<L>      13 Oct 2021 09:52    TPro  5.2<L>  /  Alb  2.7<L>  /  TBili  0.4  /  DBili  x   /  AST  53<H>  /  ALT  56<H>  /  AlkPhos  140<H>  10-11      Culture - Blood (collected 11 Oct 2021 22:57)  Source: .Blood Blood-Peripheral  Preliminary Report (12 Oct 2021 23:01):    No growth to date.        RADIOLOGY & ADDITIONAL TESTS:  Results Reviewed:   Imaging Personally Reviewed:  Electrocardiogram Personally Reviewed:    COORDINATION OF CARE:  Care Discussed with Consultants/Other Providers [Y/N]: ID Dr Nadeem maher plan of care  Prior or Outpatient Records Reviewed [Y/N]:

## 2021-10-14 NOTE — PROGRESS NOTE ADULT - SUBJECTIVE AND OBJECTIVE BOX
[facility]-- WOUND TEAM -- FOLLOW UP NOTE  --------------------------------------------------------------------------------    24 hour events/subjective:          Diet:  Diet, NPO with Tube Feed:   Tube Feeding Modality: Gastrostomy  Jevity 1.5 Adria (JEVITY1.5RTH)  Total Volume for 24 Hours (mL): 900  Continuous  Starting Tube Feed Rate mL per Hour: 50  Until Goal Tube Feed Rate (mL per Hour): 50  Tube Feed Duration (in Hours): 18  Tube Feed Start Time: 12:00  Ibrahima(7 Gm Arginine/7 Gm Glut/1.2 Gm HMB     Qty per Day:  2  No Carb Prosource TF     Qty per Day:  2 (10-06-21 @ 11:44)      ROS: pt unable to offer    ALLERGIES & MEDICATIONS  --------------------------------------------------------------------------------  No Known Allergies    STANDING INPATIENT MEDICATIONS  ascorbic acid 500 milliGRAM(s) Oral daily  caspofungin IVPB 50 milliGRAM(s) IV Intermittent every 24 hours  ceftazidime/avibactam IVPB 2.5 Gram(s) IV Intermittent every 8 hours  chlorhexidine 2% Cloths 1 Application(s) Topical <User Schedule>  chlorhexidine 4% Liquid 1 Application(s) Topical <User Schedule>  dexAMETHasone  IVPB 8 milliGRAM(s) IV Intermittent every 8 hours  dextrose 40% Gel 15 Gram(s) Oral once  dextrose 5%. 1000 milliLiter(s) IV Continuous <Continuous>  dextrose 5%. 1000 milliLiter(s) IV Continuous <Continuous>  dextrose 50% Injectable 12.5 Gram(s) IV Push once  dextrose 50% Injectable 25 Gram(s) IV Push once  dextrose 50% Injectable 25 Gram(s) IV Push once  folic acid 1 milliGRAM(s) Oral daily  glucagon  Injectable 1 milliGRAM(s) IntraMuscular once  hydrALAZINE 50 milliGRAM(s) Oral three times a day  insulin lispro (ADMELOG) corrective regimen sliding scale   SubCutaneous every 6 hours  insulin NPH human recombinant 6 Unit(s) SubCutaneous every 6 hours  levETIRAcetam  IVPB 500 milliGRAM(s) IV Intermittent every 12 hours  multivitamin/minerals/iron Oral Solution (CENTRUM) 15 milliLiter(s) Oral daily  nystatin Powder 1 Application(s) Topical two times a day  pantoprazole  Injectable 40 milliGRAM(s) IV Push two times a day  polyethylene glycol 3350 17 Gram(s) Oral every 12 hours  senna Syrup 10 milliLiter(s) Oral at bedtime      VITALS/PHYSICAL EXAM  --------------------------------------------------------------------------------  T(C): 36.3 (10-14-21 @ 05:30), Max: 36.8 (10-13-21 @ 20:32)  HR: 93 (10-14-21 @ 05:30) (83 - 98)  BP: 149/67 (10-14-21 @ 05:30) (149/67 - 160/73)  RR: 20 (10-14-21 @ 05:30) (17 - 20)  SpO2: 94% (10-14-21 @ 05:30) (94% - 97%)  Wt(kg): --        10-13-21 @ 07:01  -  10-14-21 @ 07:00  --------------------------------------------------------  IN: 850 mL / OUT: 650 mL / NET: 200 mL    NAD, sedated, frail  [facility]a [facility] P500 bed  HEENT:  NC/AT, mucosa moist,  trachea midline, neck supple  Gastrointestinal: soft NT/ND (+)BS  (+)PEG   Psych: unable to assess  Neurology: nonverbal, no follow commands/ paraplegic  Musculoskeletal:  passive ROM  Vascular: BLE equally warm,  mild BLE edema equal, BLE DP/PT pulses palpable      no acute ischemia noted  Skin:  moist w/ good turgor, anasarca  Sacrum unstageable w/soft eschar w/ slough along edges      5cm x 4cm x 0.4cm w/ scant serous drainage    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      5.5cm x 8xm x 2cm w/ undermining from 12-3o'clock w/ greatest of 2cm at 2o'clock      moderate serosanguinous drainage  Lt Hip  3cm x 2cm x 0.1cm unstageable w/  60% slough and 40% partial thickness granular w/o drainage  Bilateral thoracic posterior ribs w/ 0.5cm x 1cm x 0cm unstageable pressure injuries w/o drainage  Bilateral heels, bunions and lateral feet w/ dry resolving blisters w/o skin break, drainage  No odor, erythema, increased warmth, tenderness, induration, fluctuance          LABS/ CULTURES/ RADIOLOGY:              9.3    8.83  >-----------<  78       [10-13-21 @ 09:53]              31.1     148  |  109  |  29  ----------------------------<  146      [10-13-21 @ 09:52]  3.5   |  25  |  0.33        Ca     7.9     [10-13-21 @ 09:52]      CAPILLARY BLOOD GLUCOSE  POCT Blood Glucose.: 146 mg/dL (14 Oct 2021 09:50)  POCT Blood Glucose.: 231 mg/dL (14 Oct 2021 05:29)  POCT Blood Glucose.: 217 mg/dL (14 Oct 2021 00:04)  POCT Blood Glucose.: 208 mg/dL (13 Oct 2021 17:16)  POCT Blood Glucose.: 133 mg/dL (13 Oct 2021 12:51)      Culture - Blood (collected 10-11-21 @ 22:57)  Source: .Blood Blood-Peripheral  Preliminary Report (10-12-21 @ 23:01):    No growth to date.    Culture - Blood (collected 10-10-21 @ 13:55)  Source: .Blood Blood-Peripheral  Preliminary Report (10-11-21 @ 14:01):    No growth to date.    Culture - Blood (collected 10-10-21 @ 05:54)  Source: .Blood PICC/PERC Single Lumen  Gram Stain (10-13-21 @ 10:08):    Growth in aerobic bottle: Yeast    Growth in anaerobic bottle: Gram Negative Rods  Preliminary Report (10-13-21 @ 10:08):    Growth in aerobic bottle: Candida auris    Growth in aerobic bottle: Candida auris Referred to Mycology    Growth in anaerobic bottle: Gram Negative Rods    ***Blood Panel PCR results on this specimen are available    approximately 3 hours after the Gram stain result.***    Gram stain, PCR, and/or culture results may not always    correspond due to difference in methodologies.    ************************************************************    This PCR assay was performed by multiplex PCR. This    Assay tests for66 bacterial and resistance gene targets.    Please refer to the [facility] test directory    at [email or web address] for details.  Organism: Blood Culture PCR (10-11-21 @ 22:29)  Organism: Blood Culture PCR (10-11-21 @ 22:29)      -  Candida auris: Detec      Method Type: PCR       [facility]-- WOUND TEAM -- FOLLOW UP NOTE  --------------------------------------------------------------------------------    24 hour events/subjective:        Diet:  Diet, NPO with Tube Feed:   Tube Feeding Modality: Gastrostomy  Jevity 1.5 Adria (JEVITY1.5RTH)  Total Volume for 24 Hours (mL): 900  Continuous  Starting Tube Feed Rate mL per Hour: 50  Until Goal Tube Feed Rate (mL per Hour): 50  Tube Feed Duration (in Hours): 18  Tube Feed Start Time: 12:00  Ibrahima(7 Gm Arginine/7 Gm Glut/1.2 Gm HMB     Qty per Day:  2  No Carb Prosource TF     Qty per Day:  2 (10-06-21 @ 11:44)      ROS: pt unable to offer    ALLERGIES & MEDICATIONS  --------------------------------------------------------------------------------  No Known Allergies    STANDING INPATIENT MEDICATIONS  ascorbic acid 500 milliGRAM(s) Oral daily  caspofungin IVPB 50 milliGRAM(s) IV Intermittent every 24 hours  ceftazidime/avibactam IVPB 2.5 Gram(s) IV Intermittent every 8 hours  chlorhexidine 2% Cloths 1 Application(s) Topical <User Schedule>  chlorhexidine 4% Liquid 1 Application(s) Topical <User Schedule>  dexAMETHasone  IVPB 8 milliGRAM(s) IV Intermittent every 8 hours  dextrose 40% Gel 15 Gram(s) Oral once  dextrose 5%. 1000 milliLiter(s) IV Continuous <Continuous>  dextrose 5%. 1000 milliLiter(s) IV Continuous <Continuous>  dextrose 50% Injectable 12.5 Gram(s) IV Push once  dextrose 50% Injectable 25 Gram(s) IV Push once  dextrose 50% Injectable 25 Gram(s) IV Push once  folic acid 1 milliGRAM(s) Oral daily  glucagon  Injectable 1 milliGRAM(s) IntraMuscular once  hydrALAZINE 50 milliGRAM(s) Oral three times a day  insulin lispro (ADMELOG) corrective regimen sliding scale   SubCutaneous every 6 hours  insulin NPH human recombinant 6 Unit(s) SubCutaneous every 6 hours  levETIRAcetam  IVPB 500 milliGRAM(s) IV Intermittent every 12 hours  multivitamin/minerals/iron Oral Solution (CENTRUM) 15 milliLiter(s) Oral daily  nystatin Powder 1 Application(s) Topical two times a day  pantoprazole  Injectable 40 milliGRAM(s) IV Push two times a day  polyethylene glycol 3350 17 Gram(s) Oral every 12 hours  senna Syrup 10 milliLiter(s) Oral at bedtime      VITALS/PHYSICAL EXAM  --------------------------------------------------------------------------------  T(C): 36.3 (10-14-21 @ 05:30), Max: 36.8 (10-13-21 @ 20:32)  HR: 93 (10-14-21 @ 05:30) (83 - 98)  BP: 149/67 (10-14-21 @ 05:30) (149/67 - 160/73)  RR: 20 (10-14-21 @ 05:30) (17 - 20)  SpO2: 94% (10-14-21 @ 05:30) (94% - 97%)  Wt(kg): --        10-13-21 @ 07:01  -  10-14-21 @ 07:00  --------------------------------------------------------  IN: 850 mL / OUT: 650 mL / NET: 200 mL    NAD, sedated, frail  [facility] Care P500 bed  HEENT:  NC/AT, mucosa moist,  trachea midline, neck supple  Gastrointestinal: soft NT/ND (+)BS  (+)PEG   Psych: unable to assess  Neurology: nonverbal, no follow commands/ paraplegic  Musculoskeletal:  passive ROM  Vascular: BLE equally warm,  mild BLE edema equal, BLE DP/PT pulses palpable      no acute ischemia noted  Skin:  moist w/ good turgor, anasarca  Sacrum unstageable w/soft eschar w/ slough along edges      5cm x 4cm x 0.4cm w/ scant serous drainage    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      5.5cm x 8xm x 2cm w/ undermining from 12-3o'clock w/ greatest of 2cm at 2o'clock      moderate serosanguinous drainage  Lt Hip  3cm x 2cm x 0.1cm unstageable w/  60% slough and 40% partial thickness granular w/o drainage  Bilateral thoracic posterior ribs w/ 0.5cm x 1cm x 0cm hyperpigmented skin w/o blistering or drainage  Bilateral heels, bunions and lateral feet w/ dry resolving blisters w/o skin break, drainage  No odor, erythema, increased warmth, tenderness, induration, fluctuance          LABS/ CULTURES/ RADIOLOGY:              9.3    8.83  >-----------<  78       [10-13-21 @ 09:53]              31.1     148  |  109  |  29  ----------------------------<  146      [10-13-21 @ 09:52]  3.5   |  25  |  0.33        Ca     7.9     [10-13-21 @ 09:52]      CAPILLARY BLOOD GLUCOSE  POCT Blood Glucose.: 146 mg/dL (14 Oct 2021 09:50)  POCT Blood Glucose.: 231 mg/dL (14 Oct 2021 05:29)  POCT Blood Glucose.: 217 mg/dL (14 Oct 2021 00:04)  POCT Blood Glucose.: 208 mg/dL (13 Oct 2021 17:16)  POCT Blood Glucose.: 133 mg/dL (13 Oct 2021 12:51)      Culture - Blood (collected 10-11-21 @ 22:57)  Source: .Blood Blood-Peripheral  Preliminary Report (10-12-21 @ 23:01):    No growth to date.    Culture - Blood (collected 10-10-21 @ 13:55)  Source: .Blood Blood-Peripheral  Preliminary Report (10-11-21 @ 14:01):    No growth to date.    Culture - Blood (collected 10-10-21 @ 05:54)  Source: .Blood PICC/PERC Single Lumen  Gram Stain (10-13-21 @ 10:08):    Growth in aerobic bottle: Yeast    Growth in anaerobic bottle: Gram Negative Rods  Preliminary Report (10-13-21 @ 10:08):    Growth in aerobic bottle: Candida auris    Growth in aerobic bottle: Candida auris Referred to Mycology    Growth in anaerobic bottle: Gram Negative Rods    ***Blood Panel PCR results on this specimen are available    approximately 3 hours after the Gram stain result.***    Gram stain, PCR, and/or culture results may not always    correspond due to difference in methodologies.    ************************************************************    This PCR assay was performed by multiplex PCR. This    Assay tests for66 bacterial and resistance gene targets.    Please refer to the [facility] Labs test directory    at [email or web address] for details.  Organism: Blood Culture PCR (10-11-21 @ 22:29)  Organism: Blood Culture PCR (10-11-21 @ 22:29)      -  Candida auris: Detec      Method Type: PCR

## 2021-10-14 NOTE — PROGRESS NOTE ADULT - SUBJECTIVE AND OBJECTIVE BOX
[facility] Division of Hospital Medicine  Lamine Hopkins MD  Pager (M-F, 9E-3Y): [phone]  Other Times:  [phone]    Patient is a 81y old  Male who presents with a chief complaint of Hyponatremia (14 Oct 2021 10:42)    SUBJECTIVE / OVERNIGHT EVENTS: pt cannot participate in interview or exam. his peg tube was found to be dislodged.   ADDITIONAL REVIEW OF SYSTEMS:    MEDICATIONS  (STANDING):  ascorbic acid 500 milliGRAM(s) Oral daily  caspofungin IVPB 50 milliGRAM(s) IV Intermittent every 24 hours  ceftazidime/avibactam IVPB 2.5 Gram(s) IV Intermittent every 8 hours  chlorhexidine 2% Cloths 1 Application(s) Topical <User Schedule>  chlorhexidine 4% Liquid 1 Application(s) Topical <User Schedule>  dexAMETHasone  IVPB 8 milliGRAM(s) IV Intermittent every 8 hours  dextrose 40% Gel 15 Gram(s) Oral once  dextrose 5%. 1000 milliLiter(s) (50 mL/Hr) IV Continuous <Continuous>  dextrose 5%. 1000 milliLiter(s) (100 mL/Hr) IV Continuous <Continuous>  dextrose 50% Injectable 12.5 Gram(s) IV Push once  dextrose 50% Injectable 25 Gram(s) IV Push once  dextrose 50% Injectable 25 Gram(s) IV Push once  folic acid 1 milliGRAM(s) Oral daily  glucagon  Injectable 1 milliGRAM(s) IntraMuscular once  hydrALAZINE 50 milliGRAM(s) Oral three times a day  insulin lispro (ADMELOG) corrective regimen sliding scale   SubCutaneous every 6 hours  insulin NPH human recombinant 6 Unit(s) SubCutaneous every 6 hours  levETIRAcetam  IVPB 500 milliGRAM(s) IV Intermittent every 12 hours  multivitamin/minerals/iron Oral Solution (CENTRUM) 15 milliLiter(s) Oral daily  nystatin Powder 1 Application(s) Topical two times a day  pantoprazole  Injectable 40 milliGRAM(s) IV Push two times a day  polyethylene glycol 3350 17 Gram(s) Oral every 12 hours  senna Syrup 10 milliLiter(s) Oral at bedtime    MEDICATIONS  (PRN):      CAPILLARY BLOOD GLUCOSE      POCT Blood Glucose.: 114 mg/dL (14 Oct 2021 12:48)  POCT Blood Glucose.: 146 mg/dL (14 Oct 2021 09:50)  POCT Blood Glucose.: 231 mg/dL (14 Oct 2021 05:29)  POCT Blood Glucose.: 217 mg/dL (14 Oct 2021 00:04)  POCT Blood Glucose.: 208 mg/dL (13 Oct 2021 17:16)    I&O's Summary    13 Oct 2021 07:01  -  14 Oct 2021 07:00  --------------------------------------------------------  IN: 850 mL / OUT: 650 mL / NET: 200 mL        PHYSICAL EXAM:  Vital Signs Last 24 Hrs  T(C): 36.3 (14 Oct 2021 05:30), Max: 36.8 (13 Oct 2021 20:32)  T(F): 97.3 (14 Oct 2021 05:30), Max: 98.2 (13 Oct 2021 20:32)  HR: 93 (14 Oct 2021 05:30) (83 - 98)  BP: 149/67 (14 Oct 2021 05:30) (149/67 - 160/73)  BP(mean): --  RR: 20 (14 Oct 2021 05:30) (17 - 20)  SpO2: 94% (14 Oct 2021 05:30) (94% - 97%)  GENERAL: NAD, nonresponsive, eyes open  EYES:  conjunctiva and sclera clear, pupils normal dilation   NECK: Supple, soft  ABDOMEN: Soft, Nontender, Nondistended; Bowel sounds present; PEG dislodged, tract is clean  EXTREMITIES:  upper and lower pitting edema in extremities. left arm picc interval removal  PSYCH/Neuro: not responsive or interactive.   SKIN: denuded skin/ulcer R wrist w dressing. Sacrum not examined    LABS:                        9.8    8.65  )-----------( 85       ( 14 Oct 2021 11:06 )             31.8     10-14    147<H>  |  109<H>  |  31<H>  ----------------------------<  131<H>  3.7   |  24  |  0.32<L>    Ca    8.1<L>      14 Oct 2021 11:06      Culture - Blood (collected 11 Oct 2021 22:57)  Source: .Blood Blood-Peripheral  Preliminary Report (12 Oct 2021 23:01):    No growth to date.    RADIOLOGY & ADDITIONAL TESTS:  Results Reviewed:   Imaging Personally Reviewed:  Electrocardiogram Personally Reviewed:    COORDINATION OF CARE:  Care Discussed with Consultants/Other Providers [Y/N]: GI consulted  Prior or Outpatient Records Reviewed [Y/N]:

## 2021-10-14 NOTE — CONSULT NOTE ADULT - ASSESSMENT
Impression:  1. Dislodged PEG - placed figueroa into peg site to keep the tract open. Discussed endoscopic PEG placement with HCP daughter, who stated she would like everything done that can be done.     Recommendations:  - Keep NPO  - Plan for endoscopic PEG placement tomorrow  - Keep figueroa in PEG site  - Check AM Hgb, Transfuse if Hgb < 7  - Check AM labs, correct electrolyte disturbances if indicated  - Rest of care per primary    Thank you for involving us in this patient's care.    Seen and discussed with Attending, Dr. Rick.    Cindy Mclaughlin MD  Gastroenterology/Hepatology Fellow, PGY-V    NON-URGENT CONSULTS:  Please email [email or web address]  [email or web address]  AT NIGHT AND ON WEEKENDS:  Contact on-call GI fellow via answering service ([phone]) from 5pm-8am and on weekends/holidays  MONDAY-FRIDAY 8AM-5PM:  Pager# [phone] ([facility])  GI Phone# [phone] ([facility]) Impression:  1. Dislodged PEG c/b dysphagia - placed figueroa into peg site to keep the tract open. Discussed endoscopic PEG placement with HCP daughter, who stated she would like everything done that can be done.     Recommendations:  - Keep NPO  - Plan for endoscopic PEG placement tomorrow  - Keep figueroa in PEG site  - Check AM Hgb, Transfuse if Hgb < 7  - Check AM labs, correct electrolyte disturbances if indicated  - Rest of care per primary    Thank you for involving us in this patient's care.    Seen and discussed with Attending, Dr. Rick.    Cindy Mclaughlin MD  Gastroenterology/Hepatology Fellow, PGY-V    NON-URGENT CONSULTS:  Please email [email or web address]  [email or web address]  AT NIGHT AND ON WEEKENDS:  Contact on-call GI fellow via answering service ([phone]) from 5pm-8am and on weekends/holidays  MONDAY-FRIDAY 8AM-5PM:  Pager# [phone] ([facility])  GI Phone# [phone] ([facility]) Impression:  1. Dislodged PEG c/b dysphagia - placed figueroa into peg site to keep the tract open. Discussed peg replacement with daughter and she is okay to proceed.    Recommendations:  - Keep NPO  - Plan for bedside PEG placement tomorrow.  - Keep figueroa in PEG site  - Check AM Hgb, Transfuse if Hgb < 7  - Check AM labs, correct electrolyte disturbances if indicated  - Rest of care per primary    Thank you for involving us in this patient's care.    Seen and discussed with Attending, Dr. Rick.    Cindy Mclaughlin MD  Gastroenterology/Hepatology Fellow, PGY-V    NON-URGENT CONSULTS:  Please email [email or web address]  [email or web address]  AT NIGHT AND ON WEEKENDS:  Contact on-call GI fellow via answering service ([phone]) from 5pm-8am and on weekends/holidays  MONDAY-FRIDAY 8AM-5PM:  Pager# [phone] ([facility])  GI Phone# [phone] ([facility])

## 2021-10-14 NOTE — PROGRESS NOTE ADULT - ASSESSMENT
81 year old with baseline cognitive impairment with sacral decubitus (s/p 6 weeks of abx for sacral decubitus)  Dsyphagia with aspiration pneumonia-has peg in place  CNS mass    Now with polymicrobial bacteremia  CT a/p without acute findings  ? due to wounds    1) Leukocytosis  Likely multifactorial  On steroids/ has CNS mass- ? neoplasia/ decubitus ulcers/ bacteremia    Trending down    2) CRE Klebsiella bacteremia  Continue avycaz through 10/19 to cover bacteremia / wound    3) C auris bacteremia  ? due to sacral wound  Continue caspofungin  Repeat cultures without growth    Check echo    4) Decubitus ulcers  Right and left hip- chronic- not acutely infected  Midline decubitus- with eschar  Consider wound care eval/ surgery eval   May benefit from enzymatic or surgical debridement    5) CNS mass  ? planned duration of steroids  May need to add PCP prophylaxis    Prognosis poor due to underlying comorbidities  Goals of care discussions    Case D/w attending

## 2021-10-14 NOTE — PROVIDER CONTACT NOTE (CRITICAL VALUE NOTIFICATION) - TEST AND RESULT REPORTED:
Blood Cultures collected 10/10/2021   Growth in aerobic bottle: Candida auris  Growth in anaerobic bottle: Gram Negative Rods

## 2021-10-14 NOTE — PROGRESS NOTE ADULT - ASSESSMENT
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 Admitted to MICU for management of hyponatremia and hypotension requiring pressors.      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  prophylaxis measures      Pt w/poor prognosis- ongoing GOC w/ family  Rt Hip- Aquacel dressing, consider VAC if aligned w/ GOC  Sacrum/ Buttocks & Lt  hip- Medihoney dressing      CAVILON QD w/ pericare as per protocol w/ pericare      loose bm improving w/ senna  Posterior Ribs - allevyn QOD  Bilateral feet= CAVILON QD to dried blisters   Abx per Medicine / ID   Moisturize intact skin w/ SWEEN cream BID  Nutritional optimization for pt w/ acute on chronic protein calorie malnutrition         Jevity TF and prosource         consider MVI & Vit C to promote wound healing  Continue turning and positioning w/ offloading assistive devices as per protocol  Waffle Cushion to chair when oob to chair  Continue w/ low air loss bed surface, consider Envella bed if aligned w/ GOC as pt        w/ multiple surfaces w/ pressure injuries  Care as per medicine will follow w/ you  Upon discharge f/u as outpatient at [facility] [address] [facility] [phone]  D/w team & RN, s/w Attng  DEVIN IrvinC CWS [id number]  We spent 25minutes face to face w/ this pt of which more than 50% of the time was spent counseling & coordinating care of this pt.

## 2021-10-14 NOTE — PROGRESS NOTE ADULT - PROBLEM SELECTOR PLAN 10
-Hold pharma dvt ppx for now given recent hgb downtrend and pos blood in gastric lavage and hemorrhagic brain mass. -Need to d/w LAITH if ok to start pharma dvt ppx. -F/u LE duplex prior to SCD's.   -Will get lower and upper extremity duplex given mild edema to r/o dvt - pending.

## 2021-10-14 NOTE — CONSULT NOTE ADULT - ATTENDING COMMENTS
Dysphagia  Dislodged PEG  Temporary tube placed  Gastrograffin study   Replacement with PEG tomorrow

## 2021-10-14 NOTE — CONSULT NOTE ADULT - SUBJECTIVE AND OBJECTIVE BOX
Chief Complaint:  pulled peg out      HPI:JOSÉ ANTONIO MELENDREZ is a 81y Male with dysphagia c/b aspiration PNA s/p PEG at OSH 2021 (per daughter), exchanged 2021 at OSH and patient now presenting to GI for PEG replacement after patient reportedly pulled peg out.    18 Fr at bedside. PEG site c/d/i with small opening in skin, not indurated. Per nurse, she noticed the PEG at bedside this AM.    Of note, patient initially presented with coughing being treated for aspiration PNA, CNS mass and course c/b polymicrobial bacteremia c/b candidemia now on Abx and antifungal. Repeat BCx have been negative, patient afebrile.     PMHX/PSHX:  Spinal stenosis    Diabetes    Benign essential HTN    Gallstones    S/P cataract surgery      Allergies:  No Known Allergies      Home Medications: reviewed  Hospital Medications:  ascorbic acid 500 milliGRAM(s) Oral daily  caspofungin IVPB 50 milliGRAM(s) IV Intermittent every 24 hours  ceftazidime/avibactam IVPB 2.5 Gram(s) IV Intermittent every 8 hours  chlorhexidine 2% Cloths 1 Application(s) Topical <User Schedule>  chlorhexidine 4% Liquid 1 Application(s) Topical <User Schedule>  dexAMETHasone  IVPB 8 milliGRAM(s) IV Intermittent every 8 hours  dextrose 40% Gel 15 Gram(s) Oral once  dextrose 5%. 1000 milliLiter(s) IV Continuous <Continuous>  dextrose 5%. 1000 milliLiter(s) IV Continuous <Continuous>  dextrose 50% Injectable 12.5 Gram(s) IV Push once  dextrose 50% Injectable 25 Gram(s) IV Push once  dextrose 50% Injectable 25 Gram(s) IV Push once  folic acid 1 milliGRAM(s) Oral daily  glucagon  Injectable 1 milliGRAM(s) IntraMuscular once  hydrALAZINE 50 milliGRAM(s) Oral three times a day  insulin lispro (ADMELOG) corrective regimen sliding scale   SubCutaneous every 6 hours  insulin NPH human recombinant 6 Unit(s) SubCutaneous every 6 hours  levETIRAcetam  IVPB 500 milliGRAM(s) IV Intermittent every 12 hours  multivitamin/minerals/iron Oral Solution (CENTRUM) 15 milliLiter(s) Oral daily  nystatin Powder 1 Application(s) Topical two times a day  pantoprazole  Injectable 40 milliGRAM(s) IV Push two times a day  polyethylene glycol 3350 17 Gram(s) Oral every 12 hours  senna Syrup 10 milliLiter(s) Oral at bedtime      Social History:   unable to assess due to patient's condition    ROS:   Limited ROS due to patient's condition.     PHYSICAL EXAM:   Vital Signs:  Vital Signs Last 24 Hrs  T(C): 36.2 (14 Oct 2021 13:59), Max: 36.8 (13 Oct 2021 20:32)  T(F): 97.2 (14 Oct 2021 13:59), Max: 98.2 (13 Oct 2021 20:32)  HR: 82 (14 Oct 2021 13:59) (82 - 98)  BP: 141/70 (14 Oct 2021 13:59) (141/70 - 160/73)  BP(mean): --  RR: 18 (14 Oct 2021 13:59) (17 - 20)  SpO2: 95% (14 Oct 2021 13:59) (94% - 97%)  Daily     Daily Weight in k (14 Oct 2021 06:25)    GENERAL: no acute distress  NEURO: non verbal, aox0  HEENT: anicteric sclera, no conjunctival pallor appreciated  CHEST: no respiratory distress, no accessory muscle use  CARDIAC: regular rate, rhythm  ABDOMEN: soft, non-tender, non-distended, no rebound or guarding; PEG site clear dry, inserted figueroa, 18Fr peg at bedside  EXTREMITIES: warm, well perfused   SKIN: no lesions noted    LABS: reviewed                        9.8    8.65  )-----------( 85       ( 14 Oct 2021 11:06 )             31.8     10-14    147<H>  |  109<H>  |  31<H>  ----------------------------<  131<H>  3.7   |  24  |  0.32<L>    Ca    8.1<L>      14 Oct 2021 11:06          Culture - Blood (collected 13 Oct 2021 14:53)  Source: .Blood Blood-Peripheral  Preliminary Report (14 Oct 2021 15:01):    No growth to date.    Culture - Blood (collected 11 Oct 2021 22:57)  Source: .Blood Blood-Peripheral  Preliminary Report (12 Oct 2021 23:01):    No growth to date.        Diagnostic Studies: see sunrise for full report

## 2021-10-14 NOTE — PROGRESS NOTE ADULT - PROBLEM SELECTOR PLAN 11
yesterday spoke on phone with daughter Brooklynn discussing prognosis and plan of care including new c auris fungemia, persistent comatose state  she asks we continue trying our best to get him stabilized  counselled overall poor prognosis in setting of brain mass with plan in place to avoid biopsy and definitive diagnosis  sacral decub with multiple resistant infections portending dismal prognosis  eventually hospice will need to be discussed in more detail - rapport building  she expresses understanding - she is concerned about ability to care for him at home even with hospice given his significant needs  palliative apprec spoke on phone with daughter Brooklynn discussing prognosis and plan of care including new c auris fungemia, persistent comatose state, dislodged peg  she asks we continue trying our best to get him stabilized  counselled overall poor prognosis in setting of brain mass with plan in place to avoid biopsy and definitive diagnosis  sacral decub with multiple resistant infections portending dismal prognosis  eventually hospice will need to be discussed in more detail - rapport building  she expresses understanding - she is concerned about ability to care for him at home even with hospice given his significant needs  palliative apprec

## 2021-10-14 NOTE — PROGRESS NOTE ADULT - SUBJECTIVE AND OBJECTIVE BOX
Follow Up:      Inverval History/ROS:Patient is a 81y old  Male who presents with a chief complaint of Hyponatremia (14 Oct 2021 13:07)    Lethargic  No fever    Allergies    No Known Allergies    Intolerances        ANTIMICROBIALS:  caspofungin IVPB 50 every 24 hours  ceftazidime/avibactam IVPB 2.5 every 8 hours      OTHER MEDS:  ascorbic acid 500 milliGRAM(s) Oral daily  chlorhexidine 2% Cloths 1 Application(s) Topical <User Schedule>  chlorhexidine 4% Liquid 1 Application(s) Topical <User Schedule>  dexAMETHasone  IVPB 8 milliGRAM(s) IV Intermittent every 8 hours  dextrose 40% Gel 15 Gram(s) Oral once  dextrose 5%. 1000 milliLiter(s) IV Continuous <Continuous>  dextrose 5%. 1000 milliLiter(s) IV Continuous <Continuous>  dextrose 50% Injectable 12.5 Gram(s) IV Push once  dextrose 50% Injectable 25 Gram(s) IV Push once  dextrose 50% Injectable 25 Gram(s) IV Push once  folic acid 1 milliGRAM(s) Oral daily  glucagon  Injectable 1 milliGRAM(s) IntraMuscular once  hydrALAZINE 50 milliGRAM(s) Oral three times a day  insulin lispro (ADMELOG) corrective regimen sliding scale   SubCutaneous every 6 hours  insulin NPH human recombinant 6 Unit(s) SubCutaneous every 6 hours  levETIRAcetam  IVPB 500 milliGRAM(s) IV Intermittent every 12 hours  multivitamin/minerals/iron Oral Solution (CENTRUM) 15 milliLiter(s) Oral daily  nystatin Powder 1 Application(s) Topical two times a day  pantoprazole  Injectable 40 milliGRAM(s) IV Push two times a day  polyethylene glycol 3350 17 Gram(s) Oral every 12 hours  senna Syrup 10 milliLiter(s) Oral at bedtime      Vital Signs Last 24 Hrs  T(C): 36.2 (14 Oct 2021 13:59), Max: 36.8 (13 Oct 2021 20:32)  T(F): 97.2 (14 Oct 2021 13:59), Max: 98.2 (13 Oct 2021 20:32)  HR: 82 (14 Oct 2021 13:59) (82 - 98)  BP: 141/70 (14 Oct 2021 13:59) (141/70 - 160/73)  BP(mean): --  RR: 18 (14 Oct 2021 13:59) (17 - 20)  SpO2: 95% (14 Oct 2021 13:59) (94% - 97%)    PHYSICAL EXAM:  General: [ ] non-toxic  HEAD/EYES: [ ] PERRL [x ] white sclera [ ] icterus  ENT:  [ ] normal [x ] supple [ ] thrush [ ] pharyngeal exudate  Cardiovascular:   [ ] murmur [x ] normal [ ] PPM/AICD  Respiratory:  x[ ] clear to ausculation bilaterally  GI:  [x ] soft, non-tender, normal bowel sounds  :  [ ] figueroa [ ] no CVA tenderness   Musculoskeletal:  [ ] no synovitis  Neurologic:  [ ] non-focal exam   Skin:  [x ] decubitus ulcers  Lymph: [x ] no lymphadenopathy  Psychiatric:  [ ] appropriate affect [ ] alert & oriented  Lines:  x[ ] no phlebitis [ ] central line                                9.8    8.65  )-----------( 85       ( 14 Oct 2021 11:06 )             31.8       10-14    147<H>  |  109<H>  |  31<H>  ----------------------------<  131<H>  3.7   |  24  |  0.32<L>    Ca    8.1<L>      14 Oct 2021 11:06            MICROBIOLOGY:Culture Results:   No growth to date. (10-11-21 @ 22:57)  Culture Results:   No growth to date. (10-10-21 @ 13:55)  Culture Results:   Growth in aerobic bottle: Candida auris  Sent to [facility]  [address] [place] [id number] for additional susceptibility  Growth in anaerobic bottle: Klebsiella pneumoniae (Carbapenem Resistant)  See previous culture [id number]  ***Blood Panel PCR results on this specimen are available  approximately 3 hours after the Gram stain result.***  Gram stain, PCR, and/or culture results may not always  correspond due to difference in methodologies.  ************************************************************  This PCR assay was performed by multiplex PCR. This  Assay tests for 66 bacterial and resistance gene targets.  Please refer to the [facility] [employer] test directory  at [email or web address] for details. (10-10-21 @ 05:54)  Culture Results:   Growth in aerobic and anaerobic bottles: Klebsiella pneumoniae  (Carbapenem Resistant)  See previous culture [id number] (10-10-21 @ 00:42)  Culture Results:   Growth in aerobic and anaerobic bottles: Klebsiella pneumoniae  (Carbapenem Resistant)  MDRO detected in BCID PCR, resistance marker = KPC  ***Blood Panel PCR results on this specimen are available  approximately 3 hours after the Gram stain result.***  Gram stain, PCR, and/or culture results may not always  correspond due to difference in methodologies.  ************************************************************  This PCR assay was performed by multiplex PCR. This  Assay tests for 66 bacterial and resistance gene targets.  Please refer to the [facility] [employer] test directory  at [email or web address] for details. (10-08-21 @ 17:01)  Culture Results:   Growth in aerobic and anaerobic bottles: Klebsiella pneumoniae  (Carbapenem Resistant)  See previous culture [id number] (10-08-21 @ 17:01)      RADIOLOGY:

## 2021-10-15 NOTE — PROGRESS NOTE ADULT - PROBLEM SELECTOR PLAN 11
yesterday spoke on phone with daughter Brooklynn discussing prognosis and plan of care including new c auris fungemia, persistent comatose state, dislodged peg  she asks we continue trying our best to get him stabilized  counselled overall poor prognosis in setting of brain mass with plan in place to avoid biopsy and definitive diagnosis  sacral decub with multiple resistant infections portending dismal prognosis  eventually hospice will need to be discussed in more detail - rapport building  she expresses understanding - she is concerned about ability to care for him at home even with hospice given his significant needs  palliative apprec

## 2021-10-15 NOTE — PROGRESS NOTE ADULT - ASSESSMENT
80M PMH gallstones (30-40 yrs ago), BPH, HLD, spinal stenosis, CVA, s/p PEG, recent PNA, penile infxn (?abscess) and sacral wound on abx w/ PICC (cefepime 2gq8h 8/20-9/28; doxy 8/20-?), presented w/ worsening cough x 1 wk after daughter attempted to give ensure by mouth. Daughter noticed "snoring" breath sounds and called EMS. Denies sick contact, diarrhea. VSS in ED, Labs significant at that time for Na 110, CO2 14, lactate 2.5. CXR w/ b/l patchy opacities R>L c/w PNA. 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 CTAP w/ multifocal PNA. Iso worsening mental status pt intubated in ED and transferred to MICU.     MICU Course: Empiric abx for multifocal PNA presumed 2/2 aspiration, completed Zosyn course. Placed on levophed iso hypotension, eventually weaned 10/2, sedation weaned as well. 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 Neurosg on board, neuroonc c/s, results of tumor board and discussion w/ family were to proceed w/ biopsy to further evaluate lymphoma vs glioblastoma multiforme vs mets - however now family prefers more conservative approach. Pt extubated 10/6 weaned to RA w/ adequate SpO2%. Iso HTN pt initiated on hydralazine po 50mg TID. Tolerating PEG feeds. Iso steroid use pt requiring NPH insulin q6h titrated to 6 however pt intermittently w/ NPH held iso borderline FS. C/f occult blood from gastric lavage, placed on PPI, Hgb slowly downtrended however stabilized.     Also with CRE Klebsiella Bacteremia and C Auris Bacteremia/Fungemia in addition to above

## 2021-10-15 NOTE — CHART NOTE - NSCHARTNOTEFT_GEN_A_CORE
16 Fr PEG replaced at bedside.  Please obtain gastrograffin study at PEG to confirm placement.  Once confirmed, okay to use.    Cindy Mclaughlin MD  GI/Hepatology Fellow, PGY-V

## 2021-10-15 NOTE — PROVIDER CONTACT NOTE (CRITICAL VALUE NOTIFICATION) - TEST AND RESULT REPORTED:
Blood cultures from 10/10: growth in aerobic bottle: gram negative rods, growth in aerobic bottle: candida Aureus

## 2021-10-15 NOTE — PROGRESS NOTE ADULT - PROBLEM SELECTOR PLAN 10
[de-identified] : Ms. MARY KATE KLEIN is a 58 year old female presenting with bilateral knee pain, left worse than right, now worsening. She was last seen in 2019 for the same complaint, and was indicated for total knee replacement at that time. She notes she has had this pain for the last nine years. She localizes the pain to the medial and anterior aspect of the bilateral knee. The patient describes the pain as dull and achy, and states it is intermittent, based on activity. She states the pain is exacerbated by walking long distance, climbing/descending stairs, and rising from a seated position. Patient admits to swelling of the bilateral knees. She has been taking NSAIDs for pain with only mild temporary relief. She admits to prior conservative management inclusive of physical therapy with only mild improvement in condition. Had a steroid injection 3 years ago with only temporary relief. She denies hip or lower back pain. The patient admits to limitations in their quality of life, and is present to discuss options for treatment. She would now like to set up total knee replacement \par Patient denies any other pertinent past medical, surgical, familial, of social history.  [Worsening] : worsening [8] : a current pain level of 8/10 -Hold pharma dvt ppx for now given recent hgb downtrend and pos blood in gastric lavage and hemorrhagic brain mass. -Need to d/w [facility]ITH if ok to start pharma dvt ppx. -F/u LE duplex prior to SCD's.   -Will get lower and upper extremity duplex given mild edema to r/o dvt - pending.

## 2021-10-15 NOTE — PROGRESS NOTE ADULT - SUBJECTIVE AND OBJECTIVE BOX
[facility] Division of Hospital Medicine  Lamine Hopkins MD  Pager (M-F, 3Y-7Y): [phone]  Other Times:  [phone]    Patient is a 81y old  Male who presents with a chief complaint of Hyponatremia (14 Oct 2021 16:24)    SUBJECTIVE / OVERNIGHT EVENTS: PEG replaced by GI. pt unable to cooperate w exam  ADDITIONAL REVIEW OF SYSTEMS:    MEDICATIONS  (STANDING):  ascorbic acid 500 milliGRAM(s) Oral daily  caspofungin IVPB 50 milliGRAM(s) IV Intermittent every 24 hours  ceftazidime/avibactam IVPB 2.5 Gram(s) IV Intermittent every 8 hours  chlorhexidine 2% Cloths 1 Application(s) Topical <User Schedule>  chlorhexidine 4% Liquid 1 Application(s) Topical <User Schedule>  dexAMETHasone  IVPB 8 milliGRAM(s) IV Intermittent every 8 hours  dextrose 40% Gel 15 Gram(s) Oral once  dextrose 5%. 1000 milliLiter(s) (50 mL/Hr) IV Continuous <Continuous>  dextrose 5%. 1000 milliLiter(s) (100 mL/Hr) IV Continuous <Continuous>  dextrose 50% Injectable 12.5 Gram(s) IV Push once  dextrose 50% Injectable 25 Gram(s) IV Push once  dextrose 50% Injectable 25 Gram(s) IV Push once  folic acid 1 milliGRAM(s) Oral daily  glucagon  Injectable 1 milliGRAM(s) IntraMuscular once  hydrALAZINE 50 milliGRAM(s) Oral three times a day  insulin lispro (ADMELOG) corrective regimen sliding scale   SubCutaneous every 6 hours  insulin NPH human recombinant 6 Unit(s) SubCutaneous every 6 hours  levETIRAcetam  IVPB 500 milliGRAM(s) IV Intermittent every 12 hours  multivitamin/minerals/iron Oral Solution (CENTRUM) 15 milliLiter(s) Oral daily  nystatin Powder 1 Application(s) Topical two times a day  pantoprazole  Injectable 40 milliGRAM(s) IV Push two times a day  polyethylene glycol 3350 17 Gram(s) Oral every 12 hours  senna Syrup 10 milliLiter(s) Oral at bedtime    MEDICATIONS  (PRN):      CAPILLARY BLOOD GLUCOSE      POCT Blood Glucose.: 96 mg/dL (15 Oct 2021 12:40)  POCT Blood Glucose.: 99 mg/dL (15 Oct 2021 06:31)  POCT Blood Glucose.: 161 mg/dL (15 Oct 2021 01:46)  POCT Blood Glucose.: 72 mg/dL (14 Oct 2021 23:55)  POCT Blood Glucose.: 100 mg/dL (14 Oct 2021 20:27)  POCT Blood Glucose.: 73 mg/dL (14 Oct 2021 17:40)    I&O's Summary    14 Oct 2021 07:01  -  15 Oct 2021 07:00  --------------------------------------------------------  IN: 500 mL / OUT: 400 mL / NET: 100 mL        PHYSICAL EXAM:  Vital Signs Last 24 Hrs  T(C): 37.1 (15 Oct 2021 10:40), Max: 37.1 (15 Oct 2021 10:40)  T(F): 98.7 (15 Oct 2021 10:40), Max: 98.7 (15 Oct 2021 10:40)  HR: 77 (15 Oct 2021 10:40) (75 - 78)  BP: 148/78 (15 Oct 2021 10:40) (128/75 - 151/61)  BP(mean): --  RR: 18 (15 Oct 2021 10:40) (18 - 18)  SpO2: 97% (15 Oct 2021 10:40) (95% - 98%)  GENERAL: NAD, nonresponsive, eyes open  EYES:  conjunctiva and sclera clear, pupils normal dilation   NECK: Supple, soft  ABDOMEN: Soft, Nontender, Nondistended; Bowel sounds present; PEG re[;aced  EXTREMITIES:  upper and lower pitting edema in extremities. left arm picc interval removal  PSYCH/Neuro: not responsive or interactive, staring blankly.   SKIN: denuded skin/ulcer R wrist w dressing. Sacrum not examined    LABS:                        9.8    8.65  )-----------( 85       ( 14 Oct 2021 11:06 )             31.8     10-15    145  |  117<H>  |  28<H>  ----------------------------<  94  4.7   |  23  |  <0.30<L>    Ca    5.1<LL>      15 Oct 2021 10:44    Culture - Blood (collected 13 Oct 2021 14:53)  Source: .Blood Blood-Peripheral  Preliminary Report (14 Oct 2021 15:01):    No growth to date.    RADIOLOGY & ADDITIONAL TESTS:  Results Reviewed:   Imaging Personally Reviewed:  Electrocardiogram Personally Reviewed:    COORDINATION OF CARE:  Care Discussed with Consultants/Other Providers [Y/N]:  Prior or Outpatient Records Reviewed [Y/N]:

## 2021-10-16 NOTE — PROGRESS NOTE ADULT - PROBLEM SELECTOR PLAN 1
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 DDX of imaging metastases, glioblastoma and lymphoma. Comatose  -Family had wanted to pursue more aggressive mgmt intially. -[facility] was tentatively planning to do biopsy vs debulking but prior physician discussed with daughter Brooklynn 10/9 and she would now like to pursue more conservative/medical management and not go with any surgery/biopsy   -Ongoing GOC discussions with family;   -C/w dexamethasone; will c/w increased to 8mg IV q8h for brain edema per neuro onco.   -May need PCP ppx if will be on chronic steroids.   -Na goal 145-155 per LAITH.   -Neuro checks q4h.   -F/u neuro onco and LAITH recs.  -C/w keppra 500mg BID for seizure ppx.

## 2021-10-16 NOTE — PROGRESS NOTE ADULT - SUBJECTIVE AND OBJECTIVE BOX
Follow Up:      Inverval History/ROS:Patient is a 81y old  Male who presents with a chief complaint of Hyponatremia (16 Oct 2021 12:09)    No fever  Not verbal      Allergies    No Known Allergies    Intolerances        ANTIMICROBIALS:  caspofungin IVPB 50 every 24 hours  ceftazidime/avibactam IVPB 2.5 every 8 hours      OTHER MEDS:  ascorbic acid 500 milliGRAM(s) Oral daily  chlorhexidine 2% Cloths 1 Application(s) Topical <User Schedule>  chlorhexidine 4% Liquid 1 Application(s) Topical <User Schedule>  dexAMETHasone  IVPB 8 milliGRAM(s) IV Intermittent every 8 hours  dextrose 40% Gel 15 Gram(s) Oral once  dextrose 5%. 1000 milliLiter(s) IV Continuous <Continuous>  dextrose 5%. 1000 milliLiter(s) IV Continuous <Continuous>  dextrose 50% Injectable 25 Gram(s) IV Push once  dextrose 50% Injectable 25 Gram(s) IV Push once  dextrose 50% Injectable 12.5 Gram(s) IV Push once  folic acid 1 milliGRAM(s) Oral daily  glucagon  Injectable 1 milliGRAM(s) IntraMuscular once  hydrALAZINE 50 milliGRAM(s) Oral three times a day  insulin lispro (ADMELOG) corrective regimen sliding scale   SubCutaneous every 6 hours  insulin NPH human recombinant 6 Unit(s) SubCutaneous every 6 hours  levETIRAcetam  IVPB 500 milliGRAM(s) IV Intermittent every 12 hours  multivitamin/minerals/iron Oral Solution (CENTRUM) 15 milliLiter(s) Oral daily  nystatin Powder 1 Application(s) Topical two times a day  pantoprazole  Injectable 40 milliGRAM(s) IV Push two times a day  polyethylene glycol 3350 17 Gram(s) Oral every 12 hours  senna Syrup 10 milliLiter(s) Oral at bedtime      Vital Signs Last 24 Hrs  T(C): 36.8 (16 Oct 2021 12:30), Max: 36.8 (16 Oct 2021 05:10)  T(F): 98.3 (16 Oct 2021 12:30), Max: 98.3 (16 Oct 2021 05:10)  HR: 95 (16 Oct 2021 12:30) (66 - 95)  BP: 119/74 (16 Oct 2021 12:30) (118/80 - 152/64)  BP(mean): --  RR: 18 (16 Oct 2021 12:30) (18 - 18)  SpO2: 95% (16 Oct 2021 12:30) (95% - 98%)    PHYSICAL EXAM:  General: [ x] non-toxic  HEAD/EYES: [ ] PERRL [ ] white sclera [ ] icterus  ENT:  [ ] normal [ ] supple [ ] thrush [ ] pharyngeal exudate  Cardiovascular:   [ ] murmur [x ] normal [ ] PPM/AICD  Respiratory:  [x ] clear to ausculation bilaterally  GI:  [x ] soft, non-tender, normal bowel sounds  :  [ ] figueroa [ ] no CVA tenderness   Musculoskeletal:  [ ] no synovitis  Neurologic:  [ ] non-focal exam   Skin:  x[ ] decubitus ulcers  Lymph: [ x] no lymphadenopathy  Psychiatric:  [ ] appropriate affect [ ] alert & oriented  Lines:  [x ] no phlebitis [ ] central line              10-16    147<H>  |  109<H>  |  33<H>  ----------------------------<  213<H>  3.7   |  22  |  0.35<L>    Ca    7.9<L>      16 Oct 2021 07:15    TPro  4.6<L>  /  Alb  2.6<L>  /  TBili  0.7  /  DBili  x   /  AST  233<H>  /  ALT  166<H>  /  AlkPhos  149<H>  10-15          MICROBIOLOGY:Culture Results:   No growth to date. (10-13-21 @ 14:53)  Culture Results:   No growth to date. (10-11-21 @ 22:57)  Culture Results:   Growth in aerobic bottle: Klebsiella pneumoniae (10-10-21 @ 13:55)  Culture Results:   Growth in aerobic bottle: Candida auris  Sent to [facility]  [address] [place] [id number] for additional susceptibility  Growth in anaerobic bottle: Klebsiella pneumoniae (Carbapenem Resistant)  See previous culture [id number]  ***Blood Panel PCR results on this specimen are available  approximately 3 hours after the Gram stain result.***  Gram stain, PCR, and/or culture results may not always  correspond due to difference in methodologies.  ************************************************************  This PCR assay was performed by multiplex PCR. This  Assay tests for 66 bacterial and resistance gene targets.  Please refer to the [facility] test directory  at [email or web address] for details. (10-10-21 @ 05:54)  Culture Results:   Growth in aerobic and anaerobic bottles: Klebsiella pneumoniae  (Carbapenem Resistant)  See previous culture [id number] (10-10-21 @ 00:42)      RADIOLOGY:

## 2021-10-16 NOTE — PROGRESS NOTE ADULT - SUBJECTIVE AND OBJECTIVE BOX
Kelvin Urena MD  Division of Hospital Medicine  Pager [phone]  If no response or off hours page: [phone]  ---------------------------------------------------------    JOSÉ ANTONIO MELENDREZ  81y  Male      Patient is a 81y old  Male who presents with a chief complaint of Hyponatremia (15 Oct 2021 14:24)      INTERVAL HPI/OVERNIGHT EVENTS:  Seen at bedside. Non interactive. Appears comfortable      REVIEW OF SYSTEMS: Unable to assess given mental status    T(C): 36.8 (10-16-21 @ 05:10), Max: 36.8 (10-16-21 @ 05:10)  HR: 85 (10-16-21 @ 05:10) (66 - 85)  BP: 118/80 (10-16-21 @ 05:10) (118/80 - 152/64)  RR: 18 (10-16-21 @ 05:10) (18 - 18)  SpO2: 97% (10-16-21 @ 05:10) (96% - 98%)  Wt(kg): --Vital Signs Last 24 Hrs  T(C): 36.8 (16 Oct 2021 05:10), Max: 36.8 (16 Oct 2021 05:10)  T(F): 98.3 (16 Oct 2021 05:10), Max: 98.3 (16 Oct 2021 05:10)  HR: 85 (16 Oct 2021 05:10) (66 - 85)  BP: 118/80 (16 Oct 2021 05:10) (118/80 - 152/64)  BP(mean): --  RR: 18 (16 Oct 2021 05:10) (18 - 18)  SpO2: 97% (16 Oct 2021 05:10) (96% - 98%)    PHYSICAL EXAM:  GENERAL: NAD, well-groomed, well-developed  HEAD:  Atraumatic, Normocephalic  CHEST: No rales, rhonchi, wheezing, or rubs  HEART: Regular rate and rhythm; No murmurs, rubs, or gallops  ABDOMEN: Soft, Nontender, Nondistended; Bowel sounds present.  + PEG  EXTREMITIES:  Upper and lower extremity edema  SKIN: skin/ulcer R wrist w dressing  PSYCH: Nonverbal  NERVOUS SYSTEM: Nonverbal, does not follow commands    Consultant(s) Notes Reviewed:  [x ] YES  [ ] NO  Care Discussed with Consultants/Other Providers [ x] YES  [ ] NO    LABS:    10-16    147<H>  |  109<H>  |  33<H>  ----------------------------<  213<H>  3.7   |  22  |  0.35<L>    Ca    7.9<L>      16 Oct 2021 07:15    TPro  4.6<L>  /  Alb  2.6<L>  /  TBili  0.7  /  DBili  x   /  AST  233<H>  /  ALT  166<H>  /  AlkPhos  149<H>  10-15        CAPILLARY BLOOD GLUCOSE      POCT Blood Glucose.: 268 mg/dL (16 Oct 2021 11:12)  POCT Blood Glucose.: 242 mg/dL (16 Oct 2021 06:51)  POCT Blood Glucose.: 119 mg/dL (15 Oct 2021 23:48)  POCT Blood Glucose.: 92 mg/dL (15 Oct 2021 17:35)  POCT Blood Glucose.: 96 mg/dL (15 Oct 2021 12:40)            RADIOLOGY & ADDITIONAL TESTS:    Imaging Personally Reviewed:  [ x] YES  [ ] NO

## 2021-10-16 NOTE — PROGRESS NOTE ADULT - ASSESSMENT
81 year old with baseline cognitive impairment with sacral decubitus (s/p 6 weeks of abx for sacral decubitus)  Dsyphagia with aspiration pneumonia-has peg in place  CNS mass    Now with polymicrobial bacteremia  CT a/p without acute findings  ? due to wounds    1) Leukocytosis  Likely multifactorial  On steroids/ has CNS mass- ? neoplasia/ decubitus ulcers/ bacteremia    Trending down    2) CRE Klebsiella bacteremia  Continue avycaz through 10/19 to cover bacteremia / wound    3) C auris bacteremia  ? due to sacral wound  Continue caspofungin  Repeat cultures without growth    Check echo    4) Decubitus ulcers  Right and left hip- chronic- not acutely infected  Midline decubitus- with eschar  Consider wound care eval/ surgery eval   May benefit from enzymatic or surgical debridement    5) CNS mass  ? planned duration of steroids  May need to add PCP prophylaxis    6) Transaminitis  Trend LFTS  check RUQ US  ? due to medication  Continue to monitor      Prognosis poor due to underlying comorbidities  Goals of care discussions

## 2021-10-17 NOTE — PROGRESS NOTE ADULT - PROBLEM SELECTOR PLAN 11
-Hold pharma dvt ppx for now given recent hgb downtrend and pos blood in gastric lavage and hemorrhagic brain mass. -Need to d/w LAITH if ok to start pharma dvt ppx.

## 2021-10-17 NOTE — PROGRESS NOTE ADULT - PROBLEM SELECTOR PLAN 6
-Hgb down to 6.8 on 10/8 and gave patient 1 prbc. Hgb now stable  -occult positive in gastric lavage in MICU. c/w empiric PPI IV BID for now.   -Also with hemorrhagic brain mass. -Repeat CT head appears stable without any new hemorrhage.  -Trend CBC.   -Iron studies, B12, folate, LDH, hapto, retics. -showed low folate. repleted

## 2021-10-17 NOTE — PROGRESS NOTE ADULT - SUBJECTIVE AND OBJECTIVE BOX
Kelvin Urena MD  Division of Hospital Medicine  Pager [phone]  If no response or off hours page: [phone]  ---------------------------------------------------------    JOSÉ ANTONIO MELENDREZ  81y  Male      Patient is a 81y old  Male who presents with a chief complaint of Hyponatremia (16 Oct 2021 13:11)      INTERVAL HPI/OVERNIGHT EVENTS:  No events. Appears comfortable. Not interactive      REVIEW OF SYSTEMS: Unable to assess given mental status    T(C): 37.1 (10-17-21 @ 09:00), Max: 37.3 (10-17-21 @ 05:17)  HR: 80 (10-17-21 @ 09:00) (80 - 101)  BP: 119/67 (10-17-21 @ 09:00) (109/76 - 131/85)  RR: 18 (10-17-21 @ 09:00) (18 - 18)  SpO2: 96% (10-17-21 @ 09:00) (93% - 97%)  Wt(kg): --Vital Signs Last 24 Hrs  T(C): 37.1 (17 Oct 2021 09:00), Max: 37.3 (17 Oct 2021 05:17)  T(F): 98.7 (17 Oct 2021 09:00), Max: 99.1 (17 Oct 2021 05:17)  HR: 80 (17 Oct 2021 09:00) (80 - 101)  BP: 119/67 (17 Oct 2021 09:00) (109/76 - 131/85)  BP(mean): --  RR: 18 (17 Oct 2021 09:00) (18 - 18)  SpO2: 96% (17 Oct 2021 09:00) (93% - 97%)    PHYSICAL EXAM:  GENERAL: NAD, well-groomed, well-developed  CHEST: No rales, rhonchi, wheezing, or rubs  HEART: Regular rate and rhythm; No murmurs, rubs, or gallops  ABDOMEN: Soft, Nontender, Nondistended; Bowel sounds present.  + PEG  EXTREMITIES:  Upper and lower extremity edema  SKIN: skin/ulcer R wrist w dressing  PSYCH: Nonverbal  NERVOUS SYSTEM: Nonverbal, does not follow commands    Consultant(s) Notes Reviewed:  [x ] YES  [ ] NO  Care Discussed with Consultants/Other Providers [ x] YES  [ ] NO    LABS:    10-16    147<H>  |  109<H>  |  33<H>  ----------------------------<  213<H>  3.7   |  22  |  0.35<L>    Ca    7.9<L>      16 Oct 2021 07:15    TPro  4.6<L>  /  Alb  2.6<L>  /  TBili  0.7  /  DBili  x   /  AST  233<H>  /  ALT  166<H>  /  AlkPhos  149<H>  10-15        CAPILLARY BLOOD GLUCOSE      POCT Blood Glucose.: 171 mg/dL (17 Oct 2021 06:03)  POCT Blood Glucose.: 198 mg/dL (17 Oct 2021 00:29)  POCT Blood Glucose.: 259 mg/dL (16 Oct 2021 17:28)  POCT Blood Glucose.: 268 mg/dL (16 Oct 2021 11:12)            RADIOLOGY & ADDITIONAL TESTS:    Imaging Personally Reviewed:  [ ] YES  [ ] NO

## 2021-10-17 NOTE — PROGRESS NOTE ADULT - PROBLEM SELECTOR PLAN 1
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 DDX of imaging metastases, glioblastoma and lymphoma. Comatose  -Family initially wanted to pursue more aggressive mgmt. -[facility] was tentatively planning to do biopsy vs debulking but prior physician discussed with daughter Brooklynn 10/9 and she would now like to pursue more conservative/medical management and not go with any surgery/biopsy   -Ongoing GOC discussions with family  -C/w dexamethasone; c/w increased 8mg IV q8h for brain edema per neuro onco.   -May need PCP ppx if will be on chronic steroids.   -Na goal 145-155 per LAITH.   -Neuro checks q4h.   -F/u neuro onco and LAITH recs.  -C/w keppra 500mg BID for seizure ppx.

## 2021-10-18 NOTE — PROGRESS NOTE ADULT - ASSESSMENT
81 year old with baseline cognitive impairment with sacral decubitus (s/p 6 weeks of abx for sacral decubitus)  Dsyphagia with aspiration pneumonia-has peg in place  CNS mass    Now with polymicrobial bacteremia  CT a/p without acute findings  ? due to wounds    1) Leukocytosis  Likely multifactorial  On steroids/ has CNS mass- ? neoplasia/ decubitus ulcers/ bacteremia    Trending down    2) CRE Klebsiella bacteremia  Continue avycaz through 10/19 to cover bacteremia / wound    3) C auris bacteremia  ? due to sacral wound  Continue caspofungin  Repeat cultures without growth    Check reepat echo    4) Decubitus ulcers  Right and left hip- chronic- not acutely infected  Midline decubitus- with eschar  Consider wound care eval/ surgery eval   May benefit from enzymatic or surgical debridement    5) CNS mass  ? planned duration of steroids  May need to add PCP prophylaxis    6) Transaminitis  trending down   Continue to follow      Prognosis poor due to underlying comorbidities  Goals of care discussions

## 2021-10-18 NOTE — PROGRESS NOTE ADULT - SUBJECTIVE AND OBJECTIVE BOX
Patient is a 81y old  Male who presents with a chief complaint of Hyponatremia (17 Oct 2021 10:52)      SUBJECTIVE / OVERNIGHT EVENTS: Patient seen and examined at bedside. He remains nonverbal and not interactive. No acute events overnight     ROS:  All other review of systems negative    Allergies    No Known Allergies    Intolerances        MEDICATIONS  (STANDING):  ascorbic acid 500 milliGRAM(s) Oral daily  caspofungin IVPB 50 milliGRAM(s) IV Intermittent every 24 hours  ceftazidime/avibactam IVPB 2.5 Gram(s) IV Intermittent every 8 hours  chlorhexidine 2% Cloths 1 Application(s) Topical <User Schedule>  chlorhexidine 4% Liquid 1 Application(s) Topical <User Schedule>  dexAMETHasone  IVPB 8 milliGRAM(s) IV Intermittent every 8 hours  dextrose 40% Gel 15 Gram(s) Oral once  dextrose 5%. 1000 milliLiter(s) (50 mL/Hr) IV Continuous <Continuous>  dextrose 5%. 1000 milliLiter(s) (100 mL/Hr) IV Continuous <Continuous>  dextrose 50% Injectable 25 Gram(s) IV Push once  dextrose 50% Injectable 12.5 Gram(s) IV Push once  dextrose 50% Injectable 25 Gram(s) IV Push once  folic acid 1 milliGRAM(s) Oral daily  glucagon  Injectable 1 milliGRAM(s) IntraMuscular once  hydrALAZINE 50 milliGRAM(s) Oral three times a day  insulin lispro (ADMELOG) corrective regimen sliding scale   SubCutaneous every 6 hours  insulin NPH human recombinant 6 Unit(s) SubCutaneous every 6 hours  levETIRAcetam  IVPB 500 milliGRAM(s) IV Intermittent every 12 hours  multivitamin/minerals/iron Oral Solution (CENTRUM) 15 milliLiter(s) Oral daily  nystatin Powder 1 Application(s) Topical two times a day  pantoprazole  Injectable 40 milliGRAM(s) IV Push two times a day  polyethylene glycol 3350 17 Gram(s) Oral every 12 hours  senna Syrup 10 milliLiter(s) Oral at bedtime    MEDICATIONS  (PRN):      Vital Signs Last 24 Hrs  T(C): 37.7 (18 Oct 2021 10:19), Max: 37.7 (18 Oct 2021 10:19)  T(F): 99.8 (18 Oct 2021 10:19), Max: 99.8 (18 Oct 2021 10:19)  HR: 84 (18 Oct 2021 10:19) (79 - 85)  BP: 158/68 (18 Oct 2021 10:19) (120/66 - 158/68)  BP(mean): --  RR: 16 (18 Oct 2021 10:19) (16 - 18)  SpO2: 94% (18 Oct 2021 10:19) (94% - 99%)  CAPILLARY BLOOD GLUCOSE      POCT Blood Glucose.: 97 mg/dL (18 Oct 2021 12:04)  POCT Blood Glucose.: 237 mg/dL (18 Oct 2021 06:06)  POCT Blood Glucose.: 248 mg/dL (17 Oct 2021 23:41)  POCT Blood Glucose.: 118 mg/dL (17 Oct 2021 17:09)    I&O's Summary    17 Oct 2021 07:01  -  18 Oct 2021 07:00  --------------------------------------------------------  IN: 1850 mL / OUT: 1050 mL / NET: 800 mL    18 Oct 2021 07:01  -  18 Oct 2021 15:25  --------------------------------------------------------  IN: 0 mL / OUT: 100 mL / NET: -100 mL        PHYSICAL EXAM:  GENERAL: NAD, well-groomed, well-developed  CHEST: No rales, rhonchi, wheezing, or rubs  HEART: Regular rate and rhythm; No murmurs, rubs, or gallops  ABDOMEN: Soft, Nontender, Nondistended; Bowel sounds present.  + PEG  EXTREMITIES:  Upper and lower extremity edema  SKIN: skin/ulcer R wrist w dressing  PSYCH: Nonverbal  NERVOUS SYSTEM: Nonverbal, does not follow commands    LABS:                        8.5    8.01  )-----------( 75       ( 18 Oct 2021 10:20 )             27.8     10-18    148<H>  |  110<H>  |  46<H>  ----------------------------<  121<H>  3.3<L>   |  26  |  0.36<L>    Ca    8.1<L>      18 Oct 2021 10:20    TPro  4.5<L>  /  Alb  2.7<L>  /  TBili  0.3  /  DBili  x   /  AST  36  /  ALT  70<H>  /  AlkPhos  148<H>  10-18              RADIOLOGY & ADDITIONAL TESTS:      Consultant(s) Notes Reviewed:  ID rec 10/16 noted    Care Discussed with Consultants/Other Providers: Medicine ACP

## 2021-10-18 NOTE — PROGRESS NOTE ADULT - SUBJECTIVE AND OBJECTIVE BOX
Follow Up:      Inverval History/ROS:Patient is a 81y old  Male who presents with a chief complaint of Hyponatremia (18 Oct 2021 15:24)    no fever   Not verbal    Allergies    No Known Allergies    Intolerances        ANTIMICROBIALS:  caspofungin IVPB 50 every 24 hours  ceftazidime/avibactam IVPB 2.5 every 8 hours      OTHER MEDS:  ascorbic acid 500 milliGRAM(s) Oral daily  chlorhexidine 2% Cloths 1 Application(s) Topical <User Schedule>  chlorhexidine 4% Liquid 1 Application(s) Topical <User Schedule>  dexAMETHasone  IVPB 8 milliGRAM(s) IV Intermittent every 8 hours  dextrose 40% Gel 15 Gram(s) Oral once  dextrose 5%. 1000 milliLiter(s) IV Continuous <Continuous>  dextrose 5%. 1000 milliLiter(s) IV Continuous <Continuous>  dextrose 50% Injectable 25 Gram(s) IV Push once  dextrose 50% Injectable 12.5 Gram(s) IV Push once  dextrose 50% Injectable 25 Gram(s) IV Push once  folic acid 1 milliGRAM(s) Oral daily  glucagon  Injectable 1 milliGRAM(s) IntraMuscular once  hydrALAZINE 50 milliGRAM(s) Oral three times a day  insulin lispro (ADMELOG) corrective regimen sliding scale   SubCutaneous every 6 hours  insulin NPH human recombinant 6 Unit(s) SubCutaneous every 6 hours  levETIRAcetam  IVPB 500 milliGRAM(s) IV Intermittent every 12 hours  multivitamin/minerals/iron Oral Solution (CENTRUM) 15 milliLiter(s) Oral daily  nystatin Powder 1 Application(s) Topical two times a day  pantoprazole  Injectable 40 milliGRAM(s) IV Push two times a day  polyethylene glycol 3350 17 Gram(s) Oral every 12 hours  senna Syrup 10 milliLiter(s) Oral at bedtime      Vital Signs Last 24 Hrs  T(C): 37.7 (18 Oct 2021 10:19), Max: 37.7 (18 Oct 2021 10:19)  T(F): 99.8 (18 Oct 2021 10:19), Max: 99.8 (18 Oct 2021 10:19)  HR: 84 (18 Oct 2021 10:19) (79 - 85)  BP: 158/68 (18 Oct 2021 10:19) (120/66 - 158/68)  BP(mean): --  RR: 16 (18 Oct 2021 10:19) (16 - 18)  SpO2: 94% (18 Oct 2021 10:19) (94% - 99%)    PHYSICAL EXAM:  General: [ ] non-toxic  HEAD/EYES: [ ] PERRL [ x] white sclera [ ] icterus  ENT:  [ ] normal [x ] supple [ ] thrush [ ] pharyngeal exudate  Cardiovascular:   [ ] murmur [ x] normal [ ] PPM/AICD  Respiratory:  [x ] clear to ausculation bilaterally  GI:  [ x] soft, non-tender, normal bowel sounds  :  [ ] figueroa [ ] no CVA tenderness   Musculoskeletal:  [ ] no synovitis  Neurologic:  [ ] non-focal exam   Skin:  [x ]decubitus ulcers  Lymph: [x ] no lymphadenopathy  Psychiatric:  [ ] appropriate affect [ ] alert & oriented  Lines:  [x ] no phlebitis [ ] central line                                8.5    8.01  )-----------( 75       ( 18 Oct 2021 10:20 )             27.8       10-18    148<H>  |  110<H>  |  46<H>  ----------------------------<  121<H>  3.3<L>   |  26  |  0.36<L>    Ca    8.1<L>      18 Oct 2021 10:20    TPro  4.5<L>  /  Alb  2.7<L>  /  TBili  0.3  /  DBili  x   /  AST  36  /  ALT  70<H>  /  AlkPhos  148<H>  10-18          MICROBIOLOGY:Culture Results:   No Growth Final (10-13-21 @ 14:53)  Culture Results:   No Growth Final (10-11-21 @ 22:57)      RADIOLOGY:

## 2021-10-18 NOTE — PROVIDER CONTACT NOTE (CRITICAL VALUE NOTIFICATION) - TEST AND RESULT REPORTED:
blood cultures show growth in aerobic bottle klebsielle pneumonae carbapenem resistant blood cultures show growth in aerobic bottle klebsiella pneumonae carbapenem resistant

## 2021-10-18 NOTE — PROGRESS NOTE ADULT - PROBLEM SELECTOR PLAN 1
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 DDX of imaging metastases, glioblastoma and lymphoma. Comatose  -Family initially wanted to pursue more aggressive mgmt. -ALITH was tentatively planning to do biopsy vs debulking but prior physician discussed with daughter Brooklynn 10/9 and she would now like to pursue more conservative/medical management and not go with any surgery/biopsy   -Ongoing GOC discussions with family  -C/w dexamethasone; c/w increased 8mg IV q8h for brain edema per neuro onco.   -May need PCP ppx if will be on chronic steroids.   -Na goal 145-155 per LAITH.   -Neuro checks q4h.   -F/u neuro onco and LAITH recs.  -C/w keppra 500mg BID for seizure ppx.

## 2021-10-19 NOTE — PROGRESS NOTE ADULT - SUBJECTIVE AND OBJECTIVE BOX
Patient is a 81y old  Male who presents with a chief complaint of Hyponatremia (18 Oct 2021 15:35)      SUBJECTIVE / OVERNIGHT EVENTS: Patient seen and examined at bedside. He remains at baseline, does not respond or participate in exam. No acute events overnight     ROS:  unable to assess 2/2 patient is non verbal     Allergies    No Known Allergies    Intolerances        MEDICATIONS  (STANDING):  ascorbic acid 500 milliGRAM(s) Oral daily  caspofungin IVPB 50 milliGRAM(s) IV Intermittent every 24 hours  ceftazidime/avibactam IVPB 2.5 Gram(s) IV Intermittent every 8 hours  chlorhexidine 2% Cloths 1 Application(s) Topical <User Schedule>  chlorhexidine 4% Liquid 1 Application(s) Topical <User Schedule>  dexAMETHasone  IVPB 8 milliGRAM(s) IV Intermittent every 8 hours  dextrose 40% Gel 15 Gram(s) Oral once  dextrose 5%. 1000 milliLiter(s) (50 mL/Hr) IV Continuous <Continuous>  dextrose 5%. 1000 milliLiter(s) (100 mL/Hr) IV Continuous <Continuous>  dextrose 50% Injectable 25 Gram(s) IV Push once  dextrose 50% Injectable 12.5 Gram(s) IV Push once  dextrose 50% Injectable 25 Gram(s) IV Push once  folic acid 1 milliGRAM(s) Oral daily  glucagon  Injectable 1 milliGRAM(s) IntraMuscular once  hydrALAZINE 50 milliGRAM(s) Oral three times a day  insulin lispro (ADMELOG) corrective regimen sliding scale   SubCutaneous every 6 hours  insulin NPH human recombinant 6 Unit(s) SubCutaneous every 6 hours  levETIRAcetam  IVPB 500 milliGRAM(s) IV Intermittent every 12 hours  multivitamin/minerals/iron Oral Solution (CENTRUM) 15 milliLiter(s) Oral daily  nystatin Powder 1 Application(s) Topical two times a day  pantoprazole  Injectable 40 milliGRAM(s) IV Push two times a day  polyethylene glycol 3350 17 Gram(s) Oral every 12 hours  senna Syrup 10 milliLiter(s) Oral at bedtime    MEDICATIONS  (PRN):      Vital Signs Last 24 Hrs  T(C): 36.9 (19 Oct 2021 12:54), Max: 37.3 (18 Oct 2021 20:13)  T(F): 98.4 (19 Oct 2021 12:54), Max: 99.2 (18 Oct 2021 20:13)  HR: 86 (19 Oct 2021 12:54) (70 - 86)  BP: 151/77 (19 Oct 2021 12:54) (130/68 - 151/77)  BP(mean): --  RR: 18 (19 Oct 2021 12:54) (18 - 20)  SpO2: 100% (19 Oct 2021 12:54) (96% - 100%)  CAPILLARY BLOOD GLUCOSE      POCT Blood Glucose.: 79 mg/dL (19 Oct 2021 13:15)  POCT Blood Glucose.: 93 mg/dL (19 Oct 2021 12:57)  POCT Blood Glucose.: 125 mg/dL (19 Oct 2021 12:55)  POCT Blood Glucose.: 54 mg/dL (19 Oct 2021 12:50)  POCT Blood Glucose.: 60 mg/dL (19 Oct 2021 12:07)  POCT Blood Glucose.: 61 mg/dL (19 Oct 2021 12:06)  POCT Blood Glucose.: 138 mg/dL (19 Oct 2021 05:11)  POCT Blood Glucose.: 97 mg/dL (19 Oct 2021 05:00)  POCT Blood Glucose.: 102 mg/dL (18 Oct 2021 23:55)  POCT Blood Glucose.: 195 mg/dL (18 Oct 2021 17:02)    I&O's Summary    18 Oct 2021 07:01  -  19 Oct 2021 07:00  --------------------------------------------------------  IN: 1240 mL / OUT: 500 mL / NET: 740 mL        PHYSICAL EXAM:  GENERAL: NAD, well-groomed, well-developed  CHEST: No rales, rhonchi, wheezing, or rubs  HEART: Regular rate and rhythm; No murmurs, rubs, or gallops  ABDOMEN: Soft, Nontender, Nondistended; Bowel sounds present.  + PEG  EXTREMITIES:  Upper and lower extremity edema  SKIN: skin/ulcer R wrist w dressing  PSYCH: Nonverbal  NERVOUS SYSTEM: Nonverbal, does not follow commands    LABS:                        9.1    10.87 )-----------( 72       ( 19 Oct 2021 12:12 )             29.2     10-19    149<H>  |  112<H>  |  34<H>  ----------------------------<  53<LL>  3.8   |  26  |  0.34<L>    Ca    8.0<L>      19 Oct 2021 12:12    TPro  4.6<L>  /  Alb  2.7<L>  /  TBili  0.3  /  DBili  x   /  AST  86<H>  /  ALT  133<H>  /  AlkPhos  167<H>  10-19              RADIOLOGY & ADDITIONAL TESTS:    Consultant(s) Notes Reviewed:  ID rec noted     Care Discussed with Consultants/Other Providers: Medicine ACP     Case Discussed with Elmo Overton

## 2021-10-19 NOTE — PROGRESS NOTE ADULT - ASSESSMENT
81 year old with baseline cognitive impairment with sacral decubitus (s/p 6 weeks of abx for sacral decubitus)  Dsyphagia with aspiration pneumonia-has peg in place  CNS mass    Now with polymicrobial bacteremia  CT a/p without acute findings  ? due to wounds    1) Leukocytosis  Likely multifactorial  On steroids/ has CNS mass- ? neoplasia/ decubitus ulcers/ bacteremia    Trending down    2) CRE Klebsiella bacteremia  Suspect wound is focus  Finish avycaz today -10/19       3) C auris bacteremia  ? due to sacral wound  Continue caspofungin  Repeat cultures without growth    Check repeat echo    4) Decubitus ulcers  Right and left hip- chronic- not acutely infected  Midline decubitus- with eschar  Consider wound care eval/ surgery eval   May benefit from enzymatic or surgical debridement    5) CNS mass  ? planned duration of steroids  May need to add PCP prophylaxis    6) Transaminitis  trending down   Continue to follow      Prognosis poor due to underlying comorbidities  Goals of care discussions

## 2021-10-19 NOTE — PROGRESS NOTE ADULT - PROBLEM SELECTOR PLAN 11
-Hold pharma dvt ppx for now given recent hgb downtrend and pos blood in gastric lavage and hemorrhagic brain mass. -Need to d/w LAITH if ok to start pharma dvt ppx.    Dispo: d/w with Dtr Brooklynn today 10/19 about GOC and poor prognosis, states she will speak with family, requesting call from palliative on Thursday

## 2021-10-19 NOTE — PROGRESS NOTE ADULT - SUBJECTIVE AND OBJECTIVE BOX
Follow Up:      Inverval History/ROS:Patient is a 81y old  Male who presents with a chief complaint of Hyponatremia (19 Oct 2021 13:49)    No fever  Not responsive    Allergies    No Known Allergies    Intolerances        ANTIMICROBIALS:  caspofungin IVPB 50 every 24 hours  ceftazidime/avibactam IVPB 2.5 every 8 hours      OTHER MEDS:  ascorbic acid 500 milliGRAM(s) Oral daily  chlorhexidine 2% Cloths 1 Application(s) Topical <User Schedule>  chlorhexidine 4% Liquid 1 Application(s) Topical <User Schedule>  dexAMETHasone  IVPB 8 milliGRAM(s) IV Intermittent every 8 hours  dextrose 40% Gel 15 Gram(s) Oral once  dextrose 5%. 1000 milliLiter(s) IV Continuous <Continuous>  dextrose 5%. 1000 milliLiter(s) IV Continuous <Continuous>  dextrose 50% Injectable 25 Gram(s) IV Push once  dextrose 50% Injectable 12.5 Gram(s) IV Push once  dextrose 50% Injectable 25 Gram(s) IV Push once  folic acid 1 milliGRAM(s) Oral daily  glucagon  Injectable 1 milliGRAM(s) IntraMuscular once  hydrALAZINE 50 milliGRAM(s) Oral three times a day  insulin lispro (ADMELOG) corrective regimen sliding scale   SubCutaneous every 6 hours  insulin NPH human recombinant 6 Unit(s) SubCutaneous every 6 hours  levETIRAcetam  IVPB 500 milliGRAM(s) IV Intermittent every 12 hours  multivitamin/minerals/iron Oral Solution (CENTRUM) 15 milliLiter(s) Oral daily  nystatin Powder 1 Application(s) Topical two times a day  pantoprazole  Injectable 40 milliGRAM(s) IV Push two times a day  polyethylene glycol 3350 17 Gram(s) Oral every 12 hours  senna Syrup 10 milliLiter(s) Oral at bedtime      Vital Signs Last 24 Hrs  T(C): 36.9 (19 Oct 2021 12:54), Max: 37.3 (18 Oct 2021 20:13)  T(F): 98.4 (19 Oct 2021 12:54), Max: 99.2 (18 Oct 2021 20:13)  HR: 86 (19 Oct 2021 12:54) (70 - 86)  BP: 151/77 (19 Oct 2021 12:54) (130/68 - 151/77)  BP(mean): --  RR: 18 (19 Oct 2021 12:54) (18 - 20)  SpO2: 100% (19 Oct 2021 12:54) (96% - 100%)    PHYSICAL EXAM:  General: [ ] non-toxic  HEAD/EYES: [ ] PERRL [x ] white sclera [ ] icterus  ENT:  [ ] normal [x ] supple [ ] thrush [ ] pharyngeal exudate  Cardiovascular:   [ ] murmur [x ] normal [ ] PPM/AICD  Respiratory:  [ x] clear to ausculation bilaterally  GI:  [x ] soft, non-tender, normal bowel sounds  :  [ ] figueroa [ ] no CVA tenderness   Musculoskeletal:  [ ] no synovitis  Neurologic:  [ ] non-focal exam   Skin:  [ x] decubitus ulcers  Lymph: [x ] no lymphadenopathy  Psychiatric:  [ ] appropriate affect [ ] alert & oriented  Lines:  [x ] no phlebitis [ ] central line                                9.1    10.87 )-----------( 72       ( 19 Oct 2021 12:12 )             29.2       10-19    149<H>  |  112<H>  |  34<H>  ----------------------------<  53<LL>  3.8   |  26  |  0.34<L>    Ca    8.0<L>      19 Oct 2021 12:12    TPro  4.6<L>  /  Alb  2.7<L>  /  TBili  0.3  /  DBili  x   /  AST  86<H>  /  ALT  133<H>  /  AlkPhos  167<H>  10-19          MICROBIOLOGY:Culture Results:   No Growth Final (10-13-21 @ 14:53)      RADIOLOGY:

## 2021-10-19 NOTE — PROGRESS NOTE ADULT - PROBLEM SELECTOR PLAN 1
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 DDX of imaging metastases, glioblastoma and lymphoma. Comatose  -Family initially wanted to pursue more aggressive mgmt. -LAITH was tentatively planning to do biopsy vs debulking but prior physician discussed with daughter Brooklynn 10/9 and she would now like to pursue more conservative/medical management and not go with any surgery/biopsy   -Ongoing GOC discussions with family  -C/w dexamethasone; c/w increased 8mg IV q8h for brain edema per neuro onco.   -May need PCP ppx if will be on chronic steroids.   -Na goal 145-155 per LAITH. Na 149 & appears dry on exam will give free water 300 Q8h for 24 hrs monitor Na daily   -Neuro checks q4h.   -F/u neuro onco and LAITH recs.  -C/w keppra 500mg BID for seizure ppx.

## 2021-10-19 NOTE — PROGRESS NOTE ADULT - PROBLEM SELECTOR PLAN 5
Transaminases increasing  Check RUQ sono ([facility] 10/19)  Continue to trend LFTs

## 2021-10-20 NOTE — PROGRESS NOTE ADULT - PROBLEM SELECTOR PLAN 1
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 DDX of imaging metastases, glioblastoma and lymphoma. Comatose  -Family initially wanted to pursue more aggressive mgmt. -LAITH was tentatively planning to do biopsy vs debulking but prior physician discussed with daughter Brooklynn 10/9 and she would now like to pursue more conservative/medical management and not go with any surgery/biopsy   -Ongoing GOC discussions with family  -C/w dexamethasone; c/w increased 8mg IV q8h for brain edema per neuro onco.   -May need PCP ppx if will be on chronic steroids.   -Na goal 145-155 per LAITH. CBC/CMP pending this morning 10/20  -Neuro checks q4h.   -F/u neuro onco and LAITH recs.  -C/w keppra 500mg BID for seizure ppx.

## 2021-10-20 NOTE — PROGRESS NOTE ADULT - PROBLEM SELECTOR PLAN 11
-Hold pharma dvt ppx for now given recent hgb downtrend and pos blood in gastric lavage and hemorrhagic brain mass. -Need to d/w LAITH if ok to start pharma dvt ppx.    Dispo: d/w with Dtr Brooklynn  10/19 about GOC and poor prognosis, states she will speak with family, requesting call from palliative on Thursday, palliative care made aware

## 2021-10-20 NOTE — PROVIDER CONTACT NOTE (OTHER) - ASSESSMENT
left eye upper and lower eyelids noted to be red; yellow stringy drainage noted between eyelids. more tearing noted from eye. pt nonverbal; unable to make needs or discomfort known. no edema noted. left eye upper and lower eyelids noted to be red; yellow stringy drainage noted between eyelids. more tearing noted from eye. pt nonverbal; unable to make needs or discomfort known. no edema noted. increased scrotal swelling noted. free water discontinued today. pt has generalized pitting edema.

## 2021-10-20 NOTE — PROGRESS NOTE ADULT - SUBJECTIVE AND OBJECTIVE BOX
[facility]-- WOUND TEAM -- FOLLOW UP NOTE  --------------------------------------------------------------------------------    24 hour events/subjective:    tolerating TF  afebrile  incontinent     had condom cath- keeps coming off 2/2 edema      Diet:  Diet, NPO with Tube Feed:   Tube Feeding Modality: Gastrostomy  Jevity 1.5 Adria (JEVITY1.5RTH)  Total Volume for 24 Hours (mL): 900  Continuous  Starting Tube Feed Rate mL per Hour: 50  Until Goal Tube Feed Rate (mL per Hour): 50  Tube Feed Duration (in Hours): 18  Tube Feed Start Time: 12:00  Ibrahima(7 Gm Arginine/7 Gm Glut/1.2 Gm HMB     Qty per Day:  2  No Carb Prosource TF     Qty per Day:  2 (10-06-21 @ 11:44)      ROS: pt unable to offer    ALLERGIES & MEDICATIONS  --------------------------------------------------------------------------------    No Known Allergies      STANDING INPATIENT MEDICATIONS  ascorbic acid 500 milliGRAM(s) Oral daily  BACItracin   Ointment 1 Application(s) Topical two times a day  caspofungin IVPB 50 milliGRAM(s) IV Intermittent every 24 hours  chlorhexidine 2% Cloths 1 Application(s) Topical <User Schedule>  chlorhexidine 4% Liquid 1 Application(s) Topical <User Schedule>  dexAMETHasone  IVPB 8 milliGRAM(s) IV Intermittent every 8 hours  dextrose 40% Gel 15 Gram(s) Oral once  dextrose 5%. 1000 milliLiter(s) IV Continuous <Continuous>  dextrose 5%. 1000 milliLiter(s) IV Continuous <Continuous>  dextrose 50% Injectable 25 Gram(s) IV Push once  dextrose 50% Injectable 12.5 Gram(s) IV Push once  dextrose 50% Injectable 25 Gram(s) IV Push once  folic acid 1 milliGRAM(s) Oral daily  glucagon  Injectable 1 milliGRAM(s) IntraMuscular once  hydrALAZINE 50 milliGRAM(s) Oral three times a day  insulin lispro (ADMELOG) corrective regimen sliding scale   SubCutaneous every 6 hours  insulin NPH human recombinant 6 Unit(s) SubCutaneous every 6 hours  levETIRAcetam  IVPB 500 milliGRAM(s) IV Intermittent every 12 hours  multivitamin/minerals/iron Oral Solution (CENTRUM) 15 milliLiter(s) Oral daily  nystatin Powder 1 Application(s) Topical two times a day  pantoprazole  Injectable 40 milliGRAM(s) IV Push two times a day  polyethylene glycol 3350 17 Gram(s) Oral every 12 hours  senna Syrup 10 milliLiter(s) Oral at bedtime      VITALS/PHYSICAL EXAM  --------------------------------------------------------------------------------  T(C): 36.7 (10-20-21 @ 16:42), Max: 37 (10-20-21 @ 10:15)  HR: 98 (10-20-21 @ 16:42) (75 - 98)  BP: 152/84 (10-20-21 @ 16:42) (107/63 - 155/80)  RR: 18 (10-20-21 @ 16:42) (18 - 22)  SpO2: 98% (10-20-21 @ 16:42) (97% - 100%)  Wt(kg): --        10-19-21 @ 07:01  -  10-20-21 @ 07:00  --------------------------------------------------------  IN: 1450 mL / OUT: 900 mL / NET: 550 mL    10-20-21 @ 07:01  -  10-20-21 @ 18:02  --------------------------------------------------------  IN: 300 mL / OUT: 0 mL / NET: 300 mL    NAD, sedated, frail  [facility] P500 bed  HEENT:  NC/AT, mucosa moist,  trachea midline, neck supple  Gastrointestinal: soft NT/ND (+)BS  (+)PEG   Psych: unable to assess  Neurology: nonverbal, no follow commands/ paraplegic  Musculoskeletal:  passive ROM  Vascular: BLE equally warm,  mild BLE edema equal, BLE DP/PT pulses palpable      no acute ischemia noted  Skin:  moist w/ good turgor, anasarca  Sacrum unstageable w/soft eschar w/ slough along edges      5cm x 4cm x 0.4cm  Procedure Note  Using aseptic technique sacral wound was excisionally debrided using a scissor and forceps through necrotic & nonviable dermis & subcutaneous tissue and into fascia & gluteal muscle. Bone not exposed.  Pt tolerated procedure well.  Hemostasis was maintained throughout.  Debulking debridement of necrotic tissue. Post measurements    5cm x 4cm x 1.4cm w/ slough and nonviable tissue, about 10% granular    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 base no palp bone      5.5cm x 8xm x 2cm w/ undermining from 12-3o'clock w/ greatest of 2cm at 2o'clock      moderate serosanguinous drainage  Lt Hip 2.5cm x 2cm x 0.1cm unstageable w/  slough w/ partial thickness granular tissue along periphery       w/ serosnaguinous drainage  Bilateral thoracic posterior ribs w/ 0.5cm x 1cm x 0cm hyperpigmented skin w/o blistering or drainage  Bilateral heels, bunions and lateral feet w/ dry resolving blisters w/o skin break, drainage  No odor, erythema, increased warmth, tenderness, induration, fluctuance        LABS/ CULTURES/ RADIOLOGY:              9.1    10.87 >-----------<  72       [10-19-21 @ 12:12]              29.2     145  |  108  |  33  ----------------------------<  146      [10-20-21 @ 14:51]  4.0   |  25  |  0.31        Ca     8.0     [10-20-21 @ 14:51]    TPro  4.4  /  Alb  2.7  /  TBili  0.4  /  DBili  x   /  AST  69  /  ALT  128  /  AlkPhos  182  [10-20-21 @ 14:51]    CAPILLARY BLOOD GLUCOSE  POCT Blood Glucose.: 136 mg/dL (20 Oct 2021 17:52)  POCT Blood Glucose.: 114 mg/dL (20 Oct 2021 12:02)  POCT Blood Glucose.: 215 mg/dL (20 Oct 2021 06:16)  POCT Blood Glucose.: 181 mg/dL (19 Oct 2021 23:39)  POCT Blood Glucose.: 132 mg/dL (19 Oct 2021 18:35)

## 2021-10-20 NOTE — PROGRESS NOTE ADULT - ASSESSMENT
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 Admitted to MICU for management of hyponatremia and hypotension requiring pressors.      Wound Consult requested to assist w/ management of multiple pressure injuries present on admission  Sacral & Lt Hip unstageable w/ evolving DTI  Posterior Ribs w/ unstageable pressure injuries  Rt Hip stage 4 pressure injury   Incontinence of stool and urine  prophylaxis measures      Pt w/poor prognosis- ongoing GOC w/ family  Rt Hip- Aquacel packing, consider VAC if aligned w/ GOC  Sacrum/ Buttocks & Lt  hip- continue  Medihoney dressing      CAVILON QD w/ pericare as per protocol w/ pericare      loose bm improving w/ senna  Posterior Ribs - allevyn QOD  Bilateral feet= CAVILON QD to dried blisters   Abx per Medicine / ID   Moisturize intact skin w/ SWEEN cream BID  Nutritional optimization for pt w/ acute on chronic protein calorie malnutrition        continue  Jevity TF and prosource  & sarbjit        folic acit, MVI & Vit C to promote wound healing  Continue turning and positioning w/ offloading assistive devices as per protocol  Waffle Cushion to chair when oob to chair  Continue w/ low air loss bed surface, consider Envella bed if aligned w/ GOC as pt        w/ multiple surfaces w/ pressure injuries  Care as per medicine will follow w/ you  Upon discharge f/u as outpatient at [facility] [address] [facility] [phone]  D/w team & RN  DEVIN IrvinC CWS [id number]  I spent 25minutes face to face w/ this pt of which more than 50% of the time was spent counseling & coordinating care of this pt.

## 2021-10-20 NOTE — PROGRESS NOTE ADULT - SUBJECTIVE AND OBJECTIVE BOX
Patient is a 81y old  Male who presents with a chief complaint of Hyponatremia (20 Oct 2021 12:56)      SUBJECTIVE / OVERNIGHT EVENTS: Patient seen and examined at bedside. remains unresponsive, does not participate in exam      ROS:  unable to asses as pt is nonverbal     Allergies    No Known Allergies    Intolerances        MEDICATIONS  (STANDING):  ascorbic acid 500 milliGRAM(s) Oral daily  BACItracin   Ointment 1 Application(s) Topical two times a day  caspofungin IVPB 50 milliGRAM(s) IV Intermittent every 24 hours  chlorhexidine 2% Cloths 1 Application(s) Topical <User Schedule>  chlorhexidine 4% Liquid 1 Application(s) Topical <User Schedule>  dexAMETHasone  IVPB 8 milliGRAM(s) IV Intermittent every 8 hours  dextrose 40% Gel 15 Gram(s) Oral once  dextrose 5%. 1000 milliLiter(s) (50 mL/Hr) IV Continuous <Continuous>  dextrose 5%. 1000 milliLiter(s) (100 mL/Hr) IV Continuous <Continuous>  dextrose 50% Injectable 25 Gram(s) IV Push once  dextrose 50% Injectable 25 Gram(s) IV Push once  dextrose 50% Injectable 12.5 Gram(s) IV Push once  folic acid 1 milliGRAM(s) Oral daily  glucagon  Injectable 1 milliGRAM(s) IntraMuscular once  hydrALAZINE 50 milliGRAM(s) Oral three times a day  insulin lispro (ADMELOG) corrective regimen sliding scale   SubCutaneous every 6 hours  insulin NPH human recombinant 6 Unit(s) SubCutaneous every 6 hours  levETIRAcetam  IVPB 500 milliGRAM(s) IV Intermittent every 12 hours  multivitamin/minerals/iron Oral Solution (CENTRUM) 15 milliLiter(s) Oral daily  nystatin Powder 1 Application(s) Topical two times a day  pantoprazole  Injectable 40 milliGRAM(s) IV Push two times a day  polyethylene glycol 3350 17 Gram(s) Oral every 12 hours  senna Syrup 10 milliLiter(s) Oral at bedtime    MEDICATIONS  (PRN):      Vital Signs Last 24 Hrs  T(C): 36.3 (20 Oct 2021 13:37), Max: 37 (20 Oct 2021 10:15)  T(F): 97.4 (20 Oct 2021 13:37), Max: 98.6 (20 Oct 2021 10:15)  HR: 84 (20 Oct 2021 13:37) (75 - 87)  BP: 119/73 (20 Oct 2021 13:37) (107/63 - 155/80)  BP(mean): --  RR: 18 (20 Oct 2021 13:37) (18 - 22)  SpO2: 100% (20 Oct 2021 13:37) (97% - 100%)  CAPILLARY BLOOD GLUCOSE      POCT Blood Glucose.: 114 mg/dL (20 Oct 2021 12:02)  POCT Blood Glucose.: 215 mg/dL (20 Oct 2021 06:16)  POCT Blood Glucose.: 181 mg/dL (19 Oct 2021 23:39)  POCT Blood Glucose.: 132 mg/dL (19 Oct 2021 18:35)  POCT Blood Glucose.: 109 mg/dL (19 Oct 2021 15:59)    I&O's Summary    19 Oct 2021 07:01  -  20 Oct 2021 07:00  --------------------------------------------------------  IN: 1450 mL / OUT: 900 mL / NET: 550 mL    20 Oct 2021 07:01  -  20 Oct 2021 14:24  --------------------------------------------------------  IN: 300 mL / OUT: 0 mL / NET: 300 mL        PHYSICAL EXAM:  GENERAL: NAD, well-groomed, well-developed  CHEST: No rales, rhonchi, wheezing, or rubs  HEART: Regular rate and rhythm; No murmurs, rubs, or gallops  ABDOMEN: Soft, Nontender, Nondistended; Bowel sounds present.  + PEG  EXTREMITIES:  Upper and lower extremity edema  SKIN: skin/ulcer R wrist w dressing  PSYCH: Nonverbal  NERVOUS SYSTEM: Nonverbal, does not follow commands    LABS:                        9.1    10.87 )-----------( 72       ( 19 Oct 2021 12:12 )             29.2     10-19    149<H>  |  112<H>  |  34<H>  ----------------------------<  53<LL>  3.8   |  26  |  0.34<L>    Ca    8.0<L>      19 Oct 2021 12:12    TPro  4.6<L>  /  Alb  2.7<L>  /  TBili  0.3  /  DBili  x   /  AST  86<H>  /  ALT  133<H>  /  AlkPhos  167<H>  10-19              RADIOLOGY & ADDITIONAL TESTS:    Consultant(s) Notes Reviewed:  ID rec noted    Care Discussed with Consultants/Other Providers: Medicine ACP and palliative care

## 2021-10-20 NOTE — PROVIDER CONTACT NOTE (OTHER) - REASON
left eyelids noted to be red; yellow stringy drainage noted between eyelids left eyelids noted to be red; yellow stringy drainage noted between eyelids. increased scrotal swelling noted

## 2021-10-20 NOTE — PROGRESS NOTE ADULT - SUBJECTIVE AND OBJECTIVE BOX
Follow Up:      Inverval History/ROS:Patient is a 81y old  Male who presents with a chief complaint of Hyponatremia (19 Oct 2021 15:19)    No fever  Lethargic    Allergies    No Known Allergies    Intolerances        ANTIMICROBIALS:  caspofungin IVPB 50 every 24 hours      OTHER MEDS:  ascorbic acid 500 milliGRAM(s) Oral daily  BACItracin   Ointment 1 Application(s) Topical two times a day  chlorhexidine 2% Cloths 1 Application(s) Topical <User Schedule>  chlorhexidine 4% Liquid 1 Application(s) Topical <User Schedule>  dexAMETHasone  IVPB 8 milliGRAM(s) IV Intermittent every 8 hours  dextrose 40% Gel 15 Gram(s) Oral once  dextrose 5%. 1000 milliLiter(s) IV Continuous <Continuous>  dextrose 5%. 1000 milliLiter(s) IV Continuous <Continuous>  dextrose 50% Injectable 25 Gram(s) IV Push once  dextrose 50% Injectable 25 Gram(s) IV Push once  dextrose 50% Injectable 12.5 Gram(s) IV Push once  folic acid 1 milliGRAM(s) Oral daily  glucagon  Injectable 1 milliGRAM(s) IntraMuscular once  hydrALAZINE 50 milliGRAM(s) Oral three times a day  insulin lispro (ADMELOG) corrective regimen sliding scale   SubCutaneous every 6 hours  insulin NPH human recombinant 6 Unit(s) SubCutaneous every 6 hours  levETIRAcetam  IVPB 500 milliGRAM(s) IV Intermittent every 12 hours  multivitamin/minerals/iron Oral Solution (CENTRUM) 15 milliLiter(s) Oral daily  nystatin Powder 1 Application(s) Topical two times a day  pantoprazole  Injectable 40 milliGRAM(s) IV Push two times a day  polyethylene glycol 3350 17 Gram(s) Oral every 12 hours  senna Syrup 10 milliLiter(s) Oral at bedtime      Vital Signs Last 24 Hrs  T(C): 37 (20 Oct 2021 10:15), Max: 37 (20 Oct 2021 10:15)  T(F): 98.6 (20 Oct 2021 10:15), Max: 98.6 (20 Oct 2021 10:15)  HR: 75 (20 Oct 2021 10:15) (75 - 87)  BP: 107/63 (20 Oct 2021 10:15) (107/63 - 155/80)  BP(mean): --  RR: 22 (20 Oct 2021 10:15) (20 - 22)  SpO2: 100% (20 Oct 2021 10:15) (97% - 100%)    PHYSICAL EXAM:  General: [x ] non-toxic  HEAD/EYES: [ ] PERRL [x ] white sclera [ ] icterus  ENT:  [ ] normal [ x] supple [ ] thrush [ ] pharyngeal exudate  Cardiovascular:   [ ] murmur [x ] normal [ ] PPM/AICD  Respiratory:  [x ] clear to ausculation bilaterally  GI:  [x ] soft, non-tender, normal bowel sounds  :  [ ] figueroa [ ] no CVA tenderness   Musculoskeletal:  [ ] no synovitis  Neurologic:  [ ] non-focal exam   Skin:  x[ ] decubitus ulcers  Lymph: [x ] no lymphadenopathy  Psychiatric:  [ ] appropriate affect [ ] alert & oriented  Lines:  [ x] no phlebitis [ ] central line                                9.1    10.87 )-----------( 72       ( 19 Oct 2021 12:12 )             29.2       10-19    149<H>  |  112<H>  |  34<H>  ----------------------------<  53<LL>  3.8   |  26  |  0.34<L>    Ca    8.0<L>      19 Oct 2021 12:12    TPro  4.6<L>  /  Alb  2.7<L>  /  TBili  0.3  /  DBili  x   /  AST  86<H>  /  ALT  133<H>  /  AlkPhos  167<H>  10-19          MICROBIOLOGY:Culture Results:   No Growth Final (10-13-21 @ 14:53)      RADIOLOGY:

## 2021-10-20 NOTE — PROGRESS NOTE ADULT - ASSESSMENT
81 year old with baseline cognitive impairment with sacral decubitus (s/p 6 weeks of abx for sacral decubitus)    Dsyphagia with aspiration pneumonia-has peg in place  CNS mass    Now with polymicrobial bacteremia  CT a/p without acute findings  ? due to wounds    1) Leukocytosis  Likely multifactorial  On steroids/ has CNS mass- ? neoplasia/ decubitus ulcers/ bacteremia    Trending down    2) CRE Klebsiella bacteremia  Suspect wound is focus  s/p avycaz course     3) C auris bacteremia  ? due to sacral wound  Continue caspofungin  Repeat cultures without growth    Check repeat echo    4) Decubitus ulcers  Right and left hip- chronic- not acutely infected  Midline decubitus- with eschar  Consider wound care eval/ surgery eval   May benefit from enzymatic or surgical debridement    5) CNS mass  ? planned duration of steroids  May need to add PCP prophylaxis    6) Transaminitis  trending down   Continue to follow      Prognosis poor due to underlying comorbidities  Goals of care discussions

## 2021-10-21 NOTE — PROGRESS NOTE ADULT - SUBJECTIVE AND OBJECTIVE BOX
Patient is a 81y old  Male who presents with a chief complaint of Hyponatremia (20 Oct 2021 18:02)      SUBJECTIVE / OVERNIGHT EVENTS: Patient seen and examined at bedside. He remains at baseline, unresponsive. no acute events overnight     ROS:  unable to assess 2/2 AMS    Allergies    No Known Allergies    Intolerances        MEDICATIONS  (STANDING):  ascorbic acid 500 milliGRAM(s) Oral daily  BACItracin   Ointment 1 Application(s) Topical two times a day  caspofungin IVPB 50 milliGRAM(s) IV Intermittent every 24 hours  chlorhexidine 2% Cloths 1 Application(s) Topical <User Schedule>  chlorhexidine 4% Liquid 1 Application(s) Topical <User Schedule>  dexAMETHasone  IVPB 8 milliGRAM(s) IV Intermittent every 8 hours  dextrose 40% Gel 15 Gram(s) Oral once  dextrose 5%. 1000 milliLiter(s) (50 mL/Hr) IV Continuous <Continuous>  dextrose 5%. 1000 milliLiter(s) (100 mL/Hr) IV Continuous <Continuous>  dextrose 50% Injectable 25 Gram(s) IV Push once  dextrose 50% Injectable 12.5 Gram(s) IV Push once  dextrose 50% Injectable 25 Gram(s) IV Push once  erythromycin   Ointment 1 Application(s) Left EYE four times a day  folic acid 1 milliGRAM(s) Oral daily  glucagon  Injectable 1 milliGRAM(s) IntraMuscular once  hydrALAZINE 50 milliGRAM(s) Oral three times a day  insulin lispro (ADMELOG) corrective regimen sliding scale   SubCutaneous every 6 hours  insulin NPH human recombinant 6 Unit(s) SubCutaneous every 6 hours  levETIRAcetam  IVPB 500 milliGRAM(s) IV Intermittent every 12 hours  multivitamin/minerals/iron Oral Solution (CENTRUM) 15 milliLiter(s) Oral daily  nystatin Powder 1 Application(s) Topical two times a day  pantoprazole  Injectable 40 milliGRAM(s) IV Push two times a day  polyethylene glycol 3350 17 Gram(s) Oral every 12 hours  senna Syrup 10 milliLiter(s) Oral at bedtime    MEDICATIONS  (PRN):      Vital Signs Last 24 Hrs  T(C): 36.9 (21 Oct 2021 13:38), Max: 36.9 (21 Oct 2021 13:38)  T(F): 98.4 (21 Oct 2021 13:38), Max: 98.4 (21 Oct 2021 13:38)  HR: 80 (21 Oct 2021 13:38) (80 - 98)  BP: 137/77 (21 Oct 2021 13:38) (130/75 - 152/84)  BP(mean): --  RR: 17 (21 Oct 2021 13:38) (16 - 18)  SpO2: 100% (21 Oct 2021 13:38) (97% - 100%)  CAPILLARY BLOOD GLUCOSE      POCT Blood Glucose.: 137 mg/dL (21 Oct 2021 12:32)  POCT Blood Glucose.: 230 mg/dL (21 Oct 2021 06:38)  POCT Blood Glucose.: 168 mg/dL (20 Oct 2021 23:33)  POCT Blood Glucose.: 136 mg/dL (20 Oct 2021 17:52)    I&O's Summary    20 Oct 2021 07:01  -  21 Oct 2021 07:00  --------------------------------------------------------  IN: 1650 mL / OUT: 0 mL / NET: 1650 mL    21 Oct 2021 07:01  -  21 Oct 2021 13:43  --------------------------------------------------------  IN: 400 mL / OUT: 280 mL / NET: 120 mL        PHYSICAL EXAM:  GENERAL: NAD, well-groomed, well-developed  CHEST: No rales, rhonchi, wheezing, or rubs  HEART: Regular rate and rhythm; No murmurs, rubs, or gallops  ABDOMEN: Soft, Nontender, Nondistended; Bowel sounds present.  + PEG  EXTREMITIES:  Upper and lower extremity edema  SKIN: skin/ulcer R wrist w dressing  PSYCH: Nonverbal  NERVOUS SYSTEM: Nonverbal, does not follow commands    LABS:    10-20    145  |  108  |  33<H>  ----------------------------<  146<H>  4.0   |  25  |  0.31<L>    Ca    8.0<L>      20 Oct 2021 14:51    TPro  4.4<L>  /  Alb  2.7<L>  /  TBili  0.4  /  DBili  x   /  AST  69<H>  /  ALT  128<H>  /  AlkPhos  182<H>  10-20              RADIOLOGY & ADDITIONAL TESTS:  Care Discussed with Consultants/Other Providers: Medicine ACP

## 2021-10-21 NOTE — PROGRESS NOTE ADULT - SUBJECTIVE AND OBJECTIVE BOX
Follow Up:      Inverval History/ROS:Patient is a 81y old  Male who presents with a chief complaint of Hyponatremia (21 Oct 2021 13:43)    No fever  No events      Allergies    No Known Allergies    Intolerances        ANTIMICROBIALS:  caspofungin IVPB 50 every 24 hours      OTHER MEDS:  ascorbic acid 500 milliGRAM(s) Oral daily  BACItracin   Ointment 1 Application(s) Topical two times a day  chlorhexidine 2% Cloths 1 Application(s) Topical <User Schedule>  chlorhexidine 4% Liquid 1 Application(s) Topical <User Schedule>  dexAMETHasone  IVPB 8 milliGRAM(s) IV Intermittent every 8 hours  dextrose 40% Gel 15 Gram(s) Oral once  dextrose 5%. 1000 milliLiter(s) IV Continuous <Continuous>  dextrose 5%. 1000 milliLiter(s) IV Continuous <Continuous>  dextrose 50% Injectable 25 Gram(s) IV Push once  dextrose 50% Injectable 12.5 Gram(s) IV Push once  dextrose 50% Injectable 25 Gram(s) IV Push once  erythromycin   Ointment 1 Application(s) Left EYE four times a day  folic acid 1 milliGRAM(s) Oral daily  glucagon  Injectable 1 milliGRAM(s) IntraMuscular once  hydrALAZINE 50 milliGRAM(s) Oral three times a day  insulin lispro (ADMELOG) corrective regimen sliding scale   SubCutaneous every 6 hours  insulin NPH human recombinant 6 Unit(s) SubCutaneous every 6 hours  levETIRAcetam  IVPB 500 milliGRAM(s) IV Intermittent every 12 hours  multivitamin/minerals/iron Oral Solution (CENTRUM) 15 milliLiter(s) Oral daily  nystatin Powder 1 Application(s) Topical two times a day  pantoprazole  Injectable 40 milliGRAM(s) IV Push two times a day  polyethylene glycol 3350 17 Gram(s) Oral every 12 hours  senna Syrup 10 milliLiter(s) Oral at bedtime      Vital Signs Last 24 Hrs  T(C): 36.9 (21 Oct 2021 13:38), Max: 36.9 (21 Oct 2021 13:38)  T(F): 98.4 (21 Oct 2021 13:38), Max: 98.4 (21 Oct 2021 13:38)  HR: 80 (21 Oct 2021 13:38) (80 - 98)  BP: 137/77 (21 Oct 2021 13:38) (130/75 - 152/84)  BP(mean): --  RR: 17 (21 Oct 2021 13:38) (16 - 18)  SpO2: 100% (21 Oct 2021 13:38) (97% - 100%)    PHYSICAL EXAM:  General: [x ] non-toxic  HEAD/EYES: [ ] PERRL [ x] white sclera [ ] icterus  ENT:  [ ] normal [x ] supple [ ] thrush [ ] pharyngeal exudate  Cardiovascular:   [ ] murmur [ x] normal [ ] PPM/AICD  Respiratory:  [x ] clear to ausculation bilaterally  GI:  [ x] soft, non-tender, normal bowel sounds  :  [ ] figueroa [ ] no CVA tenderness   Musculoskeletal:  [ ] no synovitis  Neurologic:  [ ] non-focal exam   Skin:  x[ ] no rash  Lymph: [ x] no lymphadenopathy  Psychiatric:  [ ] appropriate affect [ ] alert & oriented  Lines:  [x ] no phlebitis [ ] central line              10-20    145  |  108  |  33<H>  ----------------------------<  146<H>  4.0   |  25  |  0.31<L>    Ca    8.0<L>      20 Oct 2021 14:51    TPro  4.4<L>  /  Alb  2.7<L>  /  TBili  0.4  /  DBili  x   /  AST  69<H>  /  ALT  128<H>  /  AlkPhos  182<H>  10-20          MICROBIOLOGY:    RADIOLOGY:

## 2021-10-21 NOTE — CHART NOTE - NSCHARTNOTEFT_GEN_A_CORE
Called Brooklynn to f/u conversation with Dr. Black as requested by brooklynn.  Spoke with pts Ina Moyer.  "Brooklynn does not want to talk now.  She is busy. She will try to call back later today to speak after 4."  I explained that I am not available after 4.  João states "She will speak with the other doctor."  Palliative care will f/u.

## 2021-10-21 NOTE — PROGRESS NOTE ADULT - PROBLEM SELECTOR PLAN 1
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 DDX of imaging metastases, glioblastoma and lymphoma. Comatose  -Family initially wanted to pursue more aggressive mgmt. -[facility] was tentatively planning to do biopsy vs debulking but prior physician discussed with daughter Brooklynn 10/9 and she would now like to pursue more conservative/medical management and not go with any surgery/biopsy   -Ongoing GOC discussions with family  -C/w dexamethasone; c/w increased 8mg IV q8h for brain edema per neuro onco.   -May need PCP ppx if will be on chronic steroids.   -Na goal 145-155 per [facility]. CBC/CMP pending this morning 10/21  -Neuro checks q4h.   -F/u neuro onco and LAITH recs.  -C/w keppra 500mg BID for seizure ppx.

## 2021-10-21 NOTE — PROGRESS NOTE ADULT - ASSESSMENT
81 year old with baseline cognitive impairment with sacral decubitus (s/p 6 weeks of abx for sacral decubitus)    Dsyphagia with aspiration pneumonia-has peg in place  CNS mass     polymicrobial bacteremia  CT a/p without acute findings  ? due to wounds    1) Leukocytosis  Likely multifactorial  On steroids/ has CNS mass- ? neoplasia/ decubitus ulcers/ bacteremia    Trending down    2) CRE Klebsiella bacteremia  Suspect wound is focus  s/p avycaz course     3) C auris bacteremia- tx started on 10/11   ? due to sacral wound  Continue caspofungin  Repeat cultures without growth    Check repeat echo    4) Decubitus ulcers  Right and left hip- chronic- not acutely infected  Midline decubitus-   Wound care    5) CNS mass  ? planned duration of steroids  May need to add PCP prophylaxis    6) Transaminitis  trending down   Continue to follow      Prognosis poor due to underlying comorbidities  Goals of care discussions

## 2021-10-21 NOTE — PROGRESS NOTE ADULT - PROBLEM SELECTOR PLAN 11
-Hold pharma dvt ppx for now given recent hgb downtrend and pos blood in gastric lavage and hemorrhagic brain mass. -Need to d/w LAITH if ok to start pharma dvt ppx.    Dispo: updated Dtr Brooklynn 10/19 about GOC and poor prognosis, states she will speak with family, requesting call from palliative today 10/21

## 2021-10-22 NOTE — CHART NOTE - NSCHARTNOTEFT_GEN_A_CORE
Nutrition Follow Up Note  Patient seen for: Follow-up    Chart reviewed, events noted.  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 S/P PEG 2021. Neurosurgery following. Goals of care discussions ongoing;    Source: [] Patient       [x] EMR        [] RN        [] Family at bedside       [] Other:    -If unable to interview patient: [] Trach/Vent/BiPAP  [] Disoriented/confused/inappropriate to interview    Diet Order: NPO with Tube Feed Jevity 1.5 @50mL/h x 18h, Ibrahima x2, no carb Prosource TF x2 daily.    - Is current order appropriate/adequate? [] Yes  []  No:     - Nutrition-related concerns:    GI:  Last BM _10/21 fecal incontinence__.   Bowel Regimen? [x] Yes   [] No    Weights:   Daily Weight in k.9 (10-)    Nutritionally Pertinent Medications:   Dextrose 5%  Admelog  HumuLIN N  Folic acid  Ascorbic acid  MVI  Miralax  Senna syrup  Protonix    Labs:  10/20  BUN 33 <H>  Cr 0.31 <L>  Albumin 2.7 <L>  AST 69 <H>   <H>  POCT Glu 223 <H> 10/22@06:36                 230 <H> 10/21@23:20    Skin per nursing documentation:   Edema:     Estimated Needs:   [] no change since previous assessment  [] recalculated:     Previous Nutrition Diagnosis:   Nutrition Diagnosis is: [] ongoing  [] resolved [] not applicable     New Nutrition Diagnosis: [] Not applicable    Nutrition Care Plan:  [] In Progress  [] Achieved  [] Not applicable    Nutrition Interventions:     Education Provided:       [] Yes:  [] No:        Recommendations:         [] Continue current diet order            [] Add oral nutrition supplement:     [] Discontinue current diet order. Recommend:      [] Add micronutrient supplementation:      [] Continue current micronutrient supplementation:      [] Other:     Monitoring and Evaluation:   Continue to monitor nutritional intake, tolerance to diet prescription, weights, labs, skin integrity      RD remains available upon request and will follow up per protocol Nutrition Follow Up Note  Patient seen for: Follow-up    Chart reviewed, events noted.  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 S/P PEG 2021. PEG replaced 10/16. Neurosurgery following, wound care following. Goals of care discussions ongoing.    Source: [] Patient       [x] EMR        [x] RN        [] Family at bedside       [] Other:    -If unable to interview patient: [] Trach/Vent/BiPAP  [x] Disoriented/confused/inappropriate to interview    Diet Order:   Diet, NPO with Tube Feed:   Tube Feeding Modality: Gastrostomy  Jevity 1.5 Adria (JEVITY1.5RTH)  Total Volume for 24 Hours (mL): 900  Continuous Starting Tube Feed Rate {mL per Hour}: 50  Until Goal Tube Feed Rate (mL per Hour): 50  Tube Feeding Duration (in Hours): 18  Tube Feed Start Time: 12:00 (10-06-21)  Ibrahima (7 Gm Arginine/7 Gm Glut/1.2 Gm HMB) [id number]  No carb Prosource TF x2 daily    EN Order Provides: 900ml, (27.8kcal/kg)1438kcal and (1.46g pro/kg)76g protein, meeting 92% estimated calorie needs and 100% protein needs.    - Nutrition-related concerns:  Spoke to RN, pt is tolerating tube feed well, no N&V, no GI issue, no diarrhea.  GI:  Last BM _10/21 fecal incontinence__.   Bowel Regimen? [x] Yes   [] No    Weights: Dosing wt 51.8  Daily Weight in k.9 (10-22), 63 (10/14), 65.3 (10/12)    Nutritionally Pertinent Medications:   Dextrose 5%  Admelog  HumuLIN N  Folic acid  Ascorbic acid  MVI  Miralax  Senna syrup  Protonix    Labs:  10/20  BUN 33 <H>  Cr 0.31 <L>  Albumin 2.7 <L>  AST 69 <H>   <H>  POCT Glu 223 <H> 10/22@06:36                 230 <H> 10/21@23:20    Skin per nursing documentation: stage II right rib cage; left rib cage; left hip. stage IV right hip. unstageable sacrum, right heel/DTI, right medial foot, left heel. Right scrotum pressure injury  Edema: 3+ generalized    Estimated Needs: (based on dosing wt 114.1 lbs/51.8kg):   Estimated Energy Needs: (30-35kcal/kg): 1554-1813kcal  Estimated Protein Needs: (1.4-1.6g protein/kg): 73-83g protein   [x] no change since previous assessment  [] recalculated:     Previous Nutrition Diagnosis: acute on chronic protein calorie malnutrition   Nutrition Diagnosis is: [x] ongoing with provision of EN feeds    Nutrition Care Plan:  [x] In Progress  [] Achieved  [] Not applicable       Recommendations:      1. Recommend changing Jevity 1.5 at 50ml/hr x 18 hrs to 24 hrs, discontinue No Carb Prosource TF 2x daily. To provide: 1200ml, 1800kcal (34.7kcal/kg) and 76g protein (1.46g pro/kg).  2. Continue with Ibrahima (7 Gm Arginine/7 Gm Glut/1.2 Gm HMB) [id number]  3. Continue with daily multivitamin, vitamin C if no medication contraindications noted.  4. Monitor GI tolerance. RD to remain available to adjust EN formulary, volume/rate PRN    Monitoring and Evaluation:   Continue to monitor nutritional intake, tolerance to diet prescription, weights, labs, skin integrity    RD remains available upon request and will follow up per protocol  Vivian Briceno, Dietetic Intern, Pager#[phone] Nutrition Follow Up Note  Patient seen for: Follow-up    Chart reviewed, events noted.  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 S/P PEG 2021. PEG replaced 10/16. Neurosurgery following, wound care following. Goals of care discussions ongoing.    Source: [] Patient       [x] EMR        [x] RN        [] Family at bedside       [] Other:    -If unable to interview patient: [] Trach/Vent/BiPAP  [x] Disoriented/confused/inappropriate to interview    Diet Order:   Diet, NPO with Tube Feed:   Tube Feeding Modality: Gastrostomy  Jevity 1.5 Adria (JEVITY1.5RTH)  Total Volume for 24 Hours (mL): 900  Continuous Starting Tube Feed Rate {mL per Hour}: 50  Until Goal Tube Feed Rate (mL per Hour): 50  Tube Feeding Duration (in Hours): 18  Tube Feed Start Time: 12:00 (10-06-21)  Ibrahima (7 Gm Arginine/7 Gm Glut/1.2 Gm HMB) [id number]  No carb Prosource TF x2 daily    EN Order Provides: 900ml, (27.8kcal/kg)1438kcal and (1.46g pro/kg)76g protein, meeting 92% estimated calorie needs and 100% protein needs.    - Nutrition-related concerns:  Spoke to RN, pt is tolerating tube feed well, no N&V, no GI issue, no diarrhea.  GI:  Last BM _10/21 fecal incontinence__.   Bowel Regimen? [x] Yes   [] No    Weights: Dosing wt 51.8  Daily Weight in k.9 (10-22), 63 (10/14), 65.3 (10/12)    Nutritionally Pertinent Medications:   Dextrose 5%  Admelog  HumuLIN N  Folic acid  Ascorbic acid  MVI  Miralax  Senna syrup  Protonix    Labs:  10/20  BUN 33 <H>  Cr 0.31 <L>  Albumin 2.7 <L>  AST 69 <H>   <H>  POCT Glu 223 <H> 10/22@06:36                 230 <H> 10/21@23:20    Skin per nursing documentation: stage II right rib cage; left rib cage; left hip. stage IV right hip. unstageable sacrum, right heel/DTI, right medial foot, left heel. Right scrotum pressure injury  Edema: 3+ generalized    Estimated Needs: (based on dosing wt 114.1 lbs/51.8kg):   Estimated Energy Needs: (30-35kcal/kg): 1554-1813kcal  Estimated Protein Needs: (1.4-1.6g protein/kg): 73-83g protein   [x] no change since previous assessment  [] recalculated:     Previous Nutrition Diagnosis: acute on chronic protein calorie malnutrition   Nutrition Diagnosis is: [x] ongoing with provision of EN feeds    Nutrition Care Plan:  [x] In Progress  [] Achieved  [] Not applicable       Recommendations:      1. Recommend changing Jevity 1.5 at 50ml/hr x 18 hrs to 24 hrs, discontinue No Carb Prosource TF 2x daily. To provide: 1200ml, 1800kcal (34.7kcal/kg) and 76g protein (1.46g pro/kg).  2. Continue with Ibrahima (7 Gm Arginine/7 Gm Glut/1.2 Gm HMB) [id number] for wound healing  3. Continue with daily multivitamin, vitamin C if no medication contraindications noted.  4. Monitor GI tolerance. RD to remain available to adjust EN formulary, volume/rate PRN    Monitoring and Evaluation:   Continue to monitor nutritional intake, tolerance to diet prescription, weights, labs, skin integrity    RD remains available upon request and will follow up per protocol  Vivian Briceno, Dietetic Intern, Pager#[phone]

## 2021-10-22 NOTE — PROGRESS NOTE ADULT - SUBJECTIVE AND OBJECTIVE BOX
Patient is a 81y old  Male who presents with a chief complaint of Hyponatremia (22 Oct 2021 14:01)    SUBJECTIVE / OVERNIGHT EVENTS: no acute events overnight     MEDICATIONS  (STANDING):  ascorbic acid 500 milliGRAM(s) Oral daily  BACItracin   Ointment 1 Application(s) Topical two times a day  caspofungin IVPB 50 milliGRAM(s) IV Intermittent every 24 hours  chlorhexidine 2% Cloths 1 Application(s) Topical <User Schedule>  chlorhexidine 4% Liquid 1 Application(s) Topical <User Schedule>  dexAMETHasone  IVPB 8 milliGRAM(s) IV Intermittent every 8 hours  dextrose 40% Gel 15 Gram(s) Oral once  dextrose 5%. 1000 milliLiter(s) (50 mL/Hr) IV Continuous <Continuous>  dextrose 5%. 1000 milliLiter(s) (100 mL/Hr) IV Continuous <Continuous>  dextrose 50% Injectable 25 Gram(s) IV Push once  dextrose 50% Injectable 12.5 Gram(s) IV Push once  dextrose 50% Injectable 25 Gram(s) IV Push once  erythromycin   Ointment 1 Application(s) Left EYE four times a day  folic acid 1 milliGRAM(s) Oral daily  glucagon  Injectable 1 milliGRAM(s) IntraMuscular once  hydrALAZINE 50 milliGRAM(s) Oral three times a day  insulin lispro (ADMELOG) corrective regimen sliding scale   SubCutaneous every 6 hours  insulin NPH human recombinant 6 Unit(s) SubCutaneous every 6 hours  levETIRAcetam  IVPB 500 milliGRAM(s) IV Intermittent every 12 hours  multivitamin/minerals/iron Oral Solution (CENTRUM) 15 milliLiter(s) Oral daily  nystatin Powder 1 Application(s) Topical two times a day  pantoprazole  Injectable 40 milliGRAM(s) IV Push two times a day  polyethylene glycol 3350 17 Gram(s) Oral every 12 hours  senna Syrup 10 milliLiter(s) Oral at bedtime    MEDICATIONS  (PRN):      Vital Signs Last 24 Hrs  T(C): 36.6 (22 Oct 2021 12:48), Max: 36.8 (22 Oct 2021 09:28)  T(F): 97.8 (22 Oct 2021 12:48), Max: 98.3 (22 Oct 2021 09:28)  HR: 80 (22 Oct 2021 12:48) (66 - 87)  BP: 155/73 (22 Oct 2021 12:48) (117/65 - 156/78)  BP(mean): --  RR: 18 (22 Oct 2021 12:48) (18 - 20)  SpO2: 100% (22 Oct 2021 12:48) (97% - 100%)  CAPILLARY BLOOD GLUCOSE      POCT Blood Glucose.: 120 mg/dL (22 Oct 2021 12:35)  POCT Blood Glucose.: 223 mg/dL (22 Oct 2021 06:36)  POCT Blood Glucose.: 230 mg/dL (21 Oct 2021 23:20)  POCT Blood Glucose.: 205 mg/dL (21 Oct 2021 18:12)    I&O's Summary    21 Oct 2021 07:01  -  22 Oct 2021 07:00  --------------------------------------------------------  IN: 2050 mL / OUT: 1180 mL / NET: 870 mL    22 Oct 2021 07:01  -  22 Oct 2021 14:13  --------------------------------------------------------  IN: 0 mL / OUT: 480 mL / NET: -480 mL    PHYSICAL EXAM:  GENERAL: NAD, chronically ill appearing   EYES: conjunctiva and sclera clear  CHEST/LUNG: Clear to auscultation bilaterally; No wheeze  HEART: +S1/S2   ABDOMEN: Soft, Nontender, Nondistended  EXTREMITIES: no LE edema   PSYCH: AAOx0    LABS:    10-20    145  |  108  |  33<H>  ----------------------------<  146<H>  4.0   |  25  |  0.31<L>    Ca    8.0<L>      20 Oct 2021 14:51    TPro  4.4<L>  /  Alb  2.7<L>  /  TBili  0.4  /  DBili  x   /  AST  69<H>  /  ALT  128<H>  /  AlkPhos  182<H>  10-20        Care Discussed with Consultants/Other Providers: ID attending Dr. Palacios regarding care plan

## 2021-10-22 NOTE — PROGRESS NOTE ADULT - ASSESSMENT
81 year old with baseline cognitive impairment with sacral decubitus (s/p 6 weeks of abx for sacral decubitus)    Dsyphagia with aspiration pneumonia-has peg in place  CNS mass     polymicrobial bacteremia  CT a/p without acute findings  ? due to wounds    1) Leukocytosis  Likely multifactorial  On steroids/ has CNS mass- ? neoplasia/ decubitus ulcers/ bacteremia    Trending down    2) CRE Klebsiella bacteremia  Suspect wound is focus  s/p avycaz course     3) C auris bacteremia- tx started on 10/11   ? due to sacral wound  Continue caspofungin through 11/8 to finish a 4 week course  Repeat cultures without growth    Check repeat echo    4) Decubitus ulcers  Right and left hip- chronic- not acutely infected  Midline decubitus-   Wound care    5) CNS mass  ? planned duration of steroids  May need to add PCP prophylaxis    6) Transaminitis  trending down   Continue to follow      Prognosis poor due to underlying comorbidities.  I do not think his wounds will heal.  I do not think he will improve with medical treatment and not clear that he would benefit from aggressive interventions (surgical debridement of wounds / CUCO)     Continue goals of care discussions    I will be away 10/23 through 11/2.   Please call  [phone] with questions or concerns.

## 2021-10-22 NOTE — PROGRESS NOTE ADULT - SUBJECTIVE AND OBJECTIVE BOX
Follow Up:      Inverval History/ROS:Patient is a 81y old  Male who presents with a chief complaint of Hyponatremia (21 Oct 2021 15:41)    No fever  Not verbal    Allergies    No Known Allergies    Intolerances        ANTIMICROBIALS:  caspofungin IVPB 50 every 24 hours      OTHER MEDS:  ascorbic acid 500 milliGRAM(s) Oral daily  BACItracin   Ointment 1 Application(s) Topical two times a day  chlorhexidine 2% Cloths 1 Application(s) Topical <User Schedule>  chlorhexidine 4% Liquid 1 Application(s) Topical <User Schedule>  dexAMETHasone  IVPB 8 milliGRAM(s) IV Intermittent every 8 hours  dextrose 40% Gel 15 Gram(s) Oral once  dextrose 5%. 1000 milliLiter(s) IV Continuous <Continuous>  dextrose 5%. 1000 milliLiter(s) IV Continuous <Continuous>  dextrose 50% Injectable 25 Gram(s) IV Push once  dextrose 50% Injectable 12.5 Gram(s) IV Push once  dextrose 50% Injectable 25 Gram(s) IV Push once  erythromycin   Ointment 1 Application(s) Left EYE four times a day  folic acid 1 milliGRAM(s) Oral daily  glucagon  Injectable 1 milliGRAM(s) IntraMuscular once  hydrALAZINE 50 milliGRAM(s) Oral three times a day  insulin lispro (ADMELOG) corrective regimen sliding scale   SubCutaneous every 6 hours  insulin NPH human recombinant 6 Unit(s) SubCutaneous every 6 hours  levETIRAcetam  IVPB 500 milliGRAM(s) IV Intermittent every 12 hours  multivitamin/minerals/iron Oral Solution (CENTRUM) 15 milliLiter(s) Oral daily  nystatin Powder 1 Application(s) Topical two times a day  pantoprazole  Injectable 40 milliGRAM(s) IV Push two times a day  polyethylene glycol 3350 17 Gram(s) Oral every 12 hours  senna Syrup 10 milliLiter(s) Oral at bedtime      Vital Signs Last 24 Hrs  T(C): 36.6 (22 Oct 2021 12:48), Max: 36.8 (22 Oct 2021 09:28)  T(F): 97.8 (22 Oct 2021 12:48), Max: 98.3 (22 Oct 2021 09:28)  HR: 80 (22 Oct 2021 12:48) (66 - 87)  BP: 155/73 (22 Oct 2021 12:48) (117/65 - 156/78)  BP(mean): --  RR: 18 (22 Oct 2021 12:48) (18 - 20)  SpO2: 100% (22 Oct 2021 12:48) (97% - 100%)    PHYSICAL EXAM:  General: [x ] non-verbal  HEAD/EYES: [ ] PERRL [ x] white sclera [ ] icterus  ENT:  [ ] normal [ ] supple [ ] thrush [ ] pharyngeal exudate  Cardiovascular:   [ ] murmur [x ] normal [ ] PPM/AICD  Respiratory:  [x ] clear to ausculation bilaterally  GI:  [x ] soft, non-tender, normal bowel sounds  :  [ ] figueroa [ ] no CVA tenderness   Musculoskeletal:  [ ] no synovitis  Neurologic:  [ x] non-focal exam   Skin:  [x ] decubitus ulcer- unstageable  Lymph: [x ] no lymphadenopathy  Psychiatric:  [ ] appropriate affect [ ] alert & oriented  Lines:  [x ] no phlebitis [ ] central line              10-20    145  |  108  |  33<H>  ----------------------------<  146<H>  4.0   |  25  |  0.31<L>    Ca    8.0<L>      20 Oct 2021 14:51    TPro  4.4<L>  /  Alb  2.7<L>  /  TBili  0.4  /  DBili  x   /  AST  69<H>  /  ALT  128<H>  /  AlkPhos  182<H>  10-20          MICROBIOLOGY:    RADIOLOGY:

## 2021-10-23 NOTE — PROGRESS NOTE ADULT - PROBLEM SELECTOR PLAN 6
-Hgb down to 6.8 on 10/8 and gave patient 1 prbc. Hgb now stable  -occult positive in gastric lavage in MICU. c/w empiric PPI IV BID for now.   -Also with hemorrhagic brain mass. -Repeat CT head appears stable without any new hemorrhage.  -Trend CBC.   -Iron studies, B12, folate, LDH, hapto, retics. -showed low folate. repleted  -f/u CBC from today

## 2021-10-23 NOTE — PROVIDER CONTACT NOTE (OTHER) - ACTION/TREATMENT ORDERED:
chest PT preformed by hand, orally suctioned, NP assessed pt at bedside. pt also suctioned nasally as per NP with RT Chopra. chest PT preformed by hand, orally suctioned, NP assessed pt at bedside. pt also suctioned nasally as per NP with RT Keysha. as per NP okay to continue with feeds.

## 2021-10-23 NOTE — PROGRESS NOTE ADULT - SUBJECTIVE AND OBJECTIVE BOX
Patient is a 81y old  Male who presents with a chief complaint of Hyponatremia (22 Oct 2021 14:12)      SUBJECTIVE / OVERNIGHT EVENTS: no acute events overnight     MEDICATIONS  (STANDING):  ascorbic acid 500 milliGRAM(s) Oral daily  BACItracin   Ointment 1 Application(s) Topical two times a day  caspofungin IVPB 50 milliGRAM(s) IV Intermittent every 24 hours  chlorhexidine 2% Cloths 1 Application(s) Topical <User Schedule>  chlorhexidine 4% Liquid 1 Application(s) Topical <User Schedule>  dexAMETHasone  IVPB 8 milliGRAM(s) IV Intermittent every 8 hours  dextrose 40% Gel 15 Gram(s) Oral once  dextrose 5%. 1000 milliLiter(s) (50 mL/Hr) IV Continuous <Continuous>  dextrose 5%. 1000 milliLiter(s) (100 mL/Hr) IV Continuous <Continuous>  dextrose 50% Injectable 25 Gram(s) IV Push once  dextrose 50% Injectable 12.5 Gram(s) IV Push once  dextrose 50% Injectable 25 Gram(s) IV Push once  erythromycin   Ointment 1 Application(s) Left EYE four times a day  folic acid 1 milliGRAM(s) Oral daily  glucagon  Injectable 1 milliGRAM(s) IntraMuscular once  hydrALAZINE 50 milliGRAM(s) Oral three times a day  insulin lispro (ADMELOG) corrective regimen sliding scale   SubCutaneous every 6 hours  insulin NPH human recombinant 6 Unit(s) SubCutaneous every 6 hours  levETIRAcetam  IVPB 500 milliGRAM(s) IV Intermittent every 12 hours  multivitamin/minerals/iron Oral Solution (CENTRUM) 15 milliLiter(s) Oral daily  nystatin Powder 1 Application(s) Topical two times a day  pantoprazole  Injectable 40 milliGRAM(s) IV Push two times a day  polyethylene glycol 3350 17 Gram(s) Oral every 12 hours  senna Syrup 10 milliLiter(s) Oral at bedtime    MEDICATIONS  (PRN):      Vital Signs Last 24 Hrs  T(C): 36.6 (23 Oct 2021 13:04), Max: 36.9 (22 Oct 2021 20:48)  T(F): 97.9 (23 Oct 2021 13:04), Max: 98.5 (22 Oct 2021 20:48)  HR: 88 (23 Oct 2021 13:04) (80 - 88)  BP: 125/70 (23 Oct 2021 13:04) (125/70 - 153/67)  BP(mean): --  RR: 18 (23 Oct 2021 13:04) (17 - 18)  SpO2: 97% (23 Oct 2021 13:04) (92% - 100%)  CAPILLARY BLOOD GLUCOSE      POCT Blood Glucose.: 117 mg/dL (23 Oct 2021 12:48)  POCT Blood Glucose.: 236 mg/dL (23 Oct 2021 05:45)  POCT Blood Glucose.: 231 mg/dL (23 Oct 2021 00:25)  POCT Blood Glucose.: 140 mg/dL (22 Oct 2021 17:09)    I&O's Summary    22 Oct 2021 07:01  -  23 Oct 2021 07:00  --------------------------------------------------------  IN: 870 mL / OUT: 1180 mL / NET: -310 mL      PHYSICAL EXAM:  GENERAL: NAD, chronically ill appearing   EYES: conjunctiva and sclera clear  NECK: Supple, No JVD  CHEST/LUNG: Clear to auscultation bilaterally; No wheeze  HEART: +S1/S2   ABDOMEN: Soft, Nontender, Nondistended  EXTREMITIES:  trace b/l LE edema   PSYCH: AAOx0

## 2021-10-23 NOTE — PROGRESS NOTE ADULT - PROBLEM SELECTOR PLAN 1
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 DDX of imaging metastases, glioblastoma and lymphoma. Comatose  -Family initially wanted to pursue more aggressive mgmt. -LAITH was tentatively planning to do biopsy vs debulking but prior physician discussed with daughter Brooklynn 10/9 and she would now like to pursue more conservative/medical management and not go with any surgery/biopsy   -Ongoing GOC discussions with family  -C/w dexamethasone; c/w increased 8mg IV q8h for brain edema per neuro onco.   -May need PCP ppx if will be on chronic steroids, ID still deciding   -Na goal 145-155 per LAITH. Monitor CBC and BMP   -Neuro checks q4h.   -F/u neuro onco and LAITH recs.  -C/w keppra 500mg BID for seizure ppx.

## 2021-10-23 NOTE — PROVIDER CONTACT NOTE (OTHER) - SITUATION
patient making grunting sounds   using accessory patient making grunting sounds   using accessory to breathe

## 2021-10-24 NOTE — PROGRESS NOTE ADULT - SUBJECTIVE AND OBJECTIVE BOX
Patient is a 81y old  Male who presents with a chief complaint of Hyponatremia (23 Oct 2021 14:55)      SUBJECTIVE / OVERNIGHT EVENTS: patient had increased work of breathing this morning, given lasix and suctioned, improved.     MEDICATIONS  (STANDING):  ascorbic acid 500 milliGRAM(s) Oral daily  BACItracin   Ointment 1 Application(s) Topical two times a day  caspofungin IVPB 50 milliGRAM(s) IV Intermittent every 24 hours  chlorhexidine 2% Cloths 1 Application(s) Topical <User Schedule>  chlorhexidine 4% Liquid 1 Application(s) Topical <User Schedule>  dexAMETHasone  IVPB 8 milliGRAM(s) IV Intermittent every 8 hours  dextrose 40% Gel 15 Gram(s) Oral once  dextrose 5%. 1000 milliLiter(s) (100 mL/Hr) IV Continuous <Continuous>  dextrose 5%. 1000 milliLiter(s) (50 mL/Hr) IV Continuous <Continuous>  dextrose 50% Injectable 25 Gram(s) IV Push once  dextrose 50% Injectable 12.5 Gram(s) IV Push once  dextrose 50% Injectable 25 Gram(s) IV Push once  erythromycin   Ointment 1 Application(s) Left EYE four times a day  folic acid 1 milliGRAM(s) Oral daily  glucagon  Injectable 1 milliGRAM(s) IntraMuscular once  hydrALAZINE 50 milliGRAM(s) Oral three times a day  insulin lispro (ADMELOG) corrective regimen sliding scale   SubCutaneous every 6 hours  insulin NPH human recombinant 6 Unit(s) SubCutaneous every 6 hours  levETIRAcetam  IVPB 500 milliGRAM(s) IV Intermittent every 12 hours  multivitamin/minerals/iron Oral Solution (CENTRUM) 15 milliLiter(s) Oral daily  nystatin Powder 1 Application(s) Topical two times a day  pantoprazole  Injectable 40 milliGRAM(s) IV Push two times a day  polyethylene glycol 3350 17 Gram(s) Oral every 12 hours  potassium chloride   Solution 40 milliEquivalent(s) Oral once  senna Syrup 10 milliLiter(s) Oral at bedtime    MEDICATIONS  (PRN):      Vital Signs Last 24 Hrs  T(C): 36.7 (24 Oct 2021 08:30), Max: 98.9 (24 Oct 2021 05:55)  T(F): 98 (24 Oct 2021 08:30), Max: 210 (24 Oct 2021 05:55)  HR: 101 (24 Oct 2021 08:30) (92 - 137)  BP: 103/68 (24 Oct 2021 08:30) (103/68 - 158/74)  BP(mean): --  RR: 20 (24 Oct 2021 08:30) (16 - 22)  SpO2: 98% (24 Oct 2021 08:30) (96% - 100%)  CAPILLARY BLOOD GLUCOSE      POCT Blood Glucose.: 117 mg/dL (24 Oct 2021 12:59)  POCT Blood Glucose.: 78 mg/dL (24 Oct 2021 12:10)  POCT Blood Glucose.: 68 mg/dL (24 Oct 2021 12:08)  POCT Blood Glucose.: 86 mg/dL (24 Oct 2021 11:39)  POCT Blood Glucose.: 54 mg/dL (24 Oct 2021 11:18)  POCT Blood Glucose.: 50 mg/dL (24 Oct 2021 11:17)  POCT Blood Glucose.: 203 mg/dL (24 Oct 2021 05:49)  POCT Blood Glucose.: 152 mg/dL (23 Oct 2021 23:11)  POCT Blood Glucose.: 192 mg/dL (23 Oct 2021 17:17)    I&O's Summary    23 Oct 2021 07:01  -  24 Oct 2021 07:00  --------------------------------------------------------  IN: 200 mL / OUT: 1350 mL / NET: -1150 mL        PHYSICAL EXAM:  GENERAL: chronically ill appearing   CHEST/LUNG: no wheezing noted   HEART: +S1/S2   ABDOMEN: Soft, Nontender, Nondistended  EXTREMITIES: anasarca and significant peripheral edema   PSYCH: AAOx0    LABS:                        8.0    18.72 )-----------( 50       ( 24 Oct 2021 11:24 )             25.9     10-24    147<H>  |  110<H>  |  39<H>  ----------------------------<  45<LL>  3.4<L>   |  24  |  0.36<L>    Ca    7.9<L>      24 Oct 2021 11:24

## 2021-10-24 NOTE — PROVIDER CONTACT NOTE (HYPOGLYCEMIA EVENT) - NS PROVIDER CONTACT CONTRIBUTING FACTORS OF EPISODE
Patient NPO greater than 8 hours/Tube feeding/Previous finger stick less than 100 mg/dL
Patient NPO greater than 8 hours/Tube feeding

## 2021-10-24 NOTE — CHART NOTE - NSCHARTNOTEFT_GEN_A_CORE
Pt found to have glucose 49s on ABG, repeat FS is 50. Hypoglycemia protocol initiated. Received D50. Tube feed started. Will continue 24 hour tube feeding. Will recheck FS closely to avoid hypoglycemia episode. Pt also has lactate 3.2 on abg. Pt is edematous, yesterday CXR shows pulm edema (no official report yet), will hold off IVF. Continue to closely monitor. GOC discussion ongoing w/ family and attending/ palliative team. D/w above issue and plan with Dr. Huertas. Pt found to have glucose 49s on ABG, repeat FS is 50. Hypoglycemia protocol initiated. Received D50. Tube feed started. Will continue 24 hour tube feeding. Will recheck FS closely to avoid hypoglycemia episode. Pt also has lactate 3.2 on abg. Pt is edematous, will hold off IVF. Continue to closely monitor. GOC discussion ongoing w/ family and attending/ palliative team. D/w above issue and plan with Dr. Huertas.

## 2021-10-24 NOTE — PROVIDER CONTACT NOTE (HYPOGLYCEMIA EVENT) - NS PROVIDER CONTACT BACKGROUND-HYPO
Age: 81y    Gender: Male    POCT Blood Glucose:  179 mg/dL (10-24-21 @ 17:12)  117 mg/dL (10-24-21 @ 12:59)  78 mg/dL (10-24-21 @ 12:10)  68 mg/dL (10-24-21 @ 12:08)  86 mg/dL (10-24-21 @ 11:39)  54 mg/dL (10-24-21 @ 11:18)  50 mg/dL (10-24-21 @ 11:17)  203 mg/dL (10-24-21 @ 05:49)      eMAR:  dexAMETHasone  IVPB   101.6 mL/Hr IV Intermittent (10-24-21 @ 15:01)   101.6 mL/Hr IV Intermittent (10-24-21 @ 05:50)   101.6 mL/Hr IV Intermittent (10-23-21 @ 21:01)    dextrose 50% Injectable   25 Gram(s) IV Push (10-24-21 @ 11:22)    insulin lispro (ADMELOG) corrective regimen sliding scale   2 Unit(s) SubCutaneous (10-24-21 @ 17:17)   4 Unit(s) SubCutaneous (10-24-21 @ 06:04)   2 Unit(s) SubCutaneous (10-23-21 @ 23:13)    insulin NPH human recombinant   6 Unit(s) SubCutaneous (10-24-21 @ 17:18)   6 Unit(s) SubCutaneous (10-24-21 @ 06:04)   6 Unit(s) SubCutaneous (10-23-21 @ 23:13)    
Age: 81y    Gender: Male    POCT Blood Glucose:  179 mg/dL (10-24-21 @ 17:12)  117 mg/dL (10-24-21 @ 12:59)  78 mg/dL (10-24-21 @ 12:10)  68 mg/dL (10-24-21 @ 12:08)  86 mg/dL (10-24-21 @ 11:39)  54 mg/dL (10-24-21 @ 11:18)  50 mg/dL (10-24-21 @ 11:17)  203 mg/dL (10-24-21 @ 05:49)      eMAR:  dexAMETHasone  IVPB   101.6 mL/Hr IV Intermittent (10-24-21 @ 15:01)   101.6 mL/Hr IV Intermittent (10-24-21 @ 05:50)   101.6 mL/Hr IV Intermittent (10-23-21 @ 21:01)    dextrose 50% Injectable   25 Gram(s) IV Push (10-24-21 @ 11:22)    insulin lispro (ADMELOG) corrective regimen sliding scale   2 Unit(s) SubCutaneous (10-24-21 @ 17:17)   4 Unit(s) SubCutaneous (10-24-21 @ 06:04)   2 Unit(s) SubCutaneous (10-23-21 @ 23:13)    insulin NPH human recombinant   6 Unit(s) SubCutaneous (10-24-21 @ 17:18)   6 Unit(s) SubCutaneous (10-24-21 @ 06:04)   6 Unit(s) SubCutaneous (10-23-21 @ 23:13)

## 2021-10-24 NOTE — PROVIDER CONTACT NOTE (OTHER) - ACTION/TREATMENT ORDERED:
positioned for comfort, oral suctioned preformed. positioned for comfort, oral suctioned preformed. as per np monitor.

## 2021-10-24 NOTE — PROVIDER CONTACT NOTE (OTHER) - ASSESSMENT
pat a&ox0, RR 16 spo2 98% room air but with increased work of breathing.   pulse 106, bp 128/78, temp 97.5.

## 2021-10-24 NOTE — CHART NOTE - NSCHARTNOTEFT_GEN_A_CORE
JOSÉ ANTONIO MELENDREZ  81y Male  Called by RN to evaluate pt with use of accessory muscle for breathing. Pt seen and evaluated. Pt A & O x 0, ROS unable to obtain. Pt noted to be tachycardic on assesment. EKG with Sinus tach, afebrile.      PAST MEDICAL & SURGICAL HISTORY:  Spinal stenosis    Diabetes    Benign essential HTN    Gallstones    S/P cataract surgery      Vital Signs Last 24 Hrs  T(C): 36.7 (24 Oct 2021 00:13), Max: 37.2 (23 Oct 2021 22:39)  T(F): 98.1 (24 Oct 2021 00:13), Max: 99 (23 Oct 2021 22:39)  HR: 115 (24 Oct 2021 00:13) (80 - 137)  BP: 120/70 (24 Oct 2021 00:13) (120/70 - 158/74)  BP(mean): --  RR: 19 (24 Oct 2021 00:13) (17 - 22)  SpO2: 97% (24 Oct 2021 00:13) (96% - 100%)    PE: General: A & O X 0           CV: S1, S2, tachycardic            Pulm: CTA b/l           Abd: Soft, NT, ND, + BS          Ext: Trace LE edema, + b/l UE edema  Event Summary:  80M PMH gallstones (30-40 yrs ago), BPH, HLD, spinal stenosis, CVA, s/p PEG, recent PNA, penile infxn (?abscess) and sacral wound on abx w/ PICC (cefepime 2gq8h 8/20-9/28; doxy 8/20-?), presented w/ worsening cough x 1 wk found to have right frontal hemorrhagic mass. Admission complicated with polymycrobial bactereia, CRE Keb bacteremia, C auris bacteremia on Caspofungin now being evaluated for tachypnea and tachycardia    Tachypnea and tachycardia  - s/p suctioning with mucous removal and symptoms slowly resolved  - Aggressive chest PT  - Suction prn   - EKG with sinus tachycardia, no ST/ T wave changes  - Afebrile  - Tylenol 650 via peg for possible pain  - Keep HOB elevated  Will continue to monitor pt  F/U with primary team in AM.     Michelle Soliz NP  [id number] JOSÉ ANTONIO MELENDREZ  81y Male  Called by RN to evaluate pt with use of accessory muscle for breathing. Pt seen and evaluated. Pt A & O x 0, ROS unable to obtain. Pt noted to be tachycardic on assessment. EKG with Sinus tach, afebrile.      PAST MEDICAL & SURGICAL HISTORY:  Spinal stenosis    Diabetes    Benign essential HTN    Gallstones    S/P cataract surgery      Vital Signs Last 24 Hrs  T(C): 36.7 (24 Oct 2021 00:13), Max: 37.2 (23 Oct 2021 22:39)  T(F): 98.1 (24 Oct 2021 00:13), Max: 99 (23 Oct 2021 22:39)  HR: 115 (24 Oct 2021 00:13) (80 - 137)  BP: 120/70 (24 Oct 2021 00:13) (120/70 - 158/74)  BP(mean): --  RR: 19 (24 Oct 2021 00:13) (17 - 22)  SpO2: 97% (24 Oct 2021 00:13) (96% - 100%)    PE: General: A & O X 0           CV: S1, S2, tachycardic            Pulm: CTA b/l           Abd: Soft, NT, ND, + BS          Ext: Trace LE edema, + b/l UE edema  Event Summary:  80M PMH gallstones (30-40 yrs ago), BPH, HLD, spinal stenosis, CVA, s/p PEG, recent PNA, penile infxn (?abscess) and sacral wound on abx w/ PICC (cefepime 2gq8h 8/20-9/28; doxy 8/20-?), presented w/ worsening cough x 1 wk found to have right frontal hemorrhagic mass. 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     Michelle Soliz NP  [id number]    Addendum @ 1949  ICU Vital Signs Last 24 Hrs  T(C): 36.4 (24 Oct 2021 02:50), Max: 37.2 (23 Oct 2021 22:39)  T(F): 97.5 (24 Oct 2021 02:50), Max: 99 (23 Oct 2021 22:39)  HR: 106 (24 Oct 2021 02:50) (80 - 137)  BP: 128/78 (24 Oct 2021 02:50) (120/70 - 158/74)  BP(mean): --  ABP: --  ABP(mean): --  RR: 16 (24 Oct 2021 02:50) (16 - 22)  SpO2: 98% (24 Oct 2021 02:50) (96% - 100%)    HR improved.   CXR with mild pulm edema as discussed with radiology (prelim)  Will give Lasix 20 IV X 1  Will continue to monitor pt   F/U with primary team in AM.    Michelle Soliz NP  [id number]

## 2021-10-24 NOTE — PROVIDER CONTACT NOTE (HYPOGLYCEMIA EVENT) - NS PROVIDER CONTACT REASON - HYPO
fs @ 1117 - 50 , repeat 1118   FS 54
repeat FS @ 1142  86 repeat @ 1205 - 68  , repeat @ 1210   FS 78

## 2021-10-24 NOTE — PROVIDER CONTACT NOTE (OTHER) - BACKGROUND
82 y/o male admitted for right frontal hemorrhagic m with polymycrobial bactereia, CRE Keb bacteremia, C auris bacteremia.

## 2021-10-24 NOTE — PROVIDER CONTACT NOTE (OTHER) - BACKGROUND
80 y/o male admitted for right frontal hemorrhagic m with polymycrobial bactereia, CRE Keb bacteremia, C auris bacteremia.

## 2021-10-25 NOTE — PROGRESS NOTE ADULT - PROBLEM SELECTOR PLAN 6
-occult positive in gastric lavage in MICU. c/w empiric PPI IV BID for now.   -Also with hemorrhagic brain mass. -Repeat CT head appears stable without any new hemorrhage.  -Trend CBC.   -Iron studies, B12, folate, LDH, hapto, retics. -showed low folate. repleted

## 2021-10-25 NOTE — PROGRESS NOTE ADULT - SUBJECTIVE AND OBJECTIVE BOX
[facility]-- WOUND TEAM -- FOLLOW UP NOTE  --------------------------------------------------------------------------------    24 hour events/subjective:    tolerating TF  afebrile  incontinent     had condom cath- keeps coming off 2/2 edema      Diet:  Diet, NPO with Tube Feed:   Tube Feeding Modality: Gastrostomy  Jevity 1.5 Adria (JEVITY1.5RTH)  Total Volume for 24 Hours (mL): 1200  Continuous  Starting Tube Feed Rate mL per Hour: 50  Until Goal Tube Feed Rate (mL per Hour): 50  Tube Feed Duration (in Hours): 24  Tube Feed Start Time: 12:00  Ibrahima(7 Gm Arginine/7 Gm Glut/1.2 Gm HMB     Qty per Day:  2 (10-22-21 @ 17:36)      ROS: pt unable to offer    ALLERGIES & MEDICATIONS  --------------------------------------------------------------------------------    No Known Allergies      STANDING INPATIENT MEDICATIONS  ascorbic acid 500 milliGRAM(s) Oral daily  BACItracin   Ointment 1 Application(s) Topical two times a day  caspofungin IVPB 50 milliGRAM(s) IV Intermittent every 24 hours  chlorhexidine 2% Cloths 1 Application(s) Topical <User Schedule>  dexAMETHasone  IVPB 8 milliGRAM(s) IV Intermittent every 8 hours  dextrose 40% Gel 15 Gram(s) Oral once  dextrose 5%. 1000 milliLiter(s) IV Continuous <Continuous>  dextrose 5%. 1000 milliLiter(s) IV Continuous <Continuous>  dextrose 50% Injectable 25 Gram(s) IV Push once  dextrose 50% Injectable 12.5 Gram(s) IV Push once  dextrose 50% Injectable 25 Gram(s) IV Push once  erythromycin   Ointment 1 Application(s) Left EYE four times a day  folic acid 1 milliGRAM(s) Oral daily  glucagon  Injectable 1 milliGRAM(s) IntraMuscular once  hydrALAZINE 50 milliGRAM(s) Oral three times a day  insulin lispro (ADMELOG) corrective regimen sliding scale   SubCutaneous every 6 hours  insulin NPH human recombinant 6 Unit(s) SubCutaneous every 6 hours  levETIRAcetam  IVPB 500 milliGRAM(s) IV Intermittent every 12 hours  multivitamin/minerals/iron Oral Solution (CENTRUM) 15 milliLiter(s) Oral daily  nystatin Powder 1 Application(s) Topical two times a day  pantoprazole  Injectable 40 milliGRAM(s) IV Push two times a day  polyethylene glycol 3350 17 Gram(s) Oral every 12 hours  senna Syrup 10 milliLiter(s) Oral at bedtime      PRN INPATIENT MEDICATION        VITALS/PHYSICAL EXAM  --------------------------------------------------------------------------------  T(C): 36.9 (10-25-21 @ 12:13), Max: 36.9 (10-25-21 @ 04:44)  HR: 111 (10-25-21 @ 12:13) (85 - 111)  BP: 131/69 (10-25-21 @ 12:13) (126/72 - 142/80)  RR: 18 (10-25-21 @ 12:13) (18 - 20)  SpO2: 100% (10-25-21 @ 12:13) (96% - 100%)  Wt(kg): --        10-24-21 @ 07:01  -  10-25-21 @ 07:00  --------------------------------------------------------  IN: 1110 mL / OUT: 1500 mL / NET: -390 mL    10-25-21 @ 07:01  -  10-25-21 @ 14:43  --------------------------------------------------------  IN: 0 mL / OUT: 350 mL / NET: -350 mL      NAD, sedated, frail  Versa Care P500 bed  HEENT:  NC/AT, mucosa moist,  trachea midline, neck supple  Gastrointestinal: soft NT/ND (+)BS  (+)PEG   Psych: unable to assess  Neurology: nonverbal, no follow commands/ paraplegic  Musculoskeletal:  passive ROM  Vascular: BLE equally warm,  mild BLE edema equal, BLE DP/PT pulses palpable      no acute ischemia noted  Skin:  moist w/ good turgor, anasarca  Sacrum unstageable w/soft eschar w/ slough along edges        5cm x 4cm x 1.4cm w/ slough and nonviable tissue, about 10% granular  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 base no palp bone      5.5cm x 8xm x 2cm w/ undermining from 12-3o'clock w/ greatest of 2cm at 2o'clock      moderate serosanguinous drainage  Lt Hip 2.5cm x 2cm x 0.1cm unstageable w/  slough w/ partial thickness granular tissue along periphery       w/ serosnaguinous drainage  Bilateral thoracic posterior ribs w/ 0.5cm x 1cm x 0cm hyperpigmented skin w/o blistering or drainage  Bilateral heels, bunions and lateral feet w/ dry resolving blisters w/o skin break, drainage  No odor, erythema, increased warmth, tenderness, induration, fluctuance          LABS/ CULTURES/ RADIOLOGY:              8.0    18.72 >-----------<  50       [10-24-21 @ 11:24]              25.9     147  |  110  |  39  ----------------------------<  45      [10-24-21 @ 11:24]  3.4   |  24  |  0.36        Ca     7.9     [10-24-21 @ 11:24]      CAPILLARY BLOOD GLUCOSE  POCT Blood Glucose.: 263 mg/dL (25 Oct 2021 11:52)  POCT Blood Glucose.: 284 mg/dL (25 Oct 2021 06:09)  POCT Blood Glucose.: 248 mg/dL (25 Oct 2021 00:53)  POCT Blood Glucose.: 267 mg/dL (24 Oct 2021 23:27)  POCT Blood Glucose.: 218 mg/dL (24 Oct 2021 20:42)  POCT Blood Glucose.: 179 mg/dL (24 Oct 2021 17:12)

## 2021-10-25 NOTE — GOALS OF CARE CONVERSATION - ADVANCED CARE PLANNING - CONVERSATION DETAILS
Spoke with brooklynn for greater then 16 mins regarding ACP.  Discussed pts hospital course and current medical issues. decline in MS 2/2 to [facility].   Brooklynn unable to make a decision as she would like to further discuss with her family.  Advised her that all of his medical treatments would continue and will not be affected by his code status as this was one of her concerns.  Questions answered emotional support provided.  Brooklynn asked that I call her back on Friday to talk again.

## 2021-10-25 NOTE — PROGRESS NOTE ADULT - ASSESSMENT
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 Admitted to MICU for management of hyponatremia and hypotension requiring pressors.      Wound Consult requested to assist w/ management of multiple pressure injuries present on admission  Sacral & Lt Hip unstageable w/ evolving DTI  Posterior Ribs w/ unstageable pressure injuries healed  Rt Hip stage 4 pressure injury   Incontinence of stool and urine  prophylaxis measures      Pt w/poor prognosis- ongoing GOC w/ family  Rt Hip- VAC  w/ Wound PT as per protocol  Sacrum/ Buttocks & Lt  hip- continue  Medihoney dressing      CAVILON QD w/ pericare as per protocol w/ pericare      loose bm improving w/ senna  Posterior Ribs - allevyn QOD  Bilateral feet= CAVILON QD to dried blisters   Abx per Medicine / ID   Moisturize intact skin w/ SWEEN cream BID  Nutritional optimization for pt w/ acute on chronic protein calorie malnutrition        continue  Jevity TF and prosource  & sarbjit        folic acid MVI & Vit C to promote wound healing  Continue turning and positioning w/ offloading assistive devices as per protocol  Waffle Cushion to chair when oob to chair  Continue w/ low air loss bed surface, consider Envella bed if aligned w/ GOC as pt        w/ multiple surfaces w/ pressure injuries  Care as per medicine will follow w/ you  Upon discharge f/u as outpatient at [facility] [address] [facility] [phone]  D/w team & RN  DEVIN IrvinC CWS [id number]  I spent 35minutes face to face w/ this pt of which more than 50% of the time was spent counseling & coordinating care of this pt.

## 2021-10-25 NOTE — PROGRESS NOTE ADULT - PROBLEM SELECTOR PLAN 11
-Hold pharma dvt ppx for now given recent hgb downtrend and pos blood in gastric lavage and hemorrhagic brain mass.   - f/u pallative care for ongoing goc discussions

## 2021-10-25 NOTE — PROGRESS NOTE ADULT - SUBJECTIVE AND OBJECTIVE BOX
Patient is a 81y old  Male who presents with a chief complaint of Hyponatremia (24 Oct 2021 14:48)      SUBJECTIVE / OVERNIGHT EVENTS: appears comfortable     MEDICATIONS  (STANDING):  ascorbic acid 500 milliGRAM(s) Oral daily  BACItracin   Ointment 1 Application(s) Topical two times a day  caspofungin IVPB 50 milliGRAM(s) IV Intermittent every 24 hours  chlorhexidine 2% Cloths 1 Application(s) Topical <User Schedule>  dexAMETHasone  IVPB 8 milliGRAM(s) IV Intermittent every 8 hours  dextrose 40% Gel 15 Gram(s) Oral once  dextrose 5%. 1000 milliLiter(s) (100 mL/Hr) IV Continuous <Continuous>  dextrose 5%. 1000 milliLiter(s) (50 mL/Hr) IV Continuous <Continuous>  dextrose 50% Injectable 25 Gram(s) IV Push once  dextrose 50% Injectable 12.5 Gram(s) IV Push once  dextrose 50% Injectable 25 Gram(s) IV Push once  erythromycin   Ointment 1 Application(s) Left EYE four times a day  folic acid 1 milliGRAM(s) Oral daily  glucagon  Injectable 1 milliGRAM(s) IntraMuscular once  hydrALAZINE 50 milliGRAM(s) Oral three times a day  insulin lispro (ADMELOG) corrective regimen sliding scale   SubCutaneous every 6 hours  insulin NPH human recombinant 6 Unit(s) SubCutaneous every 6 hours  levETIRAcetam  IVPB 500 milliGRAM(s) IV Intermittent every 12 hours  multivitamin/minerals/iron Oral Solution (CENTRUM) 15 milliLiter(s) Oral daily  nystatin Powder 1 Application(s) Topical two times a day  pantoprazole  Injectable 40 milliGRAM(s) IV Push two times a day  polyethylene glycol 3350 17 Gram(s) Oral every 12 hours  senna Syrup 10 milliLiter(s) Oral at bedtime    MEDICATIONS  (PRN):        CAPILLARY BLOOD GLUCOSE      POCT Blood Glucose.: 263 mg/dL (25 Oct 2021 11:52)  POCT Blood Glucose.: 284 mg/dL (25 Oct 2021 06:09)  POCT Blood Glucose.: 248 mg/dL (25 Oct 2021 00:53)  POCT Blood Glucose.: 267 mg/dL (24 Oct 2021 23:27)  POCT Blood Glucose.: 218 mg/dL (24 Oct 2021 20:42)  POCT Blood Glucose.: 179 mg/dL (24 Oct 2021 17:12)    I&O's Summary    24 Oct 2021 07:01  -  25 Oct 2021 07:00  --------------------------------------------------------  IN: 1110 mL / OUT: 1500 mL / NET: -390 mL    25 Oct 2021 07:01  -  25 Oct 2021 14:21  --------------------------------------------------------  IN: 0 mL / OUT: 350 mL / NET: -350 mL        PHYSICAL EXAM:    GENERAL: chronically ill appearing   CHEST/LUNG: no wheezing noted   HEART: +S1/S2   ABDOMEN: Soft, Nontender, Nondistended  EXTREMITIES: anasarca and significant peripheral edema   PSYCH: AAOx0    LABS:                        8.0    18.72 )-----------( 50       ( 24 Oct 2021 11:24 )             25.9     10-24    147<H>  |  110<H>  |  39<H>  ----------------------------<  45<LL>  3.4<L>   |  24  |  0.36<L>    Ca    7.9<L>      24 Oct 2021 11:24                RADIOLOGY & ADDITIONAL TESTS:    Imaging Personally Reviewed:    Consultant(s) Notes Reviewed:      Care Discussed with Consultants/Other Providers:

## 2021-10-26 NOTE — PROGRESS NOTE ADULT - PROBLEM SELECTOR PLAN 1
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 DDX of imaging metastases, glioblastoma and lymphoma. Comatose  -Family initially wanted to pursue more aggressive mgmt. -LAITH was tentatively planning to do biopsy vs debulking but prior physician discussed with daughter Brooklynn 10/9 and she would now like to pursue more conservative/medical management and not go with any surgery/biopsy   -Ongoing GOC discussions with family  -C/w dexamethasone; c/w increased 8mg IV q8h for brain edema per neuro onco.   -Na goal 145-155 per LAITH. Monitor CBC and BMP   -Neuro checks q4h.   -F/u neuro onco and LAITH recs.  -C/w keppra 500mg BID for seizure ppx.

## 2021-10-26 NOTE — PROGRESS NOTE ADULT - SUBJECTIVE AND OBJECTIVE BOX
Patient is a 81y old  Male who presents with a chief complaint of Hyponatremia (25 Oct 2021 14:42)      SUBJECTIVE / OVERNIGHT EVENTS: appears comfortable     MEDICATIONS  (STANDING):  ascorbic acid 500 milliGRAM(s) Oral daily  BACItracin   Ointment 1 Application(s) Topical two times a day  caspofungin IVPB 50 milliGRAM(s) IV Intermittent every 24 hours  chlorhexidine 2% Cloths 1 Application(s) Topical <User Schedule>  dexAMETHasone  IVPB 8 milliGRAM(s) IV Intermittent every 8 hours  dextrose 40% Gel 15 Gram(s) Oral once  dextrose 5%. 1000 milliLiter(s) (50 mL/Hr) IV Continuous <Continuous>  dextrose 5%. 1000 milliLiter(s) (100 mL/Hr) IV Continuous <Continuous>  dextrose 50% Injectable 25 Gram(s) IV Push once  dextrose 50% Injectable 12.5 Gram(s) IV Push once  dextrose 50% Injectable 25 Gram(s) IV Push once  erythromycin   Ointment 1 Application(s) Left EYE four times a day  folic acid 1 milliGRAM(s) Oral daily  glucagon  Injectable 1 milliGRAM(s) IntraMuscular once  hydrALAZINE 50 milliGRAM(s) Oral three times a day  insulin lispro (ADMELOG) corrective regimen sliding scale   SubCutaneous every 6 hours  insulin NPH human recombinant 6 Unit(s) SubCutaneous every 6 hours  levETIRAcetam  IVPB 500 milliGRAM(s) IV Intermittent every 12 hours  multivitamin/minerals/iron Oral Solution (CENTRUM) 15 milliLiter(s) Oral daily  nystatin Powder 1 Application(s) Topical two times a day  pantoprazole  Injectable 40 milliGRAM(s) IV Push two times a day  polyethylene glycol 3350 17 Gram(s) Oral every 12 hours  senna Syrup 10 milliLiter(s) Oral at bedtime    MEDICATIONS  (PRN):        CAPILLARY BLOOD GLUCOSE      POCT Blood Glucose.: 125 mg/dL (26 Oct 2021 12:25)  POCT Blood Glucose.: 201 mg/dL (26 Oct 2021 05:07)  POCT Blood Glucose.: 215 mg/dL (25 Oct 2021 23:50)  POCT Blood Glucose.: 218 mg/dL (25 Oct 2021 18:41)  POCT Blood Glucose.: 203 mg/dL (25 Oct 2021 17:19)    I&O's Summary    25 Oct 2021 07:01  -  26 Oct 2021 07:00  --------------------------------------------------------  IN: 2400 mL / OUT: 350 mL / NET: 2050 mL    26 Oct 2021 07:01  -  26 Oct 2021 13:44  --------------------------------------------------------  IN: 0 mL / OUT: 500 mL / NET: -500 mL        PHYSICAL EXAM:    GENERAL: chronically ill appearing   CHEST/LUNG: no wheezing noted   HEART: +S1/S2   ABDOMEN: Soft, Nontender, Nondistended  EXTREMITIES: anasarca and significant peripheral edema   PSYCH: AAOx0    LABS:                        7.6    11.19 )-----------( 42       ( 25 Oct 2021 14:54 )             24.3     10-26    148<H>  |  111<H>  |  37<H>  ----------------------------<  117<H>  4.4   |  24  |  0.31<L>    Ca    8.1<L>      26 Oct 2021 12:01    TPro  4.4<L>  /  Alb  2.3<L>  /  TBili  0.5  /  DBili  x   /  AST  44<H>  /  ALT  77<H>  /  AlkPhos  197<H>  10-26              RADIOLOGY & ADDITIONAL TESTS:    Imaging Personally Reviewed:    Consultant(s) Notes Reviewed:      Care Discussed with Consultants/Other Providers:

## 2021-10-27 NOTE — PROGRESS NOTE ADULT - SUBJECTIVE AND OBJECTIVE BOX
Patient is a 81y old  Male who presents with a chief complaint of Hyponatremia (26 Oct 2021 13:44)      SUBJECTIVE / OVERNIGHT EVENTS:    MEDICATIONS  (STANDING):  ascorbic acid 500 milliGRAM(s) Oral daily  BACItracin   Ointment 1 Application(s) Topical two times a day  caspofungin IVPB 50 milliGRAM(s) IV Intermittent every 24 hours  chlorhexidine 2% Cloths 1 Application(s) Topical <User Schedule>  dexAMETHasone  IVPB 8 milliGRAM(s) IV Intermittent every 8 hours  dextrose 40% Gel 15 Gram(s) Oral once  dextrose 5%. 1000 milliLiter(s) (100 mL/Hr) IV Continuous <Continuous>  dextrose 5%. 1000 milliLiter(s) (50 mL/Hr) IV Continuous <Continuous>  dextrose 50% Injectable 25 Gram(s) IV Push once  dextrose 50% Injectable 12.5 Gram(s) IV Push once  dextrose 50% Injectable 25 Gram(s) IV Push once  erythromycin   Ointment 1 Application(s) Left EYE four times a day  folic acid 1 milliGRAM(s) Oral daily  glucagon  Injectable 1 milliGRAM(s) IntraMuscular once  hydrALAZINE 50 milliGRAM(s) Oral three times a day  insulin lispro (ADMELOG) corrective regimen sliding scale   SubCutaneous every 6 hours  insulin NPH human recombinant 6 Unit(s) SubCutaneous every 6 hours  levETIRAcetam  IVPB 500 milliGRAM(s) IV Intermittent every 12 hours  multivitamin/minerals/iron Oral Solution (CENTRUM) 15 milliLiter(s) Oral daily  nystatin Powder 1 Application(s) Topical two times a day  pantoprazole  Injectable 40 milliGRAM(s) IV Push two times a day  polyethylene glycol 3350 17 Gram(s) Oral every 12 hours  senna Syrup 10 milliLiter(s) Oral at bedtime    MEDICATIONS  (PRN):        CAPILLARY BLOOD GLUCOSE      POCT Blood Glucose.: 147 mg/dL (27 Oct 2021 11:41)  POCT Blood Glucose.: 241 mg/dL (27 Oct 2021 06:12)  POCT Blood Glucose.: 232 mg/dL (26 Oct 2021 23:21)  POCT Blood Glucose.: 222 mg/dL (26 Oct 2021 17:14)    I&O's Summary    26 Oct 2021 07:01  -  27 Oct 2021 07:00  --------------------------------------------------------  IN: 120 mL / OUT: 1260 mL / NET: -1140 mL        PHYSICAL EXAM:  GENERAL: NAD, well-developed  HEAD:  Atraumatic, Normocephalic  EYES: EOMI, PERRLA, conjunctiva and sclera clear  NECK: Supple, No JVD  CHEST/LUNG: Clear to auscultation bilaterally; No wheeze  HEART: Regular rate and rhythm; No murmurs, rubs, or gallops  ABDOMEN: Soft, Nontender, Nondistended; Bowel sounds present  EXTREMITIES:  2+ Peripheral Pulses, No clubbing, cyanosis, or edema  PSYCH: AAOx3  NEUROLOGY: non-focal  SKIN: No rashes or lesions    LABS:                        7.7    9.12  )-----------( 34       ( 27 Oct 2021 10:28 )             25.1     10-27    148<H>  |  111<H>  |  39<H>  ----------------------------<  147<H>  4.3   |  24  |  0.30<L>    Ca    7.9<L>      27 Oct 2021 10:28    TPro  4.4<L>  /  Alb  2.3<L>  /  TBili  0.5  /  DBili  x   /  AST  44<H>  /  ALT  77<H>  /  AlkPhos  197<H>  10-26              RADIOLOGY & ADDITIONAL TESTS:    Imaging Personally Reviewed:    Consultant(s) Notes Reviewed:      Care Discussed with Consultants/Other Providers:   Patient is a 81y old  Male who presents with a chief complaint of Hyponatremia (26 Oct 2021 13:44)      SUBJECTIVE / OVERNIGHT EVENTS: appears comfortable     MEDICATIONS  (STANDING):  ascorbic acid 500 milliGRAM(s) Oral daily  BACItracin   Ointment 1 Application(s) Topical two times a day  caspofungin IVPB 50 milliGRAM(s) IV Intermittent every 24 hours  chlorhexidine 2% Cloths 1 Application(s) Topical <User Schedule>  dexAMETHasone  IVPB 8 milliGRAM(s) IV Intermittent every 8 hours  dextrose 40% Gel 15 Gram(s) Oral once  dextrose 5%. 1000 milliLiter(s) (100 mL/Hr) IV Continuous <Continuous>  dextrose 5%. 1000 milliLiter(s) (50 mL/Hr) IV Continuous <Continuous>  dextrose 50% Injectable 25 Gram(s) IV Push once  dextrose 50% Injectable 12.5 Gram(s) IV Push once  dextrose 50% Injectable 25 Gram(s) IV Push once  erythromycin   Ointment 1 Application(s) Left EYE four times a day  folic acid 1 milliGRAM(s) Oral daily  glucagon  Injectable 1 milliGRAM(s) IntraMuscular once  hydrALAZINE 50 milliGRAM(s) Oral three times a day  insulin lispro (ADMELOG) corrective regimen sliding scale   SubCutaneous every 6 hours  insulin NPH human recombinant 6 Unit(s) SubCutaneous every 6 hours  levETIRAcetam  IVPB 500 milliGRAM(s) IV Intermittent every 12 hours  multivitamin/minerals/iron Oral Solution (CENTRUM) 15 milliLiter(s) Oral daily  nystatin Powder 1 Application(s) Topical two times a day  pantoprazole  Injectable 40 milliGRAM(s) IV Push two times a day  polyethylene glycol 3350 17 Gram(s) Oral every 12 hours  senna Syrup 10 milliLiter(s) Oral at bedtime    MEDICATIONS  (PRN):        CAPILLARY BLOOD GLUCOSE      POCT Blood Glucose.: 147 mg/dL (27 Oct 2021 11:41)  POCT Blood Glucose.: 241 mg/dL (27 Oct 2021 06:12)  POCT Blood Glucose.: 232 mg/dL (26 Oct 2021 23:21)  POCT Blood Glucose.: 222 mg/dL (26 Oct 2021 17:14)    I&O's Summary    26 Oct 2021 07:01  -  27 Oct 2021 07:00  --------------------------------------------------------  IN: 120 mL / OUT: 1260 mL / NET: -1140 mL        PHYSICAL EXAM:  GENERAL: chronically ill appearing   CHEST/LUNG: no wheezing noted   HEART: +S1/S2   ABDOMEN: Soft, Nontender, Nondistended  EXTREMITIES: anasarca and significant peripheral edema   PSYCH: AAOx0    LABS:                        7.7    9.12  )-----------( 34       ( 27 Oct 2021 10:28 )             25.1     10-27    148<H>  |  111<H>  |  39<H>  ----------------------------<  147<H>  4.3   |  24  |  0.30<L>    Ca    7.9<L>      27 Oct 2021 10:28    TPro  4.4<L>  /  Alb  2.3<L>  /  TBili  0.5  /  DBili  x   /  AST  44<H>  /  ALT  77<H>  /  AlkPhos  197<H>  10-26              RADIOLOGY & ADDITIONAL TESTS:    Imaging Personally Reviewed:    Consultant(s) Notes Reviewed:      Care Discussed with Consultants/Other Providers:

## 2021-10-27 NOTE — PROGRESS NOTE ADULT - PROBLEM SELECTOR PLAN 1
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 DDX of imaging metastases, glioblastoma and lymphoma. Comatose  -Family initially wanted to pursue more aggressive mgmt. -LAITH was tentatively planning to do biopsy vs debulking but prior physician discussed with daughter Brooklynn 10/9 and she would now like to pursue more conservative/medical management and not go with any surgery/biopsy   -Ongoing GOC discussions with family  -C/w dexamethasone; c/w increased 8mg IV q8h for brain edema per neuro onco.   -Na goal 145-155 per LAITH. Monitor CBC and BMP   -Neuro checks q4h.   -F/u neuro onco and LAITH recs.  -C/w keppra 500mg BID for seizure ppx.  -Dw daughter Brooklynn agreeable for hospice referral

## 2021-10-27 NOTE — PROGRESS NOTE ADULT - PROBLEM SELECTOR PLAN 11
-Hold pharma dvt ppx for now given recent hgb downtrend and pos blood in gastric lavage and hemorrhagic brain mass.   - Hospice referral to be sent today, daughter agreeable

## 2021-10-28 NOTE — PROVIDER CONTACT NOTE (OTHER) - SITUATION
Pt found with blood on pillow. Orally suctioned with benson blood & clots noted. Unable to locate source of bleeding. Gentle oral care given.

## 2021-10-28 NOTE — HOSPICE CARE NOTE - CONVESATION DETAILS
Telephone call to daughter Brooklynn. No answer. Message left requesting call back. 
Telephone call to daughter. Discussed hospice services.   Consents emailed to her which she said she will review.    Hospice MD reported that patient cannot be Dc until the course of Caspofungin is completed. As stated in [place] after 11/8/21.   Wound care with VAC dressing will need to be addressed as the VAC dressing is not a part of the Hospice plan of care.     Message sent to CM in [employer].

## 2021-10-28 NOTE — PROGRESS NOTE ADULT - SUBJECTIVE AND OBJECTIVE BOX
Patient is a 81y old  Male who presents with a chief complaint of Hyponatremia (27 Oct 2021 12:50)      SUBJECTIVE / OVERNIGHT EVENTS: appears comfortable     MEDICATIONS  (STANDING):  ascorbic acid 500 milliGRAM(s) Oral daily  BACItracin   Ointment 1 Application(s) Topical two times a day  caspofungin IVPB 50 milliGRAM(s) IV Intermittent every 24 hours  chlorhexidine 2% Cloths 1 Application(s) Topical <User Schedule>  dexAMETHasone  IVPB 8 milliGRAM(s) IV Intermittent every 8 hours  dextrose 40% Gel 15 Gram(s) Oral once  dextrose 5%. 1000 milliLiter(s) (100 mL/Hr) IV Continuous <Continuous>  dextrose 5%. 1000 milliLiter(s) (50 mL/Hr) IV Continuous <Continuous>  dextrose 50% Injectable 25 Gram(s) IV Push once  dextrose 50% Injectable 12.5 Gram(s) IV Push once  dextrose 50% Injectable 25 Gram(s) IV Push once  erythromycin   Ointment 1 Application(s) Left EYE four times a day  folic acid 1 milliGRAM(s) Oral daily  glucagon  Injectable 1 milliGRAM(s) IntraMuscular once  hydrALAZINE 50 milliGRAM(s) Oral three times a day  insulin lispro (ADMELOG) corrective regimen sliding scale   SubCutaneous every 6 hours  insulin NPH human recombinant 6 Unit(s) SubCutaneous every 6 hours  levETIRAcetam  IVPB 500 milliGRAM(s) IV Intermittent every 12 hours  multivitamin/minerals/iron Oral Solution (CENTRUM) 15 milliLiter(s) Oral daily  nystatin Powder 1 Application(s) Topical two times a day  pantoprazole  Injectable 40 milliGRAM(s) IV Push two times a day  polyethylene glycol 3350 17 Gram(s) Oral every 12 hours  senna Syrup 10 milliLiter(s) Oral at bedtime    MEDICATIONS  (PRN):        CAPILLARY BLOOD GLUCOSE  250 (28 Oct 2021 06:10)  242 (28 Oct 2021 00:00)      POCT Blood Glucose.: 122 mg/dL (28 Oct 2021 12:23)  POCT Blood Glucose.: 250 mg/dL (28 Oct 2021 06:05)  POCT Blood Glucose.: 242 mg/dL (28 Oct 2021 00:03)  POCT Blood Glucose.: 237 mg/dL (27 Oct 2021 17:08)    I&O's Summary    27 Oct 2021 07:01  -  28 Oct 2021 07:00  --------------------------------------------------------  IN: 1390 mL / OUT: 1070 mL / NET: 320 mL    28 Oct 2021 07:01  -  28 Oct 2021 13:55  --------------------------------------------------------  IN: 0 mL / OUT: 350 mL / NET: -350 mL        PHYSICAL EXAM:  GENERAL: chronically ill appearing   CHEST/LUNG: no wheezing noted   HEART: +S1/S2   ABDOMEN: Soft, Nontender, Nondistended  EXTREMITIES: anasarca and significant peripheral edema   PSYCH: AAOx0    LABS:                        7.8    9.30  )-----------( 34       ( 28 Oct 2021 11:19 )             24.8     10-28    149<H>  |  112<H>  |  44<H>  ----------------------------<  145<H>  4.4   |  26  |  0.31<L>    Ca    7.8<L>      28 Oct 2021 11:19    TPro  4.4<L>  /  Alb  2.4<L>  /  TBili  0.5  /  DBili  x   /  AST  64<H>  /  ALT  118<H>  /  AlkPhos  282<H>  10-28              RADIOLOGY & ADDITIONAL TESTS:    Imaging Personally Reviewed:    Consultant(s) Notes Reviewed:      Care Discussed with Consultants/Other Providers:

## 2021-10-28 NOTE — PROGRESS NOTE ADULT - PROBLEM SELECTOR PLAN 1
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 DDX of imaging metastases, glioblastoma and lymphoma. Comatose  -Family initially wanted to pursue more aggressive mgmt. -LAITH was tentatively planning to do biopsy vs debulking but prior physician discussed with daughter Brooklynn 10/9 and she would now like to pursue more conservative/medical management and not go with any surgery/biopsy   -C/w dexamethasone; c/w increased 8mg IV q8h for brain edema per neuro onco, d/w nsgy 10/28 will provide recs on decadron taper   -Na goal 145-155 per LAITH. Monitor CBC and BMP   -F/u neuro onco and LAITH recs.  -C/w keppra 500mg BID for seizure ppx, pending nsgy recs ? completion   -Ongoing GOC discussions with family  -Dw daughter Brooklynn agreeable for hospice referral

## 2021-10-28 NOTE — PROGRESS NOTE ADULT - PROBLEM SELECTOR PLAN 6
-occult positive in gastric lavage in MICU. c/w empiric PPI IV BID for now.   -Also with hemorrhagic brain mass. -Repeat CT head appears stable without any new hemorrhage.  -Trend CBC.   -Iron studies, B12, folate, LDH, hapto, retics. -showed low folate. repleted  -thrombocytopenia platelets downtrending , ? meds- awaiting nsgy recs ?dc keppra, pending heme eval

## 2021-10-28 NOTE — PROVIDER CONTACT NOTE (OTHER) - BACKGROUND
PEG started at 12noon with no residual noted at that time. This is pt's second PEG Tube. Old one replaced on 10/15

## 2021-10-28 NOTE — PROGRESS NOTE ADULT - PROBLEM SELECTOR PLAN 11
-Hold pharma dvt ppx for now given recent hgb downtrend and pos blood in gastric lavage and hemorrhagic brain mass.   - Hospice referral to be sent, daughter agreeable

## 2021-10-29 NOTE — PROGRESS NOTE ADULT - ASSESSMENT
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 Admitted to MICU for management of hyponatremia and hypotension requiring pressors.      Wound Consult requested to assist w/ management of multiple pressure injuries present on admission  Sacral & Lt Hip unstageable w/ evolving DTI  Posterior Ribs w/ unstageable pressure injuries healed  Rt Hip stage 4 pressure injury   Incontinence of stool and urine  Moisture dermaitis  prophylaxis measures      Pt w/poor prognosis- ongoing GOC w/ family  Rt Hip- VAC  w/ Wound PT as per protocol  Sacrum/ Buttocks & Lt hip- continue Medihoney dressing      CAVILON QD w/ pericare as per protocol w/ pericare      loose bm improving w/ senna  Posterior Ribs - allevyn QOD  Bilateral feet= CAVILON QD to dried blisters   Scrotol sling w/ Interdry Ag  Weeping edema- attends underpad  Antimicrobials per Medicine / ID   Moisturize intact skin w/ SWEEN cream BID  Nutritional optimization for pt w/ acute on chronic protein calorie malnutrition        continue  Jevity TF and prosource  & sarbjit        folic acid MVI & Vit C to promote wound healing  Continue turning and positioning w/ offloading assistive devices as per protocol  Waffle Cushion to chair when oob to chair  Continue w/ low air loss bed surface, consider Envella bed if aligned w/ GOC as pt        w/ multiple surfaces w/ pressure injuries  Care as per medicine will follow w/ you  Upon discharge f/u as outpatient at [facility] [address] [facility] [phone]  D/w team & RN  Alessandra Perez PA-C [id number]  I spent 35minutes face to face w/ this pt of which more than 50% of the time was spent counseling & coordinating care of this pt.

## 2021-10-29 NOTE — PROGRESS NOTE ADULT - PROBLEM SELECTOR PLAN 1
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 DDX of imaging metastases, glioblastoma and lymphoma. Comatose  -Family initially wanted to pursue more aggressive mgmt. -LAITH was tentatively planning to do biopsy vs debulking but prior physician discussed with daughter Brooklynn 10/9 and she would now like to pursue more conservative/medical management and not go with any surgery/biopsy   -C/w dexamethasone; c/w increased 8mg IV q8h for brain edema per neuro onco, d/w nsgy 10/28 will provide recs on decadron taper   -Na goal 145-155 per LAITH. Monitor CBC and BMP   -F/u neuro onco and LAITH recs.  -C/w keppra 500mg BID for seizure ppx, pending nsgy recs ? 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 DDX of imaging metastases, glioblastoma and lymphoma. Comatose  -Family initially wanted to pursue more aggressive mgmt. -LAITH was tentatively planning to do biopsy vs debulking but prior physician discussed with daughter Brooklynn 10/9 and she would now like to pursue more conservative/medical management and not go with any surgery/biopsy   -C/w dexamethasone; c/w increased 8mg IV q8h for brain edema per neuro onco, d/w nsgy 10/28 will provide recs on decadron taper   -Na goal 145-155 per LAITH. Monitor CBC and BMP   -F/u neuro onco and LAITH recs.  -C/w keppra 500mg BID for seizure ppx, dw nsgy  -Ongoing GOC discussions with family  -Dw daughter Brooklynn agreeable for hospice referral

## 2021-10-29 NOTE — CONSULT NOTE ADULT - CONSULT REASON
hyponatremia
admitted with multiple pressure injuries
hyperdensities on cTH w/ shift
Pulled out peg
Hyponatremia
Right frontal hemorrhagic mass
Thrombocytopenia
Leukocytosis
GOALS OF CARE

## 2021-10-29 NOTE — PROGRESS NOTE ADULT - PROBLEM SELECTOR PLAN 11
-Hold pharma dvt ppx for now given recent hgb downtrend and pos blood in gastric lavage and hemorrhagic brain mass.   - Hospice referral to be sent, daughter agreeable -Hold pharma dvt ppx for now given recent hgb downtrend and pos blood in gastric lavage and hemorrhagic brain mass.   - Hospice referral sent, daughter agreeable  -Pt to stay till 11/8 to complete caspofungin, follow up with hospice paperwork

## 2021-10-29 NOTE — CONSULT NOTE ADULT - CONSULT REQUESTED DATE/TIME
14-Oct-2021 16:25
05-Oct-2021 17:00
29-Oct-2021 08:46
29-Sep-2021 23:39
30-Sep-2021 00:38
30-Sep-2021 02:09
08-Oct-2021 12:28
30-Sep-2021 16:32
13-Oct-2021 11:00

## 2021-10-29 NOTE — CONSULT NOTE ADULT - PROVIDER SPECIALTY LIST ADULT
Infectious Disease
Nephrology
MICU
Neurosurgery
Gastroenterology
Heme/Onc
Neurology
Wound Care
Palliative Care

## 2021-10-29 NOTE — PROGRESS NOTE ADULT - PROBLEM SELECTOR PLAN 5
Transaminases improving   s/p RUQ sono: minimal biliary dilation   Continue to trend LFTs Transaminases improving   s/p RUQ sono: minimal biliary dilation

## 2021-10-29 NOTE — CONSULT NOTE ADULT - SUBJECTIVE AND OBJECTIVE BOX
Oncology Consult Note    HPI:  80M PMH gallstones (30-40 yrs ago), BPH, HLD, spinal stenosis, CVA, s/p PEG, recent PNA, penile infxn (?abscess) and sacral wound on abx w/ PICC (cefepime 2gq8h 8/20-9/28; doxy 8/20-?), presented w/ worsening cough x 1 wk after daughter attempted to give ensure by mouth. Daughter noticed "snoring" breath sounds and called EMS. Denies sick contact, diarrhea. VSS in ED, Labs significant at that time for Na 110, CO2 14, lactate 2.5. CXR w/ b/l patchy opacities R>L c/w PNA. 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 CTAP w/ multifocal PNA. Iso worsening mental status pt intubated in ED and transferred to MICU.     MICU Course: Empiric abx for multifocal PNA presumed 2/2 aspiration, completed Zosyn course. Placed on levophed iso hypotension, eventually weaned 10/2, sedation weaned as well. 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 Neurosg on board, neuroonc c/s, results of tumor board and discussion w/ family were to proceed w/ biopsy to further evaluate lymphoma vs glioblastoma multiforme vs mets - however now family prefers more conservative approach. Pt extubated 10/6 weaned to RA w/ adequate SpO2%. Iso HTN pt initiated on hydralazine po 50mg TID. Tolerating PEG feeds. Iso steroid use pt requiring NPH insulin q6h titrated to 6 however pt intermittently w/ NPH held iso borderline FS. C/f occult blood from gastric lavage, placed on PPI, Hgb slowly downtrended however stabilized.     Also with CRE Klebsiella Bacteremia and C Auris Bacteremia/Fungemia in addition to above     Hematology consulted due to continued thrombocytopenia     Allergies    No Known Allergies    Intolerances        MEDICATIONS  (STANDING):  ascorbic acid 500 milliGRAM(s) Oral daily  BACItracin   Ointment 1 Application(s) Topical two times a day  caspofungin IVPB 50 milliGRAM(s) IV Intermittent every 24 hours  chlorhexidine 2% Cloths 1 Application(s) Topical <User Schedule>  dexAMETHasone  IVPB 8 milliGRAM(s) IV Intermittent every 8 hours  dextrose 40% Gel 15 Gram(s) Oral once  dextrose 5%. 1000 milliLiter(s) (100 mL/Hr) IV Continuous <Continuous>  dextrose 5%. 1000 milliLiter(s) (50 mL/Hr) IV Continuous <Continuous>  dextrose 50% Injectable 25 Gram(s) IV Push once  dextrose 50% Injectable 12.5 Gram(s) IV Push once  dextrose 50% Injectable 25 Gram(s) IV Push once  erythromycin   Ointment 1 Application(s) Left EYE four times a day  folic acid 1 milliGRAM(s) Oral daily  glucagon  Injectable 1 milliGRAM(s) IntraMuscular once  hydrALAZINE 50 milliGRAM(s) Oral three times a day  insulin lispro (ADMELOG) corrective regimen sliding scale   SubCutaneous every 6 hours  insulin NPH human recombinant 6 Unit(s) SubCutaneous every 6 hours  levETIRAcetam  IVPB 500 milliGRAM(s) IV Intermittent every 12 hours  multivitamin/minerals/iron Oral Solution (CENTRUM) 15 milliLiter(s) Oral daily  nystatin Powder 1 Application(s) Topical two times a day  pantoprazole  Injectable 40 milliGRAM(s) IV Push two times a day  polyethylene glycol 3350 17 Gram(s) Oral every 12 hours  senna Syrup 10 milliLiter(s) Oral at bedtime    MEDICATIONS  (PRN):      PAST MEDICAL & SURGICAL HISTORY:  Spinal stenosis    Diabetes    Benign essential HTN    Gallstones    S/P cataract surgery        FAMILY HISTORY:      SOCIAL HISTORY: No EtOH, no tobacco    REVIEW OF SYSTEMS:    CONSTITUTIONAL: No weakness, fevers or chills  EYES/ENT: No visual changes;  No vertigo or throat pain   NECK: No pain or stiffness  RESPIRATORY: No cough, wheezing, hemoptysis; No shortness of breath  CARDIOVASCULAR: No chest pain or palpitations  GASTROINTESTINAL: No abdominal or epigastric pain. No nausea, vomiting, or hematemesis; No diarrhea or constipation. No melena or hematochezia.  GENITOURINARY: No dysuria, frequency or hematuria  NEUROLOGICAL: No numbness or weakness  SKIN: No itching, burning, rashes, or lesions   All other review of systems is negative unless indicated above.        T(F): 98.3 (10-29-21 @ 05:00), Max: 98.6 (10-28-21 @ 20:10)  HR: 96 (10-29-21 @ 05:00)  BP: 128/75 (10-29-21 @ 05:00)  RR: 18 (10-29-21 @ 05:00)  SpO2: 98% (10-29-21 @ 05:00)  Wt(kg): --    GENERAL: NAD, well-developed  HEAD:  Atraumatic, Normocephalic  EYES: EOMI, PERRLA, conjunctiva and sclera clear  NECK: Supple, No JVD  CHEST/LUNG: Clear to auscultation bilaterally; No wheeze  HEART: Regular rate and rhythm; No murmurs, rubs, or gallops  ABDOMEN: Soft, Nontender, Nondistended; Bowel sounds present  EXTREMITIES:  2+ Peripheral Pulses, No clubbing, cyanosis, or edema  NEUROLOGY: non-focal  SKIN: No rashes or lesions                          7.8    9.30  )-----------( 34       ( 28 Oct 2021 11:19 )             24.8       10-28    149<H>  |  112<H>  |  44<H>  ----------------------------<  145<H>  4.4   |  26  |  0.31<L>    Ca    7.8<L>      28 Oct 2021 11:19    TPro  4.4<L>  /  Alb  2.4<L>  /  TBili  0.5  /  DBili  x   /  AST  64<H>  /  ALT  118<H>  /  AlkPhos  282<H>  10-28           Oncology Consult Note    HPI:  80M PMH gallstones (30-40 yrs ago), BPH, HLD, spinal stenosis, CVA, s/p PEG, recent PNA, penile infxn (?abscess) and sacral wound on abx w/ PICC (cefepime 2gq8h 8/20-9/28; doxy 8/20-?), presented w/ worsening cough x 1 wk after daughter attempted to give ensure by mouth. Daughter noticed "snoring" breath sounds and called EMS. Denies sick contact, diarrhea. VSS in ED, Labs significant at that time for Na 110, CO2 14, lactate 2.5. CXR w/ b/l patchy opacities R>L c/w PNA. 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 CTAP w/ multifocal PNA. Iso worsening mental status pt intubated in ED and transferred to MICU.     MICU Course: Empiric abx for multifocal PNA presumed 2/2 aspiration, completed Zosyn course. Placed on levophed iso hypotension, eventually weaned 10/2, sedation weaned as well. 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 Neurosg on board, neuroonc c/s, results of tumor board and discussion w/ family were to proceed w/ biopsy to further evaluate lymphoma vs glioblastoma multiforme vs mets - however now family prefers more conservative approach. Pt extubated 10/6 weaned to RA w/ adequate SpO2%. Iso HTN pt initiated on hydralazine po 50mg TID. Tolerating PEG feeds. Iso steroid use pt requiring NPH insulin q6h titrated to 6 however pt intermittently w/ NPH held iso borderline FS. C/f occult blood from gastric lavage, placed on PPI, Hgb slowly downtrended however stabilized.     Also with CRE Klebsiella Bacteremia and C Auris Bacteremia/Fungemia in addition to above     Hematology consulted due to continued thrombocytopenia     At time of encounter, patient is obtunded, not responding. Wound care at bedside.       Allergies    No Known Allergies    Intolerances        MEDICATIONS  (STANDING):  ascorbic acid 500 milliGRAM(s) Oral daily  BACItracin   Ointment 1 Application(s) Topical two times a day  caspofungin IVPB 50 milliGRAM(s) IV Intermittent every 24 hours  chlorhexidine 2% Cloths 1 Application(s) Topical <User Schedule>  dexAMETHasone  IVPB 8 milliGRAM(s) IV Intermittent every 8 hours  dextrose 40% Gel 15 Gram(s) Oral once  dextrose 5%. 1000 milliLiter(s) (100 mL/Hr) IV Continuous <Continuous>  dextrose 5%. 1000 milliLiter(s) (50 mL/Hr) IV Continuous <Continuous>  dextrose 50% Injectable 25 Gram(s) IV Push once  dextrose 50% Injectable 12.5 Gram(s) IV Push once  dextrose 50% Injectable 25 Gram(s) IV Push once  erythromycin   Ointment 1 Application(s) Left EYE four times a day  folic acid 1 milliGRAM(s) Oral daily  glucagon  Injectable 1 milliGRAM(s) IntraMuscular once  hydrALAZINE 50 milliGRAM(s) Oral three times a day  insulin lispro (ADMELOG) corrective regimen sliding scale   SubCutaneous every 6 hours  insulin NPH human recombinant 6 Unit(s) SubCutaneous every 6 hours  levETIRAcetam  IVPB 500 milliGRAM(s) IV Intermittent every 12 hours  multivitamin/minerals/iron Oral Solution (CENTRUM) 15 milliLiter(s) Oral daily  nystatin Powder 1 Application(s) Topical two times a day  pantoprazole  Injectable 40 milliGRAM(s) IV Push two times a day  polyethylene glycol 3350 17 Gram(s) Oral every 12 hours  senna Syrup 10 milliLiter(s) Oral at bedtime    MEDICATIONS  (PRN):      PAST MEDICAL & SURGICAL HISTORY:  Spinal stenosis    Diabetes    Benign essential HTN    Gallstones    S/P cataract surgery        FAMILY HISTORY:      SOCIAL HISTORY: No EtOH, no tobacco    REVIEW OF SYSTEMS:  Unable to obtain at this time     T(F): 98.3 (10-29-21 @ 05:00), Max: 98.6 (10-28-21 @ 20:10)  HR: 96 (10-29-21 @ 05:00)  BP: 128/75 (10-29-21 @ 05:00)  RR: 18 (10-29-21 @ 05:00)  SpO2: 98% (10-29-21 @ 05:00)  Wt(kg): --    GENERAL: AAOx0; obtunded;   HEAD:  Atraumatic, Normocephalic  EYES: EOMI, PERRLA, conjunctiva and sclera clear  CHEST/LUNG: Decreased breath sounds, + wheezing   HEART: Regular rate and rhythm; No murmurs, rubs, or gallops  ABDOMEN: Soft, Nontender, Nondistended; Bowel sounds present  EXTREMITIES:  anasarca with peripheral edema; Sacral wound unstageable; 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

## 2021-10-29 NOTE — PROGRESS NOTE ADULT - SUBJECTIVE AND OBJECTIVE BOX
[facility]-- WOUND TEAM -- FOLLOW UP NOTE  --------------------------------------------------------------------------------    24 hour events/subjective:    low platelets- bloody nose yesterday  incontinent - no bloody bm  tolerating tg  ongoing goc  no f/c/s      Diet:  Diet, NPO with Tube Feed:   Tube Feeding Modality: Gastrostomy  Jevity 1.5 Adria (JEVITY1.5RTH)  Total Volume for 24 Hours (mL): 1200  Continuous  Starting Tube Feed Rate mL per Hour: 50  Until Goal Tube Feed Rate (mL per Hour): 50  Tube Feed Duration (in Hours): 24  Tube Feed Start Time: 12:00  Ibrahima(7 Gm Arginine/7 Gm Glut/1.2 Gm HMB     Qty per Day:  2 (10-22-21 @ 17:36)      ROS: pt unable to offer    ALLERGIES & MEDICATIONS  --------------------------------------------------------------------------------  No Known Allergies        STANDING INPATIENT MEDICATIONS  ascorbic acid 500 milliGRAM(s) Oral daily  BACItracin   Ointment 1 Application(s) Topical two times a day  caspofungin IVPB 50 milliGRAM(s) IV Intermittent every 24 hours  chlorhexidine 2% Cloths 1 Application(s) Topical <User Schedule>  dexAMETHasone  IVPB 8 milliGRAM(s) IV Intermittent every 8 hours  dextrose 40% Gel 15 Gram(s) Oral once  dextrose 5%. 1000 milliLiter(s) IV Continuous <Continuous>  dextrose 5%. 1000 milliLiter(s) IV Continuous <Continuous>  dextrose 50% Injectable 25 Gram(s) IV Push once  dextrose 50% Injectable 12.5 Gram(s) IV Push once  dextrose 50% Injectable 25 Gram(s) IV Push once  erythromycin   Ointment 1 Application(s) Left EYE four times a day  folic acid 1 milliGRAM(s) Oral daily  glucagon  Injectable 1 milliGRAM(s) IntraMuscular once  hydrALAZINE 50 milliGRAM(s) Oral three times a day  insulin lispro (ADMELOG) corrective regimen sliding scale   SubCutaneous every 6 hours  insulin NPH human recombinant 6 Unit(s) SubCutaneous every 6 hours  levETIRAcetam  IVPB 500 milliGRAM(s) IV Intermittent every 12 hours  multivitamin/minerals/iron Oral Solution (CENTRUM) 15 milliLiter(s) Oral daily  nystatin Powder 1 Application(s) Topical two times a day  pantoprazole  Injectable 40 milliGRAM(s) IV Push two times a day  polyethylene glycol 3350 17 Gram(s) Oral every 12 hours  senna Syrup 10 milliLiter(s) Oral at bedtime        VITALS/PHYSICAL EXAM  --------------------------------------------------------------------------------  T(C): 36.8 (10-29-21 @ 05:00), Max: 37 (10-28-21 @ 20:10)  HR: 96 (10-29-21 @ 05:00) (88 - 96)  BP: 128/75 (10-29-21 @ 05:00) (113/73 - 144/80)  RR: 18 (10-29-21 @ 05:00) (18 - 19)  SpO2: 98% (10-29-21 @ 05:00) (95% - 99%)  Wt(kg): --        10-28-21 @ 07:01  -  10-29-21 @ 07:00  --------------------------------------------------------  IN: 210 mL / OUT: 1395 mL / NET: -1185 mL    NAD, lethargic, frail  [facility] P500 bed  HEENT:  NC/AT, mucosa moist,  trachea midline, neck supple  Gastrointestinal: soft NT/ND (+)BS  (+)PEG   : scrotal edema  (+) condom cath  Neurology: nonverbal, no follow commands/ paraplegic  Musculoskeletal:  passive ROM  Vascular: BLE equally warm,  mild BLE edema equal, BLE DP/PT pulses palpable      no acute ischemia noted  Skin:  moist w/ good turgor, anasarca  bilateral groin hyperpigmented and moist w/ o blistering or opening- no active drainage  Sacrum unstageable w/soft eschar w/ slough along edges        4cm x 4cm x 1.4cm w/ slough and nonviable tissue w/ 1cm lip of undermining circumferentially  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 base no palp bone (+)ligament exposed        small area of hematoma vs dti on base 3cm x 0.5cm -will continue to monitor      4.5cm x 7.5xm x 2cm w/ moderate serosanguinous drainage  Lt Hip 2.5cm x 2cm x 0.1cm unstageable w/  slough w/ partial thickness granular tissue along periphery       w/ serosnaguinous drainage  Bilateral thoracic posterior ribs w/ 0.5cm x 1cm x 0cm hyperpigmented skin w/o blistering or drainage  Bilateral heels, bunions and lateral feet w/ dry resolving blisters w/o skin break, drainage  No odor, erythema, increased warmth, tenderness, induration, fluctuance        LABS/ CULTURES/ RADIOLOGY:              7.2    8.31  >-----------<  27       [10-29-21 @ 10:03]              23.9     148  |  111  |  41  ----------------------------<  77      [10-29-21 @ 10:03]  4.5   |  24  |  0.32        Ca     7.8     [10-29-21 @ 10:03]    TPro  4.6  /  Alb  2.2  /  TBili  0.7  /  DBili  x   /  AST  57  /  ALT  98  /  AlkPhos  229  [10-29-21 @ 10:03]      CAPILLARY BLOOD GLUCOSE  POCT Blood Glucose.: 135 mg/dL (29 Oct 2021 06:56)  POCT Blood Glucose.: 63 mg/dL (29 Oct 2021 06:18)  POCT Blood Glucose.: 120 mg/dL (29 Oct 2021 00:56)  POCT Blood Glucose.: 76 mg/dL (28 Oct 2021 23:13)  POCT Blood Glucose.: 90 mg/dL (28 Oct 2021 17:06)  POCT Blood Glucose.: 122 mg/dL (28 Oct 2021 12:23)

## 2021-10-29 NOTE — PROGRESS NOTE ADULT - PROBLEM SELECTOR PLAN 6
-occult positive in gastric lavage in MICU. c/w empiric PPI IV BID for now.   -Also with hemorrhagic brain mass. -Repeat CT head appears stable without any new hemorrhage.  -Trend CBC.   -Iron studies, B12, folate, LDH, hapto, retics. -showed low folate. repleted  -thrombocytopenia platelets downtrending , ? meds- awaiting nsgy recs ?dc keppra, pending heme eval -occult positive in gastric lavage in MICU. c/w empiric PPI IV BID for now.   -Also with hemorrhagic brain mass. -Repeat CT head appears stable without any new hemorrhage.  -Trend CBC.   -Iron studies, B12, folate, LDH, hapto, retics. -showed low folate. repleted  -thrombocytopenia platelets downtrending , secondary to hemorraghic brain mass, multiple infections, iatrogenic from frequent blood draws, Bone marrow suppression from Keppra, multiple rounds of different antibiotics and infections specifically fungemia.    -transfuse 1u platelets today, keep>50 per heme

## 2021-10-29 NOTE — CONSULT NOTE ADULT - ASSESSMENT
73yo male with complicated hospital course including new diagnosis of large hemorraghic brain mass with edema and midline shift with family opting for conservative management, bacteremia, fungemia, transaminitis, with baseline anemia and thrombocytopenia developping since admission. Hematology consulted for the worsening thrombocytopenia.     # Thrombocytopenia   # anemia  - At admission; Hbg 10; downtrend during admission;   - Platelets 225 since admission and slowly downtrending; no anticoagulation given since admission  - Peripheral smear:   - Anemia and thrombocytopenia explainable secondary to hemorraghic brain mass, multiple infections, iatrogenic from frequent blood draws, Bone marrow suppression from Keppra, multiple rounds of different antibiotics and infections specifically fungemia.    - Would recommend supportive care with transfusions of pRBC for hbg <7, plts per Neuro Onc due to hemorhagic brain mass.     INCOMPLETE, will update later.  73yo male with complicated hospital course including new diagnosis of large hemorraghic brain mass with edema and midline shift with family opting for conservative management, bacteremia, fungemia, transaminitis, with baseline anemia and thrombocytopenia developping since admission. Hematology consulted for the worsening thrombocytopenia.     # Thrombocytopenia   # anemia  - At admission; Hbg 10; downtrend during admission;   - Platelets 225 since admission and slowly downtrending; no anticoagulation given since admission  - Peripheral smear: showing multiple target cells; minimal schistocytes on HPF  - Anemia and thrombocytopenia explainable secondary to hemorraghic brain mass, multiple infections, iatrogenic from frequent blood draws, Bone marrow suppression from Keppra, multiple rounds of different antibiotics and infections specifically fungemia.    - However, would recommend repeating Haptoglobin, LDH, fibrinogen.    - Would recommend supportive care with transfusions of pRBC for hbg <7, plts per Neuro Onc due to hemorrhagic brain mass; however over 50 given bleeding from mouth seen yesterday.     Please call with any additional questions     Gaby Linton   PGY4 heme/onc fellow   [phone]: Please call on-call fellow between 5pm-8am.

## 2021-10-29 NOTE — PROGRESS NOTE ADULT - PROBLEM SELECTOR PLAN 1
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 Poor mental status at baseline.   Biopsy vs debulking,  family opting for conservative medical management.

## 2021-10-29 NOTE — PROGRESS NOTE ADULT - SUBJECTIVE AND OBJECTIVE BOX
Patient is a 81y old  Male who presents with a chief complaint of Hyponatremia (29 Oct 2021 11:47)      SUBJECTIVE / OVERNIGHT EVENTS: appears comfortable     MEDICATIONS  (STANDING):  ascorbic acid 500 milliGRAM(s) Oral daily  BACItracin   Ointment 1 Application(s) Topical two times a day  caspofungin IVPB 50 milliGRAM(s) IV Intermittent every 24 hours  chlorhexidine 2% Cloths 1 Application(s) Topical <User Schedule>  dexAMETHasone  IVPB 8 milliGRAM(s) IV Intermittent every 8 hours  dextrose 40% Gel 15 Gram(s) Oral once  dextrose 5%. 1000 milliLiter(s) (100 mL/Hr) IV Continuous <Continuous>  dextrose 5%. 1000 milliLiter(s) (50 mL/Hr) IV Continuous <Continuous>  dextrose 50% Injectable 25 Gram(s) IV Push once  dextrose 50% Injectable 12.5 Gram(s) IV Push once  dextrose 50% Injectable 25 Gram(s) IV Push once  erythromycin   Ointment 1 Application(s) Left EYE four times a day  folic acid 1 milliGRAM(s) Oral daily  glucagon  Injectable 1 milliGRAM(s) IntraMuscular once  hydrALAZINE 50 milliGRAM(s) Oral three times a day  insulin lispro (ADMELOG) corrective regimen sliding scale   SubCutaneous every 6 hours  insulin NPH human recombinant 6 Unit(s) SubCutaneous every 6 hours  levETIRAcetam  IVPB 500 milliGRAM(s) IV Intermittent every 12 hours  multivitamin/minerals/iron Oral Solution (CENTRUM) 15 milliLiter(s) Oral daily  nystatin Powder 1 Application(s) Topical two times a day  pantoprazole  Injectable 40 milliGRAM(s) IV Push two times a day  polyethylene glycol 3350 17 Gram(s) Oral every 12 hours  senna Syrup 10 milliLiter(s) Oral at bedtime    MEDICATIONS  (PRN):        CAPILLARY BLOOD GLUCOSE      POCT Blood Glucose.: 78 mg/dL (29 Oct 2021 12:12)  POCT Blood Glucose.: 135 mg/dL (29 Oct 2021 06:56)  POCT Blood Glucose.: 63 mg/dL (29 Oct 2021 06:18)  POCT Blood Glucose.: 120 mg/dL (29 Oct 2021 00:56)  POCT Blood Glucose.: 76 mg/dL (28 Oct 2021 23:13)  POCT Blood Glucose.: 90 mg/dL (28 Oct 2021 17:06)    I&O's Summary    28 Oct 2021 07:01  -  29 Oct 2021 07:00  --------------------------------------------------------  IN: 210 mL / OUT: 1395 mL / NET: -1185 mL    29 Oct 2021 07:01  -  29 Oct 2021 14:29  --------------------------------------------------------  IN: 0 mL / OUT: 300 mL / NET: -300 mL        PHYSICAL EXAM:  GENERAL: chronically ill appearing   CHEST/LUNG: no wheezing noted   HEART: +S1/S2   ABDOMEN: Soft, Nontender, Nondistended  EXTREMITIES: anasarca and significant peripheral edema   PSYCH: AAOx0    LABS:                        7.2    8.31  )-----------( 27       ( 29 Oct 2021 10:03 )             23.9     10-29    148<H>  |  111<H>  |  41<H>  ----------------------------<  77  4.5   |  24  |  0.32<L>    Ca    7.8<L>      29 Oct 2021 10:03    TPro  4.6<L>  /  Alb  2.2<L>  /  TBili  0.7  /  DBili  x   /  AST  57<H>  /  ALT  98<H>  /  AlkPhos  229<H>  10-29    PTT - ( 29 Oct 2021 13:45 )  PTT:27.7 sec          RADIOLOGY & ADDITIONAL TESTS:    Imaging Personally Reviewed:    Consultant(s) Notes Reviewed:      Care Discussed with Consultants/Other Providers:

## 2021-10-29 NOTE — PROGRESS NOTE ADULT - PROBLEM SELECTOR PLAN 6
Spoke with pts dtr brooklynn for greater then 16 min discussing acp.  She is hopeful that the pt is able to come home with hospice services after his antifungal meds are complete.  We further discussed the MOLST form.  Brooklynn states she has a copy but has not been able to look at it.  I went over the form in detail including cpr and intubation.  Brooklynn states that she will not be able to make a decision now and that she will have to look over the paperwork some more.  She asked if I would call her back next week to further discuss.  Palliative care will follow up with Brooklynn next week.

## 2021-10-29 NOTE — PROGRESS NOTE ADULT - SUBJECTIVE AND OBJECTIVE BOX
SUBJECTIVE AND OBJECTIVE: pt unresponsive resting in bed  INTERVAL HPI/OVERNIGHT EVENTS:  no acute events overnight    DNR on chart:   Allergies    No Known Allergies    Intolerances    MEDICATIONS  (STANDING):  ascorbic acid 500 milliGRAM(s) Oral daily  BACItracin   Ointment 1 Application(s) Topical two times a day  caspofungin IVPB 50 milliGRAM(s) IV Intermittent every 24 hours  chlorhexidine 2% Cloths 1 Application(s) Topical <User Schedule>  dexAMETHasone  IVPB 8 milliGRAM(s) IV Intermittent every 8 hours  dextrose 40% Gel 15 Gram(s) Oral once  dextrose 5%. 1000 milliLiter(s) (100 mL/Hr) IV Continuous <Continuous>  dextrose 5%. 1000 milliLiter(s) (50 mL/Hr) IV Continuous <Continuous>  dextrose 50% Injectable 25 Gram(s) IV Push once  dextrose 50% Injectable 12.5 Gram(s) IV Push once  dextrose 50% Injectable 25 Gram(s) IV Push once  erythromycin   Ointment 1 Application(s) Left EYE four times a day  folic acid 1 milliGRAM(s) Oral daily  glucagon  Injectable 1 milliGRAM(s) IntraMuscular once  hydrALAZINE 50 milliGRAM(s) Oral three times a day  insulin lispro (ADMELOG) corrective regimen sliding scale   SubCutaneous every 6 hours  insulin NPH human recombinant 6 Unit(s) SubCutaneous every 6 hours  levETIRAcetam  IVPB 500 milliGRAM(s) IV Intermittent every 12 hours  multivitamin/minerals/iron Oral Solution (CENTRUM) 15 milliLiter(s) Oral daily  nystatin Powder 1 Application(s) Topical two times a day  pantoprazole  Injectable 40 milliGRAM(s) IV Push two times a day  polyethylene glycol 3350 17 Gram(s) Oral every 12 hours  senna Syrup 10 milliLiter(s) Oral at bedtime    MEDICATIONS  (PRN):      ITEMS UNCHECKED ARE NOT PRESENT    PRESENT SYMPTOMS: [x]Unable to obtain due to poor mentation   Source if other than patient:  [ ]Family   [ ]Team     Pain:  [ ]yes [ x]no  QOL impact -   Location -                    Aggravating factors -  Quality -  Radiation -  Timing-  Severity (0-10 scale):  Minimal acceptable level (0-10 scale):     Dyspnea:                           [ ]Mild [ ]Moderate [ ]Severe  Anxiety:                             [ ]Mild [ ]Moderate [ ]Severe  Fatigue:                             [ ]Mild [ ]Moderate [x ]Severe  Nausea:                             [ ]Mild [ ]Moderate [ ]Severe  Loss of appetite:              [ ]Mild [ ]Moderate [ ]Severe  Constipation:                    [ ]Mild [ ]Moderate [ ]Severe    PAIN AD Score:	2  [email or web address] (Ctrl + left click to view)    Other Symptoms:  [ ]All other review of systems negative     Palliative Performance Status Version 2:    10     %      [email or web address]  PHYSICAL EXAM:  Vital Signs Last 24 Hrs  T(C): 36.8 (29 Oct 2021 05:00), Max: 37 (28 Oct 2021 20:10)  T(F): 98.3 (29 Oct 2021 05:00), Max: 98.6 (28 Oct 2021 20:10)  HR: 96 (29 Oct 2021 05:00) (88 - 96)  BP: 128/75 (29 Oct 2021 05:00) (113/73 - 144/80)  BP(mean): --  RR: 18 (29 Oct 2021 05:00) (18 - 19)  SpO2: 98% (29 Oct 2021 05:00) (95% - 99%) I&O's Summary    28 Oct 2021 07:01  -  29 Oct 2021 07:00  --------------------------------------------------------  IN: 210 mL / OUT: 1395 mL / NET: -1185 mL       GENERAL:  [ ]Alert  [ ]Oriented x   [x ]Lethargic  [ ]Cachexia  [ ]Unarousable  [ ]Verbal  [x ]Non-Verbal    Behavioral: unresponsive  [ ]Anxiety  [ ]Delirium [ ]Agitation [ ]Other    HEENT:  [ ]Normal   [x ]Dry mouth   [ ]ET Tube/Trach  [ ]Oral lesions    PULMONARY:   [ x]Clear [ ]Tachypnea  [ ]Audible excessive secretions   [ ]Rhonchi        [ ]Right [ ]Left [ ]Bilateral  [ ]Crackles        [ ]Right [ ]Left [ ]Bilateral  [ ]Wheezing     [ ]Right [ ]Left [ ]Bilateral  [ x]Diminished BS [ ] Right [ ]Left [x]Bilateral    CARDIOVASCULAR:    [x]Regular [ ]Irregular [ ]Tachy  [ ]Stephen [ ]Murmur [ ]Other    GASTROINTESTINAL:  [x]Soft  [x ]Distended   [x ]+BS  [x]Non tender [ ]Tender  [x ]PEG [ ]OGT/ NGT   Last BM:      GENITOURINARY:  [ ]Normal [ ]Incontinent   [ ]Oliguria/Anuria   [x ]Bello   [ ] External cath    MUSCULOSKELETAL:   [ ]Normal   [ ]Weakness  [x ]Bed/Wheelchair bound [ ]Edema    NEUROLOGIC:   [ ]No focal deficits  x[ ] Cognitive impairment  [x ] Dysphagia [ ]Dysarthria [ ] Paresis [ ]Other     SKIN: see rn flow sheet  [ ]Normal  [ ]Rash   [ ]Pressure ulcer(s) [ ]y [ ]n present on admission    CRITICAL CARE:  [ ]Shock Present  [ ]Septic [ ]Cardiogenic [ ]Neurologic [ ]Hypovolemic  [ ]Vasopressors [ ]Inotropes  [ ]Respiratory failure present [ ]Mechanical Ventilation [ ]Non-invasive ventilatory support [ ]High-Flow  [ ]Acute  [ ]Chronic [ ]Hypoxic  [ ]Hypercarbic [ ]Other  [ ]Other organ failure     LABS:                        7.2    8.31  )-----------( 27       ( 29 Oct 2021 10:03 )             23.9   10-29    148<H>  |  111<H>  |  41<H>  ----------------------------<  77  4.5   |  24  |  0.32<L>    Ca    7.8<L>      29 Oct 2021 10:03    TPro  4.6<L>  /  Alb  2.2<L>  /  TBili  0.7  /  DBili  x   /  AST  57<H>  /  ALT  98<H>  /  AlkPhos  229<H>  10-29        RADIOLOGY & ADDITIONAL STUDIES:    < from: CT Abdomen and Pelvis w/ IV Cont (10.29.21 @ 00:07) >  EXAM:  CT ABDOMEN AND PELVIS IC                            PROCEDURE DATE:  10/29/2021            INTERPRETATION:  CLINICAL INFORMATION: Abdominal distention and PEG leakage. Concern for GI bleed.    COMPARISON: CT abdomen pelvis 9/29/2021.    CONTRAST/COMPLICATIONS:  IV Contrast: Omnipaque 350  90 cc administered   10 cc discarded  Oral Contrast: NONE  Complications: None reported at time of study completion    PROCEDURE:  CT of the Abdomen and Pelvis was performed.  Precontrast, Arterial and Delayed phases were performed.  Sagittal and coronal reformats were performed.    FINDINGS:  LOWER CHEST: Small bilateral pleural effusions. Small pericardial effusion.    LIVER: Within normal limits.  BILE DUCTS: Normal caliber. Redemonstration of a stent in the distal CBD extending into the second portion of the duodenum.  GALLBLADDER: Cholelithiasis.  SPLEEN: Within normal limits.  PANCREAS: Within normal limits.  ADRENALS: Within normal limits.  KIDNEYS/URETERS: No hydronephrosis. Bilateral renal cysts and additional subcentimeter hypoechoic and hyperdensities too small to characterize.    BLADDER: Within normal limits.  REPRODUCTIVE ORGANS: Prostate is enlarged.    BOWEL: Stomach is decompressed with PEG tube. Gastric submucosal edema is noted. No bowel obstruction. Appendix is normal. No evidence of active GI bleed. Scattered colonic diverticulosis.  PERITONEUM: Trace ascites.  VESSELS: Atherosclerotic changes.  RETROPERITONEUM/LYMPH NODES: No lymphadenopathy.  ABDOMINAL WALL: Anasarca. Redemonstration of a large right hip soft tissue defect/ulcer.  BONES: Degenerative changes.    IMPRESSION:  No evidence of active GI bleed.    PEG tube in stomach. Gastric submucosal edema is noted.    Bilateral pleural effusions. Trace ascites.    --- End of Report ---                ADELA CRAIN MD; Attending Radiologist  This document has been electronically signed. Oct 29 2021  9:01AM    < end of copied text >      Protein Calorie Malnutrition Present: [ ]mild [ ]moderate [ ]severe [ ]underweight [ ]morbid obesity  https://www.andeal.org/vault/2440/web/files/ONC/Table_Clinical%20Characteristics%20to%20Document%20Malnutrition-White%20JV%20et%20al%202012.pdf    Height (cm): 152.4 (09-30-21 @ 02:46), 170.2 (01-22-21 @ 12:34)  Weight (kg): 51.8 (09-30-21 @ 02:46), 63.5 (01-22-21 @ 12:34)  BMI (kg/m2): 22.3 (09-30-21 @ 02:46), 21.9 (01-22-21 @ 12:34)    [ ]PPSV2 < or = 30%  [ ]significant weight loss [ ]poor nutritional intake [ ]anasarca    [ ]Artificial Nutrition    REFERRALS:   [ ]Chaplaincy  [ ]Hospice  [ ]Child Life  [ ]Social Work  [ ]Case management [ ]Holistic Therapy     Goals of Care Document:

## 2021-10-30 NOTE — PROGRESS NOTE ADULT - PROBLEM SELECTOR PLAN 11
-Hold pharma dvt ppx for now given recent hgb downtrend and pos blood in gastric lavage and hemorrhagic brain mass.   - Hospice referral sent, daughter agreeable  -Pt to stay till 11/8 to complete caspofungin, follow up with hospice paperwork

## 2021-10-30 NOTE — PROVIDER CONTACT NOTE (OTHER) - SITUATION
pt with leaking from peg site increasing this shift , gauze changed x 2 , gown changed x 2  , bacitracin appl.ied to site

## 2021-10-30 NOTE — PROGRESS NOTE ADULT - SUBJECTIVE AND OBJECTIVE BOX
Patient is a 81y old  Male who presents with a chief complaint of Hyponatremia (29 Oct 2021 14:28)      SUBJECTIVE / OVERNIGHT EVENTS: appears comfortable    MEDICATIONS  (STANDING):  ascorbic acid 500 milliGRAM(s) Oral daily  BACItracin   Ointment 1 Application(s) Topical two times a day  caspofungin IVPB 50 milliGRAM(s) IV Intermittent every 24 hours  chlorhexidine 2% Cloths 1 Application(s) Topical <User Schedule>  dexAMETHasone  IVPB 8 milliGRAM(s) IV Intermittent every 8 hours  dextrose 40% Gel 15 Gram(s) Oral once  dextrose 5%. 1000 milliLiter(s) (50 mL/Hr) IV Continuous <Continuous>  dextrose 5%. 1000 milliLiter(s) (100 mL/Hr) IV Continuous <Continuous>  dextrose 50% Injectable 25 Gram(s) IV Push once  dextrose 50% Injectable 12.5 Gram(s) IV Push once  dextrose 50% Injectable 25 Gram(s) IV Push once  erythromycin   Ointment 1 Application(s) Left EYE four times a day  folic acid 1 milliGRAM(s) Oral daily  glucagon  Injectable 1 milliGRAM(s) IntraMuscular once  hydrALAZINE 50 milliGRAM(s) Oral three times a day  insulin lispro (ADMELOG) corrective regimen sliding scale   SubCutaneous every 6 hours  insulin NPH human recombinant 6 Unit(s) SubCutaneous every 6 hours  levETIRAcetam  IVPB 500 milliGRAM(s) IV Intermittent every 12 hours  multivitamin/minerals/iron Oral Solution (CENTRUM) 15 milliLiter(s) Oral daily  nystatin Powder 1 Application(s) Topical two times a day  pantoprazole  Injectable 40 milliGRAM(s) IV Push two times a day  polyethylene glycol 3350 17 Gram(s) Oral every 12 hours  senna Syrup 10 milliLiter(s) Oral at bedtime    MEDICATIONS  (PRN):        CAPILLARY BLOOD GLUCOSE      POCT Blood Glucose.: 212 mg/dL (30 Oct 2021 06:34)  POCT Blood Glucose.: 194 mg/dL (29 Oct 2021 23:04)  POCT Blood Glucose.: 175 mg/dL (29 Oct 2021 16:59)  POCT Blood Glucose.: 78 mg/dL (29 Oct 2021 12:12)    I&O's Summary    29 Oct 2021 07:01  -  30 Oct 2021 07:00  --------------------------------------------------------  IN: 2060 mL / OUT: 760 mL / NET: 1300 mL        PHYSICAL EXAM:  GENERAL: chronically ill appearing   CHEST/LUNG: no wheezing noted   HEART: +S1/S2   ABDOMEN: Soft, Nontender, Nondistended, peg site erythematous   EXTREMITIES: anasarca and significant peripheral edema   PSYCH: AAOx0    LABS:                        7.3    8.12  )-----------( 69       ( 30 Oct 2021 10:17 )             23.3     10-30    151<H>  |  113<H>  |  38<H>  ----------------------------<  142<H>  3.9   |  25  |  0.33<L>    Ca    7.6<L>      30 Oct 2021 10:17    TPro  4.6<L>  /  Alb  2.2<L>  /  TBili  0.7  /  DBili  x   /  AST  57<H>  /  ALT  98<H>  /  AlkPhos  229<H>  10-29    PTT - ( 29 Oct 2021 13:45 )  PTT:27.7 sec          RADIOLOGY & ADDITIONAL TESTS:    Imaging Personally Reviewed:    Consultant(s) Notes Reviewed:      Care Discussed with Consultants/Other Providers:

## 2021-10-30 NOTE — PROGRESS NOTE ADULT - PROBLEM SELECTOR PLAN 1
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 DDX of imaging metastases, glioblastoma and lymphoma. Comatose  -Family initially wanted to pursue more aggressive mgmt. -LAITH was tentatively planning to do biopsy vs debulking but prior physician discussed with daughter Boroklynn 10/9 and she would now like to pursue more conservative/medical management and not go with any surgery/biopsy   -C/w dexamethasone; c/w increased 8mg IV q8h for brain edema per neuro onco, d/w nsgy 10/28 will provide recs on decadron taper   -Na goal 145-155 per LAITH. Monitor CBC and BMP   -F/u neuro onco and LAITH recs.  -C/w keppra 500mg BID for seizure ppx, dw nsgy  -Ongoing GOC discussions with family  -Dw daughter Brooklynn agreeable for hospice referral

## 2021-10-30 NOTE — PROGRESS NOTE ADULT - PROBLEM SELECTOR PLAN 6
-occult positive in gastric lavage in MICU. c/w empiric PPI IV BID for now.   -Also with hemorrhagic brain mass. -Repeat CT head appears stable without any new hemorrhage.  -Trend CBC.   -Iron studies, B12, folate, LDH, hapto, retics. -showed low folate. repleted  -thrombocytopenia platelets downtrending , secondary to hemorraghic brain mass, multiple infections, iatrogenic from frequent blood draws, Bone marrow suppression from Keppra, multiple rounds of different antibiotics and infections specifically fungemia.    -s/p 1u platelets 10/29, keep>50 per heme

## 2021-10-31 NOTE — PROGRESS NOTE ADULT - SUBJECTIVE AND OBJECTIVE BOX
Patient is a 81y old  Male who presents with a chief complaint of Hyponatremia (30 Oct 2021 11:59)      SUBJECTIVE / OVERNIGHT EVENTS: Patient seen and examined at bedside. states he remains at baseline mental status. No acute events overnight     ROS:  unable to assess as pt is non-verbal     Allergies    No Known Allergies    Intolerances        MEDICATIONS  (STANDING):  ascorbic acid 500 milliGRAM(s) Oral daily  BACItracin   Ointment 1 Application(s) Topical two times a day  caspofungin IVPB 50 milliGRAM(s) IV Intermittent every 24 hours  chlorhexidine 2% Cloths 1 Application(s) Topical <User Schedule>  dexAMETHasone  IVPB 8 milliGRAM(s) IV Intermittent every 8 hours  dextrose 40% Gel 15 Gram(s) Oral once  dextrose 5%. 1000 milliLiter(s) (50 mL/Hr) IV Continuous <Continuous>  dextrose 5%. 1000 milliLiter(s) (100 mL/Hr) IV Continuous <Continuous>  dextrose 50% Injectable 12.5 Gram(s) IV Push once  dextrose 50% Injectable 25 Gram(s) IV Push once  dextrose 50% Injectable 25 Gram(s) IV Push once  erythromycin   Ointment 1 Application(s) Left EYE four times a day  folic acid 1 milliGRAM(s) Oral daily  glucagon  Injectable 1 milliGRAM(s) IntraMuscular once  hydrALAZINE 50 milliGRAM(s) Oral three times a day  insulin lispro (ADMELOG) corrective regimen sliding scale   SubCutaneous every 6 hours  insulin NPH human recombinant 6 Unit(s) SubCutaneous every 6 hours  levETIRAcetam  IVPB 500 milliGRAM(s) IV Intermittent every 12 hours  multivitamin/minerals/iron Oral Solution (CENTRUM) 15 milliLiter(s) Oral daily  nystatin Powder 1 Application(s) Topical two times a day  pantoprazole  Injectable 40 milliGRAM(s) IV Push two times a day  polyethylene glycol 3350 17 Gram(s) Oral every 12 hours  senna Syrup 10 milliLiter(s) Oral at bedtime    MEDICATIONS  (PRN):      Vital Signs Last 24 Hrs  T(C): 36.4 (31 Oct 2021 12:20), Max: 36.6 (30 Oct 2021 20:33)  T(F): 97.6 (31 Oct 2021 12:20), Max: 97.9 (30 Oct 2021 20:33)  HR: 97 (31 Oct 2021 12:20) (69 - 102)  BP: 129/74 (31 Oct 2021 12:20) (106/64 - 147/79)  BP(mean): --  RR: 17 (31 Oct 2021 12:20) (17 - 20)  SpO2: 96% (31 Oct 2021 12:20) (94% - 98%)  CAPILLARY BLOOD GLUCOSE      POCT Blood Glucose.: 157 mg/dL (31 Oct 2021 12:20)  POCT Blood Glucose.: 110 mg/dL (31 Oct 2021 05:49)  POCT Blood Glucose.: 109 mg/dL (30 Oct 2021 23:50)  POCT Blood Glucose.: 88 mg/dL (30 Oct 2021 17:44)    I&O's Summary    30 Oct 2021 07:01  -  31 Oct 2021 07:00  --------------------------------------------------------  IN: 830 mL / OUT: 475 mL / NET: 355 mL    31 Oct 2021 07:01  -  31 Oct 2021 16:24  --------------------------------------------------------  IN: 0 mL / OUT: 300 mL / NET: -300 mL        PHYSICAL EXAM:  GENERAL: chronically ill appearing   CHEST/LUNG: no wheezing noted   HEART: +S1/S2   ABDOMEN: Soft, Nontender, Nondistended, peg site erythematous   EXTREMITIES: anasarca and significant peripheral edema   PSYCH: AAOx0    LABS:                        7.3    8.12  )-----------( 69       ( 30 Oct 2021 10:17 )             23.3     10-30    151<H>  |  113<H>  |  38<H>  ----------------------------<  142<H>  3.9   |  25  |  0.33<L>    Ca    7.6<L>      30 Oct 2021 10:17                RADIOLOGY & ADDITIONAL TESTS:    Care Discussed with Consultants/Other Providers: Medicine ACP

## 2021-10-31 NOTE — PROGRESS NOTE ADULT - PROBLEM SELECTOR PLAN 11
-Hold pharma dvt ppx for now given recent hgb downtrend and pos blood in gastric lavage and hemorrhagic brain mass.   - Hospice referral sent, daughter agreeable  - Pt to stay till 11/8 to complete caspofungin, follow up with hospice paperwork

## 2021-10-31 NOTE — PROGRESS NOTE ADULT - PROBLEM SELECTOR PLAN 1
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 DDX of imaging metastases, glioblastoma and lymphoma. Comatose  -Family initially wanted to pursue more aggressive mgmt. -LAITH was tentatively planning to do biopsy vs debulking but prior physician discussed with daughter Brooklynn 10/9 and she would now like to pursue more conservative/medical management and not go with any surgery/biopsy   -C/w dexamethasone; c/w increased 8mg IV q8h for brain edema per neuro onco, d/w nsgy 10/28 will provide recs on decadron taper   -Na goal 145-155 per LAITH. Monitor CBC and BMP   -F/u neuro onco and LAITH recs.  -C/w keppra 500mg BID for seizure ppx, dw nsgy  -Ongoing GOC discussions with family  -previous hospitalist D/w daughter Brooklynn agreeable for hospice referral

## 2021-11-01 NOTE — CHART NOTE - NSCHARTNOTEFT_GEN_A_CORE
71yo male with complicated hospital course including new diagnosis of large hemorraghic brain mass with edema and midline shift with family opting for conservative management, bacteremia, fungemia, transaminitis, with baseline anemia and thrombocytopenia developing since admission. Hematology consulted for the worsening thrombocytopenia.     # Thrombocytopenia   # anemia  - At admission; Hbg 10; downtrend during admission;   - Platelets 225 since admission and slowly downtrending; no anticoagulation given since admission  - Peripheral smear: showing multiple target cells; minimal schistocytes on HPF  - Anemia and thrombocytopenia explainable secondary to hemorraghic brain mass, multiple infections, iatrogenic from frequent blood draws, Bone marrow suppression from Keppra, multiple rounds of different antibiotics and infections specifically fungemia.    - However, would recommend repeating Haptoglobin, LDH, fibrinogen not indicative of hemolysis or DIC   - Would recommend supportive care with transfusions of pRBC for hbg <7, plts per Neuro Onc due to hemorrhagic brain mass    Patient now in the process of transitioning to hospice care. Hematology to sign off at this time. Please call with any questions/concerns.     Nithin Krueger MD   Hematology/Oncology Fellow   pager [phone]   After 5pm and on weekends page on call fellow

## 2021-11-01 NOTE — PROGRESS NOTE ADULT - SUBJECTIVE AND OBJECTIVE BOX
Patient is a 81y old  Male who presents with a chief complaint of Hyponatremia (31 Oct 2021 16:24)      SUBJECTIVE / OVERNIGHT EVENTS: Patient seen and examined at beside. Remains unresponsive, no acute events overnight     ROS:  All other review of systems negative    Allergies    No Known Allergies    Intolerances        MEDICATIONS  (STANDING):  ascorbic acid 500 milliGRAM(s) Oral daily  BACItracin   Ointment 1 Application(s) Topical two times a day  caspofungin IVPB 50 milliGRAM(s) IV Intermittent every 24 hours  chlorhexidine 2% Cloths 1 Application(s) Topical <User Schedule>  dexAMETHasone  IVPB 8 milliGRAM(s) IV Intermittent every 8 hours  dextrose 40% Gel 15 Gram(s) Oral once  dextrose 5%. 1000 milliLiter(s) (50 mL/Hr) IV Continuous <Continuous>  dextrose 5%. 1000 milliLiter(s) (100 mL/Hr) IV Continuous <Continuous>  dextrose 50% Injectable 25 Gram(s) IV Push once  dextrose 50% Injectable 12.5 Gram(s) IV Push once  dextrose 50% Injectable 25 Gram(s) IV Push once  erythromycin   Ointment 1 Application(s) Left EYE four times a day  folic acid 1 milliGRAM(s) Oral daily  glucagon  Injectable 1 milliGRAM(s) IntraMuscular once  hydrALAZINE 50 milliGRAM(s) Oral three times a day  insulin lispro (ADMELOG) corrective regimen sliding scale   SubCutaneous every 6 hours  insulin NPH human recombinant 6 Unit(s) SubCutaneous every 6 hours  levETIRAcetam  IVPB 500 milliGRAM(s) IV Intermittent every 12 hours  multivitamin/minerals/iron Oral Solution (CENTRUM) 15 milliLiter(s) Oral daily  nystatin Powder 1 Application(s) Topical two times a day  pantoprazole  Injectable 40 milliGRAM(s) IV Push two times a day  polyethylene glycol 3350 17 Gram(s) Oral every 12 hours  senna Syrup 10 milliLiter(s) Oral at bedtime    MEDICATIONS  (PRN):      Vital Signs Last 24 Hrs  T(C): 36.3 (01 Nov 2021 12:22), Max: 36.8 (31 Oct 2021 17:53)  T(F): 97.3 (01 Nov 2021 12:22), Max: 98.3 (31 Oct 2021 17:53)  HR: 80 (01 Nov 2021 12:22) (78 - 93)  BP: 154/84 (01 Nov 2021 12:22) (122/73 - 154/84)  BP(mean): --  RR: 18 (01 Nov 2021 12:22) (18 - 20)  SpO2: 97% (01 Nov 2021 12:22) (94% - 99%)  CAPILLARY BLOOD GLUCOSE      POCT Blood Glucose.: 89 mg/dL (01 Nov 2021 12:19)  POCT Blood Glucose.: 91 mg/dL (01 Nov 2021 05:29)  POCT Blood Glucose.: 89 mg/dL (31 Oct 2021 23:21)  POCT Blood Glucose.: 112 mg/dL (31 Oct 2021 17:36)    I&O's Summary    31 Oct 2021 07:01  -  01 Nov 2021 07:00  --------------------------------------------------------  IN: 920 mL / OUT: 900 mL / NET: 20 mL        PHYSICAL EXAM:  GENERAL: chronically ill appearing   CHEST/LUNG: no wheezing noted   HEART: +S1/S2   ABDOMEN: Soft, Nontender, Nondistended, peg site erythematous   EXTREMITIES: anasarca and significant peripheral edema   PSYCH: AAOx0    LABS:                        7.0    6.88  )-----------( 28       ( 01 Nov 2021 10:52 )             23.1     11-01    152<H>  |  114<H>  |  46<H>  ----------------------------<  83  3.5   |  25  |  0.38<L>    Ca    7.7<L>      01 Nov 2021 10:52  Phos  3.8     11-01  Mg     2.2     11-01    TPro  4.1<L>  /  Alb  2.1<L>  /  TBili  0.6  /  DBili  x   /  AST  57<H>  /  ALT  86<H>  /  AlkPhos  253<H>  11-01              RADIOLOGY & ADDITIONAL TESTS:    Consultant(s) Notes Reviewed:  Heme/onc rec noted    Care Discussed with Consultants/Other Providers: Medicine ACP

## 2021-11-01 NOTE — PROGRESS NOTE ADULT - PROBLEM SELECTOR PLAN 1
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 DDX of imaging metastases, glioblastoma and lymphoma. Comatose  -Family initially wanted to pursue more aggressive mgmt. -LAITH was tentatively planning to do biopsy vs debulking but prior physician discussed with daughter Brooklynn 10/9 and she would now like to pursue more conservative/medical management and not go with any surgery/biopsy   -C/w dexamethasone; c/w increased 8mg IV q8h for brain edema per neuro onco, d/w nsgy 10/28 will provide recs on decadron taper   -Na goal 145-155 per LAITH. Monitor CBC and BMP   -F/u neuro onco and LAITH recs.  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 DDX of imaging metastases, glioblastoma and lymphoma. Comatose  -Family initially wanted to pursue more aggressive mgmt. -LAITH was tentatively planning to do biopsy vs debulking but prior physician discussed with daughter Brooklynn 10/9 and she would now like to pursue more conservative/medical management and not go with any surgery/biopsy   -C/w dexamethasone; c/w increased 8mg IV q8h for brain edema per neuro onco, d/w nsgy 10/28 will provide recs on decadron taper   -Na goal 145-155 per LAITH. Monitor CBC and BMP   - Na 152 stat free water 200 Q8h for 24 hrs   -F/u neuro onco and LAITH recs.  -C/w keppra 500mg BID for seizure ppx, dw nsgy  -Ongoing GOC discussions with family  -previous hospitalist D/w daughter Brooklynn agreeable for hospice referral

## 2021-11-02 NOTE — PROVIDER CONTACT NOTE (OTHER) - RECOMMENDATIONS
NP, RN Manager, medicine MD Dr Hopkins notified of peg tube displacement
bacitracin recommended
As per PA will order IV dextrose for pt
NP to see patient.
OK to give or hold insulin
wound consult, nutrition consult
antibiotic ointment be ordered
provider to see patient.
As per NP to order dextrose IV
PA notified of inablity to obtain blood sample for lab orders
hold tube feeds at present
provider to see patient

## 2021-11-02 NOTE — PROVIDER CONTACT NOTE (CRITICAL VALUE NOTIFICATION) - SITUATION
am labs drawn by phlebotomy   H/H 6.5/ 21.7  , platelet count 19K
pt with am labs  hgb 7.0
bcx 10/10 - gross aerobic bottle yeast; bcx 10/9 - gross aerobic & anaerobic bottle klebsiella pneumonae, carbapenem resistant.
fs q 6 hrs   1206  FS 61   repeat @ 1207  60
Ani Auris positive in the blood
Calcium is 5.1
Blood cultures from 10/10: growth in aerobic bottle: gram negative rods, growth in aerobic bottle: candida Aureus
pt on tube feeds; tube feeds held for u/s today;  glucose level dropped low 53; diabetes protocol implemented; will resume tube feeds once u/s test is done; will continue to monitor.
Blood Cultures collected 10/10/2021   Growth in aerobic bottle: Candida auris  Growth in anaerobic bottle: Gram Negative Rods

## 2021-11-02 NOTE — PROVIDER CONTACT NOTE (CRITICAL VALUE NOTIFICATION) - PERSON GIVING RESULT:
Fredy Edmonds
Lab- Brandy Ferrell
Lab: Julissa Tomas
Low Salgado
PCA Laura
Lab  Harjindermelany Suero
Lab: Harjinder Curtis
Mirella Galicia
Kelvin London
Camille Walker (neri)

## 2021-11-02 NOTE — PROVIDER CONTACT NOTE (OTHER) - ACTION/TREATMENT ORDERED:
NP notified and aware. As per NP, give 4 units of 6, however told pt is on jevity 1.5 10cc/hr. continue to monitor FS.

## 2021-11-02 NOTE — PROVIDER CONTACT NOTE (CRITICAL VALUE NOTIFICATION) - RECOMMENDATIONS
1 unit PRBC 1 unit platelets ,
continue to monitor for bleeding
NP Kyleigh Mota notified
NP notified.
push D50W   1/2 amp
NP Kyleigh Mota notified
NP notified.

## 2021-11-02 NOTE — PROVIDER CONTACT NOTE (CRITICAL VALUE NOTIFICATION) - NAME OF MD/NP/PA/DO NOTIFIED:
ALMA ROSA Gonzalez
Kyleigh Mota, NP
LATRICE Erazo
ALMA ROSA Williamson
Jessica POLLARD
Jessica POLLARD
LATRICE Mota
LATRICE Baeza
LATRICE Mota
LATRICE Mota

## 2021-11-02 NOTE — PROGRESS NOTE ADULT - PROBLEM SELECTOR PLAN 1
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 DDX of imaging metastases, glioblastoma and lymphoma. Comatose  -Family initially wanted to pursue more aggressive mgmt. -LAITH was tentatively planning to do biopsy vs debulking but prior physician discussed with daughter Brooklynn 10/9 and she would now like to pursue more conservative/medical management and not go with any surgery/biopsy   -C/w dexamethasone; c/w increased 8mg IV q8h for brain edema per neuro onco, d/w nsgy 10/28 will provide recs on decadron taper   -Na goal 145-155 per LAITH. Monitor CBC and BMP   - Na 152 c/w free water 200 Q8h for 24 hrs   -F/u neuro onco and LAITH recs.  -C/w keppra 500mg BID for seizure ppx, dw nsgy  -Ongoing GOC discussions with family  -previous hospitalist D/w daughter Brooklynn agreeable for hospice referral

## 2021-11-02 NOTE — PROVIDER CONTACT NOTE (CRITICAL VALUE NOTIFICATION) - ACTION/TREATMENT ORDERED:
PA notified and to notify Infection disease team.
Will continue to monitor.
post transfusion CbC and fibrinogen
Continue plan of care.
folow hypoglycemic protocol
NP Kyleigh Mota notified
am labs 11/2
Will continue to monitor.
Tube feeds to e resumed; f/s checked; will continue to monitor.
NP Kyleigh Mota notified

## 2021-11-02 NOTE — PROGRESS NOTE ADULT - SUBJECTIVE AND OBJECTIVE BOX
Patient is a 81y old  Male who presents with a chief complaint of Hyponatremia (01 Nov 2021 14:20)      SUBJECTIVE / OVERNIGHT EVENTS: Patient seen and examined at bedside. He remains unresponsive. This morning noted to have low H/h and low plt    ROS:  unable to assess as patient is unresponsive     Allergies    No Known Allergies    Intolerances        MEDICATIONS  (STANDING):  ascorbic acid 500 milliGRAM(s) Oral daily  BACItracin   Ointment 1 Application(s) Topical two times a day  caspofungin IVPB 50 milliGRAM(s) IV Intermittent every 24 hours  chlorhexidine 2% Cloths 1 Application(s) Topical <User Schedule>  dexAMETHasone  IVPB 8 milliGRAM(s) IV Intermittent every 8 hours  dextrose 40% Gel 15 Gram(s) Oral once  dextrose 5%. 1000 milliLiter(s) (50 mL/Hr) IV Continuous <Continuous>  dextrose 5%. 1000 milliLiter(s) (100 mL/Hr) IV Continuous <Continuous>  dextrose 50% Injectable 12.5 Gram(s) IV Push once  dextrose 50% Injectable 25 Gram(s) IV Push once  dextrose 50% Injectable 25 Gram(s) IV Push once  erythromycin   Ointment 1 Application(s) Left EYE four times a day  folic acid 1 milliGRAM(s) Oral daily  glucagon  Injectable 1 milliGRAM(s) IntraMuscular once  hydrALAZINE 50 milliGRAM(s) Oral three times a day  insulin lispro (ADMELOG) corrective regimen sliding scale   SubCutaneous every 6 hours  levETIRAcetam  IVPB 500 milliGRAM(s) IV Intermittent every 12 hours  multivitamin/minerals/iron Oral Solution (CENTRUM) 15 milliLiter(s) Oral daily  nystatin Powder 1 Application(s) Topical two times a day  pantoprazole  Injectable 40 milliGRAM(s) IV Push two times a day  polyethylene glycol 3350 17 Gram(s) Oral every 12 hours  senna Syrup 10 milliLiter(s) Oral at bedtime    MEDICATIONS  (PRN):      Vital Signs Last 24 Hrs  T(C): 36.4 (02 Nov 2021 10:50), Max: 36.4 (01 Nov 2021 17:22)  T(F): 97.6 (02 Nov 2021 10:50), Max: 97.6 (01 Nov 2021 17:22)  HR: 73 (02 Nov 2021 10:50) (73 - 89)  BP: 113/73 (02 Nov 2021 10:50) (113/72 - 137/72)  BP(mean): --  RR: 20 (02 Nov 2021 10:50) (18 - 21)  SpO2: 95% (02 Nov 2021 10:50) (93% - 97%)  CAPILLARY BLOOD GLUCOSE      POCT Blood Glucose.: 138 mg/dL (02 Nov 2021 12:00)  POCT Blood Glucose.: 122 mg/dL (02 Nov 2021 06:38)  POCT Blood Glucose.: 128 mg/dL (02 Nov 2021 05:08)  POCT Blood Glucose.: 111 mg/dL (02 Nov 2021 00:23)  POCT Blood Glucose.: 104 mg/dL (01 Nov 2021 17:28)    I&O's Summary    01 Nov 2021 07:01  -  02 Nov 2021 07:00  --------------------------------------------------------  IN: 1760 mL / OUT: 700 mL / NET: 1060 mL        PHYSICAL EXAM:  GENERAL: chronically ill appearing   CHEST/LUNG: no wheezing noted   HEART: +S1/S2   ABDOMEN: Soft, Nontender, Nondistended, peg site erythematous + Rt hip wound vac  EXTREMITIES: anasarca and significant peripheral edema   PSYCH: AAOx0    LABS:                        6.5    5.24  )-----------( 19       ( 02 Nov 2021 10:26 )             21.7     11-02    152<H>  |  117<H>  |  45<H>  ----------------------------<  126<H>  3.5   |  24  |  0.43<L>    Ca    7.3<L>      02 Nov 2021 10:26  Phos  3.8     11-01  Mg     2.2     11-01    TPro  4.1<L>  /  Alb  2.1<L>  /  TBili  0.6  /  DBili  x   /  AST  57<H>  /  ALT  86<H>  /  AlkPhos  253<H>  11-01              RADIOLOGY & ADDITIONAL TESTS:    Care Discussed with Consultants/Other Providers: Medicine ACP

## 2021-11-02 NOTE — PROVIDER CONTACT NOTE (CRITICAL VALUE NOTIFICATION) - BACKGROUND
Patient admitted for non-traumatic intracranial hemorrhage
pt admitted 9/30 with hemorrhagic mass
Patient admitted for non-traumatic intracranial hemorrhage
Pt admitted for intracranial hemorrhage. PMH: Candidemia, HTN, DM
Pt admitted for Intracranial Hemorrhage
HTN. gallstones, hypocalcemia; candidemia; anemia, aspiration pna; functional quadrpilegia.
80 y/o male admitted for Right Frontal Hemorrhagic mass
pt admitted for intracranial hemorrhage
pt admitted9/30 with intercranial hemorrhage

## 2021-11-02 NOTE — PROVIDER CONTACT NOTE (OTHER) - ASSESSMENT
AxO x0, obtunded. Pt is FS q6h monitored, pt has NPH 6 units due at this time. Pt . Pt has Jevity 1.5 running at 10 cc/hr.

## 2021-11-02 NOTE — PROVIDER CONTACT NOTE (OTHER) - DATE AND TIME:
20-Oct-2021 22:40
24-Oct-2021 02:50
24-Oct-2021 04:51
11-Oct-2021 11:00
14-Oct-2021 12:14
23-Oct-2021 22:30
23-Oct-2021 14:00
28-Oct-2021 13:30
28-Oct-2021 16:40
14-Oct-2021 18:30
24-Oct-2021 00:57
30-Oct-2021 11:45
30-Sep-2021 03:24
02-Nov-2021 06:15
31-Oct-2021 04:50
19-Oct-2021 21:30
15-Oct-2021 00:15

## 2021-11-02 NOTE — PROGRESS NOTE ADULT - ASSESSMENT
81 year old with baseline cognitive impairment with sacral decubitus (s/p 6 weeks of abx for sacral decubitus)    Dsyphagia with aspiration pneumonia-has peg in place  CNS mass     polymicrobial bacteremia  CT a/p without acute findings  ? due to wounds    1) Leukocytosis  Resolved    2) CRE Klebsiella bacteremia  Suspect wound is focus  s/p avycaz course     3) C auris bacteremia- tx started on 10/11   ? due to sacral wound  Continue caspofungin through 11/8 to finish a 4 week course  Repeat cultures without growth      4) Decubitus ulcers  Right and left hip- chronic- not acutely infected  Midline decubitus-   Wound care    5) CNS mass  ? planned duration of steroids  May need to add PCP prophylaxis    6) Transaminitis  trending down   Continue to follow      Prognosis poor due to underlying comorbidities.  I do not think his wounds will heal.  I do not think he will improve with medical treatment and not clear that he would benefit from aggressive interventions (surgical debridement of wounds / CUCO)     Continue goals of care discussions    Will sign off  Call ID service with questions.

## 2021-11-02 NOTE — CHART NOTE - NSCHARTNOTEFT_GEN_A_CORE
Neurosurgery contacted for platelet goal recommendations. No neurosurgical recommendations for platelet goals at this time.

## 2021-11-02 NOTE — PROGRESS NOTE ADULT - PROBLEM SELECTOR PLAN 6
-occult positive in gastric lavage in MICU. c/w empiric PPI IV BID for now.   -Also with hemorrhagic brain mass. -Repeat CT head appears stable without any new hemorrhage.  -Trend CBC.   -thrombocytopenia platelets downtrending , secondary to hemorraghic brain mass, multiple infections, iatrogenic from frequent blood draws, Bone marrow suppression from Keppra, multiple rounds of different antibiotics and infections specifically fungemia.    -hb 6.5 this morning, will give 1 unit PRBC and plt 19 will give 1 unit plt  - repeat LDH, fibrinogen and haptoglobin and f/u post tx CBC today 11/1

## 2021-11-02 NOTE — PROGRESS NOTE ADULT - SUBJECTIVE AND OBJECTIVE BOX
Follow Up:      Inverval History/ROS:Patient is a 81y old  Male who presents with a chief complaint of Hyponatremia (01 Nov 2021 14:20)    No fever  Not verbal      Allergies    No Known Allergies    Intolerances        ANTIMICROBIALS:  caspofungin IVPB 50 every 24 hours      OTHER MEDS:  ascorbic acid 500 milliGRAM(s) Oral daily  BACItracin   Ointment 1 Application(s) Topical two times a day  chlorhexidine 2% Cloths 1 Application(s) Topical <User Schedule>  dexAMETHasone  IVPB 8 milliGRAM(s) IV Intermittent every 8 hours  dextrose 40% Gel 15 Gram(s) Oral once  dextrose 5%. 1000 milliLiter(s) IV Continuous <Continuous>  dextrose 5%. 1000 milliLiter(s) IV Continuous <Continuous>  dextrose 50% Injectable 12.5 Gram(s) IV Push once  dextrose 50% Injectable 25 Gram(s) IV Push once  dextrose 50% Injectable 25 Gram(s) IV Push once  erythromycin   Ointment 1 Application(s) Left EYE four times a day  folic acid 1 milliGRAM(s) Oral daily  glucagon  Injectable 1 milliGRAM(s) IntraMuscular once  hydrALAZINE 50 milliGRAM(s) Oral three times a day  insulin lispro (ADMELOG) corrective regimen sliding scale   SubCutaneous every 6 hours  levETIRAcetam  IVPB 500 milliGRAM(s) IV Intermittent every 12 hours  multivitamin/minerals/iron Oral Solution (CENTRUM) 15 milliLiter(s) Oral daily  nystatin Powder 1 Application(s) Topical two times a day  pantoprazole  Injectable 40 milliGRAM(s) IV Push two times a day  polyethylene glycol 3350 17 Gram(s) Oral every 12 hours  senna Syrup 10 milliLiter(s) Oral at bedtime      Vital Signs Last 24 Hrs  T(C): 36.4 (02 Nov 2021 10:50), Max: 36.4 (01 Nov 2021 17:22)  T(F): 97.6 (02 Nov 2021 10:50), Max: 97.6 (01 Nov 2021 17:22)  HR: 73 (02 Nov 2021 10:50) (73 - 89)  BP: 113/73 (02 Nov 2021 10:50) (113/72 - 137/72)  BP(mean): --  RR: 20 (02 Nov 2021 10:50) (18 - 21)  SpO2: 95% (02 Nov 2021 10:50) (93% - 97%)    PHYSICAL EXAM:  General: [ ] non-toxic  HEAD/EYES: [ ] PERRL [ ] white sclera [ ] icterus  ENT:  [ ] normal [x ] supple [ ] thrush [ ] pharyngeal exudate  Cardiovascular:   [ ] murmur [x ] normal [ ] PPM/AICD  Respiratory:  [ x] clear to ausculation bilaterally  GI:  x[ ] soft, non-tender, normal bowel sounds  :  [ ] figueroa [ ] no CVA tenderness   Musculoskeletal:  [ ] no synovitis  Neurologic:  [ ] non-focal exam   Skin:  [x ] decubitus ulcers  Lymph: [x ] no lymphadenopathy  Psychiatric:  [ ] appropriate affect [ ] alert & oriented  Lines:  x[ ] no phlebitis [ ] central line                                6.5    5.24  )-----------( 19       ( 02 Nov 2021 10:26 )             21.7       11-02    152<H>  |  117<H>  |  45<H>  ----------------------------<  126<H>  3.5   |  24  |  0.43<L>    Ca    7.3<L>      02 Nov 2021 10:26  Phos  3.8     11-01  Mg     2.2     11-01    TPro  4.1<L>  /  Alb  2.1<L>  /  TBili  0.6  /  DBili  x   /  AST  57<H>  /  ALT  86<H>  /  AlkPhos  253<H>  11-01          MICROBIOLOGY:    RADIOLOGY:

## 2021-11-03 NOTE — PROGRESS NOTE ADULT - PROBLEM SELECTOR PLAN 1
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 DDX of imaging metastases, glioblastoma and lymphoma. Comatose  -Family initially wanted to pursue more aggressive mgmt. -LAITH was tentatively planning to do biopsy vs debulking but prior physician discussed with daughter Brooklynn 10/9 and she would now like to pursue more conservative/medical management and not go with any surgery/biopsy   -C/w dexamethasone; c/w increased 8mg IV q8h for brain edema per neuro onco, d/w nsgy 10/28 will provide recs on decadron taper   -Na goal 145-155 per LAITH. Monitor CBC and BMP   - Na 154, increased free water 300cc Q6h for 24 hrs   -F/u neuro onco and LAITH recs.  -C/w keppra 500mg BID for seizure ppx, dw nsgy  -Ongoing GOC discussions with family  -previous hospitalist D/w daughter Brooklynn agreeable for hospice referral, I was unable to reach dtr over the phone today 11/3

## 2021-11-03 NOTE — PROGRESS NOTE ADULT - PROBLEM SELECTOR PLAN 6
-occult positive in gastric lavage in MICU. c/w empiric PPI IV BID for now.   -Also with hemorrhagic brain mass. -Repeat CT head appears stable without any new hemorrhage.  -Trend CBC.   -thrombocytopenia platelets downtrending , secondary to hemorraghic brain mass, multiple infections, iatrogenic from frequent blood draws, Bone marrow suppression from Keppra, multiple rounds of different antibiotics and infections specifically fungemia.    -s/p 1 PRBC and 1 unit Plt 11/2 plt and H/h continue to down treading     -fibrinogen wnl however downtrending from 2 days ago, check Ddimer, Pt/PTT, poss DIC?? currently Hemodynamically stable, low threshold for MICU consult

## 2021-11-03 NOTE — PROGRESS NOTE ADULT - ASSESSMENT
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 Admitted to MICU for management of hyponatremia and hypotension requiring pressors.      Wound Consult requested to assist w/ management of multiple pressure injuries present on admission  Sacral  unstageable pressure injury  Lt Hip stage 2 pressure injury  Posterior Ribs w/ unstageable pressure injuries healed  Rt Hip stage 4 pressure injury   Incontinence of stool and urine  Moisture dermatitis  prophylaxis measures      Pt w/poor prognosis- ongoing GOC w/ family  Rt Hip- VAC  w/ adaptic w/ Wound PT as per protocol  Sacrum/ Buttocks & Lt hip- continue Medihoney dressing      CAVILON QD w/ pericare as per protocol w/ pericare      loose bm improving w/ senna  Posterior Ribs - allevyn QOD  Bilateral feet= CAVILON QD to dried blisters   Scrotal sling w/ Interdry Ag  Weeping edema- attends underpad  Antimicrobials per Medicine / ID   Moisturize intact skin w/ SWEEN cream BID  Nutritional optimization for pt w/ acute on chronic protein calorie malnutrition        continue  Jevity TF and prosource  & sarbjit        folic acid MVI & Vit C to promote wound healing  Continue turning and positioning w/ offloading assistive devices as per protocol  Waffle Cushion to chair when oob to chair  Continue w/ low air loss bed surface, consider Envella bed if aligned w/ GOC as pt        w/ multiple surfaces w/ pressure injuries  Care as per medicine will follow w/ you  Upon discharge f/u as outpatient at [facility] [address] [facility] [phone]  D/w team & RN  Alessandra ePrez PA-C CWS [id number]  I spent 35minutes face to face w/ this pt of which more than 50% of the time was spent counseling & coordinating care of this pt.

## 2021-11-03 NOTE — CHART NOTE - NSCHARTNOTEFT_GEN_A_CORE
Informed by RN pt has redness and swelling on right upper arm by axilla. Noted to have redness/brusing and swelling/hardness under skin.  DDImer resulted 981.  Ordered US duplex of UE. D/w Dr. Cohen.

## 2021-11-03 NOTE — PROGRESS NOTE ADULT - SUBJECTIVE AND OBJECTIVE BOX
Patient is a 81y old  Male who presents with a chief complaint of Hyponatremia (02 Nov 2021 13:15)      SUBJECTIVE / OVERNIGHT EVENTS: Patient seen and examined at bedside. He remains at baseline mental status. continues to have downtrending plt and H/h     ROS:  unable to assess as patient is non verbal     Allergies    No Known Allergies    Intolerances        MEDICATIONS  (STANDING):  ascorbic acid 500 milliGRAM(s) Oral daily  BACItracin   Ointment 1 Application(s) Topical two times a day  caspofungin IVPB 50 milliGRAM(s) IV Intermittent every 24 hours  chlorhexidine 2% Cloths 1 Application(s) Topical <User Schedule>  dexAMETHasone  IVPB 8 milliGRAM(s) IV Intermittent every 8 hours  dextrose 40% Gel 15 Gram(s) Oral once  dextrose 5%. 1000 milliLiter(s) (50 mL/Hr) IV Continuous <Continuous>  dextrose 5%. 1000 milliLiter(s) (100 mL/Hr) IV Continuous <Continuous>  dextrose 50% Injectable 25 Gram(s) IV Push once  dextrose 50% Injectable 12.5 Gram(s) IV Push once  dextrose 50% Injectable 25 Gram(s) IV Push once  erythromycin   Ointment 1 Application(s) Left EYE four times a day  folic acid 1 milliGRAM(s) Oral daily  glucagon  Injectable 1 milliGRAM(s) IntraMuscular once  hydrALAZINE 50 milliGRAM(s) Oral three times a day  insulin lispro (ADMELOG) corrective regimen sliding scale   SubCutaneous every 6 hours  levETIRAcetam  IVPB 500 milliGRAM(s) IV Intermittent every 12 hours  multivitamin/minerals/iron Oral Solution (CENTRUM) 15 milliLiter(s) Oral daily  nystatin Powder 1 Application(s) Topical two times a day  pantoprazole  Injectable 40 milliGRAM(s) IV Push two times a day  polyethylene glycol 3350 17 Gram(s) Oral every 12 hours  senna Syrup 10 milliLiter(s) Oral at bedtime    MEDICATIONS  (PRN):      Vital Signs Last 24 Hrs  T(C): 36.3 (03 Nov 2021 12:42), Max: 36.6 (02 Nov 2021 22:45)  T(F): 97.3 (03 Nov 2021 12:42), Max: 97.8 (02 Nov 2021 22:45)  HR: 113 (03 Nov 2021 12:42) (73 - 113)  BP: 128/77 (03 Nov 2021 12:42) (109/67 - 144/78)  BP(mean): --  RR: 22 (03 Nov 2021 12:42) (20 - 24)  SpO2: 94% (03 Nov 2021 12:42) (93% - 100%)  CAPILLARY BLOOD GLUCOSE      POCT Blood Glucose.: 133 mg/dL (03 Nov 2021 11:45)  POCT Blood Glucose.: 125 mg/dL (03 Nov 2021 05:49)  POCT Blood Glucose.: 155 mg/dL (02 Nov 2021 23:41)  POCT Blood Glucose.: 158 mg/dL (02 Nov 2021 17:18)    I&O's Summary    02 Nov 2021 07:01  -  03 Nov 2021 07:00  --------------------------------------------------------  IN: 1150 mL / OUT: 525 mL / NET: 625 mL        PHYSICAL EXAM:  GENERAL: chronically ill appearing   CHEST/LUNG: no wheezing noted   HEART: +S1/S2   ABDOMEN: Soft, Nontender, Nondistended, peg site erythematous + Rt hip wound vac  EXTREMITIES: anasarca and significant peripheral edema   PSYCH: AAOx0    LABS:                        7.2    4.42  )-----------( 24       ( 03 Nov 2021 08:59 )             23.7     11-03    154<H>  |  117<H>  |  43<H>  ----------------------------<  136<H>  3.2<L>   |  24  |  0.41<L>    Ca    7.6<L>      03 Nov 2021 08:59  Phos  3.5     11-03  Mg     2.2     11-03    TPro  3.9<L>  /  Alb  2.0<L>  /  TBili  0.9  /  DBili  x   /  AST  43<H>  /  ALT  68<H>  /  AlkPhos  215<H>  11-03              RADIOLOGY & ADDITIONAL TESTS:    Consultant(s) Notes Reviewed:  ID and Neuro sx rec noted    Care Discussed with Consultants/Other Providers: Medicine ACP    unable to reach Dtr Brooklynn over the phone today 11/3

## 2021-11-03 NOTE — PROGRESS NOTE ADULT - SUBJECTIVE AND OBJECTIVE BOX
[facility]-- WOUND TEAM -- FOLLOW UP NOTE  --------------------------------------------------------------------------------    24 hour events/subjective:    incontinent -  tolerating tg  ongoing goc  no f/c/s    Diet:  Diet, NPO with Tube Feed:   Tube Feeding Modality: Gastrostomy  Jevity 1.5 Adria (JEVITY1.5RTH)  Continuous  Starting Tube Feed Rate mL per Hour: 10  Tube Feed Duration (in Hours): 24  Tube Feed Start Time: 17:00  Ibrahima(7 Gm Arginine/7 Gm Glut/1.2 Gm HMB     Qty per Day:  2 (10-30-21 @ 16:53)      ROS: pt unable to offer    ALLERGIES & MEDICATIONS  --------------------------------------------------------------------------------    No Known Allergies        STANDING INPATIENT MEDICATIONS  ascorbic acid 500 milliGRAM(s) Oral daily  BACItracin   Ointment 1 Application(s) Topical two times a day  caspofungin IVPB 50 milliGRAM(s) IV Intermittent every 24 hours  chlorhexidine 2% Cloths 1 Application(s) Topical <User Schedule>  dexAMETHasone  IVPB 8 milliGRAM(s) IV Intermittent every 8 hours  dextrose 40% Gel 15 Gram(s) Oral once  dextrose 5%. 1000 milliLiter(s) IV Continuous <Continuous>  dextrose 5%. 1000 milliLiter(s) IV Continuous <Continuous>  dextrose 50% Injectable 25 Gram(s) IV Push once  dextrose 50% Injectable 12.5 Gram(s) IV Push once  dextrose 50% Injectable 25 Gram(s) IV Push once  erythromycin   Ointment 1 Application(s) Left EYE four times a day  folic acid 1 milliGRAM(s) Oral daily  glucagon  Injectable 1 milliGRAM(s) IntraMuscular once  hydrALAZINE 50 milliGRAM(s) Oral three times a day  insulin lispro (ADMELOG) corrective regimen sliding scale   SubCutaneous every 6 hours  levETIRAcetam  IVPB 500 milliGRAM(s) IV Intermittent every 12 hours  multivitamin/minerals/iron Oral Solution (CENTRUM) 15 milliLiter(s) Oral daily  nystatin Powder 1 Application(s) Topical two times a day  pantoprazole  Injectable 40 milliGRAM(s) IV Push two times a day  polyethylene glycol 3350 17 Gram(s) Oral every 12 hours  senna Syrup 10 milliLiter(s) Oral at bedtime      VITALS/PHYSICAL EXAM  --------------------------------------------------------------------------------  T(C): 36.3 (11-03-21 @ 16:31), Max: 36.6 (11-02-21 @ 22:45)  HR: 89 (11-03-21 @ 16:31) (82 - 113)  BP: 105/69 (11-03-21 @ 16:31) (105/69 - 144/78)  RR: 22 (11-03-21 @ 16:31) (22 - 24)  SpO2: 100% (11-03-21 @ 16:31) (94% - 100%)  Wt(kg): --        11-02-21 @ 07:01  -  11-03-21 @ 07:00  --------------------------------------------------------  IN: 1150 mL / OUT: 525 mL / NET: 625 mL    11-03-21 @ 07:01  -  11-03-21 @ 19:06  --------------------------------------------------------  IN: 1050 mL / OUT: 200 mL / NET: 850 mL    NAD, lethargic, frail  [facility] P500 bed  HEENT:  NC/AT, mucosa moist,  trachea midline, neck supple  Gastrointestinal: soft NT/ND (+)BS  (+)PEG   : scrotal edema  (+) condom cath  Neurology: nonverbal, no follow commands/ paraplegic  Musculoskeletal:  passive ROM  Vascular: BLE equally warm,  mild BLE edema equal, BLE DP/PT pulses palpable      no acute ischemia noted  Skin:  moist w/ good turgor, anasarca  bilateral groin hyperpigmented and moist w/ o blistering or opening- no active drainage  Sacrum unstageable w/soft eschar w/ slough along edges        4cm x 4cm x 1.4cm w/ slough and nonviable tissue w/ 1cm lip of undermining circumferentially  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 base no palp bone (+)ligament exposed - increased in area of previously noted hematoma vs dti on base       5.5cm x 7.5xm x 1.5cm w/ moderate serosanguinous drainage w/ undermining 10-1o'clock- greatest at 12 of 2cm  Lt Hip 2cm x 1.5cm x 0.5cm w/ partial thickness granular tissue along periphery       w/ serosnaguinous drainage  Bilateral heels, bunions and lateral feet w/ dry resolving blisters w/o skin break, drainage  No odor, erythema, increased warmth, tenderness, induration, fluctuance      LABS/ CULTURES/ RADIOLOGY:              7.2    4.42  >-----------<  24       [11-03-21 @ 08:59]              23.7     154  |  117  |  43  ----------------------------<  136      [11-03-21 @ 08:59]  3.2   |  24  |  0.41        Ca     7.6     [11-03-21 @ 08:59]      Mg     2.2     [11-03-21 @ 08:59]      Phos  3.5     [11-03-21 @ 08:59]    TPro  3.9  /  Alb  2.0  /  TBili  0.9  /  DBili  x   /  AST  43  /  ALT  68  /  AlkPhos  215  [11-03-21 @ 08:59]    PT/INR: PT 12.2 , INR 1.02       [11-03-21 @ 15:50]  PTT: 29.1       [11-03-21 @ 15:50]    CAPILLARY BLOOD GLUCOSE  POCT Blood Glucose.: 159 mg/dL (03 Nov 2021 17:12)  POCT Blood Glucose.: 133 mg/dL (03 Nov 2021 11:45)  POCT Blood Glucose.: 125 mg/dL (03 Nov 2021 05:49)  POCT Blood Glucose.: 155 mg/dL (02 Nov 2021 23:41)

## 2021-11-04 NOTE — PROGRESS NOTE ADULT - PROBLEM SELECTOR PROBLEM 11
Prophylactic measure
Advanced care planning/counseling discussion
Prophylactic measure
Advanced care planning/counseling discussion
Prophylactic measure
Prophylactic measure
Advanced care planning/counseling discussion
Prophylactic measure

## 2021-11-04 NOTE — PROGRESS NOTE ADULT - PROBLEM SELECTOR PLAN 6
-occult positive in gastric lavage in MICU. c/w empiric PPI IV BID for now.   -Also with hemorrhagic brain mass. -Repeat CT head appears stable without any new hemorrhage.  -Trend CBC.   -thrombocytopenia platelets downtrending , secondary to hemorraghic brain mass, multiple infections, iatrogenic from frequent blood draws, Bone marrow suppression from Keppra, multiple rounds of different antibiotics and infections specifically fungemia.    -s/p 1 PRBC and 1 unit Plt 11/2 plt and H/h continue to down treading     -fibrinogen / PTPT reviewed DIC julissaley, continue to follow  - currently Hemodynamically stable, low threshold for MICU consult -occult positive in gastric lavage in MICU. c/w empiric PPI IV BID for now.   -Also with hemorrhagic brain mass. -Repeat CT head appears stable without any new hemorrhage.  -Trend CBC.   -thrombocytopenia platelets downtrending , secondary to hemorraghic brain mass, multiple infections, iatrogenic from frequent blood draws, Bone marrow suppression from Keppra, multiple rounds of different antibiotics and infections specifically fungemia.    -s/p 1 PRBC and 1 unit Plt 11/2 plt and H/h continue to down treading     -fibrinogen / PT-PTT reviewed DIC unlikley, continue to follow  -DDimmer elevated , + RUE swelling check u/s RUE to r/o DVT   - currently Hemodynamically stable, low threshold for MICU consult

## 2021-11-04 NOTE — PROGRESS NOTE ADULT - PROBLEM SELECTOR PROBLEM 7
Hypertension
Diabetes
Prophylactic measure
Diabetes
Hypertension
Diabetes
Prophylactic measure
Diabetes
Diabetes
Prophylactic measure
Diabetes
Hypertension
Prophylactic measure
Diabetes
Diabetes
Hypertension
Diabetes
Diabetes
Hypertension
Diabetes
Diabetes

## 2021-11-04 NOTE — PROGRESS NOTE ADULT - SUBJECTIVE AND OBJECTIVE BOX
Patient is a 81y old  Male who presents with a chief complaint of Hyponatremia (03 Nov 2021 19:06)      SUBJECTIVE / OVERNIGHT EVENTS: Patient seen examined at bedside. He remains unresponsive, overnight concern for RUE swelling and redness.      ROS:  unable to assess as patient is unresponsive     Allergies    No Known Allergies    Intolerances        MEDICATIONS  (STANDING):  ascorbic acid 500 milliGRAM(s) Oral daily  BACItracin   Ointment 1 Application(s) Topical two times a day  caspofungin IVPB 50 milliGRAM(s) IV Intermittent every 24 hours  chlorhexidine 2% Cloths 1 Application(s) Topical <User Schedule>  dexAMETHasone  IVPB 8 milliGRAM(s) IV Intermittent every 8 hours  dextrose 40% Gel 15 Gram(s) Oral once  dextrose 5%. 1000 milliLiter(s) (100 mL/Hr) IV Continuous <Continuous>  dextrose 5%. 1000 milliLiter(s) (50 mL/Hr) IV Continuous <Continuous>  dextrose 50% Injectable 25 Gram(s) IV Push once  dextrose 50% Injectable 12.5 Gram(s) IV Push once  dextrose 50% Injectable 25 Gram(s) IV Push once  erythromycin   Ointment 1 Application(s) Left EYE four times a day  folic acid 1 milliGRAM(s) Oral daily  glucagon  Injectable 1 milliGRAM(s) IntraMuscular once  hydrALAZINE 50 milliGRAM(s) Oral three times a day  insulin lispro (ADMELOG) corrective regimen sliding scale   SubCutaneous every 6 hours  levETIRAcetam  IVPB 500 milliGRAM(s) IV Intermittent every 12 hours  multivitamin/minerals/iron Oral Solution (CENTRUM) 15 milliLiter(s) Oral daily  nystatin Powder 1 Application(s) Topical two times a day  pantoprazole  Injectable 40 milliGRAM(s) IV Push two times a day  polyethylene glycol 3350 17 Gram(s) Oral every 12 hours  senna Syrup 10 milliLiter(s) Oral at bedtime    MEDICATIONS  (PRN):      Vital Signs Last 24 Hrs  T(C): 36.3 (04 Nov 2021 10:15), Max: 36.4 (04 Nov 2021 04:56)  T(F): 97.4 (04 Nov 2021 10:15), Max: 97.6 (04 Nov 2021 04:56)  HR: 105 (04 Nov 2021 10:15) (89 - 113)  BP: 108/69 (04 Nov 2021 10:15) (105/69 - 128/77)  BP(mean): --  RR: 22 (04 Nov 2021 10:15) (21 - 22)  SpO2: 95% (04 Nov 2021 10:15) (94% - 100%)  CAPILLARY BLOOD GLUCOSE      POCT Blood Glucose.: 97 mg/dL (04 Nov 2021 05:01)  POCT Blood Glucose.: 126 mg/dL (03 Nov 2021 23:55)  POCT Blood Glucose.: 159 mg/dL (03 Nov 2021 17:12)  POCT Blood Glucose.: 133 mg/dL (03 Nov 2021 11:45)    I&O's Summary    03 Nov 2021 07:01  -  04 Nov 2021 07:00  --------------------------------------------------------  IN: 2220 mL / OUT: 570 mL / NET: 1650 mL        PHYSICAL EXAM:  GENERAL: chronically ill appearing   CHEST/LUNG: no wheezing noted   HEART: +S1/S2   ABDOMEN: Soft, Nontender, Nondistended, peg site erythematous + Rt hip wound vac  EXTREMITIES: anasarca and significant peripheral edema   PSYCH: AAOx0    LABS:                        7.2    4.42  )-----------( 24       ( 03 Nov 2021 08:59 )             23.7     11-03    154<H>  |  117<H>  |  43<H>  ----------------------------<  136<H>  3.2<L>   |  24  |  0.41<L>    Ca    7.6<L>      03 Nov 2021 08:59  Phos  3.5     11-03  Mg     2.2     11-03    TPro  3.9<L>  /  Alb  2.0<L>  /  TBili  0.9  /  DBili  x   /  AST  43<H>  /  ALT  68<H>  /  AlkPhos  215<H>  11-03    PT/INR - ( 03 Nov 2021 15:50 )   PT: 12.2 sec;   INR: 1.02 ratio         PTT - ( 03 Nov 2021 15:50 )  PTT:29.1 sec          RADIOLOGY & ADDITIONAL TESTS:    Care Discussed with Consultants/Other Providers: Medicine ACP

## 2021-11-04 NOTE — PROGRESS NOTE ADULT - REASON FOR ADMISSION
Hyponatremia

## 2021-11-04 NOTE — PROGRESS NOTE ADULT - PROBLEM SELECTOR PLAN 2
CRE Klebsiella in cultures  On avycaz  source likely sacral wounds - f/u wound care  prognosis quite poor
CRE Klebsiella in cultures  s/p avycaz,  source likely sacral wounds - f/u wound care  prognosis quite poor  ECHO neg for vegetation 10/8  abx d/kathryn today by ID   to continue with antifungal coverage for 1 more month
CRE Klebsiella in cultures  s/p avycaz,  source likely sacral wounds - f/u wound care  prognosis poor  ECHO neg for vegetation 10/8  cw caspofungin till 11/8
CRE Klebsiella in cultures  s/p avycaz,  source likely sacral wounds - f/u wound care  prognosis quite poor  ECHO neg for vegetation 10/8
CRE Klebsiella in cultures  On avycaz  source likely sacral wounds - f/u wound care  prognosis quite poor
CRE Klebsiella in cultures  On avycaz  source likely sacral wounds - f/u wound care  prognosis quite poor
CRE Klebsiella in cultures  s/p avycaz,  source likely sacral wounds - f/u wound care  prognosis poor  ECHO neg for vegetation 10/8  cw caspofungin till 11/8
CRE Klebsiella in cultures  s/p avycaz,  source likely sacral wounds - f/u wound care  prognosis quite poor  ECHO neg for vegetation 10/8  completed course of abx   to continue with antifungal coverage for 1 more month
CRE Klebsiella in cultures  On avycaz, until 10/19 as per ID (last day today)  source likely sacral wounds - f/u wound care  prognosis quite poor  ECHO neg for vegetation 10/8
CRE Klebsiella in cultures  s/p avycaz,  source likely sacral wounds - f/u wound care  prognosis quite poor  ECHO neg for vegetation 10/8  completed course of abx   to continue with antifungal coverage for 1 more month
CRE Klebsiella in cultures  On avycaz, until 10/19 as per ID  source likely sacral wounds - f/u wound care  prognosis quite poor
CRE Klebsiella in cultures  s/p avycaz,  source likely sacral wounds - f/u wound care  prognosis poor  ECHO neg for vegetation 10/8  cw caspofungin till 11/8
C.Auris fungemia  Isolation  ID appreciated.
CRE Klebsiella in cultures  s/p avycaz,  source likely sacral wounds - f/u wound care  prognosis quite poor  ECHO neg for vegetation 10/8
CRE Klebsiella in cultures  s/p avycaz,  source likely sacral wounds - f/u wound care  prognosis quite poor  ECHO neg for vegetation 10/8  completed course of abx   to continue with antifungal coverage for 1 more month
-Currently acceptable in setting of brain mass/edema. Goal 145-155.  -Monitor BMP.  -f/u renal recs.
CRE Klebsiella in cultures  On avycaz  source likely sacral wounds - f/u wound care  prognosis quite poor
CRE Klebsiella in cultures  On avycaz, until 10/19 as per ID  source likely sacral wounds - f/u wound care  prognosis quite poor  ECHO neg for vegetation 10/8
-Currently acceptable in setting of brain mass/edema. Goal 145-155.  -Monitor BMP.
-Currently acceptable in setting of brain mass/edema. Goal 145-155.  -Monitor BMP.
CRE Klebsiella in cultures  s/p avycaz,  source likely sacral wounds - f/u wound care  prognosis poor  ECHO neg for vegetation 10/8  cw caspofungin till 11/8
-Currently acceptable in setting of brain mass/edema. Goal 145-155.  -Monitor BMP.  -f/u renal recs.
CRE Klebsiella in cultures  s/p avycaz,  source likely sacral wounds - f/u wound care  prognosis quite poor  ECHO neg for vegetation 10/8  abx d/kathryn today by ID   to continue with antifungal coverage for 1 more month
CRE Klebsiella in cultures  On avycaz  source PICC (since removed) vs sacral wounds - f/u wound care  prognosis quite poor
CRE Klebsiella in cultures  s/p avycaz,  source likely sacral wounds - f/u wound care  prognosis poor  ECHO neg for vegetation 10/8  cw caspofungin till 11/8
CRE Klebsiella in cultures  s/p avycaz,  source likely sacral wounds - f/u wound care  prognosis quite poor  ECHO neg for vegetation 10/8  completed course of abx   to continue with antifungal coverage for 1 more month

## 2021-11-04 NOTE — PROGRESS NOTE ADULT - PROBLEM SELECTOR PLAN 3
-Currently acceptable in setting of brain mass/edema. Goal 145-155.  -Monitor BMP.  -f/u renal recs.
completed abx course  c/w  wound care.
-Currently acceptable in setting of brain mass/edema. Goal 145-155.  -Monitor BMP
-Currently acceptable in setting of brain mass/edema. Goal 145-155.  -Monitor BMP  -f/u renal recs
-Currently acceptable in setting of brain mass/edema. Goal 145-155.  -Monitor BMP.  -f/u renal recs.
-Currently acceptable in setting of brain mass/edema. Goal 145-155.  -Monitor BMP.  -f/u renal recs.
-Hgb down to 7.3 today from 8.1 yesterday.   -occult positive in gastric lavage in MICU. Will c/w empiric PPI IV BID for now.   -Also hemorrhagic brain mass. Consider repeat CT head.   -Trend CBC. -F/u repeat CBC.   -Will check iron studies, B12, folate, LDH, hapto, retics.
-Hgb down to 6.8 on 10/8 and gave patient 1 prbc.   -occult positive in gastric lavage in MICU. Will c/w empiric PPI IV BID for now.   -Also hemorrhagic brain mass. -Repeat CT head appears stable without any new hemorrhage.  -Trend CBC.   -Iron studies, B12, folate, LDH, hapto, retics. -showed low folate. Will replete.
-Currently acceptable in setting of brain mass/edema. Goal 145-155.  -Monitor BMP
-Currently acceptable in setting of brain mass/edema. Goal 145-155.  -Monitor BMP  -f/u renal recs
-Currently acceptable in setting of brain mass/edema. Goal 145-155.  -Monitor BMP, pending blood today  -f/u renal recs.
-Hgb down to 6.8 on 10/8 and gave patient 1 prbc.   -occult positive in gastric lavage in MICU. Will c/w empiric PPI IV BID for now.   -Also hemorrhagic brain mass. -Repeat CT head appears stable without any new hemorrhage.  -Trend CBC.   -Iron studies, B12, folate, LDH, hapto, retics. -showed low folate. Will replete.
-Currently acceptable in setting of brain mass/edema. Goal 145-155.  -Monitor BMP  -f/u renal recs.
-Hgb down to 6.8 yesterday and gave patient 1 prbc.   -occult positive in gastric lavage in MICU. Will c/w empiric PPI IV BID for now.   -Also hemorrhagic brain mass. Will repeat CT head.   -Trend CBC.   -Iron studies, B12, folate, LDH, hapto, retics. -showed low folate. Will replete.
-Currently acceptable in setting of brain mass/edema. Goal 145-155.  -Monitor BMP  -f/u renal recs
-Currently acceptable in setting of brain mass/edema. Goal 145-155.  -Monitor BMP.  -f/u renal recs.
-Currently acceptable in setting of brain mass/edema. Goal 145-155.  -Monitor BMP.  -f/u renal recs.
-Currently acceptable in setting of brain mass/edema. Goal 145-155.  -Monitor BMP
-Currently acceptable in setting of brain mass/edema. Goal 145-155.  -Monitor BMP.  -f/u renal recs.
-Currently acceptable in setting of brain mass/edema. Goal 145-155.  -Monitor BMP
-Currently acceptable in setting of brain mass/edema. Goal 145-155.  -Monitor BMP
-Currently acceptable in setting of brain mass/edema. Goal 145-155.  -Monitor BMP, pending blood today  -f/u renal recs.
-Currently acceptable in setting of brain mass/edema. Goal 145-155.  -Monitor BMP.  -f/u renal recs.
-Currently acceptable in setting of brain mass/edema. Goal 145-155.  -Monitor BMP
-Currently acceptable in setting of brain mass/edema. Goal 145-155.  -Monitor BMP.  -f/u renal recs.
-Currently acceptable in setting of brain mass/edema. Goal 145-155.  -Monitor BMP
-Currently acceptable in setting of brain mass/edema. Goal 145-155.  -Monitor BMP, pending blood today  -f/u renal recs.

## 2021-11-04 NOTE — CHART NOTE - NSCHARTNOTESELECT_GEN_ALL_CORE
Event Note
Hematology/Off Service Note
MICU/Transfer Note
Neurosurgery/Event Note
Neurosurgery/Event Note
POCUS
Event Note
Follow-up/Nutrition Services
GI Fellow/Event Note
GOC discussion
Labs/Event Note
MAR ACCEPT NOTE
NEURO-ONCOLOGY/Event Note
NEURO-ONCOLOGY/Event Note
Neurosurgery/Event Note
Neurosurgery/Event Note
Nutrition Services
POCUS/Event Note
POCUS/Event Note
Palliative Medicine/Event Note
Palliative care/Event Note

## 2021-11-04 NOTE — PROGRESS NOTE ADULT - PROBLEM SELECTOR PROBLEM 4
Candidemia
Diabetes
Candidemia
Diabetes
Functional quadriplegia
Candidemia
Diabetes
Candidemia
Diabetes
Candidemia

## 2021-11-04 NOTE — PROGRESS NOTE ADULT - PROBLEM SELECTOR PROBLEM 8
Hypertension
PEG tube malfunction
Hypertension
Hypertension
PEG tube malfunction
Hypertension
Pneumonia, aspiration
Hypertension
Hypertension
PEG tube malfunction
Hypertension
Pneumonia, aspiration
Advanced care planning/counseling discussion
Hypertension

## 2021-11-04 NOTE — PROGRESS NOTE ADULT - PROBLEM SELECTOR PROBLEM 10
Pneumonia, aspiration
Prophylactic measure
Pneumonia, aspiration
Prophylactic measure
Pneumonia, aspiration
Prophylactic measure
Pneumonia, aspiration
Pneumonia, aspiration
Advanced care planning/counseling discussion
Pneumonia, aspiration
Advanced care planning/counseling discussion
Pneumonia, aspiration

## 2021-11-04 NOTE — PROVIDER CONTACT NOTE (CHANGE IN STATUS NOTIFICATION) - BACKGROUND
Pt DNR/DNI on PEG feeds of Jevity 1.5 going at 10cc ATC (low rate for leaking PEG). Pt obtunded with HX  of frontal hemorrhagic mass dx with bacteremia & C.Auris infection.

## 2021-11-04 NOTE — PROGRESS NOTE ADULT - PROBLEM SELECTOR PLAN 9
GI replaced PEG  UGIS okay, PEG now in use
on abx as above
on abx as above
-Hold pharma dvt ppx for now given recent hgb downtrend and pos blood in gastric lavage and hemorrhagic brain mass. -Need to d/w LAITH if ok to start pharma dvt ppx. -F/u LE duplex prior to SCD's.   -Will get lower and upper extremity duplex given mild edema to r/o dvt - pending.
GI replaced PEG  UGIS lester, PEG now in use  free water for 24 hrs 10/19
-Hold pharma dvt ppx for now given recent hgb downtrend and pos blood in gastric lavage and hemorrhagic brain mass. -Need to d/w LAITH if ok to start pharma dvt ppx. -F/u LE duplex prior to SCD's.   -Will get lower and upper extremity duplex given mild edema to r/o dvt - pending.
on abx as above
GI replaced PEG  UGIS lester, PEG now in use  free water for 24 hrs 10/19
GI replaced PEG  UGIS okay, PEG now in use
GI replaced PEG  UGIS lester, PEG now in use  free water for 24 hrs 10/19
GI replaced PEG  UGIS okay, PEG now in use
GI replaced PEG  UGIS lester, PEG now in use  free water for 24 hrs 10/19
GI replaced PEG  UGIS okay, PEG now in use

## 2021-11-04 NOTE — PROGRESS NOTE ADULT - PROBLEM SELECTOR PROBLEM 6
Anemia
Diabetes
Pneumonia, aspiration
Anemia
gradual onset
Pneumonia, aspiration
Anemia
Pneumonia, aspiration
Anemia
Diabetes
Anemia
Advance care planning
Anemia
Diabetes
Diabetes
Anemia
Pneumonia, aspiration
Anemia
Diabetes
Anemia

## 2021-11-04 NOTE — PROGRESS NOTE ADULT - PROBLEM SELECTOR PROBLEM 3
Bacteremia due to Klebsiella pneumoniae
Anemia
Hypernatremia
Anemia
Hypernatremia
Anemia
Anemia
Hypernatremia

## 2021-11-04 NOTE — DISCHARGE NOTE FOR THE EXPIRED PATIENT - HOSPITAL COURSE
80M PMH gallstones (30-40 yrs ago), BPH, HLD, spinal stenosis, CVA, s/p PEG, recent PNA, penile infxn (?abscess) and sacral wound on abx w/ PICC (cefepime 2gq8h 8/20-9/28; doxy 8/20-?), presented w/ worsening cough x 1 wk after daughter attempted to give ensure by mouth. Daughter noticed "snoring" breath sounds and called EMS. Denies sick contact, diarrhea. VSS in ED, Labs significant at that time for Na 110, CO2 14, lactate 2.5. CXR w/ b/l patchy opacities R>L c/w PNA. 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 CTAP w/ multifocal PNA. Iso worsening mental status pt intubated in ED and transferred to MICU.     MICU Course: Empiric abx for multifocal PNA presumed 2/2 aspiration, completed Zosyn course. Placed on levophed iso hypotension, eventually weaned 10/2, sedation weaned as well. 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 Neurosg on board, neuroonc c/s, results of tumor board and discussion w/ family were to proceed w/ biopsy to further evaluate lymphoma vs glioblastoma multiforme vs mets - however now family prefers more conservative approach. Pt extubated 10/6 weaned to RA w/ adequate SpO2%. Iso HTN pt initiated on hydralazine po 50mg TID. Tolerating PEG feeds. Iso steroid use pt requiring NPH insulin q6h titrated to 6 however pt intermittently w/ NPH held iso borderline FS. C/f occult blood from gastric lavage, placed on PPI, Hgb slowly downtrended however stabilized.     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 DDX of imaging metastases, glioblastoma and lymphoma. Comatose  -Family initially wanted to pursue more aggressive mgmt. -LAITH was tentatively planning to do biopsy vs debulking but prior physician discussed with daughter Brooklynn 10/9 and she would now like to pursue more conservative/medical management and not go with any surgery/biopsy   -C/w dexamethasone; c/w increased 8mg IV q8h for brain edema per neuro onco, d/w nsgy 10/28 will provide recs on decadron taper   -Na goal 145-155 per LAITH. Monitor CBC and BMP   - Na 154, increased free water 300cc Q6h for 24 hrs, unable to get labs this morning due to severe anasarca, will try again with phlebotomy   -F/u neuro onco and LAITH recs.  -C/w keppra 500mg BID for seizure ppx, dw nsgy  -Ongoing GOC discussions with family, Dtr Brooklynn sign MOLST form patient is now DNR/DNI. 80M PMH gallstones (30-40 yrs ago), BPH, HLD, spinal stenosis, CVA, s/p PEG, recent PNA, penile infxn (?abscess) and sacral wound on abx w/ PICC (cefepime 2gq8h -; doxy -?), presented w/ worsening cough x 1 wk after daughter attempted to give ensure by mouth. Daughter noticed "snoring" breath sounds and called EMS. Denies sick contact, diarrhea. VSS in ED, Labs significant at that time for Na 110, CO2 14, lactate 2.5. CXR w/ b/l patchy opacities R>L c/w PNA. 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 CTAP w/ multifocal PNA. Iso worsening mental status pt intubated in ED and transferred to MICU.     MICU Course: Empiric abx for multifocal PNA presumed 2/2 aspiration, completed Zosyn course. Placed on levophed iso hypotension, eventually weaned 10/2, sedation weaned as well. 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 W/ regard to neuro imaging, CTH  R sided intraparenchymal hemorrhage w/ surrounding edema, 2.5x2.8 cm hyperdensity crossing midline 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; CTH  persistent mass effect R lat ventricle w/ shift 1cm, Acute hemorrhage L frontal lobe and presence of extra axial hemorrhage, edema R frontal lobe > L; CTH w/ IV contrast  findings suspicious for neoplasm ddx includes lymphoma, mets, glioblastoma multiforme, MRI brain w/ and w/o contrast revealing hemorrhagic mass c/f neoplasm. Neurosg on board, neuroonc c/s, results of tumor board and discussion w/ family were to proceed w/ biopsy to further evaluate lymphoma vs glioblastoma multiforme vs mets - however now family prefers more conservative approach. Pt extubated 10/6 weaned to RA w/ adequate SpO2%. Iso HTN pt initiated on hydralazine po 50mg TID. Tolerating PEG feeds. Iso steroid use pt requiring NPH insulin q6h titrated to 6 however pt intermittently w/ NPH held iso borderline FS. C/f occult blood from gastric lavage, placed on PPI, Hgb slowly downtrended however stabilized.     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 DDX of imaging metastases, glioblastoma and lymphoma. Comatose  -Family initially wanted to pursue more aggressive mgmt. -LAITH was tentatively planning to do biopsy vs debulking but prior physician discussed with daughter Brooklynn 10/9 and she would now like to pursue more conservative/medical management and not go with any surgery/biopsy   -C/w dexamethasone; c/w increased 8mg IV q8h for brain edema per neuro onco, d/w nsgy 10/28 will provide recs on decadron taper   -Na goal 145-155 per LAITH. Monitor CBC and BMP   - Na 154, increased free water 300cc Q6h for 24 hrs, unable to get labs this morning due to severe anasarca, will try again with phlebotomy   -F/u neuro onco and LAITH recs.  -C/w keppra 500mg BID for seizure ppx, dw nsgy  -Ongoing GOC discussions with family, Dtr Brooklynn sign MOLST form patient is now DNR/DNI and opted for hospice once IV meds finished   - Patient

## 2021-11-04 NOTE — PROGRESS NOTE ADULT - PROBLEM SELECTOR PLAN 7
-H/o CVA s/p PEG.   -On tube feeds.   -C/w NPH 6 units q6h and JOHN q6h.
-Hold pharma dvt ppx for now given recent hgb downtrend and pos blood in gastric lavage and hemorrhagic brain mass. -Need to d/w LAITH if ok to start pharma dvt ppx. -F/u LE duplex prior to SCD's.   -Will get lower and upper extremity duplex given mild edema to r/o dvt - pending.     8. Discussed with patient's daughter Brooklynn on 10/9 and NP Melany.
-C/w hydralazine 50mg TID for now.
-H/o CVA s/p PEG.   -On tube feeds.   -C/w NPH 6 units q6h and JOHN q6h.
-H/o CVA s/p PEG.   -On tube feeds.   -C/w JOHN q6h.
-H/o CVA s/p PEG.   -On tube feeds.   -C/w NPH 6 units q6h and JOHN q6h.
-C/w hydralazine 50mg TID for now.
-H/o CVA s/p PEG.   -On tube feeds.   -C/w NPH 6 units q6h and JOHN q6h.
-Hold pharma dvt ppx for now given hgb downtrend and pos blood in gastric lavage and hemorrhagic brain mass. D/w LAITH if ok to start pharma dvt ppx.  -Will get lower and upper extremity duplex given mild edema to r/o dvt.
-H/o CVA s/p PEG.   -On tube feeds.   -C/w NPH 6 units q6h and JOHN q6h.
-C/w hydralazine 50mg TID for now.
-H/o CVA s/p PEG.   -On tube feeds.   -C/w NPH 6 units q6h and JOHN q6h.
-Hold pharma dvt ppx for now given hgb downtrend and pos blood in gastric lavage and hemorrhagic brain mass. D/w LAITH if ok to start pharma dvt ppx. -F/u LE duplex prior to SCD's.   -Will get lower and upper extremity duplex given mild edema to r/o dvt - pending.     8. Discussed with patient's daughter Brooklynn and LATRICE Mosley and NSGY resident.
-Hold pharma dvt ppx for now given recent hgb downtrend and pos blood in gastric lavage and hemorrhagic brain mass. -Need to d/w LAITH if ok to start pharma dvt ppx. -F/u LE duplex prior to SCD's.   -Will get lower and upper extremity duplex given mild edema to r/o dvt - pending.
-C/w hydralazine 50mg TID for now.
-C/w hydralazine 50mg TID for now.
-H/o CVA s/p PEG.   -On tube feeds.   -C/w NPH 6 units q6h and JOHN q6h.
-H/o CVA s/p PEG.   -On tube feeds.   -C/w NPH 6 units q6h and JOHN q6h.
-H/o CVA s/p PEG.   -On tube feeds.   -C/w JOHN q6h.

## 2021-11-04 NOTE — CHART NOTE - NSCHARTNOTEFT_GEN_A_CORE
Palliative care will sign off as goals have been established and plan for D/c with hospice in place after iv meds complete. Please reconsult if necessary.

## 2021-11-04 NOTE — PROGRESS NOTE ADULT - PROBLEM SELECTOR PROBLEM 1
Brain mass
Hyponatremia
Brain mass

## 2021-11-04 NOTE — PROGRESS NOTE ADULT - NUTRITIONAL ASSESSMENT
This patient has been assessed with a concern for Malnutrition and has been determined to have a diagnosis/diagnoses of Severe protein-calorie malnutrition and Underweight (BMI < 19).    This patient is being managed with:   Diet NPO with Tube Feed-  Tube Feeding Modality: Gastrostomy  Jevity 1.5 Adria (JEVITY1.5RTH)  Total Volume for 24 Hours (mL): 900  Continuous  Starting Tube Feed Rate {mL per Hour}: 50  Until Goal Tube Feed Rate (mL per Hour): 50  Tube Feed Duration (in Hours): 18  Tube Feed Start Time: 12:00  Ibrahima(7 Gm Arginine/7 Gm Glut/1.2 Gm HMB     Qty per Day:  2  No Carb Prosource TF     Qty per Day:  2  Entered: Oct  6 2021 11:44AM    
This patient has been assessed with a concern for Malnutrition and has been determined to have a diagnosis/diagnoses of Severe protein-calorie malnutrition and Underweight (BMI < 19).    This patient is being managed with:   Diet NPO with Tube Feed-  Tube Feeding Modality: Gastrostomy  Jevity 1.5 Adria (JEVITY1.5RTH)  Total Volume for 24 Hours (mL): 1200  Continuous  Starting Tube Feed Rate {mL per Hour}: 50  Until Goal Tube Feed Rate (mL per Hour): 50  Tube Feed Duration (in Hours): 24  Tube Feed Start Time: 12:00  Ibrahima(7 Gm Arginine/7 Gm Glut/1.2 Gm HMB     Qty per Day:  2  Entered: Oct 22 2021  5:34PM    
This patient has been assessed with a concern for Malnutrition and has been determined to have a diagnosis/diagnoses of Severe protein-calorie malnutrition and Underweight (BMI < 19).    This patient is being managed with:   Diet NPO with Tube Feed-  Tube Feeding Modality: Gastrostomy  Jevity 1.5 Adria (JEVITY1.5RTH)  Total Volume for 24 Hours (mL): 900  Continuous  Starting Tube Feed Rate {mL per Hour}: 50  Until Goal Tube Feed Rate (mL per Hour): 50  Tube Feed Duration (in Hours): 18  Tube Feed Start Time: 12:00  Ibrahima(7 Gm Arginine/7 Gm Glut/1.2 Gm HMB     Qty per Day:  2  No Carb Prosource TF     Qty per Day:  2  Entered: Oct  6 2021 11:44AM    
This patient has been assessed with a concern for Malnutrition and has been determined to have a diagnosis/diagnoses of Severe protein-calorie malnutrition and Underweight (BMI < 19).    This patient is being managed with:   Diet NPO with Tube Feed-  Tube Feeding Modality: Orogastric  Jevity 1.5 Adria (JEVITY1.5RTH)  Total Volume for 24 Hours (mL): 900  Intermittent  Starting Tube Feed Rate {mL per Hour}: 20  Increase Tube Feed Rate by (mL): 10    Every 4 hours  Until Goal Tube Feed Rate (mL per Hour): 50  Tube Feeding Hours ON: 18  Tube Feeding OFF (Hours): 6  Tube Feed Start Time: 11:00  Entered: Oct  1 2021  9:09AM    
This patient has been assessed with a concern for Malnutrition and has been determined to have a diagnosis/diagnoses of Severe protein-calorie malnutrition and Underweight (BMI < 19).    This patient is being managed with:   Diet NPO with Tube Feed-  Tube Feeding Modality: Gastrostomy  Jevity 1.5 Adria (JEVITY1.5RTH)  Total Volume for 24 Hours (mL): 1200  Continuous  Starting Tube Feed Rate {mL per Hour}: 50  Until Goal Tube Feed Rate (mL per Hour): 50  Tube Feed Duration (in Hours): 24  Tube Feed Start Time: 12:00  Ibrahima(7 Gm Arginine/7 Gm Glut/1.2 Gm HMB     Qty per Day:  2  Entered: Oct 22 2021  5:34PM    
This patient has been assessed with a concern for Malnutrition and has been determined to have a diagnosis/diagnoses of Severe protein-calorie malnutrition and Underweight (BMI < 19).    This patient is being managed with:   Diet NPO with Tube Feed-  Tube Feeding Modality: Orogastric  Jevity 1.5 Adria (JEVITY1.5RTH)  Total Volume for 24 Hours (mL): 900  Intermittent  Starting Tube Feed Rate {mL per Hour}: 20  Increase Tube Feed Rate by (mL): 10    Every 4 hours  Until Goal Tube Feed Rate (mL per Hour): 50  Tube Feeding Hours ON: 18  Tube Feeding OFF (Hours): 6  Tube Feed Start Time: 11:00  Entered: Oct  1 2021  9:09AM    
This patient has been assessed with a concern for Malnutrition and has been determined to have a diagnosis/diagnoses of Severe protein-calorie malnutrition and Underweight (BMI < 19).    This patient is being managed with:   Diet NPO with Tube Feed-  Tube Feeding Modality: Gastrostomy  Jevity 1.5 Adria (JEVITY1.5RTH)  Total Volume for 24 Hours (mL): 900  Continuous  Starting Tube Feed Rate {mL per Hour}: 50  Until Goal Tube Feed Rate (mL per Hour): 50  Tube Feed Duration (in Hours): 18  Tube Feed Start Time: 12:00  Ibrahima(7 Gm Arginine/7 Gm Glut/1.2 Gm HMB     Qty per Day:  2  No Carb Prosource TF     Qty per Day:  2  Entered: Oct  6 2021 11:44AM    
This patient has been assessed with a concern for Malnutrition and has been determined to have a diagnosis/diagnoses of Severe protein-calorie malnutrition and Underweight (BMI < 19).    This patient is being managed with:   Diet NPO with Tube Feed-  Tube Feeding Modality: Gastrostomy  Jevity 1.5 Adria (JEVITY1.5RTH)  Continuous  Starting Tube Feed Rate {mL per Hour}: 10  Tube Feed Duration (in Hours): 24  Tube Feed Start Time: 17:00  Ibrahima(7 Gm Arginine/7 Gm Glut/1.2 Gm HMB     Qty per Day:  2  Entered: Oct 30 2021  4:53PM    
This patient has been assessed with a concern for Malnutrition and has been determined to have a diagnosis/diagnoses of Severe protein-calorie malnutrition and Underweight (BMI < 19).    This patient is being managed with:   Diet NPO with Tube Feed-  Tube Feeding Modality: Gastrostomy  Jevity 1.5 Adria (JEVITY1.5RTH)  Total Volume for 24 Hours (mL): 1200  Continuous  Starting Tube Feed Rate {mL per Hour}: 50  Until Goal Tube Feed Rate (mL per Hour): 50  Tube Feed Duration (in Hours): 24  Tube Feed Start Time: 12:00  Ibrahima(7 Gm Arginine/7 Gm Glut/1.2 Gm HMB     Qty per Day:  2  Entered: Oct 22 2021  5:34PM    
This patient has been assessed with a concern for Malnutrition and has been determined to have a diagnosis/diagnoses of Severe protein-calorie malnutrition and Underweight (BMI < 19).    This patient is being managed with:   Diet NPO with Tube Feed-  Tube Feeding Modality: Gastrostomy  Jevity 1.5 Adria (JEVITY1.5RTH)  Continuous  Starting Tube Feed Rate {mL per Hour}: 10  Tube Feed Duration (in Hours): 24  Tube Feed Start Time: 17:00  Ibrahima(7 Gm Arginine/7 Gm Glut/1.2 Gm HMB     Qty per Day:  2  Entered: Oct 30 2021  4:53PM    
This patient has been assessed with a concern for Malnutrition and has been determined to have a diagnosis/diagnoses of Severe protein-calorie malnutrition and Underweight (BMI < 19).    This patient is being managed with:   Diet NPO with Tube Feed-  Tube Feeding Modality: Gastrostomy  Jevity 1.5 Adria (JEVITY1.5RTH)  Total Volume for 24 Hours (mL): 900  Continuous  Starting Tube Feed Rate {mL per Hour}: 50  Until Goal Tube Feed Rate (mL per Hour): 50  Tube Feed Duration (in Hours): 18  Tube Feed Start Time: 12:00  Ibrahima(7 Gm Arginine/7 Gm Glut/1.2 Gm HMB     Qty per Day:  2  No Carb Prosource TF     Qty per Day:  2  Entered: Oct  6 2021 11:44AM    
This patient has been assessed with a concern for Malnutrition and has been determined to have a diagnosis/diagnoses of Severe protein-calorie malnutrition and Underweight (BMI < 19).    This patient is being managed with:   Diet NPO with Tube Feed-  Tube Feeding Modality: Gastrostomy  Jevity 1.5 Adria (JEVITY1.5RTH)  Total Volume for 24 Hours (mL): 1200  Continuous  Starting Tube Feed Rate {mL per Hour}: 50  Until Goal Tube Feed Rate (mL per Hour): 50  Tube Feed Duration (in Hours): 24  Tube Feed Start Time: 12:00  Ibrahima(7 Gm Arginine/7 Gm Glut/1.2 Gm HMB     Qty per Day:  2  Entered: Oct 22 2021  5:34PM    
This patient has been assessed with a concern for Malnutrition and has been determined to have a diagnosis/diagnoses of Severe protein-calorie malnutrition and Underweight (BMI < 19).    This patient is being managed with:   Diet NPO with Tube Feed-  Tube Feeding Modality: Gastrostomy  Jevity 1.5 Adria (JEVITY1.5RTH)  Continuous  Starting Tube Feed Rate {mL per Hour}: 10  Tube Feed Duration (in Hours): 24  Tube Feed Start Time: 17:00  Ibrahima(7 Gm Arginine/7 Gm Glut/1.2 Gm HMB     Qty per Day:  2  Entered: Oct 30 2021  4:53PM    
This patient has been assessed with a concern for Malnutrition and has been determined to have a diagnosis/diagnoses of Severe protein-calorie malnutrition and Underweight (BMI < 19).    This patient is being managed with:   Diet NPO with Tube Feed-  Tube Feeding Modality: Gastrostomy  Jevity 1.5 Adria (JEVITY1.5RTH)  Total Volume for 24 Hours (mL): 1200  Continuous  Starting Tube Feed Rate {mL per Hour}: 50  Until Goal Tube Feed Rate (mL per Hour): 50  Tube Feed Duration (in Hours): 24  Tube Feed Start Time: 12:00  Ibrahima(7 Gm Arginine/7 Gm Glut/1.2 Gm HMB     Qty per Day:  2  Entered: Oct 22 2021  5:34PM    
This patient has been assessed with a concern for Malnutrition and has been determined to have a diagnosis/diagnoses of Severe protein-calorie malnutrition and Underweight (BMI < 19).    This patient is being managed with:   Diet NPO with Tube Feed-  Tube Feeding Modality: Gastrostomy  Jevity 1.5 Adria (JEVITY1.5RTH)  Total Volume for 24 Hours (mL): 900  Continuous  Starting Tube Feed Rate {mL per Hour}: 50  Until Goal Tube Feed Rate (mL per Hour): 50  Tube Feed Duration (in Hours): 18  Tube Feed Start Time: 12:00  Ibrahima(7 Gm Arginine/7 Gm Glut/1.2 Gm HMB     Qty per Day:  2  No Carb Prosource TF     Qty per Day:  2  Entered: Oct  6 2021 11:44AM    
This patient has been assessed with a concern for Malnutrition and has been determined to have a diagnosis/diagnoses of Severe protein-calorie malnutrition and Underweight (BMI < 19).    This patient is being managed with:   Diet NPO with Tube Feed-  Tube Feeding Modality: Gastrostomy  Jevity 1.5 Adria (JEVITY1.5RTH)  Total Volume for 24 Hours (mL): 900  Continuous  Starting Tube Feed Rate {mL per Hour}: 50  Until Goal Tube Feed Rate (mL per Hour): 50  Tube Feed Duration (in Hours): 18  Tube Feed Start Time: 12:00  Ibrahima(7 Gm Arginine/7 Gm Glut/1.2 Gm HMB     Qty per Day:  2  No Carb Prosource TF     Qty per Day:  2  Entered: Oct  6 2021 11:44AM    
This patient has been assessed with a concern for Malnutrition and has been determined to have a diagnosis/diagnoses of Severe protein-calorie malnutrition and Underweight (BMI < 19).    This patient is being managed with:   Diet NPO with Tube Feed-  Tube Feeding Modality: Gastrostomy  Jevity 1.5 Adria (JEVITY1.5RTH)  Total Volume for 24 Hours (mL): 1200  Continuous  Starting Tube Feed Rate {mL per Hour}: 50  Until Goal Tube Feed Rate (mL per Hour): 50  Tube Feed Duration (in Hours): 24  Tube Feed Start Time: 12:00  Ibrahima(7 Gm Arginine/7 Gm Glut/1.2 Gm HMB     Qty per Day:  2  Entered: Oct 22 2021  5:34PM    
This patient has been assessed with a concern for Malnutrition and has been determined to have a diagnosis/diagnoses of Severe protein-calorie malnutrition and Underweight (BMI < 19).    This patient is being managed with:   Diet NPO with Tube Feed-  Tube Feeding Modality: Gastrostomy  Jevity 1.5 Adria (JEVITY1.5RTH)  Total Volume for 24 Hours (mL): 900  Continuous  Starting Tube Feed Rate {mL per Hour}: 50  Until Goal Tube Feed Rate (mL per Hour): 50  Tube Feed Duration (in Hours): 18  Tube Feed Start Time: 12:00  Ibrahima(7 Gm Arginine/7 Gm Glut/1.2 Gm HMB     Qty per Day:  2  No Carb Prosource TF     Qty per Day:  2  Entered: Oct  6 2021 11:44AM    
This patient has been assessed with a concern for Malnutrition and has been determined to have a diagnosis/diagnoses of Severe protein-calorie malnutrition and Underweight (BMI < 19).    This patient is being managed with:   Diet NPO with Tube Feed-  Tube Feeding Modality: Gastrostomy  Jevity 1.5 Adria (JEVITY1.5RTH)  Total Volume for 24 Hours (mL): 900  Continuous  Starting Tube Feed Rate {mL per Hour}: 50  Until Goal Tube Feed Rate (mL per Hour): 50  Tube Feed Duration (in Hours): 18  Tube Feed Start Time: 12:00  Ibrahima(7 Gm Arginine/7 Gm Glut/1.2 Gm HMB     Qty per Day:  2  No Carb Prosource TF     Qty per Day:  2  Entered: Oct  6 2021 11:44AM

## 2021-11-04 NOTE — PROGRESS NOTE ADULT - PROBLEM SELECTOR PROBLEM 2
Bacteremia due to Klebsiella pneumoniae
Hypernatremia
Bacteremia due to Klebsiella pneumoniae
Candidemia
Hypernatremia
Bacteremia due to Klebsiella pneumoniae
Hypernatremia
Hypernatremia
Bacteremia due to Klebsiella pneumoniae
Bacteremia due to Klebsiella pneumoniae

## 2021-11-04 NOTE — PROGRESS NOTE ADULT - PROBLEM SELECTOR PROBLEM 9
PEG tube malfunction
Pneumonia, aspiration
PEG tube malfunction
PEG tube malfunction
Pneumonia, aspiration
PEG tube malfunction
Prophylactic measure
PEG tube malfunction
PEG tube malfunction
Pneumonia, aspiration
PEG tube malfunction
Prophylactic measure
PEG tube malfunction

## 2021-11-04 NOTE — PROGRESS NOTE ADULT - PROVIDER SPECIALTY LIST ADULT
Infectious Disease
MICU
Nephrology
Wound Care
Hospitalist
Infectious Disease
MICU
Nephrology
Nephrology
Neurosurgery
Wound Care
Hospitalist
Infectious Disease
MICU
MICU
Nephrology
Neurosurgery
Wound Care
Infectious Disease
Infectious Disease
MICU
Nephrology
Nephrology
Neurosurgery
Hospitalist
Neurosurgery
Palliative Care
Hospitalist
Internal Medicine
Hospitalist
Internal Medicine
Hospitalist

## 2021-11-04 NOTE — PROGRESS NOTE ADULT - PROBLEM SELECTOR PLAN 1
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 DDX of imaging metastases, glioblastoma and lymphoma. Comatose  -Family initially wanted to pursue more aggressive mgmt. -LAITH was tentatively planning to do biopsy vs debulking but prior physician discussed with daughter Brooklynn 10/9 and she would now like to pursue more conservative/medical management and not go with any surgery/biopsy   -C/w dexamethasone; c/w increased 8mg IV q8h for brain edema per neuro onco, d/w nsgy 10/28 will provide recs on decadron taper   -Na goal 145-155 per LAITH. Monitor CBC and BMP   - Na 154, increased free water 300cc Q6h for 24 hrs, unable to get labs this morning due to severe anasarca, will try again with phlebotomy   -F/u neuro onco and LAITH recs.  -C/w keppra 500mg BID for seizure ppx, dw nsgy  -Ongoing GOC discussions with family, Dtr Brooklynn sign MOLST form patient is now DNR/DNI

## 2021-11-04 NOTE — PROGRESS NOTE ADULT - PROBLEM SELECTOR PLAN 11
-Hold pharma dvt ppx for now given recent hgb downtrend and pos blood in gastric lavage and hemorrhagic brain mass.   - Hospice referral sent, daughter agreeable  - Pt to stay till 11/8 to complete caspofungin, follow up with hospice paperwork, now DNR/DNI

## 2021-11-04 NOTE — PROGRESS NOTE ADULT - PROBLEM SELECTOR PLAN 8
-C/w hydralazine 50mg TID for now.
GI replaced PEG  UGIS okay, PEG now in use
-C/w hydralazine 50mg TID for now.
on abx as above
-C/w hydralazine 50mg TID for now.
replaced PEG  f/u GI series to use
20 min on phone with daughter Brooklynn discussing prognosis and plan of care  she is reassured by small improvements in interactivity and in improving wbc/sodium  counselled overall poor prognosis in setting of brain mass with plan in place to avoid biopsy and definitive diagnosis  eventually hospice will need to be discussed in more detail - rapport building  palliative re-consulted
on abx as above
-C/w hydralazine 50mg TID for now.
PEG was found dislodged - unclear if there is remnant in abdomen  GI eval  tract looks clean  change meds to IV
-C/w hydralazine 50mg TID for now.

## 2021-11-04 NOTE — PROGRESS NOTE ADULT - NSPROGADDITIONALINFOA_GEN_ALL_CORE
d/w Medicine VLAD Satnos
Palliative speaking with family, not agreeable with hospice yet
d/w Medicine ACP Rody
I had a long discussion with the patient's daughter Brooklynn today. She was updated on the patient's clinical status and current plan of care. She understands that he is acutely ill, and that he has a polymicrobial infection with both a bacterial infection that has been treated, as well as a fungal infection. I discussed with her that as long as his wounds remain unhealed he will always be at risk of being reinfected, and that the prognosis is very poor given his brain mass and his general clinical condition. She understands this but is not ready to make a decision at this point. However she will think it over, and is open to speaking with the palliative team this upcoming week.     Tube feeds were restarted today, and hypoglycemia has improved. Continue with gentle diuresis as needed. It has been very difficult to draw blood given the anasarca. Can consider getting a PICC if cleared by ID, however, given recent bacteremia will have to be very cautious regarding this.
Mani Pierson MD  Division of [facility] Medicine  Cell: [phone]  Pager: [phone]  Office: [phone]
d/w Medicine VLAD Tubbs
d/w Medicine ACP Zeta
d/w Medicine ACP Zeta
Dw daughter 10/27 updated on full plan of care [phone]
D/w daughter Brooklynn multiple times this week updated on full plan of care
d/w Medicine VLAD Arizmendi
d/w Medicine VLAD Tubbs
d/w Medicine ACP Claudia
d/w Medicine VLAD Arizmendi
D/w daughter Brooklynn today 10/29 updated on full plan of care

## 2021-11-04 NOTE — PROGRESS NOTE ADULT - PROBLEM SELECTOR PROBLEM 5
Hypertension
Hypertension
Transaminitis
Anemia
Transaminitis
Encounter for palliative care
Transaminitis
Anemia
Anemia
Transaminitis
Transaminitis
Hypertension
Transaminitis
Transaminitis
Anemia
Transaminitis
Anemia
Transaminitis
Hypertension
Transaminitis
Transaminitis

## 2021-11-04 NOTE — PROGRESS NOTE ADULT - PROBLEM SELECTOR PLAN 4
c. auris fungemia  c/w caspofungin  c/w isolation
c. auris fungemia  c/w caspofungin. ID following  c/w isolation
c. auris fungemia  c/w caspofungin. ID following, to be continued for 1 month   c/w isolation
c. auris fungemia  c/w caspofungin. ID following (f/u with ID regarding duration)  c/w isolation
c. auris fungemia    f/u ID  isolation    portends quite dismal prognosis
c. auris fungemia  c/w caspofungin  c/w isolation
c. auris fungemia  c/w caspofungin. ID following, to be continued for 1 month   c/w isolation
c. auris fungemia  capsofungin    isolation    portends quite dismal prognosis
c. auris fungemia  caspofungin    isolation    portends quite dismal prognosis
-H/o CVA s/p PEG.   -On tube feeds.   -C/w NPH 6 units q6h and JOHN q6h.
-H/o CVA s/p PEG.   -On tube feeds.   -C/w NPH 6 units q6h and JOHN q6h.
KPS 10% needs assistance with all basic needs
c. auris fungemia  c/w caspofungin  c/w isolation
c. auris fungemia  c/w caspofungin. ID following  c/w isolation
c. auris fungemia  c/w caspofungin. ID following, to be continued for 1 month   c/w isolation
-H/o CVA s/p PEG.   -On tube feeds.   -C/w NPH 6 units q6h and JOHN q6h.
c. auris fungemia  c/w caspofungin. ID following, to be continued for 1 month   c/w isolation
-H/o CVA s/p PEG.   -On tube feeds.   -C/w NPH 6 units q6h and JOHN q6h.
c. auris fungemia  c/w caspofungin. ID following  c/w isolation
c. auris fungemia  caspofungin    isolation    portends quite dismal prognosis
c. auris fungemia  c/w caspofungin. ID following  c/w isolation
c. auris fungemia  capsofungin    isolation    portends quite dismal prognosis
c. auris fungemia  c/w caspofungin. ID following, to be continued for 1 month   c/w isolation
c. auris fungemia  c/w caspofungin  c/w isolation
c. auris fungemia  c/w caspofungin. ID following, to be continued for 1 month   c/w isolation

## 2021-11-08 LAB — DNA PLOIDY SPEC FC-IMP: SIGNIFICANT CHANGE UP

## 2022-02-08 NOTE — PROGRESS NOTE ADULT - SUBJECTIVE AND OBJECTIVE BOX
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 Hx obtained from daughter. She noticed breathing sounds like he was snoring and called EMS.  No sick contact. No diarrhea.     In the ED, VSS. Labs significant for Na 110, CO2 14, lactate 2.5. CXR w/ b/l patchy opacities R>L c/w PNA. 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 MICU consulted for hyponatremia. (30 Sep 2021 01:07)      Imaging: CTH R sided significant edema with some hyperdensity and shift, c/f mass vs venous infarct.     Exam: EOS, pupils 3R, minimal movement       --Anticoagulation:    =====================  PAST MEDICAL HISTORY   Spinal stenosis    Diabetes    Benign essential HTN    Gallstones      PAST SURGICAL HISTORY   S/P cataract surgery          MEDICATIONS:  Antibiotics:  piperacillin/tazobactam IVPB.. 3.375 Gram(s) IV Intermittent every 8 hours    Neuro:  fentaNYL   Infusion... 0.5 MICROgram(s)/kG/Hr IV Continuous <Continuous>  levETIRAcetam  IVPB 500 milliGRAM(s) IV Intermittent every 12 hours  propofol Infusion 10 MICROgram(s)/kG/Min IV Continuous <Continuous>    Other:  sodium chloride 3%. 500 milliLiter(s) IV Continuous <Continuous>      SOCIAL HISTORY:   Occupation:   Marital Status:     FAMILY HISTORY:      ROS: Negative except per HPI    Exam: Intubated, GHULAM, UE minimal movement, BLE trace withdrawal     p (1480)     HPI:  80yM PMH of gallstones (30-40 years ago), BPH, HLD, h/o spinal stenosis, CVA (nonverbal, AAOx0 at baseline) s/p PEG,  recent PNA, penile infection (?abscess) &  sacral wound on antibiotic with PICC line (cefepime 2gq8 8/20-9/28; doxycycline 8/20---), presenting with cough for a week ago but worsened today after daughter attempted to give him ensure by mouth. Hx obtained from daughter. She noticed breathing sounds like he was snoring and called EMS.  No sick contact. No diarrhea.     In the ED, VSS. Labs significant for Na 110, CO2 14, lactate 2.5. CXR w/ b/l patchy opacities R>L c/w PNA. 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 MICU consulted for hyponatremia. (30 Sep 2021 01:07)      Imaging: CTH R sided significant edema with some hyperdensity and shift, c/f mass vs venous infarct.     Exam: Extubated, LOGAN, pupils 2 sluggish, very trace mvmt in AE      --Anticoagulation:    =====================  PAST MEDICAL HISTORY   Spinal stenosis    Diabetes    Benign essential HTN    Gallstones      PAST SURGICAL HISTORY   S/P cataract surgery          MEDICATIONS:  Antibiotics:  piperacillin/tazobactam IVPB.. 3.375 Gram(s) IV Intermittent every 8 hours    Neuro:  fentaNYL   Infusion... 0.5 MICROgram(s)/kG/Hr IV Continuous <Continuous>  levETIRAcetam  IVPB 500 milliGRAM(s) IV Intermittent every 12 hours  propofol Infusion 10 MICROgram(s)/kG/Min IV Continuous <Continuous>    Other:  sodium chloride 3%. 500 milliLiter(s) IV Continuous <Continuous>      SOCIAL HISTORY:   Occupation:   Marital Status:     FAMILY HISTORY:      ROS: Negative except per HPI    Exam: Intubated, GHULAM, UE minimal movement, BLE trace withdrawal     Detail Level: Simple Initiate Treatment: Washing twice daily with non soap cleanser \\nKutaryaxm Emulsion cream on clean dry face nightly \\nOil free moisturizer can be applied after topical \\nTopical can be applied to hands nightly as well

## 2023-09-01 NOTE — PROGRESS NOTE ADULT - PROBLEM SELECTOR PLAN 7
41 y/o hyperthyroidism 2/2 to Graves disease, MDD/anxiety, Afib who was admitted as a transfer from [place] for heavy vaginal bleeding after transfusion of 10uPRBC, 1uPlts, 2uFFP, TXA 1g x2. Now s/p UAE with IR on 8/30. Medicine consulted for medical management. 
plts 113, could be d/t chronic liver dz though no evid of cirrhosis on imaging  - continue to monitor
plts 113, could be d/t chronic liver dz though no evid of cirrhosis on imaging  -obtain b/l labs from outpt PCP during weekday
#  IMPROVE Score of 2  -SCDs until ERCP  -Diet liquid CC, DASH/TLC

## 2023-10-05 NOTE — PROGRESS NOTE ADULT - ASSESSMENT
INITIAL PSYCHIATRIC EVALUATION      PATIENT:  Lucinda Woods    MEDICAL RECORD NUMBER:  [id number]  YOB: 1971  AGE: 52 year old    Adult Virtual: This visit was performed via live interactive two-way Video visit with patient's verbal consent. Clinician Location:Home. Patient Location: Home.. Patient's identity was verified. The Consent for Treatment, which includes patient rights and the grievance procedure, was reviewed and signed by patient or legal representative as part of the pre check in process for their virtual appointment today. The Consent was reviewed verbally with the patient and/or legal representative by this provider and any concerns or questions were addressed. Information including the Missed Appointment Agreement, After Hours contact information, and Fee Schedule was sent to the patient via their secure patient portal for reference after the appointment.     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  At her last visit with Elda, approx 3.5 months ago, Lucinda reported her partner had his trial and sentencing; charged with a felony harm to a child and would be incarcerated for 9 months with boss privileges.  Overall, she had felt as though she was coping relatively well; still had some anxiety and worried quite a bit about her situation as well as her partner's; had difficulty with focus and attention due to anxiety.  She felt the recent increase of sertraline was helpful but  anxiety was still pretty high and after treatment options were discussed, she elected to trial Buspar 5 mg BID and to continue sertraline 150 mg daily.  She was also using hydroxyzine at night for racing thoughts.  In terms of ADHD symptoms, she was instructed to continue the 40 mg dose, as historical dose increases led to feeling more jittery and shaky.  She was also due for her annual UDS, which was ordered; was established with a therapist and intended to continue.       Recently, mood has been \"happy\" because I babysit two days/week and she is anticipating her first grandchild in November.  Appetite has been the same; denies any significant weight loss or gain since the 12 lb weight loss last year.  Energy has been \"getting better\".  Sleep has been better; denies any issues with initial or middle insomnia.  Concentration has been variable.  Denies anhedonia; crocheting, puzzles, coloring, taking care of kids and spending time with her daughter.  Denies suicidal ideation, with no intent, with no plan.  Denies homicidal ideation, intent or plan.     The patient reports a past history of depression starting in \"probably when my mom passed away 20 years ago\" and consisting of past symptoms of dysphoria, irritability, poor appetite, low energy, poor sleep, poor concentration and anhedonia, lasting \"it depends what's going on at the time\"; days \"it's not there constantly. PHQ-9 indicates mild depression, and the patient rated depression 5-6/10 today; noting it's tolerable.  Lucinda reported the last dose increase of sertraline has been working really well and she sees a therapist weekly.  Recent triggers include dad's passing in Nov '22 and her 's incarceration.    Psychotic Symptoms:  None currently or in the past.     Manic symptoms:  denies prior and current symptoms.    Generalized anxiety:  patient has had excessive anxiety/worry for at least 6 months \"forever\", which is difficult to control, causes distress  or impairment and is associated with at least 3 symptoms including: restlessness, feeling keyed up or on edge, easily fatigued, difficulty concentrating, irritability, muscle tension and sleep disturbance.  On a scale from one to ten (10 being the worst), the patient rates anxiety at a 7-8/10 and JULIO-7 indicates moderate anxiety today; triggered by daughter's upcoming ultrasound to be \"check the growth of the baby and the placenta\"; feel sertraline has also been helpful for anxiety but it could be better and she was instructed to take the Buspar consistently not PRN.  Also offered to increase to TID and she preferred to wait and see effects of current dose first.    Panic disorder:  denies current or history of symptoms.     Social anxiety:  denies symptoms.    Obsessive-Compulsive Disorder:  denies symptoms.    Attention deficit disorder: Diagnosed with ADHD-C in second grade; diagnosed by Dr. Camarillo (\"my psychologist through my dad's insurance when I was a kid\")      Posttraumatic stress disorder: Denies    Autism: Denies    Eating disorder symptoms: denies eating disorder symptoms    Adjustment disorder symptoms: denies any adjustment disorder symptoms    Personality symptoms: denies personality symptoms    Insomnia symptoms: Reported initial and middle insomnia, especially during times of high anxiety and stress; for the last 7-10 years \"this stuff has been going on with Manoj\"    Patient's strengths:  Patient's view: 'I am very friendly, nurturing and caring; I like people and I like to talk; I love to take care of kids\"    Patient's weaknesses:  Patient's view: \"wording things\"    PHQ 9 and JULIO 7 reviewed with patient in the appointment today.     Last four PHQ 2/9 Test Results  0: Not at all  1: Several days  2: More than half the days  3: Nearly every day          10/6/2023    12:53 PM 5/9/2023    10:52 AM 11/28/2022     9:15 AM 4/27/2022     1:21 PM   PHQ 2 Score   Adult PHQ 2 Score 1 3 2 4   Adult PHQ 2  Interpretation No further screening needed Further screening needed No further screening needed Further screening needed   Little interest or pleasure in activity? 1 2 1 2         10/6/2023    12:53 PM 5/9/2023    10:52 AM 11/28/2022     9:15 AM 4/27/2022     1:21 PM   PHQ 9 Score   Adult PHQ 9 Score 7 6 10 10   Adult PHQ 9 Interpretation Mild Depression Mild Depression Moderate Depression Moderate Depression           Last four GAD7 Assessments           10/6/2023     1:00 PM 5/9/2023    11:00 AM 11/28/2022    11:00 AM 4/27/2022     1:00 PM   GAD7 Screening   GAD7 Score 14 7 20 11   Feeling nervous, anxious or on edge More than half the days Several days Nearly every day More than half the days   Not being able to stop or control worrying Several days Several days Nearly every day More than half the days   Worrying too much about different things Several days Several days Nearly every day More than half the days   Trouble relaxing Nearly every day Several days Nearly every day More than half the days   Being so restless that it's hard to sit still Nearly every day Several days Nearly every day Several days   Becoming easily annoyed or irritable Nearly every day Several days More than half the days Several days   Feeling afraid as if something awful might happen Several days Several days Nearly every day Several days   Ability to handle work, home and other people Somewhat difficult Somewhat difficult Somewhat difficult Somewhat difficult   Date/time completed by patient via [employer] 10/6/2023 12:51 PM    10/6/2023 12:53 PM 5/9/2023 10:50 AM    5/9/2023 10:52 AM 11/28/2022  9:11 AM    11/28/2022  9:15 AM 4/27/2022  1:19 PM    4/27/2022  1:21 PM        REVIEW OF SYSTEMS:   Constitutional:  Denies fever.              Integumentary:  Denies rash.   Ears, Nose, Throat:  Denies rhinorrhea and sore throat.                     Respiratory:  Denies cough.  Gastrointestinal:  Denies nausea, vomiting and diarrhea.          Cardiovascular:  Denies chest pain, palpitations and shortness of breath.  Musculoskeletal:  Denies pain.   Neurological:  Denies headache and weakness.       Urological:  Denies painful urination.         ALLERGIES:   Allergen Reactions   • Iodine   (Environmental Or Med) Other (See Comments)     tingling       Current Outpatient Medications   Medication Sig   • lisdexamfetamine (VYVANSE) 40 MG capsule Take 1 capsule by mouth every morning. Do not start before September 13, 2023.   • traZODone (DESYREL) 50 MG tablet Take one to three tabs at night as needed for sleep.   • busPIRone (BUSPAR) 5 MG tablet Take 1 tablet by mouth in the morning and 1 tablet in the evening.   • sertraline (ZOLOFT) 100 MG tablet Take 1.5 tablets by mouth daily.   • hydrOXYzine (ATARAX) 25 MG tablet Take half a tab to 1 tabs up to three times per day as needed for anxiety.   • SUMAtriptan (IMITREX) 50 MG tablet Take 1 tablet by mouth at onset of migraine. May repeat after 2 hours if needed.   • ProAir  (90 Base) MCG/ACT inhaler Inhale 2 puffs into the lungs every 4 hours as needed (as needed).   • tiZANidine (ZANAFLEX) 2 MG tablet Take 1 tablet by mouth every 6 hours as needed for Muscle spasms.   • lidocaine (LIDODERM) 5 % patch Place 1 patch onto the skin every 24 hours. Remove patch 12 hours after applying   • cetirizine (ZyrTEC) 10 MG tablet Take 1 tablet by mouth daily.   • clobetasol (TEMOVATE) 0.05 % ointment Daily application to area x 4 weeks, tapering to alternative days for 4 weeks, followed by 4 weeks of twice weekly application.   • omeprazole (PriLOSEC) 40 MG capsule Take 40 mg by mouth 2 times daily.   • Unable to Find Medication 1 time. Amberen for hot flashes takes 2 a day   • cyclobenzaprine (FLEXERIL) 10 MG tablet Take 1 tablet by mouth 3 times daily as needed for Muscle spasms.   • Aspirin-Acetaminophen-Caffeine (EXCEDRIN MIGRAINE PO) Take by mouth as needed.   • diclofenac (VOLTAREN) 1 % gel Apply topically 4  times daily. Apply to the affected area 4 times a day.     No current facility-administered medications for this visit.          5/28/2023  7:36 PM 5/28/2023  8:00 PM 5/28/2023  9:00 PM 5/28/2023  9:30 PM   Vitals       SYSTOLIC 179 !  146 !  148 !  151 !    DIASTOLIC 92 (H)  86 !  83 !  94 (H)    Heart Rate 88  86  83  77    Temp 99.2 °F (37.3 °C)       Resp 18  18  18  18    Weight kg       Height       BMI (Calculated)       Weight - Patient Reported       Height - Patient Reported       BMI kg/m2          5/28/2023  9:33 PM   Vitals    SYSTOLIC 150 !    DIASTOLIC 93 (H)    Heart Rate 72    Temp    Resp 18    Weight kg    Height    BMI (Calculated)    Weight - Patient Reported    Height - Patient Reported    BMI kg/m2       Legend:  ! Abnormal  (H) High    There were no vitals taken for this visit.    LABS:  TSH (mcUnits/mL)   Date Value   10/14/2020 0.611     No results found for: \"T4FREE\"      Cholesterol (mg/dL)   Date Value   10/14/2020 192                                     HDL (mg/dL)   Date Value   10/14/2020 68                                                Cholesterol/ HDL Ratio (no units)   Date Value   10/14/2020 2.8     Triglycerides (mg/dL)   Date Value   10/14/2020 58     LDL (mg/dL)   Date Value   10/14/2020 112         Glucose (mg/dL)   Date Value   10/16/2021 110 (H)         No results found for: \"HGBA1C\"      MEDICAL HISTORY:  Patient Active Problem List    Diagnosis Date Noted   • Moderate episode of recurrent major depressive disorder (CMD) 05/25/2022     Priority: Low   • Generalized anxiety disorder 04/27/2022     Priority: Low   • History of breast cancer 03/14/2019     Priority: Low     12/2016     • Chronic neck pain 03/14/2019     Priority: Low   • Gastroesophageal reflux disease 03/14/2019     Priority: Low   • Abnormal uterine bleeding 03/14/2019     Priority: Low   • Insomnia due to mental condition      Priority: Low   • Degenerative disc disease, cervical 04/22/2018     Priority:  Low   • Chronic headache 06/28/2017     Priority: Low   • Cutaneous skin tags 05/25/2017     Priority: Low     neck     • Environmental allergies 05/20/2017     Priority: Low   • Vitamin D deficiency 05/17/2017     Priority: Low   • Anxiety 05/03/2017     Priority: Low   • Attention deficit hyperactivity disorder (ADHD), combined type 04/24/2017     Priority: Low     Associated impulsivity and \"anger issues\"     • Status post right mastectomy 04/02/2017     Priority: Low   • Depression 04/02/2017     Priority: Low   • Cervicalgia 04/02/2017     Priority: Low   • Abnormal Pap smear of cervix 06/06/2019     S/P colposcopy; LUCÍA I; repeat pap with cotesting at 12 months         PAST SURGICAL HISTORY:    TUBAL LIGATION                                  05/10/2017      Comment: Complete bilateral salpingectomies, all anatomy               is seen was normal    BREAST RECONSTRUCTION                           03/02/2017      Comment: Right direct implant breast reconstruction with               revision and drainage placement    BREAST SURGERY                                  02/01/2017      Comment: mastectomy    CHOLECYSTECTOMY                                 1995          ----------MAMMOGRAM----------                   12/05/2018    BREAST SURGERY                                  11/05/2020      Comment: fat graft to right breast    ENDOMETRIAL ABLATION                                          BREAST SURGERY                                  11/2021         Comment: RIGHT BREAST REVISION- RECONSTRUCTION WITH FAT                TRANSFER (Dr. Johnson- [place])    OPEN ACCESS COLONOSCOPY                                         Comment: colon polp in aug 2020 per patient    ESOPHAGOGASTRODUODENOSCOPY TRANSORAL FLEX W/BX*                 Comment: erosive gastropathy    FAMILY HISTORY:  Family History   Problem Relation Age of Onset   • Cancer Mother         stomach   • COPD Mother         emphysema   • Diabetes Father    •  Hypertension Father    • Cancer Father         colon   • Hypertension Sister    • Cancer Brother         1 brother bladder cancer - bladder CA   • Hypertension Brother         all brothers have HTN   • Cancer Paternal Grandmother         ovarian   • Patient is unaware of any medical problems Daughter    • Cancer, Pancreatic Paternal Uncle        SOCIAL HISTORY:  Social History     Socioeconomic History   • Marital status: Single   • Number of children: 1   • Highest education level: Some college, no degree   Occupational History   • Occupation: fulltime student   Tobacco Use   • Smoking status: Former     Current packs/day: 0.00     Types: Cigarettes     Quit date: 2017     Years since quittin.7   • Smokeless tobacco: Never   • Tobacco comments:     on/off smoker since 9 yoa; smoked about 1/3 ppd when smoking   Substance and Sexual Activity   • Alcohol use: Yes     Comment: twice a week, 3 drinks   • Drug use: Yes     Frequency: 3.0 times per week     Types: Marijuana     Comment: thc edibles   • Sexual activity: Yes     Partners: Male     Birth control/protection: Surgical     Social Determinants of Health     Intimate Partner Violence: Not At Risk (3/12/2023)    Intimate Partner Violence    • Social Determinants: Intimate Partner Violence Past Fear: No    • Social Determinants: Intimate Partner Violence Current Fear: No        Patient grew up in [place] living with bio parents. Patient describes childhood as \"fun\". Patient has 5 siblings. Patient's education level includes diploma for Early Childhood Education & Development. Patient currently does part-time work; hasn't needed to work since  and  has been the larger financial contributor. Patient has been in a relationship with Manoj for 14 years. Patient has 1 daughter who is 29 y/o and they have a great relationship. Patient's hobbies include crocheting, puzzles, talking to people, coloring, taking care of children, playing games. Patient's  largest support network involves my daughter. Patient has experienced no history of physical abuse. Patient has experienced no history of mental/emotional abuse. Patient has experienced no history of sexual abuse. Patient negative for legal history. Alcohol use: 2-4 [place] 1-3 days/week. Patient does not smoke. Patient uses Delta 8 gummies and chocolate on the weekends \"mostly\".  Denies any past substance use disorders.  Patient drinks one cup of \"1/2 caf\" coffee in the morning and no other caffeinated beverages throughout the day.        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  Previous Psychiatrist: Elda Swift  Previous Psychiatric Hospitalizations: Denies  Previous Suicide Attempts: Denies  Self-Injurious Behavior: denies    Past Psychiatric Medication Trials: Vyvanse, nortriptyline (headaches), Strattera, fluoxetine, topiramate, alprazolam, gabapentin, paxil, bupropion (aggression)     FAMILY MENTAL HEALTH HISTORY:  Patient states there is a history of suicide in the family. (Uncle)  Mom: anxiety, alcoholism  Daughter: ADHD, depression, anxiety; recovering addict (heroin)  Dad: anxiety and depression  Brother: anxiety         SUICIDE RISK ASSESSMENT   YES NO If yes, describe    x Suicide attempt in last 24 hour?    x Suicidal thoughts?    x Plan or considering various methods? Describe:     x Access to means?  No Specify weapon location     x Indication of substance abuse/dependence?    x Attempts in past?     x  Any family members, loved ones, friends who committed suicide?    x Recent deaths, losses, anniversary dates?    x Has made preparations for death?    x  Lack of support system?       N/A Verbal contract for safety?   x  Patient has no current intent,or plan, but agrees to contact provider if Suicidal Ideation arises.   x  Patient given emergency 24 hour access information.        MENTAL STATUS EXAM:  Appearance: [x] well-groomed   []  good eye contact   [x] appears stated age [] poor hygiene  [] disheveled  [] poor eye contact [] appears older than stated age  [] appears younger than stated age [x] other: fair eye contact; moving around quite a bit in the garage    Behavior:  [] calm [x] pleasant   [x] interactive  [x] anxious [] hostile [] apathetic [] irritable [] psychomotor retardation [] other:     Muscle: [x] normal [] abnormal muscle tone/strength  [] normal movements noted  [] abnormal movements noted      Gait: [x] normal  [] slow  [] hyperactive [] garrido [] walker [] wheelchair [] other:    Cooperativity: [x]  cooperative  [x] forthcoming [x] appears reliable  [] guarded [] reliability questionable  [] suspicious [] other:    Speech:  [x] clear/articulate  [] soft  [] loud  []  pressured  []  animated  []  rambling  []  slurred []  poor articulation [] other:     Language: [x] no abnormality noted  [] repetition impaired/naming impaired [] other:     Mood/Feelings:  [x] normal  [] euthymic  [] depressed  [] irritable [x] anxious  []  fearful [] euphoric [] labile [] grief [] paranoia [] panic [] guilt/shame    [] apathy/Indifference  [] jealousy [] helpless [] hopeless [] other:    Affect: [x] mood-congruent [x] stable [] normal range [x] constricted [] flat [] other:    Thought Processes: [x] linear [x] logical []  goal-directed []  tangential   [] circumstantial []  disorganized [] blocking []  flight of ideas []  loose associations [] other:    Thought content: [x]  no overt delusions or abnormality noted [] delusions []  poverty of thought []  Preoccupations [] other:     Perception: [x]  no auditory visual hallucinations []  no auditory hallucinations  []  no visual hallucinations []  auditory hallucinations []  visual hallucinations []  responding to internal stimuli  [] other:    Consciousness: [x] awake [x] alert [] drowsy [] other:    Recent memory: [x] good [] fair [] poor [] other:     Remote memory: [x] good [] fair [] poor [] other:     Fund of knowledge: [x] consistent with education and experiences as evidenced by vocabulary [] impaired [] other:     Attention: [] good [x] fair [] poor [] other:     Concentration: [x] good [] fair [] poor [] other:     Insight: [] good  [x] fair [] poor [] other:     Judgement: [] good [x] fair [] poor [] other:     Motivation to pursue treatment: [x] good [] fair [] poor [] other:     Level of engagement: [x] open [] guarded [] resistant [] other:        ASSESSMENT   Lucinda Woods  is a 52 year old female with a PMHx of a vitamin D deficiency, GERD, BRCA and right mastectomy, chronic neck pain, chronic headaches, cervical DDD and PPHx of MDD, ADHD-C, anxiety and 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 Since her last visit with Elda (approx 3.5 months ago), Lucinda reported overall anxiety and depression have been going very well, specifically depression.  She endorsed long-standing anxiety and depression for as long as she can remember, thinking it became more of a disorder after her mother's passing about 20 years ago.  She was present for her mother's passing and speaks of the memories that cause some sadness but nothing consistent with PTSD.  Depression and anxiety have waxed and waned since that time with subsequent middle and initial insomnia that is triggered by stress and high anxiety, recently due to her father's passing in Nov '22 and the recent allegations made toward her boyfriend with subsequent incarceration.  Lucinda also reported a past history of  ADHD-C diagnosed in second grade; endorsed Ritalin worked very well for but caused her to feel too jittery and shaky; feels she tolerates Vyvanse better and it's been effective at the current dose.  Today's assessment was consistent with moderate anxiety and mild depression with underlying insomnia (better-controlled with trazodone) and ADHD-C.  uLcinda felt overall anxiety could be better and was instructed to take Buspar consistently versus prn for optimal effects.  She will give that a try and we will follow-up in 6 weeks; sooner if needed.  She also drinks alcoholic seltzers 2-3x/week (not in excess) and uses Delta 8 gummies and chocolate on weekends.  She denied any excessive caffeine use and tries to stay busy taking care of kids and maintaining a part-time job in retail.  The use of Delta 8 can also contribute to higher anxiety and she was informed of the same, but is not ready to abstain at this time; and that was the only substance found in her recent UDS.  Will continue all other medications at this time, and she should continue regular therapy visits with the therapist she's been established with since 2010.        DIAGNOSES  1. Generalized anxiety disorder    2. 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 Attention deficit hyperactivity disorder (ADHD), combined type    4. Insomnia due to mental condition        PLAN  1. Continue current medications.  2. Take Buspar twice daily consistently  3. Continue regular follow-ups with therapy  4. Continue with anxiety and mood coping skills.  5. Utilize proper sleep hygiene and sleep routine.  6. Continue to follow-up with other healthcare providers for other chronic health conditions.  7. If medication refills are required between visits the medications can be refilled until next appt.  8. Return in 6 weeks; sooner if needed     Treatment plan and consent signed: Sent 10/06/23  Medication consent signed for new medication: Sent  10/06/23  Controlled substance contract signed (Vyvanse): Sent 10/06/23  Last urine drug screen: Sept '23; positive for MJ metabolites  Next urine drug screen due: Sept '24  Medication labs (lipid panel, TSH, A1c/glucose): Not needed   AIMS: Not needed     Discussed risks, benefits and alternatives of medications especially:  [x]  Increased risk of suicide  [] metabolic risks such as increased weight gain, diabetes and hyperlipidemia; extrapyramidal side effects; and tardive dyskinesia  [x] increased risk of seizures  [x] increased risk of drug dependence [x]  legal risks  [x] increased risk of death when mixed with alcohol or other depressants  [] Jaramillo-Del syndrome  [x] Cardiac risks  [] Other:     Psychiatric Medications:  Sertraline 150 mg daily  Hydroxyzine 25 mg t.i.d. p.r.n.  Vyvanse 40 mg daily    Trazodone 50 mg 1-3 tabs q.h.s. p.r.n. (2 tabs/night)  Buspirone 5 mg b.i.d.    Cyclobenzaprine 10 mg TID PRN by another prescriber    No orders of the defined types were placed in this encounter.      Nataly Lao, RABIA-BC       80M PMH gallstones (30-40 yrs ago), BPH, HLD, spinal stenosis, CVA, s/p PEG, recent PNA, penile infxn (?abscess) and sacral wound on abx w/ PICC (cefepime 2gq8h 8/20-9/28; doxy 8/20-?), presented w/ worsening cough x 1 wk after daughter attempted to give ensure by mouth. Daughter noticed "snoring" breath sounds and called EMS. Denies sick contact, diarrhea. VSS in ED, Labs significant at that time for Na 110, CO2 14, lactate 2.5. CXR w/ b/l patchy opacities R>L c/w PNA. 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 CTAP w/ multifocal PNA. Iso worsening mental status pt intubated in ED and transferred to MICU.     MICU Course: Empiric abx for multifocal PNA presumed 2/2 aspiration, completed Zosyn course. Placed on levophed iso hypotension, eventually weaned 10/2, sedation weaned as well. 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 Neurosg on board, neuroonc c/s, results of tumor board and discussion w/ family were to proceed w/ biopsy to further evaluate lymphoma vs glioblastoma multiforme vs mets - however now family prefers more conservative approach. Pt extubated 10/6 weaned to RA w/ adequate SpO2%. Iso HTN pt initiated on hydralazine po 50mg TID. Tolerating PEG feeds. Iso steroid use pt requiring NPH insulin q6h titrated to 6 however pt intermittently w/ NPH held iso borderline FS. C/f occult blood from gastric lavage, placed on PPI, Hgb slowly downtrended however stabilized.     Also with CRE Klebsiella Bacteremia and C Auris Bacteremia/Fungemia in addition to above